# Patient Record
Sex: MALE | Race: WHITE | NOT HISPANIC OR LATINO | Employment: OTHER | URBAN - NONMETROPOLITAN AREA
[De-identification: names, ages, dates, MRNs, and addresses within clinical notes are randomized per-mention and may not be internally consistent; named-entity substitution may affect disease eponyms.]

---

## 2018-07-02 ENCOUNTER — APPOINTMENT (OUTPATIENT)
Dept: CT IMAGING | Facility: HOSPITAL | Age: 63
End: 2018-07-02

## 2018-07-02 ENCOUNTER — APPOINTMENT (OUTPATIENT)
Dept: GENERAL RADIOLOGY | Facility: HOSPITAL | Age: 63
End: 2018-07-02

## 2018-07-02 ENCOUNTER — HOSPITAL ENCOUNTER (INPATIENT)
Facility: HOSPITAL | Age: 63
LOS: 12 days | Discharge: HOME HEALTH | End: 2018-07-14
Attending: FAMILY MEDICINE | Admitting: INTERNAL MEDICINE

## 2018-07-02 DIAGNOSIS — J69.0 ASPIRATION PNEUMONIA OF LEFT LUNG, UNSPECIFIED ASPIRATION PNEUMONIA TYPE, UNSPECIFIED PART OF LUNG (HCC): Primary | ICD-10-CM

## 2018-07-02 DIAGNOSIS — R65.10 SIRS (SYSTEMIC INFLAMMATORY RESPONSE SYNDROME) (HCC): ICD-10-CM

## 2018-07-02 DIAGNOSIS — R13.12 OROPHARYNGEAL DYSPHAGIA: ICD-10-CM

## 2018-07-02 DIAGNOSIS — Z74.09 IMPAIRED FUNCTIONAL MOBILITY, BALANCE, GAIT, AND ENDURANCE: ICD-10-CM

## 2018-07-02 LAB
ALBUMIN SERPL-MCNC: 3.4 G/DL (ref 3.5–5)
ALBUMIN/GLOB SERPL: 0.9 G/DL (ref 1.1–2.5)
ALP SERPL-CCNC: 228 U/L (ref 24–120)
ALT SERPL W P-5'-P-CCNC: 69 U/L (ref 0–54)
ANION GAP SERPL CALCULATED.3IONS-SCNC: 10 MMOL/L (ref 4–13)
ANISOCYTOSIS BLD QL: ABNORMAL
APTT PPP: 41.6 SECONDS (ref 24.1–34.8)
ARTERIAL PATENCY WRIST A: POSITIVE
AST SERPL-CCNC: 60 U/L (ref 7–45)
ATMOSPHERIC PRESS: 751 MMHG
BASE EXCESS BLDA CALC-SCNC: 11 MMOL/L (ref 0–2)
BDY SITE: ABNORMAL
BILIRUB SERPL-MCNC: 0.5 MG/DL (ref 0.1–1)
BODY TEMPERATURE: 37 C
BUN BLD-MCNC: 21 MG/DL (ref 5–21)
BUN/CREAT SERPL: 47.7 (ref 7–25)
CALCIUM SPEC-SCNC: 9.5 MG/DL (ref 8.4–10.4)
CHLORIDE SERPL-SCNC: 90 MMOL/L (ref 98–110)
CO2 SERPL-SCNC: 37 MMOL/L (ref 24–31)
CREAT BLD-MCNC: 0.44 MG/DL (ref 0.5–1.4)
D DIMER PPP FEU-MCNC: 2.35 MG/L (FEU) (ref 0–0.5)
D-LACTATE SERPL-SCNC: 1.6 MMOL/L (ref 0.5–2)
DEPRECATED RDW RBC AUTO: 41.3 FL (ref 40–54)
ERYTHROCYTE [DISTWIDTH] IN BLOOD BY AUTOMATED COUNT: 16.4 % (ref 12–15)
GAS FLOW AIRWAY: 2 LPM
GFR SERPL CREATININE-BSD FRML MDRD: >150 ML/MIN/1.73
GLOBULIN UR ELPH-MCNC: 3.9 GM/DL
GLUCOSE BLD-MCNC: 130 MG/DL (ref 70–100)
HCO3 BLDA-SCNC: 37.5 MMOL/L (ref 20–26)
HCT VFR BLD AUTO: 35.1 % (ref 40–52)
HGB BLD-MCNC: 10.3 G/DL (ref 14–18)
HYPOCHROMIA BLD QL: ABNORMAL
INR PPP: 1.19 (ref 0.91–1.09)
LYMPHOCYTES # BLD MANUAL: 0.3 10*3/MM3 (ref 0.72–4.86)
LYMPHOCYTES NFR BLD MANUAL: 1 % (ref 15–45)
LYMPHOCYTES NFR BLD MANUAL: 2 % (ref 4–12)
Lab: ABNORMAL
MCH RBC QN AUTO: 20.8 PG (ref 28–32)
MCHC RBC AUTO-ENTMCNC: 29.3 G/DL (ref 33–36)
MCV RBC AUTO: 70.9 FL (ref 82–95)
MICROCYTES BLD QL: ABNORMAL
MODALITY: ABNORMAL
MONOCYTES # BLD AUTO: 0.61 10*3/MM3 (ref 0.19–1.3)
NEUTROPHILS # BLD AUTO: 29.57 10*3/MM3 (ref 1.87–8.4)
NEUTROPHILS NFR BLD MANUAL: 97 % (ref 39–78)
NT-PROBNP SERPL-MCNC: 570 PG/ML (ref 0–900)
PCO2 BLDA: 59.2 MM HG (ref 35–45)
PH BLDA: 7.41 PH UNITS (ref 7.35–7.45)
PLATELET # BLD AUTO: 493 10*3/MM3 (ref 130–400)
PMV BLD AUTO: 9.1 FL (ref 6–12)
PO2 BLDA: 61.9 MM HG (ref 83–108)
POIKILOCYTOSIS BLD QL SMEAR: ABNORMAL
POTASSIUM BLD-SCNC: 4.6 MMOL/L (ref 3.5–5.3)
PROCALCITONIN SERPL-MCNC: 0.35 NG/ML
PROT SERPL-MCNC: 7.3 G/DL (ref 6.3–8.7)
PROTHROMBIN TIME: 15.5 SECONDS (ref 11.9–14.6)
RBC # BLD AUTO: 4.95 10*6/MM3 (ref 4.8–5.9)
SAO2 % BLDCOA: 92.4 % (ref 94–99)
SCHISTOCYTES BLD QL SMEAR: ABNORMAL
SMALL PLATELETS BLD QL SMEAR: ABNORMAL
SODIUM BLD-SCNC: 137 MMOL/L (ref 135–145)
TROPONIN I SERPL-MCNC: 0.19 NG/ML (ref 0–0.03)
TROPONIN I SERPL-MCNC: 0.23 NG/ML (ref 0–0.03)
TROPONIN I SERPL-MCNC: 0.28 NG/ML (ref 0–0.03)
VENTILATOR MODE: ABNORMAL
WBC MORPH BLD: NORMAL
WBC NRBC COR # BLD: 30.48 10*3/MM3 (ref 4.8–10.8)

## 2018-07-02 PROCEDURE — 94799 UNLISTED PULMONARY SVC/PX: CPT

## 2018-07-02 PROCEDURE — 83605 ASSAY OF LACTIC ACID: CPT | Performed by: PHYSICIAN ASSISTANT

## 2018-07-02 PROCEDURE — 93010 ELECTROCARDIOGRAM REPORT: CPT | Performed by: INTERNAL MEDICINE

## 2018-07-02 PROCEDURE — 84145 PROCALCITONIN (PCT): CPT | Performed by: PHYSICIAN ASSISTANT

## 2018-07-02 PROCEDURE — 25010000002 LEVOFLOXACIN PER 250 MG: Performed by: INTERNAL MEDICINE

## 2018-07-02 PROCEDURE — 85007 BL SMEAR W/DIFF WBC COUNT: CPT | Performed by: PHYSICIAN ASSISTANT

## 2018-07-02 PROCEDURE — 85025 COMPLETE CBC W/AUTO DIFF WBC: CPT | Performed by: PHYSICIAN ASSISTANT

## 2018-07-02 PROCEDURE — 84484 ASSAY OF TROPONIN QUANT: CPT | Performed by: INTERNAL MEDICINE

## 2018-07-02 PROCEDURE — 94640 AIRWAY INHALATION TREATMENT: CPT

## 2018-07-02 PROCEDURE — 71275 CT ANGIOGRAPHY CHEST: CPT

## 2018-07-02 PROCEDURE — 93005 ELECTROCARDIOGRAM TRACING: CPT | Performed by: PHYSICIAN ASSISTANT

## 2018-07-02 PROCEDURE — 99284 EMERGENCY DEPT VISIT MOD MDM: CPT

## 2018-07-02 PROCEDURE — 71045 X-RAY EXAM CHEST 1 VIEW: CPT

## 2018-07-02 PROCEDURE — 92610 EVALUATE SWALLOWING FUNCTION: CPT

## 2018-07-02 PROCEDURE — 25010000002 PIPERACILLIN SOD-TAZOBACTAM PER 1 G: Performed by: PHYSICIAN ASSISTANT

## 2018-07-02 PROCEDURE — 85610 PROTHROMBIN TIME: CPT | Performed by: PHYSICIAN ASSISTANT

## 2018-07-02 PROCEDURE — 85379 FIBRIN DEGRADATION QUANT: CPT | Performed by: PHYSICIAN ASSISTANT

## 2018-07-02 PROCEDURE — 84484 ASSAY OF TROPONIN QUANT: CPT | Performed by: PHYSICIAN ASSISTANT

## 2018-07-02 PROCEDURE — 73502 X-RAY EXAM HIP UNI 2-3 VIEWS: CPT

## 2018-07-02 PROCEDURE — 25010000002 ENOXAPARIN PER 10 MG: Performed by: INTERNAL MEDICINE

## 2018-07-02 PROCEDURE — 87040 BLOOD CULTURE FOR BACTERIA: CPT | Performed by: PHYSICIAN ASSISTANT

## 2018-07-02 PROCEDURE — 25010000002 PIPERACILLIN SOD-TAZOBACTAM PER 1 G: Performed by: INTERNAL MEDICINE

## 2018-07-02 PROCEDURE — 80053 COMPREHEN METABOLIC PANEL: CPT | Performed by: PHYSICIAN ASSISTANT

## 2018-07-02 PROCEDURE — 83880 ASSAY OF NATRIURETIC PEPTIDE: CPT | Performed by: PHYSICIAN ASSISTANT

## 2018-07-02 PROCEDURE — 36600 WITHDRAWAL OF ARTERIAL BLOOD: CPT

## 2018-07-02 PROCEDURE — 25010000002 VANCOMYCIN PER 500 MG: Performed by: PHYSICIAN ASSISTANT

## 2018-07-02 PROCEDURE — 85730 THROMBOPLASTIN TIME PARTIAL: CPT | Performed by: PHYSICIAN ASSISTANT

## 2018-07-02 PROCEDURE — G8997 SWALLOW GOAL STATUS: HCPCS

## 2018-07-02 PROCEDURE — 0 IOPAMIDOL PER 1 ML: Performed by: PHYSICIAN ASSISTANT

## 2018-07-02 PROCEDURE — G8996 SWALLOW CURRENT STATUS: HCPCS

## 2018-07-02 PROCEDURE — 25010000002 METHYLPREDNISOLONE PER 40 MG: Performed by: INTERNAL MEDICINE

## 2018-07-02 PROCEDURE — 82803 BLOOD GASES ANY COMBINATION: CPT

## 2018-07-02 RX ORDER — SODIUM CHLORIDE 9 MG/ML
100 INJECTION, SOLUTION INTRAVENOUS CONTINUOUS
Status: DISCONTINUED | OUTPATIENT
Start: 2018-07-02 | End: 2018-07-03

## 2018-07-02 RX ORDER — CITALOPRAM 20 MG/1
20 TABLET ORAL DAILY
Status: DISCONTINUED | OUTPATIENT
Start: 2018-07-02 | End: 2018-07-02

## 2018-07-02 RX ORDER — METOPROLOL TARTRATE 50 MG/1
50 TABLET, FILM COATED ORAL EVERY 12 HOURS SCHEDULED
Status: DISCONTINUED | OUTPATIENT
Start: 2018-07-02 | End: 2018-07-03

## 2018-07-02 RX ORDER — ASPIRIN 81 MG/1
81 TABLET ORAL DAILY
COMMUNITY

## 2018-07-02 RX ORDER — PANTOPRAZOLE SODIUM 40 MG/1
40 TABLET, DELAYED RELEASE ORAL
Status: DISCONTINUED | OUTPATIENT
Start: 2018-07-02 | End: 2018-07-03

## 2018-07-02 RX ORDER — MIRTAZAPINE 15 MG/1
15 TABLET, FILM COATED ORAL DAILY
COMMUNITY
End: 2018-07-14 | Stop reason: HOSPADM

## 2018-07-02 RX ORDER — SIMVASTATIN 20 MG
20 TABLET ORAL NIGHTLY
COMMUNITY

## 2018-07-02 RX ORDER — ONDANSETRON 2 MG/ML
4 INJECTION INTRAMUSCULAR; INTRAVENOUS EVERY 6 HOURS PRN
Status: DISCONTINUED | OUTPATIENT
Start: 2018-07-02 | End: 2018-07-14 | Stop reason: HOSPADM

## 2018-07-02 RX ORDER — SODIUM CHLORIDE 0.9 % (FLUSH) 0.9 %
1-10 SYRINGE (ML) INJECTION AS NEEDED
Status: DISCONTINUED | OUTPATIENT
Start: 2018-07-02 | End: 2018-07-14 | Stop reason: HOSPADM

## 2018-07-02 RX ORDER — ROPINIROLE 2 MG/1
2 TABLET, FILM COATED ORAL NIGHTLY
COMMUNITY
End: 2018-07-14 | Stop reason: HOSPADM

## 2018-07-02 RX ORDER — IPRATROPIUM BROMIDE AND ALBUTEROL SULFATE 2.5; .5 MG/3ML; MG/3ML
3 SOLUTION RESPIRATORY (INHALATION)
Status: DISCONTINUED | OUTPATIENT
Start: 2018-07-02 | End: 2018-07-14 | Stop reason: HOSPADM

## 2018-07-02 RX ORDER — GUAIFENESIN 600 MG/1
1200 TABLET, EXTENDED RELEASE ORAL EVERY 12 HOURS SCHEDULED
Status: DISCONTINUED | OUTPATIENT
Start: 2018-07-02 | End: 2018-07-14 | Stop reason: HOSPADM

## 2018-07-02 RX ORDER — LEVOFLOXACIN 5 MG/ML
750 INJECTION, SOLUTION INTRAVENOUS EVERY 24 HOURS
Status: COMPLETED | OUTPATIENT
Start: 2018-07-02 | End: 2018-07-04

## 2018-07-02 RX ORDER — ACETAMINOPHEN 325 MG/1
650 TABLET ORAL EVERY 4 HOURS PRN
Status: DISCONTINUED | OUTPATIENT
Start: 2018-07-02 | End: 2018-07-14 | Stop reason: HOSPADM

## 2018-07-02 RX ORDER — AMITRIPTYLINE HYDROCHLORIDE 25 MG/1
50 TABLET, FILM COATED ORAL NIGHTLY
Status: DISCONTINUED | OUTPATIENT
Start: 2018-07-02 | End: 2018-07-11

## 2018-07-02 RX ORDER — METHYLPREDNISOLONE SODIUM SUCCINATE 40 MG/ML
40 INJECTION, POWDER, LYOPHILIZED, FOR SOLUTION INTRAMUSCULAR; INTRAVENOUS EVERY 6 HOURS
Status: DISCONTINUED | OUTPATIENT
Start: 2018-07-02 | End: 2018-07-04

## 2018-07-02 RX ADMIN — IPRATROPIUM BROMIDE AND ALBUTEROL SULFATE 3 ML: 2.5; .5 SOLUTION RESPIRATORY (INHALATION) at 16:24

## 2018-07-02 RX ADMIN — ENOXAPARIN SODIUM 40 MG: 100 INJECTION SUBCUTANEOUS at 16:53

## 2018-07-02 RX ADMIN — IPRATROPIUM BROMIDE AND ALBUTEROL SULFATE 3 ML: 2.5; .5 SOLUTION RESPIRATORY (INHALATION) at 19:03

## 2018-07-02 RX ADMIN — IOPAMIDOL 100 ML: 755 INJECTION, SOLUTION INTRAVENOUS at 13:13

## 2018-07-02 RX ADMIN — METHYLPREDNISOLONE SODIUM SUCCINATE 40 MG: 40 INJECTION, POWDER, FOR SOLUTION INTRAMUSCULAR; INTRAVENOUS at 21:17

## 2018-07-02 RX ADMIN — SODIUM CHLORIDE 1000 ML: 9 INJECTION, SOLUTION INTRAVENOUS at 14:21

## 2018-07-02 RX ADMIN — TAZOBACTAM SODIUM AND PIPERACILLIN SODIUM 3.38 G: 375; 3 INJECTION, SOLUTION INTRAVENOUS at 21:17

## 2018-07-02 RX ADMIN — IPRATROPIUM BROMIDE AND ALBUTEROL SULFATE 3 ML: 2.5; .5 SOLUTION RESPIRATORY (INHALATION) at 23:02

## 2018-07-02 RX ADMIN — SODIUM CHLORIDE 1000 ML: 9 INJECTION, SOLUTION INTRAVENOUS at 12:49

## 2018-07-02 RX ADMIN — METHYLPREDNISOLONE SODIUM SUCCINATE 40 MG: 40 INJECTION, POWDER, FOR SOLUTION INTRAMUSCULAR; INTRAVENOUS at 16:53

## 2018-07-02 RX ADMIN — TAZOBACTAM SODIUM AND PIPERACILLIN SODIUM 4.5 G: 500; 4 INJECTION, SOLUTION INTRAVENOUS at 13:55

## 2018-07-02 RX ADMIN — LEVOFLOXACIN 750 MG: 5 INJECTION, SOLUTION INTRAVENOUS at 16:53

## 2018-07-02 RX ADMIN — SODIUM CHLORIDE 100 ML/HR: 9 INJECTION, SOLUTION INTRAVENOUS at 15:58

## 2018-07-02 RX ADMIN — VANCOMYCIN HYDROCHLORIDE 1000 MG: 1 INJECTION, POWDER, LYOPHILIZED, FOR SOLUTION INTRAVENOUS at 14:22

## 2018-07-02 NOTE — PLAN OF CARE
Problem: Fall Risk (Adult)  Goal: Identify Related Risk Factors and Signs and Symptoms  Outcome: Ongoing (interventions implemented as appropriate)      Problem: Patient Care Overview  Goal: Plan of Care Review  Outcome: Ongoing (interventions implemented as appropriate)   07/02/18 5761   Coping/Psychosocial   Plan of Care Reviewed With patient   Plan of Care Review   Progress no change   OTHER   Outcome Summary Pt on 3 liter O2 at this time. He is NPO until he has a dysphagia evaluation tomorrow. He remains alert, oriented and pleasant. No c/o pain , heart rhythm remains tachycardic.       Problem: Sepsis/Septic Shock (Adult)  Goal: Signs and Symptoms of Listed Potential Problems Will be Absent, Minimized or Managed (Sepsis/Septic Shock)  Outcome: Ongoing (interventions implemented as appropriate)

## 2018-07-02 NOTE — THERAPY EVALUATION
Acute Care - Speech Language Pathology   Swallow Initial Evaluation [facility]     Patient Name: Kellie Arzate  : 1955  MRN: [id number]  Today's Date: 2018               Admit Date: 2018       SPEECH-LANGUAGE PATHOLOGY EVALUATION - SWALLOW  Subjective: The patient was seen on this date for a Clinical Swallow evaluation.  Patient was alert and cooperative.  Significant history: Prior PEG, prior trach, hx of dysphagia and aspiration   Objective: Textures given included puree consistency.  Assessment: Upon ST arrival, pt presented with weak, significantly congested cough with increased respirations following coughing instances. Wet sounds noted in cough at times. Pt was presented pureed trials only. Pt was noted to have up to three multiple swallows, with delayed coughs noted following each trial. Feel pt is at a high risk for aspiration given decreased respiratory support, also supported by acute CXR showing new L consolidation. Cannot ensure safety with PO intake at this time.   SLP Findings:  Patient presents with suspected pharyngeal dysphagia, without esophageal component.   Recommendations: Diet Textures: NPO, except ok for ice chips following aspiration precautions.  Medications should be taken with puree. May have ice between meals after oral care, under staff or family supervision and with the recommended strategies for safe swallowing.   Recommended Strategies: Upright for PO and small bites and sips. Oral care BID and prior to ice chips.  Other Recommended Evaluations: VFSS to be completed on 2018  Dysphagia therapy is recommended. Rationale: See above. VFSS to be completed in Radiology tomorrow. ST to follow up with further nutritional recommendations following VFSS. Thanks!   Dinorah Thomas, CCC-SLP 2018 4:31 PM    Visit Dx:     ICD-10-CM ICD-9-CM   1. Aspiration pneumonia of left lung, unspecified aspiration pneumonia type, unspecified part of lung J69.0 507.0   2. SIRS  (systemic inflammatory response syndrome) (CMS/[facility]) R65.10 995.90   3. Oropharyngeal dysphagia R13.12 787.22     Patient Active Problem List   Diagnosis   • Aspiration pneumonia of left lung     Past Medical History:   Diagnosis Date   • Anxiety    • Arthritis    • COPD (chronic obstructive pulmonary disease) (CMS/[facility])    • Depression    • Hypertension    • Nephrolithiasis      Past Surgical History:   Procedure Laterality Date   • COLONOSCOPY  04/20/2007    Within Normal Limits.   • ENDOSCOPY  04/20/2007    urea neg, sbbx benign, mild gastritis, small hiatal hernia   • ENDOSCOPY N/A 10/5/2016    Procedure: ESOPHAGOGASTRODUODENOSCOPY WITH ANESTHESIA;  Surgeon: Kevyn Damon MD;  Location: [facility] ENDOSCOPY;  Service:    • HEMORRHOIDECTOMY     • PEG TUBE INSERTION  07/22/2016    Dr Mccarty   • TRACHEOSTOMY            SWALLOW EVALUATION (last 72 hours)      SLP Adult Swallow Evaluation     Row Name 07/02/18 1530                   Rehab Evaluation    Document Type evaluation  -TM        Subjective Information no complaints  -TM        Patient Observations alert;cooperative  -TM        Patient/Family Observations No family present at time of eval; however, Keyla SNOW present at end of eval.   -TM        Patient Effort adequate  -TM           General Information    Patient Profile Reviewed yes  -TM        Pertinent History Of Current Problem CXR 07/02/2018 showed L consolidation, new   -TM        Current Method of Nutrition NPO  -TM        Precautions/Limitations, Vision WFL  -TM        Precautions/Limitations, Hearing WFL  -TM        Prior Level of Function-Communication WFL  -TM        Prior Level of Function-Swallowing other (see comments)   Hx of dysphagia, hx of trach, hx of PEG   -TM        Plans/Goals Discussed with patient;other (see comments)   Keyla SNOW.  -TM        Barriers to Rehab previous functional deficit  -TM        Patient's Goals for Discharge patient did not state  -TM        Family Goals for Discharge  other (see comments)   No family present  -TM           Pain Assessment    Additional Documentation Pain Scale: Numbers Pre/Post-Treatment (Group)  -TM           Pain Scale: Numbers Pre/Post-Treatment    Pain Scale: Numbers, Pretreatment 0/10 - no pain  -TM        Pain Scale: Numbers, Post-Treatment 0/10 - no pain  -TM           Oral Motor and Function    Dentition Assessment edentulous, dentures not available;other (see comments)   Stated he has upper dentures, wears inconsistently  -TM        Secretion Management dried secretions in oral cavity;other (see comments)   Lingually and labially  -TM        Mucosal Quality dry  -TM        Volitional Swallow WFL  -TM        Volitional Cough weak;reduced respiratory support  -TM           Oral Musculature and Cranial Nerve Assessment    Oral Motor General Assessment WFL  -TM           General Eating/Swallowing Observations    Respiratory Support Currently in Use nasal cannula  -TM        Eating/Swallowing Skills fed by SLP  -TM        Positioning During Eating upright 90 degree;upright in bed  -TM        Utensils Used spoon  -TM        Consistencies Trialed pudding thick  -TM           Clinical Swallow Eval    Oral Prep Phase WFL  -TM        Oral Transit WFL  -TM        Oral Residue WFL  -TM        Pharyngeal Phase suspected pharyngeal impairment  -TM        Esophageal Phase unremarkable  -TM        Clinical Swallow Evaluation Summary Bedside swallow eval completed. Upon ST arrival, pt presented with weak, significantly congested cough with increased respirations following coughing instances. Wet sounds noted in cough at times. Pt was presented pureed trials only. Pt was noted to have up to three multiple swallows, with delayed coughs noted following each trial. Feel pt is at a high risk for aspiration given decreased respiratory support, also supported by acute CXR showing new L consolidation. Cannot ensure safety with PO intake at this time.   -TM           Pharyngeal  Phase Concerns    Pharyngeal Phase Concerns multiple swallows;cough  -TM        Multiple Swallows pudding  -TM           Clinical Impression    SLP Swallowing Diagnosis functional oral phase;suspected pharyngeal dysfunction  -TM        Functional Impact risk of aspiration/pneumonia;risk of malnutrition;risk of dehydration  -TM        Rehab Potential/Prognosis, Swallowing adequate, monitor progress closely  -TM        Criteria for Skilled Therapeutic Interventions Met demonstrates skilled criteria  -TM           Recommendations    Therapy Frequency (Swallow) daily  -TM        Predicted Duration Therapy Intervention (Days) until discharge  -TM        SLP Diet Recommendation NPO;ice chips between meals after oral care, with supervision  -TM        Recommended Diagnostics VFSS (MBS);other (see comments)   07/03/2018  -TM        Recommended Precautions and Strategies upright posture during/after eating;other (see comments)   For ice chips, oral care prior to ice chips  -TM        SLP Rec. for Method of Medication Administration meds via alternate route  -TM        Monitor for Signs of Aspiration yes;cough;gurgly voice;throat clearing;pneumonia;right lower lobe infiltrates  -TM        Anticipated Dischage Disposition unknown  -TM           Swallow Goals (SLP)    Oral Nutrition/Hydration Goal Selection (SLP) oral nutrition/hydration, SLP goal 1  -TM           Oral Nutrition/Hydration Goal 1 (SLP)    Oral Nutrition/Hydration Goal 1, SLP Pt will tolerate LRD without overt s/s of aspiration.   -TM        Time Frame (Oral Nutrition/Hydration Goal 1, SLP) short term goal (STG);by discharge  -TM        Barriers (Oral Nutrition/Hydration Goal 1, SLP) N/A  -TM        Progress/Outcomes (Oral Nutrition/Hydration Goal 1, SLP) goal ongoing  -TM          User Key  (r) = Recorded By, (t) = Taken By, (c) = Cosigned By    Initials Name Effective Dates    TM Dinorah Thomas CCC-SLP 08/02/16 -         EDUCATION  The patient has been  educated in the following areas:   Dysphagia (Swallowing Impairment).    SLP Recommendation and Plan  SLP Swallowing Diagnosis: functional oral phase, suspected pharyngeal dysfunction  SLP Diet Recommendation: NPO, ice chips between meals after oral care, with supervision  Recommended Precautions and Strategies: upright posture during/after eating, other (see comments) (For ice chips, oral care prior to ice chips)     Monitor for Signs of Aspiration: yes, cough, gurgly voice, throat clearing, pneumonia, right lower lobe infiltrates  Recommended Diagnostics: VFSS (MBS), other (see comments) (07/03/2018)  Criteria for Skilled Therapeutic Interventions Met: demonstrates skilled criteria  Anticipated Dischage Disposition: unknown  Rehab Potential/Prognosis, Swallowing: adequate, monitor progress closely  Therapy Frequency (Swallow): daily  Predicted Duration Therapy Intervention (Days): until discharge       Plan of Care Reviewed With: patient, other (see comments) (RN)  Plan of Care Review  Plan of Care Reviewed With: patient, other (see comments) (RN)  Progress:  (Eval)  Outcome Summary: Bedside swallow eval completed. Upon ST arrival, pt presented with weak, significantly congested cough with increased respirations following coughing instances. Wet sounds noted in cough at times. Pt was presented pureed trials only. Pt was noted to have up to three multiple swallows, with delayed coughs noted following each trial. Feel pt is at a high risk for aspiration given decreased respiratory support, also supported by acute CXR showing new L consolidation. Cannot ensure safety with PO intake at this time. RECOMMENDATIONS: NPO except ok for ice chips s/p oral care, following aspiration precautions; no PO meds; RN to monitor for increased congestion, if concerns noted, hold ice chips until VFSS, scheduled for 07/03/2018. Thanks!           SLP GOALS     Row Name 07/02/18 8649             Oral Nutrition/Hydration Goal 1 (SLP)     Oral Nutrition/Hydration Goal 1, SLP Pt will tolerate LRD without overt s/s of aspiration.   -TM      Time Frame (Oral Nutrition/Hydration Goal 1, SLP) short term goal (STG);by discharge  -TM      Barriers (Oral Nutrition/Hydration Goal 1, SLP) N/A  -TM      Progress/Outcomes (Oral Nutrition/Hydration Goal 1, SLP) goal ongoing  -TM        User Key  (r) = Recorded By, (t) = Taken By, (c) = Cosigned By    Initials Name Provider Type    TM Dinorah Thomas CCC-SLP Speech and Language Pathologist             SLP Outcome Measures (last 72 hours)      SLP Outcome Measures     Row Name 07/02/18 1626             SLP Outcome Measures    Outcome Measure Used? Adult NOMS  -TM         FCM Scores    FCM Chosen Swallowing  -TM      Swallowing FCM Score 2  -TM        User Key  (r) = Recorded By, (t) = Taken By, (c) = Cosigned By    Initials Name Effective Dates    TM DAKOTAH España 08/02/16 -            Time Calculation:         Time Calculation- SLP     Row Name 07/02/18 1627             Time Calculation- SLP    SLP Start Time 1530  -TM      SLP Stop Time 1630  -TM      SLP Time Calculation (min) 60 min  -TM      SLP Received On 07/02/18  -TM      SLP Goal Re-Cert Due Date 07/12/18  -TM        User Key  (r) = Recorded By, (t) = Taken By, (c) = Cosigned By    Initials Name Provider Type     DAKOTAH España Speech and Language Pathologist          Therapy Charges for Today     Code Description Service Date Service Provider Modifiers Qty    22049859876 HC ST SWALLOWING CURRENT STATUS 7/2/2018 DAKOTAH España GN, CM 1    69667663186 HC ST SWALLOWING PROJECTED 7/2/2018 DAKOTAH España GN, CL 1    [id number] HC ST EVAL ORAL PHARYNG SWALLOW 4 7/2/2018 DAKOTAH España GN 1          SLP G-Codes  SLP NOMS Used?: Yes  Functional Limitations: Swallowing  Swallow Current Status (): At least 80 percent but less than 100 percent impaired, limited or restricted  Swallow Goal Status (): At  least 60 percent but less than 80 percent impaired, limited or restricted    Dinorah Thomas, LOWELL-SLP  7/2/2018

## 2018-07-02 NOTE — PLAN OF CARE
Problem: Patient Care Overview  Goal: Plan of Care Review  Outcome: Ongoing (interventions implemented as appropriate)   07/02/18 4885   Coping/Psychosocial   Plan of Care Reviewed With patient;other (see comments)  (RN)   Plan of Care Review   Progress (Eval)   OTHER   Outcome Summary Bedside swallow eval completed. Upon ST arrival, pt presented with weak, significantly congested cough with increased respirations following coughing instances. Wet sounds noted in cough at times. Pt was presented pureed trials only. Pt was noted to have up to three multiple swallows, with delayed coughs noted following each trial. Feel pt is at a high risk for aspiration given decreased respiratory support, also supported by acute CXR showing new L consolidation. Cannot ensure safety with PO intake at this time. RECOMMENDATIONS: NPO except ok for ice chips s/p oral care, following aspiration precautions; no PO meds; RN to monitor for increased congestion, if concerns noted, hold ice chips until VFSS, scheduled for 07/03/2018. Thanks!

## 2018-07-02 NOTE — ED PROVIDER NOTES
Subjective   History of Present Illness  63-year-old male presents with a chief complaint of five-day history of weakness, frequent falls, cough productive of green sputum.  Patient also notes he has some chest pain described as a dull ache.  He says this is mild.  Patient has a history of pulmonary embolus and used to be on Coumadin but no longer takes this.  The patient reports she has some tenderness from a fall on his right lower back.  He reports after taking a few steps his legs can no longer take it to give out.  He says this is a combination of pain and weakness.  No fevers vomiting or diarrhea.  The patient reports his mouth does feel very dry.  Review of Systems   All other systems reviewed and are negative.      Past Medical History:   Diagnosis Date   • Anxiety    • Arthritis    • COPD (chronic obstructive pulmonary disease) (CMS/HCC)    • Depression    • Hypertension    • Nephrolithiasis        No Known Allergies    Past Surgical History:   Procedure Laterality Date   • COLONOSCOPY  04/20/2007    Within Normal Limits.   • ENDOSCOPY  04/20/2007    urea neg, sbbx benign, mild gastritis, small hiatal hernia   • ENDOSCOPY N/A 10/5/2016    Procedure: ESOPHAGOGASTRODUODENOSCOPY WITH ANESTHESIA;  Surgeon: Kevyn Damon MD;  Location: [facility] ENDOSCOPY;  Service:    • HEMORRHOIDECTOMY     • PEG TUBE INSERTION  07/22/2016    Dr Mccarty   • TRACHEOSTOMY         History reviewed. No pertinent family history.    Social History     Social History   • Marital status:      Social History Main Topics   • Smoking status: Current Every Day Smoker     Packs/day: 0.50     Types: Cigarettes   • Smokeless tobacco: Never Used   • Alcohol use No   • Drug use: Unknown     Other Topics Concern   • Not on file           Objective   Physical Exam   Constitutional: He is oriented to person, place, and time.   Cachectic, appears chronically ill   HENT:   Dry oral mucous membranes   Eyes: EOM are normal. Pupils are equal, round,  and reactive to light. No scleral icterus.   Neck: Normal range of motion. Neck supple.   Cardiovascular: Tachycardia present.    Pulmonary/Chest: Effort normal. He has wheezes. He has rales.   Abdominal: Soft. Bowel sounds are normal.   Musculoskeletal: Normal range of motion.   No pain with internal and external rotation of hips bilaterally    Lymphadenopathy:     He has no cervical adenopathy.   Neurological: He is alert and oriented to person, place, and time. No cranial nerve deficit. Coordination normal.   Skin: Skin is warm and dry.   Psychiatric: He has a normal mood and affect. His behavior is normal.   Nursing note and vitals reviewed.      Procedures           ED Course                  MDM  Number of Diagnoses or Management Options  Diagnosis management comments: Will admit to hospitalist for aspiration pneumonia, IV abx       Amount and/or Complexity of Data Reviewed  Clinical lab tests: reviewed and ordered  Tests in the radiology section of CPT®: ordered and reviewed  Tests in the medicine section of CPT®: ordered and reviewed  Decide to obtain previous medical records or to obtain history from someone other than the patient: yes    Risk of Complications, Morbidity, and/or Mortality  Presenting problems: moderate  Diagnostic procedures: moderate  Management options: moderate    Patient Progress  Patient progress: stable        Final diagnoses:   Aspiration pneumonia of left lung, unspecified aspiration pneumonia type, unspecified part of lung (CMS/[facility])   SIRS (systemic inflammatory response syndrome) (CMS/[facility])            Eduar Mcfarland PA-C  07/02/18 7959

## 2018-07-03 ENCOUNTER — APPOINTMENT (OUTPATIENT)
Dept: GENERAL RADIOLOGY | Facility: HOSPITAL | Age: 63
End: 2018-07-03

## 2018-07-03 ENCOUNTER — APPOINTMENT (OUTPATIENT)
Dept: CARDIOLOGY | Facility: HOSPITAL | Age: 63
End: 2018-07-03
Attending: INTERNAL MEDICINE

## 2018-07-03 LAB
ALBUMIN SERPL-MCNC: 2.6 G/DL (ref 3.5–5)
ALBUMIN/GLOB SERPL: 0.8 G/DL (ref 1.1–2.5)
ALP SERPL-CCNC: 169 U/L (ref 24–120)
ALT SERPL W P-5'-P-CCNC: 50 U/L (ref 0–54)
ANION GAP SERPL CALCULATED.3IONS-SCNC: 8 MMOL/L (ref 4–13)
AST SERPL-CCNC: 54 U/L (ref 7–45)
BILIRUB SERPL-MCNC: 0.4 MG/DL (ref 0.1–1)
BUN BLD-MCNC: 12 MG/DL (ref 5–21)
BUN/CREAT SERPL: 32.4 (ref 7–25)
CALCIUM SPEC-SCNC: 8.4 MG/DL (ref 8.4–10.4)
CHLORIDE SERPL-SCNC: 96 MMOL/L (ref 98–110)
CO2 SERPL-SCNC: 33 MMOL/L (ref 24–31)
CREAT BLD-MCNC: 0.37 MG/DL (ref 0.5–1.4)
DEPRECATED RDW RBC AUTO: 40.9 FL (ref 40–54)
ERYTHROCYTE [DISTWIDTH] IN BLOOD BY AUTOMATED COUNT: 16.4 % (ref 12–15)
GFR SERPL CREATININE-BSD FRML MDRD: >150 ML/MIN/1.73
GLOBULIN UR ELPH-MCNC: 3.1 GM/DL
GLUCOSE BLD-MCNC: 171 MG/DL (ref 70–100)
HCT VFR BLD AUTO: 29.4 % (ref 40–52)
HGB BLD-MCNC: 8.9 G/DL (ref 14–18)
IRON 24H UR-MRATE: 11 MCG/DL (ref 42–180)
IRON SATN MFR SERPL: 5 % (ref 20–45)
MCH RBC QN AUTO: 21 PG (ref 28–32)
MCHC RBC AUTO-ENTMCNC: 30.3 G/DL (ref 33–36)
MCV RBC AUTO: 69.3 FL (ref 82–95)
PLATELET # BLD AUTO: 390 10*3/MM3 (ref 130–400)
PMV BLD AUTO: 9.1 FL (ref 6–12)
POTASSIUM BLD-SCNC: 3.5 MMOL/L (ref 3.5–5.3)
PREALB SERPL-MCNC: <3 MG/DL (ref 18–36)
PROT SERPL-MCNC: 5.7 G/DL (ref 6.3–8.7)
RBC # BLD AUTO: 4.24 10*6/MM3 (ref 4.8–5.9)
SODIUM BLD-SCNC: 137 MMOL/L (ref 135–145)
TIBC SERPL-MCNC: 228 MCG/DL (ref 225–420)
TROPONIN I SERPL-MCNC: 0.26 NG/ML (ref 0–0.03)
WBC NRBC COR # BLD: 23.12 10*3/MM3 (ref 4.8–10.8)

## 2018-07-03 PROCEDURE — 84134 ASSAY OF PREALBUMIN: CPT | Performed by: INTERNAL MEDICINE

## 2018-07-03 PROCEDURE — 25010000002 METHYLPREDNISOLONE PER 40 MG: Performed by: INTERNAL MEDICINE

## 2018-07-03 PROCEDURE — 92611 MOTION FLUOROSCOPY/SWALLOW: CPT

## 2018-07-03 PROCEDURE — 25010000002 PIPERACILLIN SOD-TAZOBACTAM PER 1 G: Performed by: INTERNAL MEDICINE

## 2018-07-03 PROCEDURE — 87205 SMEAR GRAM STAIN: CPT | Performed by: INTERNAL MEDICINE

## 2018-07-03 PROCEDURE — 94799 UNLISTED PULMONARY SVC/PX: CPT

## 2018-07-03 PROCEDURE — 87070 CULTURE OTHR SPECIMN AEROBIC: CPT | Performed by: INTERNAL MEDICINE

## 2018-07-03 PROCEDURE — 85027 COMPLETE CBC AUTOMATED: CPT | Performed by: INTERNAL MEDICINE

## 2018-07-03 PROCEDURE — 25810000003 SODIUM CHLORIDE 0.9 % WITH KCL 20 MEQ 20-0.9 MEQ/L-% SOLUTION: Performed by: INTERNAL MEDICINE

## 2018-07-03 PROCEDURE — 93325 DOPPLER ECHO COLOR FLOW MAPG: CPT

## 2018-07-03 PROCEDURE — 74230 X-RAY XM SWLNG FUNCJ C+: CPT

## 2018-07-03 PROCEDURE — 80053 COMPREHEN METABOLIC PANEL: CPT | Performed by: INTERNAL MEDICINE

## 2018-07-03 PROCEDURE — 83550 IRON BINDING TEST: CPT | Performed by: INTERNAL MEDICINE

## 2018-07-03 PROCEDURE — 93308 TTE F-UP OR LMTD: CPT | Performed by: INTERNAL MEDICINE

## 2018-07-03 PROCEDURE — 25010000002 LEVOFLOXACIN PER 250 MG: Performed by: INTERNAL MEDICINE

## 2018-07-03 PROCEDURE — 93321 DOPPLER ECHO F-UP/LMTD STD: CPT

## 2018-07-03 PROCEDURE — 25010000002 IRON SUCROSE PER 1 MG: Performed by: INTERNAL MEDICINE

## 2018-07-03 PROCEDURE — 25010000002 ENOXAPARIN PER 10 MG: Performed by: INTERNAL MEDICINE

## 2018-07-03 PROCEDURE — 84484 ASSAY OF TROPONIN QUANT: CPT | Performed by: INTERNAL MEDICINE

## 2018-07-03 PROCEDURE — 93308 TTE F-UP OR LMTD: CPT

## 2018-07-03 PROCEDURE — 93325 DOPPLER ECHO COLOR FLOW MAPG: CPT | Performed by: INTERNAL MEDICINE

## 2018-07-03 PROCEDURE — 83540 ASSAY OF IRON: CPT | Performed by: INTERNAL MEDICINE

## 2018-07-03 PROCEDURE — 93321 DOPPLER ECHO F-UP/LMTD STD: CPT | Performed by: INTERNAL MEDICINE

## 2018-07-03 RX ORDER — METOPROLOL TARTRATE 5 MG/5ML
5 INJECTION INTRAVENOUS EVERY 8 HOURS
Status: DISCONTINUED | OUTPATIENT
Start: 2018-07-03 | End: 2018-07-03

## 2018-07-03 RX ORDER — ASPIRIN 81 MG/1
81 TABLET ORAL DAILY
Status: DISCONTINUED | OUTPATIENT
Start: 2018-07-03 | End: 2018-07-14 | Stop reason: HOSPADM

## 2018-07-03 RX ORDER — SODIUM CHLORIDE AND POTASSIUM CHLORIDE 150; 900 MG/100ML; MG/100ML
20 INJECTION, SOLUTION INTRAVENOUS CONTINUOUS
Status: DISCONTINUED | OUTPATIENT
Start: 2018-07-03 | End: 2018-07-10

## 2018-07-03 RX ORDER — METOPROLOL TARTRATE 50 MG/1
50 TABLET, FILM COATED ORAL EVERY 12 HOURS SCHEDULED
Status: DISCONTINUED | OUTPATIENT
Start: 2018-07-03 | End: 2018-07-14 | Stop reason: HOSPADM

## 2018-07-03 RX ORDER — FAMOTIDINE 10 MG/ML
20 INJECTION, SOLUTION INTRAVENOUS DAILY
Status: DISCONTINUED | OUTPATIENT
Start: 2018-07-03 | End: 2018-07-04

## 2018-07-03 RX ORDER — POTASSIUM CHLORIDE 14.9 MG/ML
10 INJECTION INTRAVENOUS
Status: COMPLETED | OUTPATIENT
Start: 2018-07-03 | End: 2018-07-03

## 2018-07-03 RX ADMIN — POTASSIUM CHLORIDE 10 MEQ: 200 INJECTION, SOLUTION INTRAVENOUS at 10:04

## 2018-07-03 RX ADMIN — GUAIFENESIN 1200 MG: 600 TABLET, EXTENDED RELEASE ORAL at 21:08

## 2018-07-03 RX ADMIN — LEVOFLOXACIN 750 MG: 5 INJECTION, SOLUTION INTRAVENOUS at 16:44

## 2018-07-03 RX ADMIN — BARIUM SULFATE 20 ML: 400 PASTE ORAL at 13:40

## 2018-07-03 RX ADMIN — IPRATROPIUM BROMIDE AND ALBUTEROL SULFATE 3 ML: 2.5; .5 SOLUTION RESPIRATORY (INHALATION) at 15:39

## 2018-07-03 RX ADMIN — AMITRIPTYLINE HYDROCHLORIDE 50 MG: 25 TABLET, FILM COATED ORAL at 21:09

## 2018-07-03 RX ADMIN — METHYLPREDNISOLONE SODIUM SUCCINATE 40 MG: 40 INJECTION, POWDER, FOR SOLUTION INTRAMUSCULAR; INTRAVENOUS at 04:30

## 2018-07-03 RX ADMIN — TAZOBACTAM SODIUM AND PIPERACILLIN SODIUM 3.38 G: 375; 3 INJECTION, SOLUTION INTRAVENOUS at 05:54

## 2018-07-03 RX ADMIN — METHYLPREDNISOLONE SODIUM SUCCINATE 40 MG: 40 INJECTION, POWDER, FOR SOLUTION INTRAMUSCULAR; INTRAVENOUS at 11:42

## 2018-07-03 RX ADMIN — METHYLPREDNISOLONE SODIUM SUCCINATE 40 MG: 40 INJECTION, POWDER, FOR SOLUTION INTRAMUSCULAR; INTRAVENOUS at 22:12

## 2018-07-03 RX ADMIN — TAZOBACTAM SODIUM AND PIPERACILLIN SODIUM 3.38 G: 375; 3 INJECTION, SOLUTION INTRAVENOUS at 22:11

## 2018-07-03 RX ADMIN — POTASSIUM CHLORIDE 10 MEQ: 200 INJECTION, SOLUTION INTRAVENOUS at 10:48

## 2018-07-03 RX ADMIN — BARIUM SULFATE 20 ML: 0.81 POWDER, FOR SUSPENSION ORAL at 13:40

## 2018-07-03 RX ADMIN — IPRATROPIUM BROMIDE AND ALBUTEROL SULFATE 3 ML: 2.5; .5 SOLUTION RESPIRATORY (INHALATION) at 18:55

## 2018-07-03 RX ADMIN — METOROPROLOL TARTRATE 5 MG: 5 INJECTION, SOLUTION INTRAVENOUS at 09:50

## 2018-07-03 RX ADMIN — FAMOTIDINE 20 MG: 10 INJECTION, SOLUTION INTRAVENOUS at 09:50

## 2018-07-03 RX ADMIN — IPRATROPIUM BROMIDE AND ALBUTEROL SULFATE 3 ML: 2.5; .5 SOLUTION RESPIRATORY (INHALATION) at 02:53

## 2018-07-03 RX ADMIN — IRON SUCROSE 200 MG: 20 INJECTION, SOLUTION INTRAVENOUS at 09:59

## 2018-07-03 RX ADMIN — IPRATROPIUM BROMIDE AND ALBUTEROL SULFATE 3 ML: 2.5; .5 SOLUTION RESPIRATORY (INHALATION) at 12:04

## 2018-07-03 RX ADMIN — BARIUM SULFATE 20 ML: 400 SUSPENSION ORAL at 13:40

## 2018-07-03 RX ADMIN — POTASSIUM CHLORIDE AND SODIUM CHLORIDE 100 ML/HR: 900; 150 INJECTION, SOLUTION INTRAVENOUS at 10:04

## 2018-07-03 RX ADMIN — TAZOBACTAM SODIUM AND PIPERACILLIN SODIUM 3.38 G: 375; 3 INJECTION, SOLUTION INTRAVENOUS at 14:42

## 2018-07-03 RX ADMIN — ENOXAPARIN SODIUM 40 MG: 100 INJECTION SUBCUTANEOUS at 16:43

## 2018-07-03 RX ADMIN — METOPROLOL TARTRATE 50 MG: 50 TABLET ORAL at 21:08

## 2018-07-03 RX ADMIN — POTASSIUM CHLORIDE 10 MEQ: 200 INJECTION, SOLUTION INTRAVENOUS at 11:43

## 2018-07-03 RX ADMIN — IPRATROPIUM BROMIDE AND ALBUTEROL SULFATE 3 ML: 2.5; .5 SOLUTION RESPIRATORY (INHALATION) at 06:25

## 2018-07-03 RX ADMIN — METHYLPREDNISOLONE SODIUM SUCCINATE 40 MG: 40 INJECTION, POWDER, FOR SOLUTION INTRAMUSCULAR; INTRAVENOUS at 16:43

## 2018-07-03 NOTE — PROGRESS NOTES
Discharge Planning Assessment  [facility]     Patient Name: Kellie Arzate  MRN: [id number]  Today's Date: 7/3/2018    Admit Date: 7/2/2018          Discharge Needs Assessment     Row Name 07/03/18 1044       Living Environment    Lives With alone    Current Living Arrangements home/apartment/condo    Potentially Unsafe Housing Conditions other (see comments)    Primary Care Provided by self    Provides Primary Care For pet(s)    Family Caregiver if Needed other (see comments)    Quality of Family Relationships supportive;helpful;involved    Able to Return to Prior Arrangements other (see comments)    Living Arrangement Comments Family plans to remove the 32 cats.       Resource/Environmental Concerns    Resource/Environmental Concerns other (see comments)   32 cats       Transition Planning    Patient/Family Anticipates Transition to home;long term care facility    Patient/Family Anticipated Services at Transition ;skilled nursing    Transportation Anticipated family or friend will provide       Discharge Needs Assessment    Readmission Within the Last 30 Days no previous admission in last 30 days    Concerns to be Addressed care coordination/care conferences;discharge planning;adjustment to diagnosis/illness;home safety    Equipment Currently Used at Home oxygen;nebulizer;bipap/cpap;walker, rolling;rollator;commode    Anticipated Changes Related to Illness inability to care for self    Outpatient/Agency/Support Group Needs skilled nursing facility    Discharge Facility/Level of Care Needs nursing facility, skilled    Current Discharge Risk chronically ill;lives alone;non-alliance;other (see comments)    Discharge Coordination/Progress Patient admitted from home where he resides alone.  Patient reports he has 32 cats and three dogs.  (Yes, that is correct 32 cats.)  Patient's niece, Cory Zendejas,confirmed patient indeed has 32 cats.  Patient advises his family will have the cats removed while he is in  the hospital.  Patient's niece stated they have removed several cats from patient's home before but left a few and they mated and they are back at [facility] one.  Patient states his brother, Ronak Arzate, is his POA but he wants to have him removed as POA due to being an alcoholic and having stolen from him.  Patient niece confirmed patient's brother, Ronak, is an alcoholic and has been residing out of state and they are not even sure where he is.  Patient also states he has an advance directive but SW was unable to locate this information.  Patient has been on [facility] in the past, 2016, and had a tracheostomy and feeding tube.  Patient states he is mostly independent at home but has experienced falling over his animals lately.  Patient states he is able to get back up when he falls.  SW did discuss short term nursing home placement with patient and he stated he would think about it.  Patient made it very clear he does not want anyone living with him on a long term basis and patient's niece, Cory, states the same.  SW did make report to [facility]PS, spoke with Rose, ID# [id number].    Row Name 07/03/18 1001       Living Environment    Lives With alone    Current Living Arrangements home/apartment/condo    Potentially Unsafe Housing Conditions other (see comments)   Patient and family report patient has approximately 32 cats and three dogs.     Primary Care Provided by self;other (see comments)   patient has a niece and nephew that are supportive            Discharge Plan    No documentation.       Destination     No service coordination in this encounter.      Durable Medical Equipment     No service coordination in this encounter.      Dialysis/Infusion     No service coordination in this encounter.      Home Medical Care     No service coordination in this encounter.      Social Care     No service coordination in this encounter.                Demographic Summary    No documentation.           Functional Status     No documentation.           Psychosocial    No documentation.           Abuse/Neglect    No documentation.           Legal    No documentation.           Substance Abuse    No documentation.           Patient Forms    No documentation.         ANDERS EscobarW

## 2018-07-03 NOTE — PLAN OF CARE
Problem: Patient Care Overview  Goal: Plan of Care Review  Outcome: Ongoing (interventions implemented as appropriate)   07/03/18 4652   Coping/Psychosocial   Plan of Care Reviewed With patient   Plan of Care Review   Progress no change   OTHER   Outcome Summary VFSS complete. Pt presented with a full range of PO consistencies except for regular solids due to poor oral preperation of mechanical soft consistency and essentially swallowing bolus whole. Pt experienced premature loss of bolus into the pharynx with nectar thick and thin liquids. Noteably to the point of the pyriform sinuses with thin liquids. This premature loss was secondary to decreased back fo tongue strength and delayed swallow initiation. Poor hyolaryngeal excursion was noted throughout evaluation and inconsistent epiglottic inversion. Epiglottic inversion was noted to be decreased throughout assessment, however with trial of honey thick and x1 trial of nectar thick liquids essentially no epiglottic inversion was observed. Due to this the pt expereinced 2x trace penetration of nectar thick liquids with a delayed cough noted which cleared bolus from laryngeal vestibule. Moderate oropharyngeal residue was noted with honey thick and thin consistencies and a mild amount of residue was noted with all other consistencies. It is also important to note poor pharyngeal contraction with trials completed. No definite aspiration was observed. SLP recommends a pureed diet with honey thick liquids. Ok for meds to be administered whole in pudding/applesauce or with thickened liquids. Ok for water between meals 30 minutes following PO intake. SLP will continue to follow and treat.

## 2018-07-03 NOTE — PROGRESS NOTES
Malnutrition Severity Assessment    Patient Name:  Kellie Arzate  YOB: 1955  MRN: [id number]  Admit Date:  7/2/2018    Patient meets criteria for : Severe malnutrition    Comments:  If in agreement please attest.    Malnutrition Type: Chronic Illness Malnutrition     Malnutrition Type (last 8 hours)      Malnutrition Severity Assessment     Row Name 07/03/18 1310       Malnutrition Severity Assessment    Malnutrition Type Chronic Illness Malnutrition    Row Name 07/03/18 1310       Physical Signs of Malnutrition (Chronic)    Muscle Wasting Severe   temple region severe;clavicle region severe; shoulder severe; scapula region severe    Fat Loss Severe   Orbital fat pad severe;ribs severe    Row Name 07/03/18 1310       Weight Status (Chronic)    BMI Mod (<17)    %IBW Mod <80%    %UBW Mod (75-85%)   per pt UBW 117lbs unspecified time frame    Row Name 07/03/18 1315       Criteria Met (Must meet criteria for severity in at least 2 of these categories: M Wasting, Fat Loss, Fluid, Secondary Signs, Wt. Status, Intake)    Patient meets criteria for  Severe malnutrition          Electronically signed by:  Chrissie Ariza MS,RDN,LD  07/03/18 1:17 PM

## 2018-07-03 NOTE — PLAN OF CARE
Problem: Patient Care Overview  Goal: Plan of Care Review  Outcome: Ongoing (interventions implemented as appropriate)   07/03/18 1320   Coping/Psychosocial   Plan of Care Reviewed With patient   Plan of Care Review   Progress no change   OTHER   Outcome Summary NPO. Pt reports good appetite. Encouraged oral intake and pt willing to try oral supplements pending diet advancement. con to follow.       Problem: Nutrition, Imbalanced: Inadequate Oral Intake (Adult)  Goal: Identify Related Risk Factors and Signs and Symptoms  Outcome: Ongoing (interventions implemented as appropriate)    Goal: Improved Oral Intake  Outcome: Ongoing (interventions implemented as appropriate)    Goal: Prevent Further Weight Loss  Outcome: Ongoing (interventions implemented as appropriate)

## 2018-07-03 NOTE — PROGRESS NOTES
"    [facility]  INPATIENT PROGRESS NOTE    Patient Name: Kellie Arzate  Date of Admission: 7/2/2018  Today's Date: 07/03/18  Length of Stay: 1  Primary Care Physician: Jose Moon MD    Subjective   Chief Complaint: follow-up aspiration PNA  HPI   Patient reports that he feels pretty good today.  He states that he is \"about to starve\".  Remains NPO.  Denies any pain, including no chest pain or chest pressure.  Feels like he is breathing a little bit better today than he was yesterday.  He voices no new complaints this morning.    Review of Systems     All pertinent negatives and positives are as above. All other systems have been reviewed and are negative unless otherwise stated.     Objective    Temp:  [98 °F (36.7 °C)-99.2 °F (37.3 °C)] 98.1 °F (36.7 °C)  Heart Rate:  [113-131] 113  Resp:  [14-24] 20  BP: (103-146)/() 114/68  Physical Exam   Constitutional: He is oriented to person, place, and time. No distress.   Thin and frail appearing   HENT:   Head: Normocephalic.   Mouth/Throat: No oropharyngeal exudate.   Eyes: No scleral icterus.   Neck: No tracheal deviation present.   Cardiovascular: Normal heart sounds.    Rate approx 110-120; regular   Pulmonary/Chest: No respiratory distress. He has wheezes (faint - expiratory).   On 3L nasal cannula; some coarse BSs noted on the left   Abdominal: Soft.   Musculoskeletal: He exhibits no edema.   Neurological: He is alert and oriented to person, place, and time.   Skin: Skin is warm and dry. He is not diaphoretic.   Psychiatric: He has a normal mood and affect. His behavior is normal.   Vitals reviewed.    Results Review:  I have reviewed the labs, radiology results, and diagnostic studies.    Laboratory Data:     Results from last 7 days  Lab Units 07/03/18  0439 07/02/18  1246   WBC 10*3/mm3 23.12* 30.48*   HEMOGLOBIN g/dL 8.9* 10.3*   HEMATOCRIT % 29.4* 35.1*   PLATELETS 10*3/mm3 390 493* "          Results from last 7 days  Lab Units 07/03/18  0439 07/02/18  1246   SODIUM mmol/L 137 137   POTASSIUM mmol/L 3.5 4.6   CHLORIDE mmol/L 96* 90*   CO2 mmol/L 33.0* 37.0*   BUN mg/dL 12 21   CREATININE mg/dL 0.37* 0.44*   CALCIUM mg/dL 8.4 9.5   BILIRUBIN mg/dL 0.4 0.5   ALK PHOS U/L 169* 228*   ALT (SGPT) U/L 50 69*   AST (SGOT) U/L 54* 60*   GLUCOSE mg/dL 171* 130*       Culture Data:   Blood Culture   Date Value Ref Range Status   07/02/2018 No growth at less than 24 hours  Preliminary   07/02/2018 No growth at less than 24 hours  Preliminary       Radiology Data:   Imaging Results (last 24 hours)     Procedure Component Value Units Date/Time    CT Angiogram Chest With Contrast [[id number]] Collected:  07/02/18 1334     Updated:  07/02/18 1345    Narrative:       History:  63-year-old with shortness of breath and tachycardia. History of  pulmonary embolus.      Reference:  CTA chest August 2016.     Technique:  Following the administration of intravenous contrast, helical  acquisition was obtained from the thoracic inlet through the diaphragm  during the arterial phase. Multiplanar reconstructed images including 3D  MIP were obtained.     For this CT exam, one or more of the following dose reduction techniques  was employed:  -automated exposure control  -mA and/or kVp adjustment for patient size  -iterative reconstruction      mGy-cm     Findings:     Vascular findings:  No pulmonary embolus is identified. No thoracic aortic aneurysm or  dissection. Atherosclerosis of the thoracic aorta. The heart is not  enlarged. There is a small pericardial effusion.     Nonvascular findings:  No axillary adenopathy. There is mediastinal lymphadenopathy. For  reference and aorticopulmonary window node measures 2 x 1 cm. Is  abnormal bilateral hilar jannie tissue as well. Granulomas no  calcification subcarinal region.     There is significant presumed aspirate in the trachea and mainstem  bronchi.     There is  extensive consolidation throughout the left lung. Chronic  consolidation/scarring involves the right upper lobe apex with cystic  change. Severe pulmonary emphysema and architectural distortion is  noted. No pleural effusion or pneumothorax.     Imaging through the upper abdomen reveals no acute process.     No acute osseous abnormalities.          Impression:       1. No pulmonary embolus.  2. Extensive aspiration pneumonia throughout the left lung. Large amount  of aspirated material in the trachea and mainstem bronchi.  3. Follow-up CT chest in 1-2 months is needed to ensure resolution and  exclude an underlying left lung neoplasm.  4. Mediastinal and hilar adenopathy is probably reactive but attention  at follow-up is needed.           This report was finalized on 07/02/2018 13:41 by Dr Beto Mendenhall, .    XR Hip With or Without Pelvis 2 - 3 View Right [[id number]] Collected:  07/02/18 1332     Updated:  07/02/18 1337    Narrative:       History:  63-year-old with right-sided low back pain. Fall.     Reference:  KUB July 2016.     FINDINGS:  Frontal view the pelvis with 2 views of the right hip.     The pelvic ring is intact. No fracture or dislocation of the right hip.  Joint spaces maintained. Limited view of the left hip is unremarkable.          Impression:       No fractures identified.  This report was finalized on 07/02/2018 13:34 by Dr Beto Mendenhall, .    XR Chest 1 View [[id number]] Collected:  07/02/18 1329     Updated:  07/02/18 1334    Narrative:       XR CHEST 1 VW- 7/2/2018 12:37 PM CDT     HISTORY: Cough and shortness of air     COMPARISON: 8/29/2016.     FINDINGS:   Bilateral airspace opacities are noted. Volume loss is seen in the RIGHT  lung. Interstitial and airspace opacities in the LEFT lung have  significantly increased since the previous study. This is seen on a  background of moderate to marked emphysema. The cardiomediastinal  silhouette and pulmonary vascularity are unchanged.      The osseous  structures and surrounding soft tissues demonstrate no acute  abnormality.       Impression:       1. Diffuse opacities in the LEFT lung are new since the prior exam and  could be due to pneumonia superimposed upon chronic emphysema. Worsening  pulmonary fibrosis can cause a similar appearance.  2. Progressive scarring in the RIGHT upper lobe is noted. There is  compensatory hyperexpansion of the RIGHT lower lobe.  This report was finalized on 07/02/2018 13:31 by Dr. Kel Purcell MD.          I have reviewed the patient's current medications.     Assessment/Plan     Hospital Problem List     Aspiration pneumonia of left lung (CMS/HCC)        Assessment:  1.  Acute on chronic hypoxemic respiratory failure  2.  Sepsis  3.  Pneumonia - aspiration  4.  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  Weight Loss  6.  Elevated troponin - no cheset pain  7.  Abnormal LFTs  8.  History of PE in the past - now off of anticoagulation  9.  Concern for severe protein-calorie malnutrition  10.  Tobacco dependence    11.  MICHELLE     Plan:   1.  IV Zosyn and Levaquin - day 2  2.  IV Solumedrol  3.  Scheduled nebs  4.  NPO pending ST VFSS planned for today  5.  Supp 02  6.  Respiratory culture and blood cultures pending  7.  IVFs  8.  Nutrition consult  9.   Repeat troponin this AM  10.  IV Lopressor (transition to PO when okay with ST; ASA as well)  11.  IV Pepcid  12.  Lovenox at PPX dose for now  13.  Echo today  14.  Continue outpatient CPAP per home settings  15.  IV Venofer  16.  Anticipate transfer to floor soon, even later today, pending the above      Adrien Merrill MD   07/03/18   7:03 AM

## 2018-07-03 NOTE — MBS/VFSS/FEES
Acute Care - Speech Language Pathology   Swallow Re-Evaluation [facility]     Patient Name: Kellie Arzate  : 1955  MRN: [id number]  Today's Date: 7/3/2018               Admit Date: 2018  VFSS complete. Pt presented with a full range of PO consistencies except for regular solids due to poor oral preperation of mechanical soft consistency and essentially swallowing bolus whole. Pt experienced premature loss of bolus into the pharynx with nectar thick and thin liquids. Noteably to the point of the pyriform sinuses with thin liquids. This premature loss was secondary to decreased back fo tongue strength and delayed swallow initiation. Poor hyolaryngeal excursion was noted throughout evaluation and inconsistent epiglottic inversion. Epiglottic inversion was noted to be decreased throughout assessment, however with trial of honey thick and x1 trial of nectar thick liquids essentially no epiglottic inversion was observed. Due to this the pt expereinced 2x trace penetration of nectar thick liquids with a delayed cough noted which cleared bolus from laryngeal vestibule. Moderate oropharyngeal residue was noted with honey thick and thin consistencies and a mild amount of residue was noted with all other consistencies. It is also important to note poor pharyngeal contraction with trials completed. No definite aspiration was observed.   Recommendations:  1.) Pureed diet with honey thick liquids.   2.) Ok for meds to be administered whole in pudding/applesauce or with thickened liquids.   3.). Ok for water between meals 30 minutes following PO intake.   4.) SLP will continue to follow and treat.  Jack Muro MS CCC-SLP 7/3/2018 4:12 PM    Visit Dx:     ICD-10-CM ICD-9-CM   1. Aspiration pneumonia of left lung, unspecified aspiration pneumonia type, unspecified part of lung (CMS/Former[facility]) J69.0 507.0   2. SIRS (systemic inflammatory response syndrome) (CMS/[facility]) R65.10 995.90   3. Oropharyngeal dysphagia R13.12 787.22      Patient Active Problem List   Diagnosis   • Aspiration pneumonia of left lung (CMS/HCC)     Past Medical History:   Diagnosis Date   • Anxiety    • Arthritis    • COPD (chronic obstructive pulmonary disease) (CMS/HCC)    • Depression    • Hypertension    • Nephrolithiasis      Past Surgical History:   Procedure Laterality Date   • COLONOSCOPY  04/20/2007    Within Normal Limits.   • ENDOSCOPY  04/20/2007    urea neg, sbbx benign, mild gastritis, small hiatal hernia   • ENDOSCOPY N/A 10/5/2016    Procedure: ESOPHAGOGASTRODUODENOSCOPY WITH ANESTHESIA;  Surgeon: Kevyn Damon MD;  Location: [facility] ENDOSCOPY;  Service:    • HEMORRHOIDECTOMY     • PEG TUBE INSERTION  07/22/2016    Dr Mccarty   • TRACHEOSTOMY            SWALLOW EVALUATION (last 72 hours)      SLP Adult Swallow Evaluation     Row Name 07/03/18 1300 07/02/18 1530                Rehab Evaluation    Document Type evaluation  -CS evaluation  -TM       Subjective Information no complaints  -CS no complaints  -TM       Patient Observations alert;cooperative  -CS alert;cooperative  -TM       Patient/Family Observations No family present  -CS No family present at time of eval; however, Keyla SNOW present at end of eval.   -TM       Patient Effort adequate  -CS adequate  -TM          General Information    Patient Profile Reviewed yes  -CS yes  -TM       Pertinent History Of Current Problem CXR 7/02/18- Left consolidation.  -CS CXR 07/02/2018 showed L consolidation, new   -TM       Current Method of Nutrition NPO  -CS NPO  -TM       Precautions/Limitations, Vision WFL  -CS WFL  -TM       Precautions/Limitations, Hearing WFL  -CS WFL  -TM       Prior Level of Function-Communication WFL  -CS WFL  -TM       Prior Level of Function-Swallowing other (see comments)  -CS other (see comments)   Hx of dysphagia, hx of trach, hx of PEG   -TM       Plans/Goals Discussed with patient;other (see comments)  -CS patient;other (see comments)   Keyla SNOW.  -TM       Barriers to  Rehab previous functional deficit  -CS previous functional deficit  -TM       Patient's Goals for Discharge patient did not state  -CS patient did not state  -TM       Family Goals for Discharge  -- other (see comments)   No family present  -TM          Pain Assessment    Additional Documentation Pain Scale: FACES Pre/Post-Treatment (Group)  -CS Pain Scale: Numbers Pre/Post-Treatment (Group)  -TM          Pain Scale: Numbers Pre/Post-Treatment    Pain Scale: Numbers, Pretreatment  -- 0/10 - no pain  -TM       Pain Scale: Numbers, Post-Treatment  -- 0/10 - no pain  -TM          Pain Scale: FACES Pre/Post-Treatment    Pain: FACES Scale, Pretreatment 0-->no hurt  -CS  --       Pain: FACES Scale, Post-Treatment 0-->no hurt  -CS  --          Oral Motor and Function    Dentition Assessment edentulous, dentures not available;other (see comments)  -CS edentulous, dentures not available;other (see comments)   Stated he has upper dentures, wears inconsistently  -TM       Secretion Management WNL/WFL  -CS dried secretions in oral cavity;other (see comments)   Lingually and labially  -TM       Mucosal Quality dry  -CS dry  -TM       Volitional Swallow WFL  -CS WFL  -TM       Volitional Cough non-productive  -CS weak;reduced respiratory support  -TM          Oral Musculature and Cranial Nerve Assessment    Oral Motor General Assessment WFL  -CS WFL  -TM          General Eating/Swallowing Observations    Respiratory Support Currently in Use  -- nasal cannula  -TM       Eating/Swallowing Skills  -- fed by SLP  -TM       Positioning During Eating  -- upright 90 degree;upright in bed  -TM       Utensils Used  -- spoon  -TM       Consistencies Trialed  -- pudding thick  -TM          Clinical Swallow Eval    Oral Prep Phase  -- WFL  -TM       Oral Transit  -- WFL  -TM       Oral Residue  -- WFL  -TM       Pharyngeal Phase  -- suspected pharyngeal impairment  -TM       Esophageal Phase  -- unremarkable  -TM       Clinical Swallow  Evaluation Summary  -- Bedside swallow eval completed. Upon ST arrival, pt presented with weak, significantly congested cough with increased respirations following coughing instances. Wet sounds noted in cough at times. Pt was presented pureed trials only. Pt was noted to have up to three multiple swallows, with delayed coughs noted following each trial. Feel pt is at a high risk for aspiration given decreased respiratory support, also supported by acute CXR showing new L consolidation. Cannot ensure safety with PO intake at this time.   -TM          Pharyngeal Phase Concerns    Pharyngeal Phase Concerns  -- multiple swallows;cough  -TM       Multiple Swallows  -- pudding  -TM          MBS/VFSS    Utensils Used spoon;straw  -CS  --       Consistencies Trialed soft textures;pudding thick;honey-thick liquids;nectar/syrup-thick liquids;thin liquids  -CS  --          MBS/VFSS Interpretation    Oral Prep Phase impaired oral phase of swallowing  -CS  --       Oral Transit Phase impaired  -CS  --       Oral Residue impaired  -CS  --       VFSS Summary VFSS complete. Pt presented with a full range of PO consistencies except for regular solids due to poor oral preperation of mechanical soft consistency and essentially swallowing bolus whole. Pt experienced premature loss of bolus into the pharynx with nectar thick and thin liquids. Noteably to the point of the pyriform sinuses with thin liquids. This premature loss was secondary to decreased back fo tongue strength and delayed swallow initiation. Poor hyolaryngeal excursion was noted throughout evaluation and inconsistent epiglottic inversion. Epiglottic inversion was noted to be decreased throughout assessment, however with trial of honey thick and x1 trial of nectar thick liquids essentially no epiglottic inversion was observed. Due to this the pt expereinced 2x trace penetration of nectar thick liquids with a delayed cough noted which cleared bolus from laryngeal  vestibule. Moderate oropharyngeal residue was noted with honey thick and thin consistencies and a mild amount of residue was noted with all other consistencies. It is also important to note poor pharyngeal contraction with trials completed. No definite aspiration was observed. SLP recommends a pureed diet with honey thick liquids. Ok for meds to be administered whole in pudding/applesauce or with thickened liquids. Ok for water between meals 30 minutes following PO intake. SLP will continue to follow and treat.  -CS  --          Oral Preparatory Phase    Oral Preparatory Phase inadequate manipulation  -CS  --       Inadequate Manipulation mechanical soft  -CS  --          Initiation of Pharyngeal Swallow    Initiation of Pharyngeal Swallow bolus in pyriform sinuses  -CS  --       Pharyngeal Phase impaired pharyngeal phase of swallowing  -CS  --       Penetration During the Swallow nectar-thick liquids;secondary to reduced laryngeal elevation  -CS  --       Pharyngeal Residue mechanical soft;pudding/puree;honey-thick liquids;nectar-thick liquids;thin liquids;secondary to reduced base of tongue retraction;secondary to reduced posterior pharyngeal wall stripping;secondary to reduced hyolaryngeal excursion  -CS  --       Response to Residue cleared residue with spontaneous subsequent swallow  -CS  --          Clinical Impression    SLP Swallowing Diagnosis moderate;oral dysfunction;pharyngeal dysfunction  -CS functional oral phase;suspected pharyngeal dysfunction  -TM       Functional Impact risk of aspiration/pneumonia  -CS risk of aspiration/pneumonia;risk of malnutrition;risk of dehydration  -TM       Rehab Potential/Prognosis, Swallowing good, to achieve stated therapy goals  -CS adequate, monitor progress closely  -TM       Criteria for Skilled Therapeutic Interventions Met demonstrates skilled criteria  -CS demonstrates skilled criteria  -TM          Recommendations    Therapy Frequency (Swallow) at least;3 days  per week  -CS daily  -TM       Predicted Duration Therapy Intervention (Days) until discharge  -CS until discharge  -TM       SLP Diet Recommendation puree;honey thick liquids;water between meals after oral care, with supervision;ice chips between meals after oral care, with supervision  -CS NPO;ice chips between meals after oral care, with supervision  -TM       Recommended Diagnostics  -- VFSS ([facility]);other (see comments)   07/03/2018  -TM       Recommended Precautions and Strategies upright posture during/after eating;other (see comments)  -CS upright posture during/after eating;other (see comments)   For ice chips, oral care prior to ice chips  -TM       SLP Rec. for Method of Medication Administration meds whole;with thick liquids;with pudding or applesauce  -CS meds via alternate route  -TM       Monitor for Signs of Aspiration yes;cough;gurgly voice;throat clearing;pneumonia;right lower lobe infiltrates  -CS yes;cough;gurgly voice;throat clearing;pneumonia;right lower lobe infiltrates  -TM       Anticipated Dischage Disposition unknown  -CS unknown  -TM          Swallow Goals (SLP)    Oral Nutrition/Hydration Goal Selection (SLP) oral nutrition/hydration, SLP goal 1  -CS oral nutrition/hydration, SLP goal 1  -TM       Pharyngeal Strengthening Exercise Goal Selection (SLP) pharyngeal strengthening exercise, SLP goal 1;pharyngeal strengthening exercise, SLP goal (free text)  -CS  --       Additional Documentation pharyngeal strengthening exercise goal selection (SLP)  -CS  --          Oral Nutrition/Hydration Goal 1 (SLP)    Oral Nutrition/Hydration Goal 1, SLP LTG: Pt will tolerate LRD without overt s/s of aspiration.   -CS Pt will tolerate LRD without overt s/s of aspiration.   -TM       Time Frame (Oral Nutrition/Hydration Goal 1, SLP) by discharge  -CS short term goal (STG);by discharge  -TM       Barriers (Oral Nutrition/Hydration Goal 1, SLP) N/A  -CS N/A  -TM       Progress/Outcomes (Oral  Nutrition/Hydration Goal 1, SLP) goal ongoing  -CS goal ongoing  -TM          Pharyngeal Strengthening Exercise Goal 1 (SLP)    Activity (Pharyngeal Strengthening Goal 1, SLP) increase squeeze/positive pressure generation  -CS  --       Increase Squeeze/Positive Pressure Generation hard effortful swallow;cassidy  -CS  --       [place]/Accuracy (Pharyngeal Strengthening Goal 1, SLP) with minimal cues (75-90% accuracy)  -CS  --       Time Frame (Pharyngeal Strengthening Goal 1, SLP) by discharge  -CS  --       Barriers (Pharyngeal Strengthening Goal 1, SLP) n/a  -CS  --       Progress/Outcomes (Pharyngeal Strengthening Goal 1, SLP) good progress toward goal  -CS  --          Pharyngeal Strengthening Exercise Goal (SLP)    Pharyngeal Strengthening Goal, (SLP) Pt will complete 30 minutes of NMES applied to the suprahyoids in conjunction with swallow exercises w/o any overt s/s of distress in order to improve swallow function.  -CS  --       Time Frame (Pharyngeal Strengthening Goal, SLP) by discharge  -CS  --       Barriers (Pharyngeal Strengthening Goal, SLP) n/a  -CS  --       Progress/Outcomes (Pharyngeal Strengthening Goal, SLP) goal ongoing  -CS  --         User Key  (r) = Recorded By, (t) = Taken By, (c) = Cosigned By    Initials Name Effective Dates    TM Dinorah Thomas, CCC-SLP 08/02/16 -     CS Jack Muro MS CCC-SLP 04/03/18 -         EDUCATION  The patient has been educated in the following areas:   Dysphagia (Swallowing Impairment).    SLP Recommendation and Plan  SLP Swallowing Diagnosis: moderate, oral dysfunction, pharyngeal dysfunction  SLP Diet Recommendation: puree, honey thick liquids, water between meals after oral care, with supervision, ice chips between meals after oral care, with supervision  Recommended Precautions and Strategies: upright posture during/after eating, other (see comments)     Monitor for Signs of Aspiration: yes, cough, gurgly voice, throat clearing, pneumonia, right lower  lobe infiltrates     Criteria for Skilled Therapeutic Interventions Met: demonstrates skilled criteria  Anticipated Dischage Disposition: unknown  Rehab Potential/Prognosis, Swallowing: good, to achieve stated therapy goals  Therapy Frequency (Swallow): at least, 3 days per week  Predicted Duration Therapy Intervention (Days): until discharge       Plan of Care Reviewed With: patient  Plan of Care Review  Plan of Care Reviewed With: patient  Progress: no change  Outcome Summary: VFSS complete. Pt presented with a full range of PO consistencies except for regular solids due to poor oral preperation of mechanical soft consistency and essentially swallowing bolus whole. Pt experienced premature loss of bolus into the pharynx with nectar thick and thin liquids. Noteably to the point of the pyriform sinuses with thin liquids. This premature loss was secondary to decreased back fo tongue strength and delayed swallow initiation. Poor hyolaryngeal excursion was noted throughout evaluation and inconsistent epiglottic inversion. Epiglottic inversion was noted to be decreased throughout assessment, however with trial of honey thick and x1 trial of nectar thick liquids essentially no epiglottic inversion was observed. Due to this the pt expereinced 2x trace penetration of nectar thick liquids with a delayed cough noted which cleared bolus from laryngeal vestibule. Moderate oropharyngeal residue was noted with honey thick and thin consistencies and a mild amount of residue was noted with all other consistencies. It is also important to note poor pharyngeal contraction with trials completed. No definite aspiration was observed. SLP recommends a pureed diet with honey thick liquids. Ok for meds to be administered whole in pudding/applesauce or with thickened liquids. Ok for water between meals 30 minutes following PO intake. SLP will continue to follow and treat.          SLP GOALS     Row Name 07/03/18 1300 07/02/18 6788           Oral Nutrition/Hydration Goal 1 (SLP)    Oral Nutrition/Hydration Goal 1, SLP LTG: Pt will tolerate LRD without overt s/s of aspiration.   -CS Pt will tolerate LRD without overt s/s of aspiration.   -TM     Time Frame (Oral Nutrition/Hydration Goal 1, SLP) by discharge  -CS short term goal (STG);by discharge  -TM     Barriers (Oral Nutrition/Hydration Goal 1, SLP) N/A  -CS N/A  -TM     Progress/Outcomes (Oral Nutrition/Hydration Goal 1, SLP) goal ongoing  -CS goal ongoing  -TM        Pharyngeal Strengthening Exercise Goal 1 (SLP)    Activity (Pharyngeal Strengthening Goal 1, SLP) increase squeeze/positive pressure generation  -CS  --     Increase Squeeze/Positive Pressure Generation hard effortful swallow;cassidy  -CS  --     [place]/Accuracy (Pharyngeal Strengthening Goal 1, SLP) with minimal cues (75-90% accuracy)  -CS  --     Time Frame (Pharyngeal Strengthening Goal 1, SLP) by discharge  -CS  --     Barriers (Pharyngeal Strengthening Goal 1, SLP) n/a  -CS  --     Progress/Outcomes (Pharyngeal Strengthening Goal 1, SLP) good progress toward goal  -CS  --        Pharyngeal Strengthening Exercise Goal (SLP)    Pharyngeal Strengthening Goal, (SLP) Pt will complete 30 minutes of NMES applied to the suprahyoids in conjunction with swallow exercises w/o any overt s/s of distress in order to improve swallow function.  -CS  --     Time Frame (Pharyngeal Strengthening Goal, SLP) by discharge  -CS  --     Barriers (Pharyngeal Strengthening Goal, SLP) n/a  -CS  --     Progress/Outcomes (Pharyngeal Strengthening Goal, SLP) goal ongoing  -CS  --       User Key  (r) = Recorded By, (t) = Taken By, (c) = Cosigned By    Initials Name Provider Type    TM Dinorah Thomas CCC-SLP Speech and Language Pathologist    CS Jack Muro MS CCC-SLP Speech and Language Pathologist             SLP Outcome Measures (last 72 hours)      SLP Outcome Measures     Row Name 07/03/18 1600 07/02/18 8026          SLP Outcome Measures    Outcome  Measure Used? Adult NOMS  -CS Adult NOMS  -TM        FCM Scores    FCM Chosen Swallowing  -CS Swallowing  -TM     Swallowing FCM Score 3  -CS 2  -TM       User Key  (r) = Recorded By, (t) = Taken By, (c) = Cosigned By    Initials Name Effective Dates    TM Dinorah Thomas CCC-SLP 08/02/16 -     CS Jack Muro MS CCC-SLP 04/03/18 -            Time Calculation:         Time Calculation- SLP     Row Name 07/03/18 1609             Time Calculation- SLP    SLP Start Time 1300  -CS      SLP Stop Time 1430  -      SLP Time Calculation (min) 90 min  -CS      SLP Received On 07/03/18  -      SLP Goal Re-Cert Due Date 07/13/18  -CS        User Key  (r) = Recorded By, (t) = Taken By, (c) = Cosigned By    Initials Name Provider Type    CS Jack Muro MS CCC-SLP Speech and Language Pathologist          Therapy Charges for Today     Code Description Service Date Service Provider Modifiers Qty    [id number] HC ST MOTION FLUORO EVAL SWALLOW 6 7/3/2018 Jack Muro MS CCC-SLP GN 1          SLP G-Codes  SLP NOMS Used?: Yes  Functional Limitations: Swallowing  Swallow Current Status (): At least 80 percent but less than 100 percent impaired, limited or restricted  Swallow Goal Status (): At least 60 percent but less than 80 percent impaired, limited or restricted    Jack Muro MS CCC-SLP  7/3/2018

## 2018-07-04 LAB
ALBUMIN SERPL-MCNC: 2.7 G/DL (ref 3.5–5)
ALBUMIN/GLOB SERPL: 0.8 G/DL (ref 1.1–2.5)
ALP SERPL-CCNC: 196 U/L (ref 24–120)
ALT SERPL W P-5'-P-CCNC: 72 U/L (ref 0–54)
ANION GAP SERPL CALCULATED.3IONS-SCNC: 6 MMOL/L (ref 4–13)
AST SERPL-CCNC: 85 U/L (ref 7–45)
BH CV ECHO MEAS - AO MAX PG (FULL): 1.2 MMHG
BH CV ECHO MEAS - AO MAX PG: 2.8 MMHG
BH CV ECHO MEAS - AO MEAN PG (FULL): 1 MMHG
BH CV ECHO MEAS - AO MEAN PG: 2 MMHG
BH CV ECHO MEAS - AO ROOT AREA (BSA CORRECTED): 2.3
BH CV ECHO MEAS - AO ROOT AREA: 8.6 CM^2
BH CV ECHO MEAS - AO ROOT DIAM: 3.3 CM
BH CV ECHO MEAS - AO V2 MAX: 84.4 CM/SEC
BH CV ECHO MEAS - AO V2 MEAN: 61 CM/SEC
BH CV ECHO MEAS - AO V2 VTI: 16.1 CM
BH CV ECHO MEAS - AVA(I,A): 3.1 CM^2
BH CV ECHO MEAS - AVA(I,D): 3.1 CM^2
BH CV ECHO MEAS - AVA(V,A): 3.2 CM^2
BH CV ECHO MEAS - AVA(V,D): 3.2 CM^2
BH CV ECHO MEAS - BSA(HAYCOCK): 1.4 M^2
BH CV ECHO MEAS - BSA: 1.5 M^2
BH CV ECHO MEAS - BZI_BMI: 16.1 KILOGRAMS/M^2
BH CV ECHO MEAS - BZI_METRIC_HEIGHT: 165.1 CM
BH CV ECHO MEAS - BZI_METRIC_WEIGHT: 44 KG
BH CV ECHO MEAS - CONTRAST EF 4CH: 50 ML/M^2
BH CV ECHO MEAS - EDV(CUBED): 129.6 ML
BH CV ECHO MEAS - EDV(MOD-SP4): 29 ML
BH CV ECHO MEAS - EDV(TEICH): 121.6 ML
BH CV ECHO MEAS - EF(CUBED): 73.5 %
BH CV ECHO MEAS - EF(MOD-SP4): 50 %
BH CV ECHO MEAS - EF(TEICH): 65 %
BH CV ECHO MEAS - ESV(CUBED): 34.3 ML
BH CV ECHO MEAS - ESV(MOD-SP4): 14.5 ML
BH CV ECHO MEAS - ESV(TEICH): 42.5 ML
BH CV ECHO MEAS - FS: 35.8 %
BH CV ECHO MEAS - IVS/LVPW: 1.5
BH CV ECHO MEAS - IVSD: 0.63 CM
BH CV ECHO MEAS - LA DIMENSION: 2.4 CM
BH CV ECHO MEAS - LA/AO: 0.73
BH CV ECHO MEAS - LAT PEAK E' VEL: 10.1 CM/SEC
BH CV ECHO MEAS - LV DIASTOLIC VOL/BSA (35-75): 19.9 ML/M^2
BH CV ECHO MEAS - LV MASS(C)D: 81.3 GRAMS
BH CV ECHO MEAS - LV MASS(C)DI: 55.9 GRAMS/M^2
BH CV ECHO MEAS - LV MAX PG: 1.6 MMHG
BH CV ECHO MEAS - LV MEAN PG: 1 MMHG
BH CV ECHO MEAS - LV SYSTOLIC VOL/BSA (12-30): 10 ML/M^2
BH CV ECHO MEAS - LV V1 MAX: 64 CM/SEC
BH CV ECHO MEAS - LV V1 MEAN: 34.3 CM/SEC
BH CV ECHO MEAS - LV V1 VTI: 11.9 CM
BH CV ECHO MEAS - LVIDD: 5.1 CM
BH CV ECHO MEAS - LVIDS: 3.3 CM
BH CV ECHO MEAS - LVLD AP4: 5.2 CM
BH CV ECHO MEAS - LVLS AP4: 4.6 CM
BH CV ECHO MEAS - LVOT AREA (M): 4.2 CM^2
BH CV ECHO MEAS - LVOT AREA: 4.2 CM^2
BH CV ECHO MEAS - LVOT DIAM: 2.3 CM
BH CV ECHO MEAS - LVPWD: 0.41 CM
BH CV ECHO MEAS - MED PEAK E' VEL: 11.7 CM/SEC
BH CV ECHO MEAS - RAP SYSTOLE: 5 MMHG
BH CV ECHO MEAS - RVSP: 39.1 MMHG
BH CV ECHO MEAS - SI(AO): 94.7 ML/M^2
BH CV ECHO MEAS - SI(CUBED): 65.5 ML/M^2
BH CV ECHO MEAS - SI(LVOT): 34 ML/M^2
BH CV ECHO MEAS - SI(MOD-SP4): 10 ML/M^2
BH CV ECHO MEAS - SI(TEICH): 54.3 ML/M^2
BH CV ECHO MEAS - SV(AO): 137.7 ML
BH CV ECHO MEAS - SV(CUBED): 95.2 ML
BH CV ECHO MEAS - SV(LVOT): 49.4 ML
BH CV ECHO MEAS - SV(MOD-SP4): 14.5 ML
BH CV ECHO MEAS - SV(TEICH): 79 ML
BH CV ECHO MEAS - TR MAX VEL: 292 CM/SEC
BILIRUB SERPL-MCNC: 0.3 MG/DL (ref 0.1–1)
BUN BLD-MCNC: 14 MG/DL (ref 5–21)
BUN/CREAT SERPL: 32.6 (ref 7–25)
CALCIUM SPEC-SCNC: 9.1 MG/DL (ref 8.4–10.4)
CHLORIDE SERPL-SCNC: 100 MMOL/L (ref 98–110)
CO2 SERPL-SCNC: 34 MMOL/L (ref 24–31)
CREAT BLD-MCNC: 0.43 MG/DL (ref 0.5–1.4)
DEPRECATED RDW RBC AUTO: 42.3 FL (ref 40–54)
ERYTHROCYTE [DISTWIDTH] IN BLOOD BY AUTOMATED COUNT: 16.9 % (ref 12–15)
GFR SERPL CREATININE-BSD FRML MDRD: >150 ML/MIN/1.73
GLOBULIN UR ELPH-MCNC: 3.3 GM/DL
GLUCOSE BLD-MCNC: 140 MG/DL (ref 70–100)
HCT VFR BLD AUTO: 32.9 % (ref 40–52)
HGB BLD-MCNC: 9.8 G/DL (ref 14–18)
LEFT ATRIUM VOLUME INDEX: 15.9 ML/M2
LEFT ATRIUM VOLUME: 23 CM3
LV EF 2D ECHO EST: 60 %
MAXIMAL PREDICTED HEART RATE: 155 BPM
MCH RBC QN AUTO: 20.9 PG (ref 28–32)
MCHC RBC AUTO-ENTMCNC: 29.8 G/DL (ref 33–36)
MCV RBC AUTO: 70 FL (ref 82–95)
PLATELET # BLD AUTO: 399 10*3/MM3 (ref 130–400)
PMV BLD AUTO: 9.3 FL (ref 6–12)
POTASSIUM BLD-SCNC: 4.5 MMOL/L (ref 3.5–5.3)
PROT SERPL-MCNC: 6 G/DL (ref 6.3–8.7)
RBC # BLD AUTO: 4.7 10*6/MM3 (ref 4.8–5.9)
SODIUM BLD-SCNC: 140 MMOL/L (ref 135–145)
STRESS TARGET HR: 132 BPM
WBC NRBC COR # BLD: 23.67 10*3/MM3 (ref 4.8–10.8)

## 2018-07-04 PROCEDURE — 94799 UNLISTED PULMONARY SVC/PX: CPT

## 2018-07-04 PROCEDURE — 94760 N-INVAS EAR/PLS OXIMETRY 1: CPT

## 2018-07-04 PROCEDURE — 25010000002 PIPERACILLIN SOD-TAZOBACTAM PER 1 G: Performed by: INTERNAL MEDICINE

## 2018-07-04 PROCEDURE — 25810000003 SODIUM CHLORIDE 0.9 % WITH KCL 20 MEQ 20-0.9 MEQ/L-% SOLUTION: Performed by: INTERNAL MEDICINE

## 2018-07-04 PROCEDURE — 25010000002 IRON SUCROSE PER 1 MG: Performed by: INTERNAL MEDICINE

## 2018-07-04 PROCEDURE — 25010000002 METHYLPREDNISOLONE PER 40 MG: Performed by: INTERNAL MEDICINE

## 2018-07-04 PROCEDURE — 25010000002 ENOXAPARIN PER 10 MG: Performed by: INTERNAL MEDICINE

## 2018-07-04 PROCEDURE — 25010000002 LEVOFLOXACIN PER 250 MG: Performed by: INTERNAL MEDICINE

## 2018-07-04 PROCEDURE — 80053 COMPREHEN METABOLIC PANEL: CPT | Performed by: INTERNAL MEDICINE

## 2018-07-04 PROCEDURE — 85027 COMPLETE CBC AUTOMATED: CPT | Performed by: INTERNAL MEDICINE

## 2018-07-04 RX ORDER — MEGESTROL ACETATE 40 MG/ML
800 SUSPENSION ORAL DAILY
Status: DISCONTINUED | OUTPATIENT
Start: 2018-07-04 | End: 2018-07-14 | Stop reason: HOSPADM

## 2018-07-04 RX ORDER — METHYLPREDNISOLONE SODIUM SUCCINATE 40 MG/ML
40 INJECTION, POWDER, LYOPHILIZED, FOR SOLUTION INTRAMUSCULAR; INTRAVENOUS EVERY 8 HOURS
Status: DISCONTINUED | OUTPATIENT
Start: 2018-07-04 | End: 2018-07-05

## 2018-07-04 RX ADMIN — TAZOBACTAM SODIUM AND PIPERACILLIN SODIUM 3.38 G: 375; 3 INJECTION, SOLUTION INTRAVENOUS at 06:03

## 2018-07-04 RX ADMIN — IPRATROPIUM BROMIDE AND ALBUTEROL SULFATE 3 ML: 2.5; .5 SOLUTION RESPIRATORY (INHALATION) at 15:51

## 2018-07-04 RX ADMIN — IPRATROPIUM BROMIDE AND ALBUTEROL SULFATE 3 ML: 2.5; .5 SOLUTION RESPIRATORY (INHALATION) at 00:02

## 2018-07-04 RX ADMIN — METHYLPREDNISOLONE SODIUM SUCCINATE 40 MG: 40 INJECTION, POWDER, FOR SOLUTION INTRAMUSCULAR; INTRAVENOUS at 10:10

## 2018-07-04 RX ADMIN — IPRATROPIUM BROMIDE AND ALBUTEROL SULFATE 3 ML: 2.5; .5 SOLUTION RESPIRATORY (INHALATION) at 11:46

## 2018-07-04 RX ADMIN — GUAIFENESIN 1200 MG: 600 TABLET, EXTENDED RELEASE ORAL at 20:55

## 2018-07-04 RX ADMIN — TAZOBACTAM SODIUM AND PIPERACILLIN SODIUM 3.38 G: 375; 3 INJECTION, SOLUTION INTRAVENOUS at 14:50

## 2018-07-04 RX ADMIN — IPRATROPIUM BROMIDE AND ALBUTEROL SULFATE 3 ML: 2.5; .5 SOLUTION RESPIRATORY (INHALATION) at 23:27

## 2018-07-04 RX ADMIN — IPRATROPIUM BROMIDE AND ALBUTEROL SULFATE 3 ML: 2.5; .5 SOLUTION RESPIRATORY (INHALATION) at 02:52

## 2018-07-04 RX ADMIN — TAZOBACTAM SODIUM AND PIPERACILLIN SODIUM 3.38 G: 375; 3 INJECTION, SOLUTION INTRAVENOUS at 21:13

## 2018-07-04 RX ADMIN — METHYLPREDNISOLONE SODIUM SUCCINATE 40 MG: 40 INJECTION, POWDER, FOR SOLUTION INTRAMUSCULAR; INTRAVENOUS at 18:14

## 2018-07-04 RX ADMIN — POTASSIUM CHLORIDE AND SODIUM CHLORIDE 100 ML/HR: 900; 150 INJECTION, SOLUTION INTRAVENOUS at 03:02

## 2018-07-04 RX ADMIN — GUAIFENESIN 1200 MG: 600 TABLET, EXTENDED RELEASE ORAL at 09:01

## 2018-07-04 RX ADMIN — ENOXAPARIN SODIUM 40 MG: 100 INJECTION SUBCUTANEOUS at 16:10

## 2018-07-04 RX ADMIN — FAMOTIDINE 20 MG: 10 INJECTION, SOLUTION INTRAVENOUS at 09:01

## 2018-07-04 RX ADMIN — LEVOFLOXACIN 750 MG: 5 INJECTION, SOLUTION INTRAVENOUS at 18:14

## 2018-07-04 RX ADMIN — METOPROLOL TARTRATE 50 MG: 50 TABLET ORAL at 20:55

## 2018-07-04 RX ADMIN — METHYLPREDNISOLONE SODIUM SUCCINATE 40 MG: 40 INJECTION, POWDER, FOR SOLUTION INTRAMUSCULAR; INTRAVENOUS at 04:03

## 2018-07-04 RX ADMIN — ASPIRIN 81 MG: 81 TABLET ORAL at 09:01

## 2018-07-04 RX ADMIN — MEGESTROL ACETATE 800 MG: 40 SUSPENSION ORAL at 16:10

## 2018-07-04 RX ADMIN — METOPROLOL TARTRATE 50 MG: 50 TABLET ORAL at 09:01

## 2018-07-04 RX ADMIN — IRON SUCROSE 200 MG: 20 INJECTION, SOLUTION INTRAVENOUS at 09:00

## 2018-07-04 RX ADMIN — AMITRIPTYLINE HYDROCHLORIDE 50 MG: 25 TABLET, FILM COATED ORAL at 20:55

## 2018-07-04 RX ADMIN — IPRATROPIUM BROMIDE AND ALBUTEROL SULFATE 3 ML: 2.5; .5 SOLUTION RESPIRATORY (INHALATION) at 07:42

## 2018-07-04 NOTE — PROGRESS NOTES
[facility] Medicine Services  INPATIENT PROGRESS NOTE    Patient Name: Kellie Arzate  Date of Admission: 7/2/2018  Today's Date: 07/04/18  Length of Stay: 2  Primary Care Physician: Jose Moon MD    Subjective   Chief Complaint: follow-up aspiration PNA  HPI   Patient reports that he feels pretty good today. Patient was started on a pureed diet, and states that it is nasty. States that he will drink Boost. No BM. States that he is getting a sore on his bottom. Thin and frail.     Review of Systems     All pertinent negatives and positives are as above. All other systems have been reviewed and are negative unless otherwise stated.     Objective    Temp:  [96.2 °F (35.7 °C)-98.5 °F (36.9 °C)] 96.2 °F (35.7 °C)  Heart Rate:  [] 85  Resp:  [14-26] 16  BP: (105-142)/(62-79) 112/79  Physical Exam   Constitutional: He is oriented to person, place, and time. No distress.   Thin and frail appearing   HENT:   Head: Normocephalic.   Mouth/Throat: No oropharyngeal exudate.   Eyes: No scleral icterus.   Neck: No tracheal deviation present.   Cardiovascular: Normal heart sounds.    Rate approx 110-120; regular   Pulmonary/Chest: No respiratory distress. He has wheezes (faint - expiratory).   On 3L nasal cannula; some coarse BSs noted on the left   Abdominal: Soft.   Musculoskeletal: He exhibits no edema.   Neurological: He is alert and oriented to person, place, and time.   Skin: Skin is warm and dry. He is not diaphoretic.   Psychiatric: He has a normal mood and affect. His behavior is normal.   Vitals reviewed.    Results Review:  I have reviewed the labs, radiology results, and diagnostic studies.    Laboratory Data:     Results from last 7 days  Lab Units 07/04/18  0549 07/03/18  0439 07/02/18  1246   WBC 10*3/mm3 23.67* 23.12* 30.48*   HEMOGLOBIN g/dL 9.8* 8.9* 10.3*   HEMATOCRIT % 32.9* 29.4* 35.1*   PLATELETS 10*3/mm3 399 390 493*          Results from last 7  days  Lab Units 07/04/18  0549 07/03/18  0439 07/02/18  1246   SODIUM mmol/L 140 137 137   POTASSIUM mmol/L 4.5 3.5 4.6   CHLORIDE mmol/L 100 96* 90*   CO2 mmol/L 34.0* 33.0* 37.0*   BUN mg/dL 14 12 21   CREATININE mg/dL 0.43* 0.37* 0.44*   CALCIUM mg/dL 9.1 8.4 9.5   BILIRUBIN mg/dL 0.3 0.4 0.5   ALK PHOS U/L 196* 169* 228*   ALT (SGPT) U/L 72* 50 69*   AST (SGOT) U/L 85* 54* 60*   GLUCOSE mg/dL 140* 171* 130*       Culture Data:   Blood Culture   Date Value Ref Range Status   07/02/2018 No growth at less than 24 hours  Preliminary   07/02/2018 No growth at less than 24 hours  Preliminary       Radiology Data:   Imaging Results (last 24 hours)     ** No results found for the last 24 hours. **          I have reviewed the patient's current medications.     Assessment/Plan     Hospital Problem List     Aspiration pneumonia of left lung (CMS/HCC)        Assessment:  1.  Acute on chronic hypoxemic respiratory failure  2.  Sepsis  3.  Pneumonia - aspiration  4.  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  Weight Loss  6.  Elevated troponin - no cheset pain  7.  Abnormal LFTs  8.  History of PE in the past - now off of anticoagulation  9.  Concern for severe protein-calorie malnutrition  10.  Tobacco dependence    11.  MICHELLE  12. Developing sacral wound.     Plan:   1.  IV Zosyn and Levaquin - day 3  2.  IV Solumedrol, wean  3.  Scheduled nebs  4.   Diet advanced, but patient not eating  5.  Supp 02  6.   No growth thus far.   7.  IVFs  8.  Nutrition consult  9.   Repeat troponin this AM  10.  IV Lopressor to PO  11.  IV Pepcid to PO  12.  Lovenox at PPX dose for now  13.  Echo:  · Left ventricular systolic function is normal. Estimated EF = 60%.  · Right ventricular cavity is mildly dilated.  · Normal RV function  · Mild pulmonary hypertension is present.  14.  Continue outpatient CPAP per home settings  15.  IV Perez Oliver DO   07/04/18   3:02  PM

## 2018-07-04 NOTE — PROGRESS NOTES
Continued Stay Note   Pal     Patient Name: Kellie Arzate  MRN: [id number]  Today's Date: 7/4/2018    Admit Date: 7/2/2018          Discharge Plan     Row Name 07/04/18 1439       Plan    Plan Open to HH if ordered    Patient/Family in Agreement with Plan yes    Plan Comments Spoke with pt to see if he feels he will need to go to SNF upon discharge. He has thought over and wishes to return home, says he has family to assist if needed. He says he had [facility] in past and would be agreeable for them to come back.  Will inform [facility] when he is ready to go home. Will follow.               Discharge Codes    No documentation.           MATTHIEU [place]

## 2018-07-04 NOTE — PLAN OF CARE
Problem: Fall Risk (Adult)  Goal: Identify Related Risk Factors and Signs and Symptoms  Outcome: Outcome(s) achieved Date Met: 07/04/18    Goal: Absence of Fall  Outcome: Ongoing (interventions implemented as appropriate)      Problem: Patient Care Overview  Goal: Plan of Care Review  Outcome: Ongoing (interventions implemented as appropriate)   07/04/18 0413   Coping/Psychosocial   Plan of Care Reviewed With patient   Plan of Care Review   Progress no change   OTHER   Outcome Summary No changes. Denies pain or SOA. VSS and monitoring for s/s aspiration. Will continue to monitor.       Problem: Sepsis/Septic Shock (Adult)  Goal: Signs and Symptoms of Listed Potential Problems Will be Absent, Minimized or Managed (Sepsis/Septic Shock)  Outcome: Ongoing (interventions implemented as appropriate)      Problem: Skin Injury Risk (Adult)  Goal: Identify Related Risk Factors and Signs and Symptoms  Outcome: Outcome(s) achieved Date Met: 07/04/18    Goal: Skin Health and Integrity  Outcome: Ongoing (interventions implemented as appropriate)      Problem: Nutrition, Imbalanced: Inadequate Oral Intake (Adult)  Goal: Improved Oral Intake  Outcome: Ongoing (interventions implemented as appropriate)    Goal: Prevent Further Weight Loss  Outcome: Ongoing (interventions implemented as appropriate)

## 2018-07-04 NOTE — PLAN OF CARE
Problem: Patient Care Overview  Goal: Plan of Care Review  Outcome: Ongoing (interventions implemented as appropriate)   07/04/18 1512   Coping/Psychosocial   Plan of Care Reviewed With patient   Plan of Care Review   Progress no change   OTHER   Outcome Summary Pt reports shortness of air at times. V/S/S. Pt doesn't like prescribed pureed diet. MD to order ensure. Lungs coarse, productive cough with yellow/green sputum. Oxygen at 3l/nc sats wnl.        Problem: Sepsis/Septic Shock (Adult)  Goal: Signs and Symptoms of Listed Potential Problems Will be Absent, Minimized or Managed (Sepsis/Septic Shock)  Outcome: Ongoing (interventions implemented as appropriate)      Problem: Skin Injury Risk (Adult)  Goal: Skin Health and Integrity  Outcome: Ongoing (interventions implemented as appropriate)      Problem: Nutrition, Imbalanced: Inadequate Oral Intake (Adult)  Goal: Identify Related Risk Factors and Signs and Symptoms  Outcome: Ongoing (interventions implemented as appropriate)    Goal: Improved Oral Intake  Outcome: Ongoing (interventions implemented as appropriate)

## 2018-07-05 LAB
ALBUMIN SERPL-MCNC: 2.7 G/DL (ref 3.5–5)
ALBUMIN/GLOB SERPL: 0.9 G/DL (ref 1.1–2.5)
ALP SERPL-CCNC: 181 U/L (ref 24–120)
ALT SERPL W P-5'-P-CCNC: 72 U/L (ref 0–54)
ANION GAP SERPL CALCULATED.3IONS-SCNC: 8 MMOL/L (ref 4–13)
AST SERPL-CCNC: 102 U/L (ref 7–45)
BACTERIA SPEC RESP CULT: ABNORMAL
BACTERIA SPEC RESP CULT: ABNORMAL
BASOPHILS # BLD AUTO: 0.02 10*3/MM3 (ref 0–0.2)
BASOPHILS NFR BLD AUTO: 0.1 % (ref 0–2)
BILIRUB SERPL-MCNC: 0.4 MG/DL (ref 0.1–1)
BUN BLD-MCNC: 17 MG/DL (ref 5–21)
BUN/CREAT SERPL: 31.5 (ref 7–25)
CALCIUM SPEC-SCNC: 9 MG/DL (ref 8.4–10.4)
CHLORIDE SERPL-SCNC: 100 MMOL/L (ref 98–110)
CO2 SERPL-SCNC: 35 MMOL/L (ref 24–31)
CREAT BLD-MCNC: 0.54 MG/DL (ref 0.5–1.4)
DEPRECATED RDW RBC AUTO: 42.8 FL (ref 40–54)
EOSINOPHIL # BLD AUTO: 0 10*3/MM3 (ref 0–0.7)
EOSINOPHIL NFR BLD AUTO: 0 % (ref 0–4)
ERYTHROCYTE [DISTWIDTH] IN BLOOD BY AUTOMATED COUNT: 17.1 % (ref 12–15)
GFR SERPL CREATININE-BSD FRML MDRD: >150 ML/MIN/1.73
GLOBULIN UR ELPH-MCNC: 3.1 GM/DL
GLUCOSE BLD-MCNC: 144 MG/DL (ref 70–100)
GRAM STN SPEC: ABNORMAL
HCT VFR BLD AUTO: 31.7 % (ref 40–52)
HGB BLD-MCNC: 9.5 G/DL (ref 14–18)
LYMPHOCYTES # BLD AUTO: 0.86 10*3/MM3 (ref 0.72–4.86)
LYMPHOCYTES NFR BLD AUTO: 3.8 % (ref 15–45)
MCH RBC QN AUTO: 21.1 PG (ref 28–32)
MCHC RBC AUTO-ENTMCNC: 30 G/DL (ref 33–36)
MCV RBC AUTO: 70.4 FL (ref 82–95)
MONOCYTES # BLD AUTO: 0.61 10*3/MM3 (ref 0.19–1.3)
MONOCYTES NFR BLD AUTO: 2.7 % (ref 4–12)
NEUTROPHILS # BLD AUTO: 20.77 10*3/MM3 (ref 1.87–8.4)
NEUTROPHILS NFR BLD AUTO: 90.7 % (ref 39–78)
PLATELET # BLD AUTO: 427 10*3/MM3 (ref 130–400)
PMV BLD AUTO: 9.4 FL (ref 6–12)
POTASSIUM BLD-SCNC: 4.4 MMOL/L (ref 3.5–5.3)
PROT SERPL-MCNC: 5.8 G/DL (ref 6.3–8.7)
RBC # BLD AUTO: 4.5 10*6/MM3 (ref 4.8–5.9)
SODIUM BLD-SCNC: 143 MMOL/L (ref 135–145)
WBC NRBC COR # BLD: 22.87 10*3/MM3 (ref 4.8–10.8)

## 2018-07-05 PROCEDURE — 85025 COMPLETE CBC W/AUTO DIFF WBC: CPT | Performed by: INTERNAL MEDICINE

## 2018-07-05 PROCEDURE — 94799 UNLISTED PULMONARY SVC/PX: CPT

## 2018-07-05 PROCEDURE — 25010000002 IRON SUCROSE PER 1 MG: Performed by: INTERNAL MEDICINE

## 2018-07-05 PROCEDURE — 25010000002 ENOXAPARIN PER 10 MG: Performed by: INTERNAL MEDICINE

## 2018-07-05 PROCEDURE — 25010000002 PIPERACILLIN SOD-TAZOBACTAM PER 1 G: Performed by: INTERNAL MEDICINE

## 2018-07-05 PROCEDURE — 25810000003 SODIUM CHLORIDE 0.9 % WITH KCL 20 MEQ 20-0.9 MEQ/L-% SOLUTION: Performed by: INTERNAL MEDICINE

## 2018-07-05 PROCEDURE — 94760 N-INVAS EAR/PLS OXIMETRY 1: CPT

## 2018-07-05 PROCEDURE — 80053 COMPREHEN METABOLIC PANEL: CPT | Performed by: INTERNAL MEDICINE

## 2018-07-05 PROCEDURE — 92526 ORAL FUNCTION THERAPY: CPT

## 2018-07-05 PROCEDURE — 25010000002 LEVOFLOXACIN PER 250 MG: Performed by: INTERNAL MEDICINE

## 2018-07-05 PROCEDURE — 25010000002 METHYLPREDNISOLONE PER 40 MG: Performed by: INTERNAL MEDICINE

## 2018-07-05 RX ORDER — DOCUSATE SODIUM 100 MG/1
100 CAPSULE, LIQUID FILLED ORAL 2 TIMES DAILY
Status: DISCONTINUED | OUTPATIENT
Start: 2018-07-05 | End: 2018-07-14 | Stop reason: HOSPADM

## 2018-07-05 RX ORDER — LEVOFLOXACIN 5 MG/ML
750 INJECTION, SOLUTION INTRAVENOUS EVERY 24 HOURS
Status: DISCONTINUED | OUTPATIENT
Start: 2018-07-05 | End: 2018-07-06

## 2018-07-05 RX ORDER — LEVOFLOXACIN 5 MG/ML
750 INJECTION, SOLUTION INTRAVENOUS EVERY 24 HOURS
Status: DISCONTINUED | OUTPATIENT
Start: 2018-07-05 | End: 2018-07-05

## 2018-07-05 RX ORDER — METHYLPREDNISOLONE SODIUM SUCCINATE 40 MG/ML
40 INJECTION, POWDER, LYOPHILIZED, FOR SOLUTION INTRAMUSCULAR; INTRAVENOUS EVERY 12 HOURS
Status: DISCONTINUED | OUTPATIENT
Start: 2018-07-06 | End: 2018-07-06

## 2018-07-05 RX ADMIN — IRON SUCROSE 200 MG: 20 INJECTION, SOLUTION INTRAVENOUS at 09:22

## 2018-07-05 RX ADMIN — ACETAMINOPHEN 650 MG: 325 TABLET, FILM COATED ORAL at 00:08

## 2018-07-05 RX ADMIN — TAZOBACTAM SODIUM AND PIPERACILLIN SODIUM 3.38 G: 375; 3 INJECTION, SOLUTION INTRAVENOUS at 05:32

## 2018-07-05 RX ADMIN — POTASSIUM CHLORIDE AND SODIUM CHLORIDE 75 ML/HR: 900; 150 INJECTION, SOLUTION INTRAVENOUS at 05:32

## 2018-07-05 RX ADMIN — IPRATROPIUM BROMIDE AND ALBUTEROL SULFATE 3 ML: 2.5; .5 SOLUTION RESPIRATORY (INHALATION) at 03:25

## 2018-07-05 RX ADMIN — ACETAMINOPHEN 650 MG: 325 TABLET, FILM COATED ORAL at 23:57

## 2018-07-05 RX ADMIN — METHYLPREDNISOLONE SODIUM SUCCINATE 40 MG: 40 INJECTION, POWDER, FOR SOLUTION INTRAMUSCULAR; INTRAVENOUS at 02:01

## 2018-07-05 RX ADMIN — TAZOBACTAM SODIUM AND PIPERACILLIN SODIUM 3.38 G: 375; 3 INJECTION, SOLUTION INTRAVENOUS at 15:05

## 2018-07-05 RX ADMIN — POTASSIUM CHLORIDE AND SODIUM CHLORIDE 75 ML/HR: 900; 150 INJECTION, SOLUTION INTRAVENOUS at 22:10

## 2018-07-05 RX ADMIN — IPRATROPIUM BROMIDE AND ALBUTEROL SULFATE 3 ML: 2.5; .5 SOLUTION RESPIRATORY (INHALATION) at 06:57

## 2018-07-05 RX ADMIN — IPRATROPIUM BROMIDE AND ALBUTEROL SULFATE 3 ML: 2.5; .5 SOLUTION RESPIRATORY (INHALATION) at 19:28

## 2018-07-05 RX ADMIN — GUAIFENESIN 1200 MG: 600 TABLET, EXTENDED RELEASE ORAL at 09:26

## 2018-07-05 RX ADMIN — ASPIRIN 81 MG: 81 TABLET ORAL at 15:04

## 2018-07-05 RX ADMIN — IPRATROPIUM BROMIDE AND ALBUTEROL SULFATE 3 ML: 2.5; .5 SOLUTION RESPIRATORY (INHALATION) at 14:46

## 2018-07-05 RX ADMIN — IPRATROPIUM BROMIDE AND ALBUTEROL SULFATE 3 ML: 2.5; .5 SOLUTION RESPIRATORY (INHALATION) at 10:38

## 2018-07-05 RX ADMIN — ENOXAPARIN SODIUM 40 MG: 100 INJECTION SUBCUTANEOUS at 18:12

## 2018-07-05 RX ADMIN — AMITRIPTYLINE HYDROCHLORIDE 50 MG: 25 TABLET, FILM COATED ORAL at 21:25

## 2018-07-05 RX ADMIN — LEVOFLOXACIN 750 MG: 750 INJECTION, SOLUTION INTRAVENOUS at 20:32

## 2018-07-05 RX ADMIN — METHYLPREDNISOLONE SODIUM SUCCINATE 40 MG: 40 INJECTION, POWDER, FOR SOLUTION INTRAMUSCULAR; INTRAVENOUS at 15:05

## 2018-07-05 RX ADMIN — GUAIFENESIN 1200 MG: 600 TABLET, EXTENDED RELEASE ORAL at 21:25

## 2018-07-05 RX ADMIN — METOPROLOL TARTRATE 50 MG: 50 TABLET ORAL at 09:26

## 2018-07-05 RX ADMIN — MEGESTROL ACETATE 800 MG: 40 SUSPENSION ORAL at 09:26

## 2018-07-05 RX ADMIN — TAZOBACTAM SODIUM AND PIPERACILLIN SODIUM 3.38 G: 375; 3 INJECTION, SOLUTION INTRAVENOUS at 22:10

## 2018-07-05 RX ADMIN — DOCUSATE SODIUM 100 MG: 100 CAPSULE ORAL at 21:25

## 2018-07-05 RX ADMIN — METOPROLOL TARTRATE 50 MG: 50 TABLET ORAL at 21:25

## 2018-07-05 NOTE — PLAN OF CARE
Problem: Patient Care Overview  Goal: Plan of Care Review  Outcome: Ongoing (interventions implemented as appropriate)   07/05/18 1214   Coping/Psychosocial   Plan of Care Reviewed With patient   Plan of Care Review   Progress improving   OTHER   Outcome Summary ST swallowing tx completed. Pt was alert and cooperative. Trialed ice chips, per pt's request, and pt demonstrated with 1x immediate cough and 1x delayed cough. Pt did not show any overt s/s on trials of honey-thick liquids. Pt notes that he has not had dificulty during his meals. During swallowing exercises, pt performed 6x effortful swallow and 4x cassidy. Pt benefits from ice cube to initiate swallow. Educated RN and pt that he is able to have water or ice chips in between meals. RN notes that pt's lungs sounded clear this AM. Thanks!

## 2018-07-05 NOTE — PLAN OF CARE
Problem: Fall Risk (Adult)  Goal: Absence of Fall  Outcome: Ongoing (interventions implemented as appropriate)      Problem: Patient Care Overview  Goal: Plan of Care Review  Outcome: Ongoing (interventions implemented as appropriate)   07/05/18 0165   Coping/Psychosocial   Plan of Care Reviewed With patient   Plan of Care Review   Progress no change   OTHER   Outcome Summary Pt up to BR and shower with PCA this shitft. SOA with exertion. O2 increased to 4lnc to keep o2 sats greater than 90%. No c/o pain. Encouraged to shift weight. Turns as tolerated. Po intake encouraged. Continues with IV fluids and IV antibiotics.       Problem: Sepsis/Septic Shock (Adult)  Goal: Signs and Symptoms of Listed Potential Problems Will be Absent, Minimized or Managed (Sepsis/Septic Shock)  Outcome: Ongoing (interventions implemented as appropriate)      Problem: Skin Injury Risk (Adult)  Goal: Skin Health and Integrity  Outcome: Ongoing (interventions implemented as appropriate)      Problem: Nutrition, Imbalanced: Inadequate Oral Intake (Adult)  Goal: Identify Related Risk Factors and Signs and Symptoms  Outcome: Outcome(s) achieved Date Met: 07/05/18    Goal: Improved Oral Intake  Outcome: Ongoing (interventions implemented as appropriate)    Goal: Prevent Further Weight Loss  Outcome: Ongoing (interventions implemented as appropriate)

## 2018-07-05 NOTE — PROGRESS NOTES
[facility] Medicine Services  INPATIENT PROGRESS NOTE    Patient Name: Kellie Arzate  Date of Admission: 7/2/2018  Today's Date: 07/05/18  Length of Stay: 3  Primary Care Physician: Jose Moon MD    Subjective   Chief Complaint: follow-up aspiration PNA  Weakness - Generalized        Patient reports that he feels better today. Patient was started on a pureed diet, and states that it is nasty. Has been drinking supplements and eating applesauce. No BM. Thin and frail.     Review of Systems     All pertinent negatives and positives are as above. All other systems have been reviewed and are negative unless otherwise stated.     Objective    Temp:  [97 °F (36.1 °C)-98 °F (36.7 °C)] 97.4 °F (36.3 °C)  Heart Rate:  [] 75  Resp:  [16-20] 18  BP: (118-133)/(61-83) 133/83  Physical Exam   Constitutional: He is oriented to person, place, and time. No distress.   Thin and frail appearing   HENT:   Head: Normocephalic.   Mouth/Throat: No oropharyngeal exudate.   Eyes: No scleral icterus.   Neck: No tracheal deviation present.   Cardiovascular: Normal heart sounds.    Rate approx 110-120; regular   Pulmonary/Chest: No respiratory distress. He has wheezes (faint - expiratory).   On 3L nasal cannula; some coarse BSs noted on the left   Abdominal: Soft.   Musculoskeletal: He exhibits no edema.   Neurological: He is alert and oriented to person, place, and time.   Skin: Skin is warm and dry. He is not diaphoretic.   Psychiatric: He has a normal mood and affect. His behavior is normal.   Vitals reviewed.    Results Review:  I have reviewed the labs, radiology results, and diagnostic studies.    Laboratory Data:     Results from last 7 days  Lab Units 07/05/18  0411 07/04/18  0549 07/03/18  0439   WBC 10*3/mm3 22.87* 23.67* 23.12*   HEMOGLOBIN g/dL 9.5* 9.8* 8.9*   HEMATOCRIT % 31.7* 32.9* 29.4*   PLATELETS 10*3/mm3 427* 399 390          Results from last 7 days  Lab Units  07/05/18  0411 07/04/18  0549 07/03/18  0439   SODIUM mmol/L 143 140 137   POTASSIUM mmol/L 4.4 4.5 3.5   CHLORIDE mmol/L 100 100 96*   CO2 mmol/L 35.0* 34.0* 33.0*   BUN mg/dL 17 14 12   CREATININE mg/dL 0.54 0.43* 0.37*   CALCIUM mg/dL 9.0 9.1 8.4   BILIRUBIN mg/dL 0.4 0.3 0.4   ALK PHOS U/L 181* 196* 169*   ALT (SGPT) U/L 72* 72* 50   AST (SGOT) U/L 102* 85* 54*   GLUCOSE mg/dL 144* 140* 171*       Culture Data:   Blood Culture   Date Value Ref Range Status   07/02/2018 No growth at less than 24 hours  Preliminary   07/02/2018 No growth at less than 24 hours  Preliminary       Radiology Data:   Imaging Results (last 24 hours)     ** No results found for the last 24 hours. **          I have reviewed the patient's current medications.     Assessment/Plan     Hospital Problem List     Aspiration pneumonia of left lung (CMS/HCC)        Assessment:  1.  Acute on chronic hypoxemic respiratory failure  2.  Sepsis  3.  Pneumonia - aspiration  4.  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  Weight Loss  6.  Elevated troponin - no cheset pain  7.  Abnormal LFTs  8.  History of PE in the past - now off of anticoagulation  9.  Concern for severe protein-calorie malnutrition  10.  Tobacco dependence    11.  MICHELLE  12. Developing sacral wound.     Plan:   1.  IV Zosyn and Levaquin - day 4  2.  IV Solumedrol, wean  3.  Scheduled nebs  4.   Patient starting to eat more  5.  Supp 02  6.   No growth thus far.   7.  DC IVF  8.  Nutrition consult  9.  Lovenox at PPX dose for now  10.  Echo:  · Left ventricular systolic function is normal. Estimated EF = 60%.  · Right ventricular cavity is mildly dilated.  · Normal RV function  · Mild pulmonary hypertension is present.  11.  Continue outpatient CPAP per home settings  12.  IV Perez Oliver,    07/05/18   3:12 PM

## 2018-07-05 NOTE — THERAPY TREATMENT NOTE
Acute Care - Speech Language Pathology   Swallow Treatment Note [facility]     Patient Name: Kellie Arzate  : 1955  MRN: [id number]  Today's Date: 2018               Admit Date: 2018  ST swallowing tx completed. Pt was alert and cooperative. Trialed ice chips, per pt's request, and pt demonstrated with 1x immediate cough and 1x delayed cough. Pt did not show any overt s/s on trials of honey-thick liquids. Pt notes that he has not had dificulty during his meals. During swallowing exercises, pt performed 6x effortful swallow and 4x cassidy. Pt benefits from ice cube to initiate swallow. Educated RN and pt that he is able to have water or ice chips in between meals. RN notes that pt's lungs sounded clear this AM. Thanks! Keyla Alvarez Speech Therapy Student 2018 12:22 PM  Visit Dx:      ICD-10-CM ICD-9-CM   1. Aspiration pneumonia of left lung, unspecified aspiration pneumonia type, unspecified part of lung (CMS/[facility]) J69.0 507.0   2. SIRS (systemic inflammatory response syndrome) (CMS/[facility]) R65.10 995.90   3. Oropharyngeal dysphagia R13.12 787.22     Patient Active Problem List   Diagnosis   • Aspiration pneumonia of left lung (CMS/[facility])       Therapy Treatment    Therapy Treatment / Health Promotion    Treatment Time/Intention  Discipline: (P) speech language pathologist (18 : Marc Hussein Therapy Student)  Document Type: (P) therapy note (daily note) (18 : Marc Hussein Therapy Student)  Subjective Information: (P) no complaints (18 : Marc Hussein Therapy Student)  Mode of Treatment: (P) individual therapy, speech-language pathology (18 : Marc Hussein Therapy Student)  Patient/Family Observations: (P) No family present (18 : Marc Hussein Therapy Student)  Care Plan Review: (P) evaluation/treatment results reviewed, care plan/treatment goals reviewed, risks/benefits reviewed (18 : Keyla Alvarez Speech  Therapy Student)  Patient Effort: (P) good (07/05/18 0955 : Keyla Alvarez Speech Therapy Student)  Plan of Care Review  Plan of Care Reviewed With: (P) patient (07/05/18 1214 : Keyla Alvarez Speech Therapy Student)    Vitals/Pain/Safety  Pain Assessment  Additional Documentation: (P) Pain Scale: Numbers Pre/Post-Treatment (Group) (07/05/18 0955 : Keyla Alvarez Speech Therapy Student)  Pain Scale: Numbers Pre/Post-Treatment  Pain Scale: Numbers, Pretreatment: (P) 0/10 - no pain (07/05/18 0955 : Keyla Alvarez Speech Therapy Student)  Pain Scale: Numbers, Post-Treatment: (P) 0/10 - no pain (07/05/18 0955 : Keyla Alvarez Speech Therapy Student)    Cognition, Communication, Swallow  Recommendations  Anticipated Dischage Disposition: (P) unknown (07/05/18 0955 : Keyla Alvarez Speech Therapy Student)    Outcome Summary  Outcome Summary/Treatment Plan (SLP)  Daily Summary of Progress (SLP): (P) progress toward functional goals is good (07/05/18 0955 : Keyla Alvarez Speech Therapy Student)  Barriers to Overall Progress (SLP): (P) n/a (07/05/18 0955 : Keyla Alvarez Speech Therapy Student)  Plan for Continued Treatment (SLP): (P) ST to continue to follow and tx (07/05/18 0955 : Keyla Alvarez Speech Therapy Student)  Anticipated Dischage Disposition: (P) unknown (07/05/18 0955 : eKyla Alvarez Speech Therapy Student)            SLP GOALS     Row Name 07/05/18 0955 07/03/18 1300 07/02/18 1530       Oral Nutrition/Hydration Goal 1 (SLP)    Oral Nutrition/Hydration Goal 1, SLP (P)  LTG: Pt will tolerate LRD without overt s/s of aspiration.   -TF LTG: Pt will tolerate LRD without overt s/s of aspiration.   -CS Pt will tolerate LRD without overt s/s of aspiration.   -TM    Time Frame (Oral Nutrition/Hydration Goal 1, SLP) (P)  by discharge  -TF by discharge  -CS short term goal (STG);by discharge  -TM    Barriers (Oral Nutrition/Hydration Goal 1, SLP) (P)  N/A  -TF N/A  -CS N/A  -TM    Progress/Outcomes (Oral Nutrition/Hydration Goal 1, SLP)  (P)  continuing progress toward goal  -TF goal ongoing  -CS goal ongoing  -TM       Pharyngeal Strengthening Exercise Goal 1 (SLP)    Activity (Pharyngeal Strengthening Goal 1, SLP) (P)  increase squeeze/positive pressure generation  -TF increase squeeze/positive pressure generation  -CS  --    Increase Squeeze/Positive Pressure Generation (P)  hard effortful swallow;cassidy  -TF hard effortful swallow;cassidy  -CS  --    Brule/Accuracy (Pharyngeal Strengthening Goal 1, SLP) (P)  with minimal cues (75-90% accuracy)  -TF with minimal cues (75-90% accuracy)  -CS  --    Time Frame (Pharyngeal Strengthening Goal 1, SLP) (P)  by discharge  -TF by discharge  -CS  --    Barriers (Pharyngeal Strengthening Goal 1, SLP) (P)  n/a  -TF n/a  -CS  --    Progress/Outcomes (Pharyngeal Strengthening Goal 1, SLP) (P)  good progress toward goal  -TF good progress toward goal  -CS  --       Pharyngeal Strengthening Exercise Goal (SLP)    Pharyngeal Strengthening Goal, (SLP) (P)  Pt will complete 30 minutes of NMES applied to the suprahyoids in conjunction with swallow exercises w/o any overt s/s of distress in order to improve swallow function.  -TF Pt will complete 30 minutes of NMES applied to the suprahyoids in conjunction with swallow exercises w/o any overt s/s of distress in order to improve swallow function.  -CS  --    Time Frame (Pharyngeal Strengthening Goal, SLP) (P)  by discharge  -TF by discharge  -CS  --    Barriers (Pharyngeal Strengthening Goal, SLP) (P)  n/a  -TF n/a  -CS  --    Progress/Outcomes (Pharyngeal Strengthening Goal, SLP) (P)  goal ongoing  -TF goal ongoing  -CS  --      User Key  (r) = Recorded By, (t) = Taken By, (c) = Cosigned By    Initials Name Provider Type    TM Dinorah Thomas, CCC-SLP Speech and Language Pathologist    ANA Muro MS CCC-SLP Speech and Language Pathologist    MARTINA Alvarez, Speech Therapy Student Speech Therapy Student          EDUCATION  The patient has been educated in  the following areas:   Dysphagia (Swallowing Impairment).    SLP Recommendation and Plan                       Anticipated Dischage Disposition: (P) unknown                Plan of Care Reviewed With: (P) patient  Plan of Care Review  Plan of Care Reviewed With: (P) patient  Daily Summary of Progress (SLP): (P) progress toward functional goals is good  Plan for Continued Treatment (SLP): (P) ST to continue to follow and tx  Progress: (P) improving  Outcome Summary: (P) ST swallowing tx completed. Pt was alert and cooperative. Trialed ice chips, per pt's request, and pt demonstrated with 1x immediate cough and 1x delayed cough. Pt did not show any overt s/s on trials of honey-thick liquids. Pt notes that he has not had dificulty during his meals. During swallowing exercises, pt performed 6x effortful swallow and 4x cassidy. Pt benefits from ice cube to initiate swallow. Educated RN and pt that he is able to have water or ice chips in between meals. RN notes that pt's lungs sounded clear this AM. Thanks!        SLP Outcome Measures (last 72 hours)      SLP Outcome Measures     Row Name 07/03/18 1600 07/02/18 1626          SLP Outcome Measures    Outcome Measure Used? Adult NOMS  -CS Adult NOMS  -TM        FCM Scores    FCM Chosen Swallowing  -CS Swallowing  -TM     Swallowing FCM Score 3  -CS 2  -TM       User Key  (r) = Recorded By, (t) = Taken By, (c) = Cosigned By    Initials Name Effective Dates    TM Dinorah Thomas CCC-SLP 08/02/16 -     CS Jack Muro, MS CCC-SLP 04/03/18 -              Time Calculation:         Time Calculation- SLP     Row Name 07/05/18 1221             Time Calculation- SLP    SLP Start Time (P)  0955  -TF      SLP Stop Time (P)  1023  -TF      SLP Time Calculation (min) (P)  28 min  -TF      SLP Received On (P)  07/05/18  -TF        User Key  (r) = Recorded By, (t) = Taken By, (c) = Cosigned By    Initials Name Provider Type    MARTINA Alvarez Speech Therapy Student Speech Therapy Student           Therapy Charges for Today     Code Description Service Date Service Provider Modifiers Qty    [id number] HC ST TREATMENT SWALLOW 2 7/5/2018 Keyla Alvarez, Speech Therapy Student GN 1          SLP G-Codes  SLP NOMS Used?: Yes  Functional Limitations: Swallowing  Swallow Current Status (): At least 80 percent but less than 100 percent impaired, limited or restricted  Swallow Goal Status (): At least 60 percent but less than 80 percent impaired, limited or restricted      Keyla Alvarez, Speech Therapy Student  7/5/2018

## 2018-07-05 NOTE — PLAN OF CARE
Problem: Fall Risk (Adult)  Goal: Absence of Fall  Outcome: Ongoing (interventions implemented as appropriate)      Problem: Patient Care Overview  Goal: Plan of Care Review  Outcome: Ongoing (interventions implemented as appropriate)   07/05/18 0223   Coping/Psychosocial   Plan of Care Reviewed With patient   Plan of Care Review   Progress no change   OTHER   Outcome Summary pt encouraged to do frequent wt shifts. complied once so far this shift. mepilex in place over coccyx which is red and blachable. one on right hip which is not red but pt states is sore. cont to monitor     Goal: Individualization and Mutuality  Outcome: Ongoing (interventions implemented as appropriate)      Problem: Sepsis/Septic Shock (Adult)  Goal: Signs and Symptoms of Listed Potential Problems Will be Absent, Minimized or Managed (Sepsis/Septic Shock)  Outcome: Ongoing (interventions implemented as appropriate)      Problem: Skin Injury Risk (Adult)  Goal: Skin Health and Integrity  Outcome: Ongoing (interventions implemented as appropriate)      Problem: Nutrition, Imbalanced: Inadequate Oral Intake (Adult)  Goal: Identify Related Risk Factors and Signs and Symptoms  Outcome: Ongoing (interventions implemented as appropriate)    Goal: Improved Oral Intake  Outcome: Ongoing (interventions implemented as appropriate)    Goal: Prevent Further Weight Loss  Outcome: Ongoing (interventions implemented as appropriate)

## 2018-07-06 ENCOUNTER — APPOINTMENT (OUTPATIENT)
Dept: GENERAL RADIOLOGY | Facility: HOSPITAL | Age: 63
End: 2018-07-06

## 2018-07-06 LAB
BILIRUB UR QL STRIP: NEGATIVE
CLARITY UR: ABNORMAL
COLOR UR: YELLOW
GLUCOSE UR STRIP-MCNC: NEGATIVE MG/DL
HGB UR QL STRIP.AUTO: NEGATIVE
KETONES UR QL STRIP: ABNORMAL
LEUKOCYTE ESTERASE UR QL STRIP.AUTO: NEGATIVE
NITRITE UR QL STRIP: NEGATIVE
PH UR STRIP.AUTO: 7.5 [PH] (ref 5–8)
PROT UR QL STRIP: NEGATIVE
SP GR UR STRIP: 1.02 (ref 1–1.03)
UROBILINOGEN UR QL STRIP: ABNORMAL

## 2018-07-06 PROCEDURE — 25010000002 METHYLPREDNISOLONE PER 40 MG: Performed by: INTERNAL MEDICINE

## 2018-07-06 PROCEDURE — 71046 X-RAY EXAM CHEST 2 VIEWS: CPT

## 2018-07-06 PROCEDURE — 94799 UNLISTED PULMONARY SVC/PX: CPT

## 2018-07-06 PROCEDURE — 25010000002 PIPERACILLIN SOD-TAZOBACTAM PER 1 G: Performed by: INTERNAL MEDICINE

## 2018-07-06 PROCEDURE — 94760 N-INVAS EAR/PLS OXIMETRY 1: CPT

## 2018-07-06 PROCEDURE — 94640 AIRWAY INHALATION TREATMENT: CPT

## 2018-07-06 PROCEDURE — 92526 ORAL FUNCTION THERAPY: CPT | Performed by: SPEECH-LANGUAGE PATHOLOGIST

## 2018-07-06 PROCEDURE — 81003 URINALYSIS AUTO W/O SCOPE: CPT | Performed by: INTERNAL MEDICINE

## 2018-07-06 PROCEDURE — 25010000002 ENOXAPARIN PER 10 MG: Performed by: INTERNAL MEDICINE

## 2018-07-06 RX ORDER — SACCHAROMYCES BOULARDII 250 MG
250 CAPSULE ORAL 2 TIMES DAILY
Status: DISCONTINUED | OUTPATIENT
Start: 2018-07-06 | End: 2018-07-14 | Stop reason: HOSPADM

## 2018-07-06 RX ORDER — FAMOTIDINE 20 MG/1
20 TABLET, FILM COATED ORAL DAILY
Status: DISCONTINUED | OUTPATIENT
Start: 2018-07-06 | End: 2018-07-14 | Stop reason: HOSPADM

## 2018-07-06 RX ORDER — BUDESONIDE AND FORMOTEROL FUMARATE DIHYDRATE 160; 4.5 UG/1; UG/1
2 AEROSOL RESPIRATORY (INHALATION)
Status: DISCONTINUED | OUTPATIENT
Start: 2018-07-06 | End: 2018-07-14 | Stop reason: HOSPADM

## 2018-07-06 RX ORDER — METHYLPREDNISOLONE SODIUM SUCCINATE 40 MG/ML
40 INJECTION, POWDER, LYOPHILIZED, FOR SOLUTION INTRAMUSCULAR; INTRAVENOUS EVERY 8 HOURS SCHEDULED
Status: DISCONTINUED | OUTPATIENT
Start: 2018-07-06 | End: 2018-07-08

## 2018-07-06 RX ADMIN — TAZOBACTAM SODIUM AND PIPERACILLIN SODIUM 3.38 G: 375; 3 INJECTION, SOLUTION INTRAVENOUS at 14:33

## 2018-07-06 RX ADMIN — IPRATROPIUM BROMIDE AND ALBUTEROL SULFATE 3 ML: 2.5; .5 SOLUTION RESPIRATORY (INHALATION) at 23:14

## 2018-07-06 RX ADMIN — METHYLPREDNISOLONE SODIUM SUCCINATE 40 MG: 40 INJECTION, POWDER, FOR SOLUTION INTRAMUSCULAR; INTRAVENOUS at 14:33

## 2018-07-06 RX ADMIN — IPRATROPIUM BROMIDE AND ALBUTEROL SULFATE 3 ML: 2.5; .5 SOLUTION RESPIRATORY (INHALATION) at 14:40

## 2018-07-06 RX ADMIN — METHYLPREDNISOLONE SODIUM SUCCINATE 40 MG: 40 INJECTION, POWDER, FOR SOLUTION INTRAMUSCULAR; INTRAVENOUS at 03:33

## 2018-07-06 RX ADMIN — GUAIFENESIN 1200 MG: 600 TABLET, EXTENDED RELEASE ORAL at 21:12

## 2018-07-06 RX ADMIN — ASPIRIN 81 MG: 81 TABLET ORAL at 08:03

## 2018-07-06 RX ADMIN — IPRATROPIUM BROMIDE AND ALBUTEROL SULFATE 3 ML: 2.5; .5 SOLUTION RESPIRATORY (INHALATION) at 10:38

## 2018-07-06 RX ADMIN — TAZOBACTAM SODIUM AND PIPERACILLIN SODIUM 3.38 G: 375; 3 INJECTION, SOLUTION INTRAVENOUS at 06:35

## 2018-07-06 RX ADMIN — DOCUSATE SODIUM 100 MG: 100 CAPSULE ORAL at 08:03

## 2018-07-06 RX ADMIN — FAMOTIDINE 20 MG: 20 TABLET, FILM COATED ORAL at 10:53

## 2018-07-06 RX ADMIN — IPRATROPIUM BROMIDE AND ALBUTEROL SULFATE 3 ML: 2.5; .5 SOLUTION RESPIRATORY (INHALATION) at 03:25

## 2018-07-06 RX ADMIN — METHYLPREDNISOLONE SODIUM SUCCINATE 40 MG: 40 INJECTION, POWDER, FOR SOLUTION INTRAMUSCULAR; INTRAVENOUS at 21:12

## 2018-07-06 RX ADMIN — DOCUSATE SODIUM 100 MG: 100 CAPSULE ORAL at 21:13

## 2018-07-06 RX ADMIN — METOPROLOL TARTRATE 50 MG: 50 TABLET ORAL at 08:03

## 2018-07-06 RX ADMIN — GUAIFENESIN 1200 MG: 600 TABLET, EXTENDED RELEASE ORAL at 08:03

## 2018-07-06 RX ADMIN — Medication 250 MG: at 10:52

## 2018-07-06 RX ADMIN — IPRATROPIUM BROMIDE AND ALBUTEROL SULFATE 3 ML: 2.5; .5 SOLUTION RESPIRATORY (INHALATION) at 06:15

## 2018-07-06 RX ADMIN — BUDESONIDE AND FORMOTEROL FUMARATE DIHYDRATE 2 PUFF: 160; 4.5 AEROSOL RESPIRATORY (INHALATION) at 19:26

## 2018-07-06 RX ADMIN — IPRATROPIUM BROMIDE AND ALBUTEROL SULFATE 3 ML: 2.5; .5 SOLUTION RESPIRATORY (INHALATION) at 19:26

## 2018-07-06 RX ADMIN — MEGESTROL ACETATE 800 MG: 40 SUSPENSION ORAL at 08:03

## 2018-07-06 RX ADMIN — TAZOBACTAM SODIUM AND PIPERACILLIN SODIUM 3.38 G: 375; 3 INJECTION, SOLUTION INTRAVENOUS at 21:12

## 2018-07-06 RX ADMIN — METOPROLOL TARTRATE 50 MG: 50 TABLET ORAL at 21:12

## 2018-07-06 RX ADMIN — AMITRIPTYLINE HYDROCHLORIDE 50 MG: 25 TABLET, FILM COATED ORAL at 21:12

## 2018-07-06 RX ADMIN — IPRATROPIUM BROMIDE AND ALBUTEROL SULFATE 3 ML: 2.5; .5 SOLUTION RESPIRATORY (INHALATION) at 00:14

## 2018-07-06 RX ADMIN — Medication 250 MG: at 21:12

## 2018-07-06 RX ADMIN — BUDESONIDE AND FORMOTEROL FUMARATE DIHYDRATE 2 PUFF: 160; 4.5 AEROSOL RESPIRATORY (INHALATION) at 10:38

## 2018-07-06 RX ADMIN — ENOXAPARIN SODIUM 40 MG: 100 INJECTION SUBCUTANEOUS at 18:19

## 2018-07-06 NOTE — THERAPY TREATMENT NOTE
Acute Care - Speech Language Pathology   Swallow Treatment Note [facility]     Patient Name: Kellie Arzate  : 1955  MRN: [id number]  Today's Date: 2018               Admit Date: 2018  Swallow treatment completed. Pt was upright in bed and alert. Pt completed trial of nectar thick liquids with 1X immediate cough and 1X delayed throat clear. He also completed a trial of mech soft fruit. Inadequate oral prep noted at this time. Pt states he usually wears upper dentures but does not have them with him. ST asked to see if family could bring in to attempt mech soft trial again. ST recommends continue puree/honey thick liquids. ST will continue to follow.   Lindsay Webb MS, CFY-SLP 2018 4:08 PM    Visit Dx:      ICD-10-CM ICD-9-CM   1. Aspiration pneumonia of left lung, unspecified aspiration pneumonia type, unspecified part of lung (CMS/[facility]) J69.0 507.0   2. SIRS (systemic inflammatory response syndrome) (CMS/[facility]) R65.10 995.90   3. Oropharyngeal dysphagia R13.12 787.22     Patient Active Problem List   Diagnosis   • Aspiration pneumonia of left lung (CMS/[facility])       Therapy Treatment    Therapy Treatment / Health Promotion    Treatment Time/Intention  Discipline: speech language pathologist (18 1525 : Lindsay Webb MS, CFY-SLP)  Document Type: therapy note (daily note) (18 1525 : Lindsay Webb MS, CFY-SLP)  Subjective Information: no complaints (18 1525 : Lindsay Webb MS, CFY-SLP)  Mode of Treatment: individual therapy, speech-language pathology (18 1525 : Lindsay Webb MS, CFY-SLP)  Patient/Family Observations: No family present. (18 1525 : Lindsay Webb MS, CFY-SLP)  Care Plan Review: care plan/treatment goals reviewed, risks/benefits reviewed, patient/other agree to care plan (18 1525 : Lindsay Webb MS, CFY-SLP)  Therapy Frequency (Swallow): at least, 3 days per week (18 1525 : Lindsay Webb MS,  CFY-SLP)  Patient Effort: good (07/06/18 1525 : Lindsay SHIN Jon MS, CFY-SLP)  Plan of Care Review  Plan of Care Reviewed With: patient (07/06/18 1602 : Lindsay SHIN Jon MS, CFY-SLP)    Vitals/Pain/Safety  Pain Assessment  Additional Documentation: Pain Scale: FACES Pre/Post-Treatment (Group) (07/06/18 1525 : Lindsay Webb MS, CFY-SLP)  Pain Scale: FACES Pre/Post-Treatment  Pain: FACES Scale, Pretreatment: 0-->no hurt (07/06/18 1525 : Lindsay Webb MS, CFY-SLP)  Pain: FACES Scale, Post-Treatment: 0-->no hurt (07/06/18 1525 : Lindsay Webb MS, CFY-SLP)    Cognition, Communication, Swallow  Recommendations  Therapy Frequency (Swallow): at least, 3 days per week (07/06/18 1525 : Lindsay Webb MS, CFY-SLP)  Anticipated Dischage Disposition: unknown (07/06/18 1525 : Lindsaylea Webb MS, CFY-SLP)    Outcome Summary  Outcome Summary/Treatment Plan (SLP)  Daily Summary of Progress (SLP): progress toward functional goals is good (07/06/18 1525 : Lindsay Webb MS, CFY-SLP)  Barriers to Overall Progress (SLP): n/a  (07/06/18 1525 : Lindsay Webb MS, CFY-SLP)  Plan for Continued Treatment (SLP): ST to continue to follow. ST asked pt to attempt to get family to bring upper dentures. (07/06/18 1525 : Lindsay Webb MS, CFY-SLP)  Anticipated Dischage Disposition: unknown (07/06/18 1525 : Lindsay Webb MS, CFY-SLP)            SLP GOALS     Row Name 07/06/18 1525 07/05/18 0955          Oral Nutrition/Hydration Goal 1 (SLP)    Oral Nutrition/Hydration Goal 1, SLP LTG: Pt will tolerate LRD without overt s/s of aspiration.   -MM LTG: Pt will tolerate LRD without overt s/s of aspiration.   -CS (r) TF (t) CS (c)     Time Frame (Oral Nutrition/Hydration Goal 1, SLP) by discharge  -MM by discharge  -CS (r) TF (t) CS (c)     Barriers (Oral Nutrition/Hydration Goal 1, SLP) N/A  -MM N/A  -CS (r) TF (t) CS (c)     Progress/Outcomes (Oral Nutrition/Hydration Goal 1, SLP) continuing  progress toward goal  -MM continuing progress toward goal  -CS (r) TF (t) CS (c)        Pharyngeal Strengthening Exercise Goal 1 (SLP)    Activity (Pharyngeal Strengthening Goal 1, SLP) increase squeeze/positive pressure generation  -MM increase squeeze/positive pressure generation  -CS (r) TF (t) CS (c)     Increase Squeeze/Positive Pressure Generation hard effortful swallow;cassidy  -MM hard effortful swallow;cassidy  -CS (r) TF (t) CS (c)     [place]/Accuracy (Pharyngeal Strengthening Goal 1, SLP) with minimal cues (75-90% accuracy)  -MM with minimal cues (75-90% accuracy)  -CS (r) TF (t) CS (c)     Time Frame (Pharyngeal Strengthening Goal 1, SLP) by discharge  -MM by discharge  -CS (r) TF (t) CS (c)     Barriers (Pharyngeal Strengthening Goal 1, SLP) n/a  -MM n/a  -CS (r) TF (t) CS (c)     Progress/Outcomes (Pharyngeal Strengthening Goal 1, SLP) goal ongoing  -MM good progress toward goal  -CS (r) TF (t) CS (c)        Pharyngeal Strengthening Exercise Goal (SLP)    Pharyngeal Strengthening Goal, (SLP) Pt will complete 30 minutes of NMES applied to the suprahyoids in conjunction with swallow exercises w/o any overt s/s of distress in order to improve swallow function.  -MM Pt will complete 30 minutes of NMES applied to the suprahyoids in conjunction with swallow exercises w/o any overt s/s of distress in order to improve swallow function.  -CS (r) TF (t) CS (c)     Time Frame (Pharyngeal Strengthening Goal, SLP) by discharge  -MM by discharge  -CS (r) TF (t) CS (c)     Barriers (Pharyngeal Strengthening Goal, SLP) n/a  -MM n/a  -CS (r) TF (t) CS (c)     Progress/Outcomes (Pharyngeal Strengthening Goal, SLP) goal ongoing  -MM goal ongoing  -CS (r) TF (t) CS (c)       User Key  (r) = Recorded By, (t) = Taken By, (c) = Cosigned By    Initials Name Provider Type    CS Jack Muro MS CCC-SLP Speech and Language Pathologist    MM Lindsay Webb MS, CFY-SLP Speech and Language Pathologist    TF Keyla Alvarez,  Speech Therapy Student Speech Therapy Student          EDUCATION  The patient has been educated in the following areas:   Dysphagia (Swallowing Impairment).    SLP Recommendation and Plan                       Anticipated Dischage Disposition: unknown     Therapy Frequency (Swallow): at least, 3 days per week          Plan of Care Reviewed With: patient  Plan of Care Review  Plan of Care Reviewed With: patient  Daily Summary of Progress (SLP): progress toward functional goals is good  Plan for Continued Treatment (SLP): ST to continue to follow. ST asked pt to attempt to get family to bring upper dentures.  Progress: improving  Outcome Summary: Swallow treatment completed. Pt was upright in bed and alert. Pt completed trial of nectar thick liquids with 1X immediate cough and 1X delayed throat clear. He also completed a trial of mech soft fruit. Inadequate oral prep noted at this time. Pt states he usually wears upper dentures but does not have them with him. ST asked to see if family could bring in to attempt mech soft trial again. ST recommends continue puree/honey thick liquids. ST will continue to follow.            Time Calculation:         Time Calculation- SLP     Row Name 07/06/18 1606             Time Calculation- SLP    SLP Start Time 1525  -MM      SLP Stop Time 1552  -MM      SLP Time Calculation (min) 27 min  -MM      SLP Received On 07/06/18  -MM        User Key  (r) = Recorded By, (t) = Taken By, (c) = Cosigned By    Initials Name Provider Type    MM Lindsay Webb MS, CFY-SLP Speech and Language Pathologist          Therapy Charges for Today     Code Description Service Date Service Provider Modifiers Qty    [id number] [facility] TREATMENT SWALLOW 2 7/6/2018 Lindsay Webb MS, CFY-SLP GN 1          SLP G-Codes  SLP NOMS Used?: Yes  Functional Limitations: Swallowing  Swallow Current Status (): At least 80 percent but less than 100 percent impaired, limited or restricted  Swallow Goal Status  (): At least 60 percent but less than 80 percent impaired, limited or restricted      Lindsay Webb MS, CFY-SLP  7/6/2018

## 2018-07-06 NOTE — PROGRESS NOTES
Continued Stay Note   Pal     Patient Name: Kellie Arzate  MRN: [id number]  Today's Date: 7/6/2018    Admit Date: 7/2/2018          Discharge Plan     Row Name 07/06/18 0841       Plan    Plan Open to HH if ordered    Patient/Family in Agreement with Plan yes    Plan Comments Pt wishes to return home, says he has family to assist if needed. He was not interested in SNF with last visit.  He says he had [facility] in past and would be agreeable for them to come back.  Will inform [facility] when he is ready to go home. Will follow.               Discharge Codes    No documentation.           MATTHIEU [place]

## 2018-07-06 NOTE — PLAN OF CARE
Problem: Fall Risk (Adult)  Goal: Absence of Fall  Outcome: Ongoing (interventions implemented as appropriate)      Problem: Patient Care Overview  Goal: Plan of Care Review  Outcome: Ongoing (interventions implemented as appropriate)   07/06/18 0352   Coping/Psychosocial   Plan of Care Reviewed With patient   Plan of Care Review   Progress no change   OTHER   Outcome Summary C/o of BLE pain x1, PRN given with relief. SOA with exertion. O2 @ 3.5L nc. Requested pt to have family/friends to bring Home trilogy in. VSS.      Goal: Individualization and Mutuality  Outcome: Ongoing (interventions implemented as appropriate)    Goal: Discharge Needs Assessment  Outcome: Ongoing (interventions implemented as appropriate)      Problem: Sepsis/Septic Shock (Adult)  Goal: Signs and Symptoms of Listed Potential Problems Will be Absent, Minimized or Managed (Sepsis/Septic Shock)  Outcome: Ongoing (interventions implemented as appropriate)      Problem: Skin Injury Risk (Adult)  Goal: Skin Health and Integrity  Outcome: Ongoing (interventions implemented as appropriate)      Problem: Nutrition, Imbalanced: Inadequate Oral Intake (Adult)  Goal: Improved Oral Intake  Outcome: Ongoing (interventions implemented as appropriate)    Goal: Prevent Further Weight Loss  Outcome: Ongoing (interventions implemented as appropriate)

## 2018-07-06 NOTE — PROGRESS NOTES
[facility] Medicine Services  INPATIENT PROGRESS NOTE    Patient Name: Kellie Arzate  Date of Admission: 7/2/2018  Today's Date: 07/06/18  Length of Stay: 4  Primary Care Physician: Jose Moon MD    Subjective   Chief Complaint: follow-up aspiration PNA  HPI   Patient feels like his breathing is getting worse.  More dyspnea.  Denies new wheezing.  Denies chest pain.  Patient reports he had difficulty sleeping last night.  Nursing reports that he's not wanting to eat much of anything.    Review of Systems     All pertinent negatives and positives are as above. All other systems have been reviewed and are negative unless otherwise stated.     Objective    Temp:  [96.9 °F (36.1 °C)-98.1 °F (36.7 °C)] 96.9 °F (36.1 °C)  Heart Rate:  [] 108  Resp:  [16-24] 18  BP: (119-137)/(67-83) 123/77  Physical Exam   Constitutional: He is oriented to person, place, and time. No distress.   Thin and frail appearing; on 3.5-4.0LNC   HENT:   Head: Normocephalic.   Mouth/Throat: No oropharyngeal exudate.   Eyes: No scleral icterus.   Neck: No tracheal deviation present.   Cardiovascular: Normal heart sounds.    Rate approx 100; regular   Pulmonary/Chest: No respiratory distress. He has no wheezes.   Diminished BSs bilaterally; after walking from bathroom to bedroom had dyspnea with pursed lip breathing with some tachypnea   Abdominal: Soft.   Musculoskeletal: He exhibits no edema.   Neurological: He is alert and oriented to person, place, and time.   Skin: Skin is warm and dry. He is not diaphoretic.   Psychiatric: He has a normal mood and affect. His behavior is normal.   Vitals reviewed.    Results Review:  I have reviewed the labs, radiology results, and diagnostic studies.    Laboratory Data:     Results from last 7 days  Lab Units 07/05/18  0411 07/04/18  0549 07/03/18  0439   WBC 10*3/mm3 22.87* 23.67* 23.12*   HEMOGLOBIN g/dL 9.5* 9.8* 8.9*   HEMATOCRIT % 31.7* 32.9*  29.4*   PLATELETS 10*3/mm3 427* 399 390          Results from last 7 days  Lab Units 07/05/18  0411 07/04/18  0549 07/03/18  0439   SODIUM mmol/L 143 140 137   POTASSIUM mmol/L 4.4 4.5 3.5   CHLORIDE mmol/L 100 100 96*   CO2 mmol/L 35.0* 34.0* 33.0*   BUN mg/dL 17 14 12   CREATININE mg/dL 0.54 0.43* 0.37*   CALCIUM mg/dL 9.0 9.1 8.4   BILIRUBIN mg/dL 0.4 0.3 0.4   ALK PHOS U/L 181* 196* 169*   ALT (SGPT) U/L 72* 72* 50   AST (SGOT) U/L 102* 85* 54*   GLUCOSE mg/dL 144* 140* 171*       Culture Data:   Blood Culture   Date Value Ref Range Status   07/02/2018 No growth at less than 24 hours  Preliminary   07/02/2018 No growth at less than 24 hours  Preliminary       Radiology Data:   Imaging Results (last 24 hours)     ** No results found for the last 24 hours. **          I have reviewed the patient's current medications.     Assessment/Plan     Hospital Problem List     Aspiration pneumonia of left lung (CMS/HCC)        Assessment:  1.  Acute on chronic hypoxemic respiratory failure  2.  Sepsis  3.  Pneumonia - aspiration  4.  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  Weight Loss  6.  Elevated troponin - no chest pain  7.  Abnormal LFTs  8.  History of PE in the past - now off of anticoagulation  9.  Severe protein-calorie malnutrition  10.  Tobacco dependence    11.  MICHELLE     Plan:   1.  IV Zosyn - day 5  2.  D/C Levaquin  3.  Will increase IV steroids  4.  PA/LAT CXR today  5.  Scheduled nebs  6.  Add Symbicort  7.  Respiratory culture and blood cultures with no acute findings  8.  IVFs to KVO  9.  CPAP per home settings  10.  S/p 3 days of IV Venofer  11.  PT and OT eval pending  12.  ST also following  13.  PO Megace  14.  PO Probiotic  15.  Dispo planning: patient wants to go home with HH; does not want SNF placement.  Will see how he does with PT and OT.  I watched him ambulate today from bathroom to bedside chair; would likely benefit from  SNF.    Adrien Merrill MD   07/06/18   9:21 AM

## 2018-07-06 NOTE — PLAN OF CARE
Problem: Patient Care Overview  Goal: Plan of Care Review  Outcome: Ongoing (interventions implemented as appropriate)   07/06/18 1602   Coping/Psychosocial   Plan of Care Reviewed With patient   Plan of Care Review   Progress improving   OTHER   Outcome Summary Swallow treatment completed. Pt was upright in bed and alert. Pt completed trial of nectar thick liquids with 1X immediate cough and 1X delayed throat clear. He also completed a trial of mech soft fruit. Inadequate oral prep noted at this time. Pt states he usually wears upper dentures but does not have them with him. ST asked to see if family could bring in to attempt mech soft trial again. ST recommends continue puree/honey thick liquids. ST will continue to follow.

## 2018-07-06 NOTE — PLAN OF CARE
Problem: Patient Care Overview  Goal: Plan of Care Review  Outcome: Ongoing (interventions implemented as appropriate)   07/06/18 1129   Coping/Psychosocial   Plan of Care Reviewed With patient   Plan of Care Review   Progress no change   OTHER   Outcome Summary RD follow up completed. Pt states that he never has had a good appetite. Will offer Magic Cup for pt to try and monitor acceptance. PO intake remains poor at this time with 25% of meals charted. Will continue to follow for d/c needs. Pt likely to benefit from nutritional supplementation after d/c       Problem: Nutrition, Imbalanced: Inadequate Oral Intake (Adult)  Goal: Improved Oral Intake  Outcome: Ongoing (interventions implemented as appropriate)   07/06/18 1129   Nutrition, Imbalanced: Inadequate Oral Intake (Adult)   Improved Oral Intake other (see comments)     Goal: Prevent Further Weight Loss  Outcome: Ongoing (interventions implemented as appropriate)   07/06/18 1129   Nutrition, Imbalanced: Inadequate Oral Intake (Adult)   Prevent Further Weight Loss making progress toward outcome

## 2018-07-06 NOTE — PLAN OF CARE
Problem: Fall Risk (Adult)  Goal: Absence of Fall  Outcome: Ongoing (interventions implemented as appropriate)      Problem: Patient Care Overview  Goal: Plan of Care Review  Outcome: Ongoing (interventions implemented as appropriate)   07/06/18 9473   Coping/Psychosocial   Plan of Care Reviewed With patient   Plan of Care Review   Progress no change   OTHER   Outcome Summary Pt continues on 3.5LNC with c/o SOa with any exertion. Up to chair for about an hour today. Much more pleasant and smiling today. Awake and alert this shift. Encouraged PO intake. Encouraging weight shifting to prevent pressure injury. Continues on IV anyibiotics and solu medrol.       Problem: Sepsis/Septic Shock (Adult)  Goal: Signs and Symptoms of Listed Potential Problems Will be Absent, Minimized or Managed (Sepsis/Septic Shock)  Outcome: Ongoing (interventions implemented as appropriate)      Problem: Skin Injury Risk (Adult)  Goal: Skin Health and Integrity  Outcome: Ongoing (interventions implemented as appropriate)      Problem: Nutrition, Imbalanced: Inadequate Oral Intake (Adult)  Goal: Improved Oral Intake  Outcome: Ongoing (interventions implemented as appropriate)    Goal: Prevent Further Weight Loss  Outcome: Ongoing (interventions implemented as appropriate)

## 2018-07-07 LAB
BACTERIA SPEC AEROBE CULT: NORMAL
BACTERIA SPEC AEROBE CULT: NORMAL
PROCALCITONIN SERPL-MCNC: 0.31 NG/ML

## 2018-07-07 PROCEDURE — G8979 MOBILITY GOAL STATUS: HCPCS | Performed by: PHYSICAL THERAPIST

## 2018-07-07 PROCEDURE — 25010000002 PIPERACILLIN SOD-TAZOBACTAM PER 1 G: Performed by: INTERNAL MEDICINE

## 2018-07-07 PROCEDURE — 94760 N-INVAS EAR/PLS OXIMETRY 1: CPT

## 2018-07-07 PROCEDURE — 94799 UNLISTED PULMONARY SVC/PX: CPT

## 2018-07-07 PROCEDURE — 84145 PROCALCITONIN (PCT): CPT | Performed by: INTERNAL MEDICINE

## 2018-07-07 PROCEDURE — 25810000003 SODIUM CHLORIDE 0.9 % WITH KCL 20 MEQ 20-0.9 MEQ/L-% SOLUTION: Performed by: INTERNAL MEDICINE

## 2018-07-07 PROCEDURE — 25010000002 METHYLPREDNISOLONE PER 40 MG: Performed by: INTERNAL MEDICINE

## 2018-07-07 PROCEDURE — 97162 PT EVAL MOD COMPLEX 30 MIN: CPT | Performed by: PHYSICAL THERAPIST

## 2018-07-07 PROCEDURE — 25010000002 ENOXAPARIN PER 10 MG: Performed by: INTERNAL MEDICINE

## 2018-07-07 PROCEDURE — 94667 MNPJ CHEST WALL 1ST: CPT

## 2018-07-07 PROCEDURE — G8978 MOBILITY CURRENT STATUS: HCPCS | Performed by: PHYSICAL THERAPIST

## 2018-07-07 RX ADMIN — AMITRIPTYLINE HYDROCHLORIDE 50 MG: 25 TABLET, FILM COATED ORAL at 21:56

## 2018-07-07 RX ADMIN — IPRATROPIUM BROMIDE AND ALBUTEROL SULFATE 3 ML: 2.5; .5 SOLUTION RESPIRATORY (INHALATION) at 11:08

## 2018-07-07 RX ADMIN — FAMOTIDINE 20 MG: 20 TABLET, FILM COATED ORAL at 09:01

## 2018-07-07 RX ADMIN — TAZOBACTAM SODIUM AND PIPERACILLIN SODIUM 3.38 G: 375; 3 INJECTION, SOLUTION INTRAVENOUS at 21:56

## 2018-07-07 RX ADMIN — METOPROLOL TARTRATE 50 MG: 50 TABLET ORAL at 21:56

## 2018-07-07 RX ADMIN — IPRATROPIUM BROMIDE AND ALBUTEROL SULFATE 3 ML: 2.5; .5 SOLUTION RESPIRATORY (INHALATION) at 19:48

## 2018-07-07 RX ADMIN — POTASSIUM CHLORIDE AND SODIUM CHLORIDE 20 ML/HR: 900; 150 INJECTION, SOLUTION INTRAVENOUS at 18:13

## 2018-07-07 RX ADMIN — Medication 250 MG: at 09:01

## 2018-07-07 RX ADMIN — IPRATROPIUM BROMIDE AND ALBUTEROL SULFATE 3 ML: 2.5; .5 SOLUTION RESPIRATORY (INHALATION) at 22:52

## 2018-07-07 RX ADMIN — DOCUSATE SODIUM 100 MG: 100 CAPSULE ORAL at 21:56

## 2018-07-07 RX ADMIN — METHYLPREDNISOLONE SODIUM SUCCINATE 40 MG: 40 INJECTION, POWDER, FOR SOLUTION INTRAMUSCULAR; INTRAVENOUS at 05:58

## 2018-07-07 RX ADMIN — GUAIFENESIN 1200 MG: 600 TABLET, EXTENDED RELEASE ORAL at 09:01

## 2018-07-07 RX ADMIN — Medication 250 MG: at 21:56

## 2018-07-07 RX ADMIN — GUAIFENESIN 1200 MG: 600 TABLET, EXTENDED RELEASE ORAL at 21:56

## 2018-07-07 RX ADMIN — BUDESONIDE AND FORMOTEROL FUMARATE DIHYDRATE 2 PUFF: 160; 4.5 AEROSOL RESPIRATORY (INHALATION) at 07:48

## 2018-07-07 RX ADMIN — METOPROLOL TARTRATE 50 MG: 50 TABLET ORAL at 09:01

## 2018-07-07 RX ADMIN — IPRATROPIUM BROMIDE AND ALBUTEROL SULFATE 3 ML: 2.5; .5 SOLUTION RESPIRATORY (INHALATION) at 07:47

## 2018-07-07 RX ADMIN — ENOXAPARIN SODIUM 40 MG: 100 INJECTION SUBCUTANEOUS at 15:08

## 2018-07-07 RX ADMIN — IPRATROPIUM BROMIDE AND ALBUTEROL SULFATE 3 ML: 2.5; .5 SOLUTION RESPIRATORY (INHALATION) at 15:19

## 2018-07-07 RX ADMIN — BUDESONIDE AND FORMOTEROL FUMARATE DIHYDRATE 2 PUFF: 160; 4.5 AEROSOL RESPIRATORY (INHALATION) at 19:48

## 2018-07-07 RX ADMIN — ASPIRIN 81 MG: 81 TABLET ORAL at 09:01

## 2018-07-07 RX ADMIN — DOCUSATE SODIUM 100 MG: 100 CAPSULE ORAL at 09:01

## 2018-07-07 RX ADMIN — METHYLPREDNISOLONE SODIUM SUCCINATE 40 MG: 40 INJECTION, POWDER, FOR SOLUTION INTRAMUSCULAR; INTRAVENOUS at 21:56

## 2018-07-07 RX ADMIN — TAZOBACTAM SODIUM AND PIPERACILLIN SODIUM 3.38 G: 375; 3 INJECTION, SOLUTION INTRAVENOUS at 05:57

## 2018-07-07 RX ADMIN — MEGESTROL ACETATE 800 MG: 40 SUSPENSION ORAL at 09:01

## 2018-07-07 RX ADMIN — METHYLPREDNISOLONE SODIUM SUCCINATE 40 MG: 40 INJECTION, POWDER, FOR SOLUTION INTRAMUSCULAR; INTRAVENOUS at 14:08

## 2018-07-07 RX ADMIN — TAZOBACTAM SODIUM AND PIPERACILLIN SODIUM 3.38 G: 375; 3 INJECTION, SOLUTION INTRAVENOUS at 14:08

## 2018-07-07 RX ADMIN — IPRATROPIUM BROMIDE AND ALBUTEROL SULFATE 3 ML: 2.5; .5 SOLUTION RESPIRATORY (INHALATION) at 03:43

## 2018-07-07 NOTE — PLAN OF CARE
Problem: Patient Care Overview  Goal: Plan of Care Review  Outcome: Ongoing (interventions implemented as appropriate)   07/07/18 1799   Coping/Psychosocial   Plan of Care Reviewed With patient   Plan of Care Review   Progress no change   OTHER   Outcome Summary Pt states he doesnt like this food or the consistancy of it, PO intake havily encouraged, vss, no c/o pain, urinal in use at bedside, will continue to monitor       Problem: Sepsis/Septic Shock (Adult)  Goal: Signs and Symptoms of Listed Potential Problems Will be Absent, Minimized or Managed (Sepsis/Septic Shock)  Outcome: Ongoing (interventions implemented as appropriate)      Problem: Skin Injury Risk (Adult)  Goal: Skin Health and Integrity  Outcome: Ongoing (interventions implemented as appropriate)      Problem: Nutrition, Imbalanced: Inadequate Oral Intake (Adult)  Goal: Improved Oral Intake  Outcome: Ongoing (interventions implemented as appropriate)    Goal: Prevent Further Weight Loss  Outcome: Ongoing (interventions implemented as appropriate)

## 2018-07-07 NOTE — PLAN OF CARE
Problem: Patient Care Overview  Goal: Plan of Care Review  Outcome: Ongoing (interventions implemented as appropriate)   07/06/18 1843 07/07/18 0438   Coping/Psychosocial   Plan of Care Reviewed With --  patient   Plan of Care Review   Progress --  no change   OTHER   Outcome Summary Pt continues on 3.5LNC with c/o SOa with any exertion. Up to chair for about an hour today. Much more pleasant and smiling today. Awake and alert this shift. Encouraged PO intake. Encouraging weight shifting to prevent pressure injury. Continues on IV anyibiotics and solu medrol. --

## 2018-07-07 NOTE — THERAPY EVALUATION
Acute Care - Physical Therapy Initial Evaluation  [facility]     Patient Name: Kellie Arzate  : 1955  MRN: [id number]  Today's Date: 2018   Onset of Illness/Injury or Date of Surgery: 18  Date of Referral to PT: 18  Referring Physician: Dr. Merrill      Admit Date: 2018    Visit Dx:     ICD-10-CM ICD-9-CM   1. Aspiration pneumonia of left lung, unspecified aspiration pneumonia type, unspecified part of lung (CMS/Formerly [facility]) J69.0 507.0   2. SIRS (systemic inflammatory response syndrome) (CMS/Formerly [facility]) R65.10 995.90   3. Oropharyngeal dysphagia R13.12 787.22   4. Impaired functional mobility, balance, gait, and endurance Z74.09 V49.89     Patient Active Problem List   Diagnosis   • Aspiration pneumonia of left lung (CMS/Formerly [facility])     Past Medical History:   Diagnosis Date   • Anxiety    • Arthritis    • COPD (chronic obstructive pulmonary disease) (CMS/Formerly [facility])    • Depression    • Hypertension    • Nephrolithiasis      Past Surgical History:   Procedure Laterality Date   • COLONOSCOPY  2007    Within Normal Limits.   • ENDOSCOPY  2007    urea neg, sbbx benign, mild gastritis, small hiatal hernia   • ENDOSCOPY N/A 10/5/2016    Procedure: ESOPHAGOGASTRODUODENOSCOPY WITH ANESTHESIA;  Surgeon: Kevyn Damon MD;  Location: [facility];  Service:    • HEMORRHOIDECTOMY     • PEG TUBE INSERTION  2016    Dr Mccarty   • TRACHEOSTOMY          PT ASSESSMENT (last 12 hours)      Physical Therapy Evaluation     Row Name 18 1300          PT Evaluation Time/Intention    Subjective Information complains of;weakness  -HR     Document Type evaluation  -HR     Mode of Treatment physical therapy  -HR     Patient Effort good  -HR     Symptoms Noted During/After Treatment shortness of breath  -HR     Row Name 18 1300          General Information    Patient Profile Reviewed? yes  -HR     Onset of Illness/Injury or Date of Surgery 18  -HR     Referring Physician Dr. Merrill  -HR     Patient  Observations alert;cooperative  -HR     Patient/Family Observations no family present  -HR     General Observations of Patient very thin male, O2 per nc. Castanon's in bed.   -HR     Prior Level of Function independent:;all household mobility;home management;ADL's   per patient  -HR     Equipment Currently Used at Home bipap/ cpap;oxygen  -HR     Pertinent History of Current Functional Problem Came to ER with recent increased weakness, falls and feeling ill.  -HR     Existing Precautions/Restrictions fall  -HR     Limitations/Impairments swallowing;safety/cognitive  -HR     Risks Reviewed patient:;LOB;dizziness;nausea/vomiting;change in vital signs  -HR     Benefits Reviewed patient:;improve function;increase independence;increase strength;increase balance  -HR     Barriers to Rehab medically complex;previous functional deficit;physical barrier  -HR     Row Name 07/07/18 1300          Relationship/Environment    Primary Source of Support/Comfort extended family  -HR     Lives With alone  -HR     Row Name 07/07/18 1300          Resource/Environmental Concerns    Current Living Arrangements home/apartment/condo   mobile home  -HR     Row Name 07/07/18 1300          Cognitive Assessment/Intervention- PT/OT    Orientation Status (Cognition) oriented x 3  -HR     Cognitive Assessment/Intervention Comment Some decreased cognition possible. Difficult to pinpoint. Odd answers to questions. Example- stated he was falling at home because it was so hot.  -HR     Row Name 07/07/18 1300          Safety Issues, Functional Mobility    Safety Issues Affecting Function (Mobility) insight into deficits/self awareness;judgment;safety precautions follow-through/compliance  -HR     Impairments Affecting Function (Mobility) balance;strength  -HR     Row Name 07/07/18 1300          Bed Mobility Assessment/Treatment    Bed Mobility Assessment/Treatment supine-sit  -HR     Supine-Sit [place] (Bed Mobility) set up;supervision  -HR      Assistive Device (Bed Mobility) bed rails;head of bed elevated  -HR     Comment (Bed Mobility) Had to sit EOB due to dyspnea from getting from lying to sitting  -HR     Row Name 07/07/18 1300          Transfer Assessment/Treatment    Transfer Assessment/Treatment bed-chair transfer  -HR     Comment (Transfers) SOB with any activity. Unsteady on feet.  -HR     Bed-Chair [place] (Transfers) contact guard;set up;verbal cues  -HR     Row Name 07/07/18 1300          Gait/Stairs Assessment/Training    Gait/Stairs Assessment/Training gait/ambulation independence;gait/ambulation assistive device;distance ambulated  -HR     [place] Level (Gait) contact guard  -HR     Distance in Feet (Gait) 4  -HR     Deviations/Abnormal Patterns (Gait) base of support, wide  -HR     Row Name 07/07/18 1300          General ROM    GENERAL ROM COMMENTS BLE ROM WFL  -HR     Row Name 07/07/18 1300          General Assessment (Manual Muscle Testing)    Comment, General Manual Muscle Testing (MMT) Assessment BLE strength 3+/5- 4-/5 with very little muscle mass. Very thin with muscle wasting.  -HR     Row Name 07/07/18 1300          Pain Scale: Numbers Pre/Post-Treatment    Pain Scale: Numbers, Pretreatment 0/10 - no pain  -HR     Row Name 07/07/18 1300          Plan of Care Review    Plan of Care Reviewed With patient  -HR     Row Name 07/07/18 1300          Physical Therapy Clinical Impression    Date of Referral to PT 07/04/18  -HR     PT Diagnosis (PT Clinical Impression) impaired functional mobility, gait and balance   -HR     Functional Level at Time of Evaluation (PT Clinical Impression) Mr. Arzate is severely deconditioned and experiences dyspnea with any exertion. He needs support in standing to transfer and walk very short distances. He would benefit from a rolling walker to decrease exertion during ambulation but distance should not be the primary goal. His O2 monitor was not consistently giving a reliable reading.   -HR      Patient/Family Goals Statement (PT Clinical Impression) Patient believes he would be fine to go back home when discharged.   -HR     Criteria for Skilled Interventions Met (PT Clinical Impression) yes;treatment indicated  -HR     Pathology/Pathophysiology Noted (Describe Specifically for Each System) pulmonary;musculoskeletal  -HR     Impairments Found (describe specific impairments) gait, locomotion, and balance;aerobic capacity/endurance;arousal, attention, and cognition;muscle performance  -HR     Functional Limitations in Following Categories (Describe Specific Limitations) self-care;home management  -HR     Rehab Potential (PT Clinical Summary) fair, will monitor progress closely  -HR     Predicted Duration of Therapy (PT) until discharge  -HR     Care Plan Review (PT) evaluation/treatment results reviewed  -HR     Patient/Family Concerns, Anticipated Discharge Disposition (PT) patient plans to go home  -HR     Row Name 07/07/18 1300          Vital Signs    Pre SpO2 (%) 91  -HR     Intra SpO2 (%) 83  -HR     Post SpO2 (%) --   would not read  -HR     O2 Delivery Post Treatment supplemental O2  -HR     Pre Patient Position Supine  -HR     Intra Patient Position Sitting  -HR     Post Patient Position Sitting  -HR     Row Name 07/07/18 1300          Physical Therapy Goals    Bed Mobility Goal Selection (PT) bed mobility, PT goal 1  -HR     Transfer Goal Selection (PT) transfer, PT goal 1  -HR     Gait Training Goal Selection (PT) gait training, PT goal 1  -HR     Row Name 07/07/18 1300          Bed Mobility Goal 1 (PT)    Activity/Assistive Device (Bed Mobility Goal 1, PT) bed mobility activities, all  -HR     [place] Level/Cues Needed (Bed Mobility Goal 1, PT) conditional independence  -HR     Time Frame (Bed Mobility Goal 1, PT) by discharge  -HR     Row Name 07/07/18 1300          Transfer Goal 1 (PT)    Activity/Assistive Device (Transfer Goal 1, PT) sit-to-stand/stand-to-sit;bed-to-chair/chair-to-bed   -HR     [place] Level/Cues Needed (Transfer Goal 1, PT) conditional independence  -HR     Time Frame (Transfer Goal 1, PT) by discharge  -HR     Barriers (Transfers Goal 1, PT) VIVAS  -HR     Row Name 07/07/18 1300          Gait Training Goal 1 (PT)    Activity/Assistive Device (Gait Training Goal 1, PT) gait (walking locomotion);assistive device use;walker, rolling  -HR     [place] Level (Gait Training Goal 1, PT) contact guard assist  -HR     Distance (Gait Goal 1, PT) 50  -HR     Time Frame (Gait Training Goal 1, PT) by discharge  -HR     Barriers (Gait Training Goal 1, PT) VIVAS  -HR     Row Name 07/07/18 1300          Positioning and Restraints    Pre-Treatment Position in bed  -HR     Post Treatment Position chair  -HR     In Chair notified nsg;call light within reach  -HR       User Key  (r) = Recorded By, (t) = Taken By, (c) = Cosigned By    Initials Name Provider Type    HR Tete Barboza, PT, DPT, CLT-AGUSTÍN Physical Therapist          Physical Therapy Education     Title: PT OT SLP Therapies (Active)     Topic: Physical Therapy (Active)     Point: Mobility training (Active)    Learning Progress Summary     Learner Status Readiness Method Response Comment Documented by    Patient Active Acceptance E NR Educated patient about need for assist with mobility and progression of activity with PT. HR 07/07/18 1353          Point: Home exercise program (Active)    Learning Progress Summary     Learner Status Readiness Method Response Comment Documented by    Patient Active Acceptance E NR Educated patient about need for assist with mobility and progression of activity with PT. HR 07/07/18 1353          Point: Body mechanics (Active)    Learning Progress Summary     Learner Status Readiness Method Response Comment Documented by    Patient Active Acceptance E NR Educated patient about need for assist with mobility and progression of activity with PT. HR 07/07/18 1353          Point: Precautions (Active)     Learning Progress Summary     Learner Status Readiness Method Response Comment Documented by    Patient Active Acceptance E NR Educated patient about need for assist with mobility and progression of activity with PT. HR 07/07/18 5948                      User Key     Initials Effective Dates Name Provider Type Discipline    HR 06/04/18 -  Tete Barboza, PT, DPT, CLT-AGUSTÍN Physical Therapist PT                PT Recommendation and Plan  Anticipated Discharge Disposition (PT): skilled nursing facility  Planned Therapy Interventions (PT Eval): bed mobility training, strengthening, transfer training, gait training, balance training  Therapy Frequency (PT Clinical Impression): daily (1-2X/day)  Outcome Summary/Treatment Plan (PT)  Anticipated Equipment Needs at Discharge (PT): front wheeled walker, four wheeled walker, rollator (may do well with rollator to have seat to rest when SOB)  Anticipated Discharge Disposition (PT): skilled nursing facility  Patient/Family Concerns, Anticipated Discharge Disposition (PT): patient plans to go home  Plan of Care Reviewed With: patient  Outcome Summary: Mr. Arzate was evaluated by PT today. He is very limited in any physical activity as even getting up to sit EOB made him SOB and he had to rest. He is unsteady on his feet and will need to use a rolling walker for any ambulation. He needs to be progressed with short bouts of activity with plenty of time to recover.  I don't anticipate that he will be safe to return home alone at discharge but this is his plan.           Outcome Measures     Row Name 07/07/18 1300             How much help from another person do you currently need...    Turning from your back to your side while in flat bed without using bedrails? 3  -HR      Moving from lying on back to sitting on the side of a flat bed without bedrails? 3  -HR      Moving to and from a bed to a chair (including a wheelchair)? 3  -HR      Standing up from a chair using your  arms (e.g., wheelchair, bedside chair)? 4  -HR      Climbing 3-5 steps with a railing? 2  -HR      To walk in hospital room? 3  -HR      AM-PAC 6 Clicks Score 18  -HR         Functional Assessment    Outcome Measure Options AM-PAC 6 Clicks Basic Mobility (PT)  -HR        User Key  (r) = Recorded By, (t) = Taken By, (c) = Cosigned By    Initials Name Provider Type    HR Tete Barboza, PT, DPT, CLT-AGUSTÍN Physical Therapist           Time Calculation:         PT Charges     Row Name 07/07/18 1358             Time Calculation    PT Received On 07/07/18  -HR      PT Goal Re-Cert Due Date 07/17/18  -HR        User Key  (r) = Recorded By, (t) = Taken By, (c) = Cosigned By    Initials Name Provider Type    HR Tete Barboza, PT, DPT, CLT-AGUSTÍN Physical Therapist        Therapy Suggested Charges     Code   Minutes Charges    None           Therapy Charges for Today     Code Description Service Date Service Provider Modifiers Qty    05307020484 HC PT MOBILITY CURRENT 7/7/2018 Tete Barboza PT, DPT, CLT-AGUSTÍN GP, CK 1    03205522359 HC PT MOBILITY PROJECTED 7/7/2018 Tete Barboza PT, DPT, CLT-AGUSTÍN GP, CJ 1    89732527283 HC PT EVAL MOD COMPLEXITY 4 7/7/2018 Tete Barboza PT, DPT, CLT-AGUSTÍN GP 1          PT G-Codes  Outcome Measure Options: AM-PAC 6 Clicks Basic Mobility (PT)  Score: 18  Functional Limitation: Mobility: Walking and moving around  Mobility: Walking and Moving Around Current Status (): At least 40 percent but less than 60 percent impaired, limited or restricted  Mobility: Walking and Moving Around Goal Status (): At least 20 percent but less than 40 percent impaired, limited or restricted      Tete Barboza PT, DPT, CLT-AGUSTÍN  7/7/2018

## 2018-07-07 NOTE — PROGRESS NOTES
[facility] Medicine Services  INPATIENT PROGRESS NOTE    Patient Name: Kellie Arzate  Date of Admission: 7/2/2018  Today's Date: 07/07/18  Length of Stay: 5  Primary Care Physician: Jose Moon MD    Subjective   Chief Complaint: follow-up aspiration PNA  Weakness - Generalized        Patient continues to state that he just does not feel very good.  Feels like that his breathing is not making meaningful improvement.  He is frustrated by the puréed diet, and feels like he is never going to be able to eat enough to be able to gain weight while on this diet.  Denies any pain at this time.  Feels weak overall, but does feel like that he is starting to get stronger.    Review of Systems     All pertinent negatives and positives are as above. All other systems have been reviewed and are negative unless otherwise stated.     Objective    Temp:  [97.9 °F (36.6 °C)-98.9 °F (37.2 °C)] 97.9 °F (36.6 °C)  Heart Rate:  [] 110  Resp:  [16-20] 20  BP: (118-140)/(69-74) 124/72  Physical Exam   Constitutional: He is oriented to person, place, and time. No distress.   Thin and frail appearing; resting comfortably in bed; nontoxic appearing   HENT:   Head: Normocephalic.   Mouth/Throat: No oropharyngeal exudate.   Eyes: No scleral icterus.   Neck: No tracheal deviation present.   Cardiovascular: Normal heart sounds.    Rate approx 100; regular   Pulmonary/Chest: No respiratory distress. He has no wheezes.   Some scattered coarse BSs on the left; no significant wheezes   Abdominal: Soft.   Musculoskeletal: He exhibits no edema.   Neurological: He is alert and oriented to person, place, and time.   Skin: Skin is warm and dry. He is not diaphoretic.   Psychiatric: He has a normal mood and affect. His behavior is normal.   Vitals reviewed.    Results Review:  I have reviewed the labs, radiology results, and diagnostic studies.    Laboratory Data:     Results from last 7  days  Lab Units 07/05/18  0411 07/04/18  0549 07/03/18  0439   WBC 10*3/mm3 22.87* 23.67* 23.12*   HEMOGLOBIN g/dL 9.5* 9.8* 8.9*   HEMATOCRIT % 31.7* 32.9* 29.4*   PLATELETS 10*3/mm3 427* 399 390          Results from last 7 days  Lab Units 07/05/18  0411 07/04/18  0549 07/03/18  0439   SODIUM mmol/L 143 140 137   POTASSIUM mmol/L 4.4 4.5 3.5   CHLORIDE mmol/L 100 100 96*   CO2 mmol/L 35.0* 34.0* 33.0*   BUN mg/dL 17 14 12   CREATININE mg/dL 0.54 0.43* 0.37*   CALCIUM mg/dL 9.0 9.1 8.4   BILIRUBIN mg/dL 0.4 0.3 0.4   ALK PHOS U/L 181* 196* 169*   ALT (SGPT) U/L 72* 72* 50   AST (SGOT) U/L 102* 85* 54*   GLUCOSE mg/dL 144* 140* 171*       Culture Data:   Blood Culture   Date Value Ref Range Status   07/02/2018 No growth at less than 24 hours  Preliminary   07/02/2018 No growth at less than 24 hours  Preliminary       Radiology Data:   Imaging Results (last 24 hours)     Procedure Component Value Units Date/Time    XR Chest PA & Lateral [[id number]] Collected:  07/06/18 1014     Updated:  07/06/18 1019    Narrative:       HISTORY: Shortness of breath     CXR: 2 views the chest are obtained.     COMPARISON: 7/2/2018     FINDINGS: Lungs remain hyperinflated with advanced emphysematous changes  of the right lung. Diffuse predominantly interstitial pulmonary  opacities throughout the left lung persist with slightly increasing  alveolar changes within the left lung base. There is chronic scarring  and volume loss within the right lung apex. No pneumothorax is  identified. Mediastinal contours are similar. Subcarinal calcified lymph  nodes are noted. No acute bony pathology identified.       Impression:       1. Slightly increasing left lung opacities compared to 7/2/2018.  Pneumonia superimposed on chronic emphysema favored. Continued follow-up  recommended  2. Right apical scarring with associated volume loss. Advanced  emphysematous changes of the right lung.  This report was finalized on 07/06/2018 10:16 by Dr. Mckeon  "MD Rashawn.          I have reviewed the patient's current medications.     Assessment/Plan     Hospital Problem List     Aspiration pneumonia of left lung (CMS/HCC)        Assessment:  1.  Acute on chronic hypoxemic respiratory failure  2.  Sepsis  3.  Pneumonia - aspiration  4.  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  Weight Loss  6.  Elevated troponin - no chest pain  7.  Abnormal LFTs  8.  History of PE in the past - now off of anticoagulation  9.  Severe protein-calorie malnutrition  10.  Tobacco dependence    11.  MICHELLE     Plan:   1.  IV Zosyn - day 6  2.  Now off of Levaquin  3.  IV steroids  4.  Given lack of improvement, and repeat CXR findings, will ask for Pulmonary consultation today  5.  Scheduled nebs; Symbicort  6.  Respiratory culture and blood cultures with no acute findings  7.  IVFs to KVO  8.  CPAP per home settings  9.  S/p 3 days of IV Venofer  10.  PT eval still pending  11.  ST also following  12.  PO Megace; encourage PO (patient reports he's \"never going to eat enough\" with the Pureed diet - he does not like this diet)  13.  PO Probiotic  14.  Dispo planning: patient wants to go home with HH; does not want SNF placement.  Will see how he does with PT and OT.  I watched him ambulate today from bathroom to bedside chair; would likely benefit from SNF.    Adrien Merrill MD   07/07/18   9:50 AM  "

## 2018-07-07 NOTE — CONSULTS
PULMONARY & CRITICAL CARE CONSULT - [facility]    18, 4:44 PM  Patient Care Team:  Jose Moon MD as PCP - General  Jose Moon MD as PCP - Family Medicine  Name: Kellie Arzate  : 1955  MRN: [id number]  Contact Serial Number [id number]    Chief complaint: pneumonia  HPI:  We have been consulted by Adrien Merrill MD to see this 63 y.o. male born on 1955.  Patient complains of dyspnea, cough and wheezing in the chest for 6 days prior to admission on .. Severity: severe and worsening.  Aggravating factors: walking.   Alleviating factors: medication(s) (nothing) Associated symptoms: fatigue, wheezing and falls. Sputum is discolored.  Patient currently is on oxygen at 2 L/min per nasal cannula.. Respiratory history: COPD, emphysema, pneumonia and history of severe acute respiratory failure requiring LTAC therapy 2 years ago.  Pt had tracheostomy then.  Pt has had problems with dysphagia and has an altered consistency diet with uncertain compliance.  He has been admitted with pneumonia and is failing to improve significantly.  Outside records are reviewed including [facility] records which in summary indicate prior treatment at LTAC for respiratory failure requiring ventilation and tracheostomy.  Outside records at pulmonary office show no physical visits but problems with some hemoptysis after he had gone home and 4 separate phone calls dealing with various issues but he never went physically to the office and was lost to follow up.  He had been sent home with anticoagulation then.  Past Medical History:  Past Medical History:   Diagnosis Date   • Anxiety    • Arthritis    • COPD (chronic obstructive pulmonary disease) (CMS/HCC)    • Depression    • Hypertension    • Nephrolithiasis      Past Surgical History:   Procedure Laterality Date   • COLONOSCOPY  2007    Within Normal Limits.   • ENDOSCOPY  2007    urea neg,  sbbx benign, mild gastritis, small hiatal hernia   • ENDOSCOPY N/A 10/5/2016    Procedure: ESOPHAGOGASTRODUODENOSCOPY WITH ANESTHESIA;  Surgeon: Kevyn Damon MD;  Location: [facility] ENDOSCOPY;  Service:    • HEMORRHOIDECTOMY     • PEG TUBE INSERTION  07/22/2016    Dr Mccarty   • TRACHEOSTOMY       No Known Allergies  Medications:    amitriptyline 50 mg Oral Nightly   aspirin 81 mg Oral Daily   budesonide-formoterol 2 puff Inhalation BID - RT   docusate sodium 100 mg Oral BID   enoxaparin 40 mg Subcutaneous Q24H   famotidine 20 mg Oral Daily   guaiFENesin 1,200 mg Oral Q12H   ipratropium-albuterol 3 mL Nebulization Q4H - RT   megestrol 800 mg Oral Daily   methylPREDNISolone sodium succinate 40 mg Intravenous Q8H   metoprolol tartrate 50 mg Oral Q12H   piperacillin-tazobactam 3.375 g Intravenous Q8H   saccharomyces boulardii 250 mg Oral BID       sodium chloride 0.9 % with KCl 20 mEq 20 mL/hr Last Rate: 20 mL/hr (07/06/18 0943)     Family History:  History reviewed. No pertinent family history.  Social History:   reports that he has been smoking Cigarettes.  He has been smoking about 0.50 packs per day. He has never used smokeless tobacco. He reports that he does not drink alcohol.  Review of Systems:  Review of Systems   Constitutional: Negative for activity change, appetite change, chills and fever.   HENT: Positive for trouble swallowing. Negative for drooling and rhinorrhea.    Eyes: Negative for photophobia and visual disturbance.   Respiratory: Positive for cough, choking, shortness of breath and wheezing.    Cardiovascular: Negative for chest pain and leg swelling.   Gastrointestinal: Negative for abdominal distention and abdominal pain.   Endocrine: Negative for cold intolerance.   Genitourinary: Positive for flank pain. Negative for difficulty urinating.   Musculoskeletal: Negative for arthralgias, back pain and joint swelling.   Neurological: Positive for weakness. Negative for dizziness and numbness.    Psychiatric/Behavioral: Negative for dysphoric mood, hallucinations and sleep disturbance.      Physical Exam:  Temp:  [97.8 °F (36.6 °C)-98.2 °F (36.8 °C)] 97.8 °F (36.6 °C)  Heart Rate:  [] 122  Resp:  [16-22] 22  BP: (119-138)/(60-80) 132/80  Intake/Output Summary (Last 24 hours) at 07/07/18 1644  Last data filed at 07/07/18 1200   Gross per 24 hour   Intake              690 ml   Output              650 ml   Net               40 ml     1    07/04/18 2019 07/05/18 2025 07/06/18  1924   Weight: 50.8 kg (112 lb) 53.4 kg (117 lb 12.8 oz) 53 kg (116 lb 14.4 oz)     SpO2 Percentage    07/07/18 1151 07/07/18 1519 07/07/18 1603   SpO2: 94% 97% 90%     Physical Exam   Constitutional: He appears well-developed. No distress.   HENT:   Head: Normocephalic and atraumatic.   Nose: Nose normal.   Mouth/Throat: Oropharynx is clear and moist.   Eyes: EOM are normal. No scleral icterus.   Neck: No JVD present. No tracheal deviation present.   Well healed tracheostomy scar.   Cardiovascular: Normal rate and regular rhythm.    Pulmonary/Chest: No respiratory distress.   Abdominal: Soft. There is no tenderness. There is no guarding.   scaphoid   Musculoskeletal: He exhibits no edema.   Skin: Skin is warm and dry. He is not diaphoretic.   Psychiatric: He has a normal mood and affect. His behavior is normal.       Results from last 7 days  Lab Units 07/05/18  0411 07/04/18  0549 07/03/18  0439   WBC 10*3/mm3 22.87* 23.67* 23.12*   HEMOGLOBIN g/dL 9.5* 9.8* 8.9*   PLATELETS 10*3/mm3 427* 399 390       Results from last 7 days  Lab Units 07/05/18  0411 07/04/18  0549 07/03/18  0439   SODIUM mmol/L 143 140 137   POTASSIUM mmol/L 4.4 4.5 3.5   CO2 mmol/L 35.0* 34.0* 33.0*   BUN mg/dL 17 14 12   CREATININE mg/dL 0.54 0.43* 0.37*   GLUCOSE mg/dL 144* 140* 171*       Results from last 7 days  Lab Units 07/02/18  1230   PH, ARTERIAL pH units 7.410   PCO2, ARTERIAL mm Hg 59.2*   PO2 ART mm Hg 61.9*     Lab Results   Component Value  Date    PROBNP 570.0 07/02/2018     Blood Culture   Date Value Ref Range Status   07/02/2018 No growth at 5 days  Final   07/02/2018 No growth at 5 days  Final     Respiratory Culture   Date Value Ref Range Status   07/03/2018 Scant growth (1+) Normal Respiratory Tonya  Final   07/03/2018 Scant growth (1+) Candida albicans (A)  Final     Recent radiology:   Imaging Results (last 72 hours)     Procedure Component Value Units Date/Time    XR Chest PA & Lateral [[id number]] Collected:  07/06/18 1014     Updated:  07/06/18 1019    Narrative:       HISTORY: Shortness of breath     CXR: 2 views the chest are obtained.     COMPARISON: 7/2/2018     FINDINGS: Lungs remain hyperinflated with advanced emphysematous changes  of the right lung. Diffuse predominantly interstitial pulmonary  opacities throughout the left lung persist with slightly increasing  alveolar changes within the left lung base. There is chronic scarring  and volume loss within the right lung apex. No pneumothorax is  identified. Mediastinal contours are similar. Subcarinal calcified lymph  nodes are noted. No acute bony pathology identified.       Impression:       1. Slightly increasing left lung opacities compared to 7/2/2018.  Pneumonia superimposed on chronic emphysema favored. Continued follow-up  recommended  2. Right apical scarring with associated volume loss. Advanced  emphysematous changes of the right lung.  This report was finalized on 07/06/2018 10:16 by Dr. Linda Morocho MD.        My radiograph interpretation/independent review of imaging: severe emphysema, fibrosis and consolidation in XI on ct chest, probable reactive adenopathy  · Other test results (not lab or imaging): Left ventricular systolic function is normal. Estimated EF = 60%.  · Right ventricular cavity is mildly dilated.  · Normal RV function  · Mild pulmonary hypertension is present.  There is a trivial pericardial effusion. There is no evidence of cardiac  tamponade.  Independent review of ekg: SR, poor rwp, RAA  Patient Active Problem List   Diagnosis   • Aspiration pneumonia of left lung (CMS/HCC)     Pulmonary Assessment:  New problem (to me), with additional workup planned: Pneumonia, slow to respond due to underlying severe lung disease, likely due to repeated aspiration  New problem (to me), no additional workup planned: hypertension, deferred to others  Other problems either stable, failing to improve or worsenin. Nicotine dependency, needs to discontinue  2. Chronic resp failure with hypoxia, compensated with oxygen  3. Cough  4. Pulmonary hypertension, probably group 3  5. Personal history of tracheostomy  6. Leukocytosis due to pneumonia  7. Mediastinal adenopathy  8. Emphysema, probably severe/COPD probably severe  Recommend/plan:   · Smoking cessation  · Continue current abx  · Vest therapy  · Dysphagia diet  · Continue oxygen  · Continue current RT  · Not ready to taper steroids yet  · Will need to follow up ct in outpatient setting    Electronically signed by Matti Young MD, 18, 5:36 PM

## 2018-07-07 NOTE — PLAN OF CARE
Problem: Patient Care Overview  Goal: Plan of Care Review   07/07/18 0104   Coping/Psychosocial   Plan of Care Reviewed With patient   OTHER   Outcome Summary Mr. Arzate was evaluated by PT today. He is very limited in any physical activity as even getting up to sit EOB made him SOB and he had to rest. He is unsteady on his feet and will need to use a rolling walker for any ambulation. He needs to be progressed with short bouts of activity with plenty of time to recover. I don't anticipate that he will be safe to return home alone at discharge but this is his plan.

## 2018-07-08 PROCEDURE — 97530 THERAPEUTIC ACTIVITIES: CPT

## 2018-07-08 PROCEDURE — 25010000002 PIPERACILLIN SOD-TAZOBACTAM PER 1 G: Performed by: INTERNAL MEDICINE

## 2018-07-08 PROCEDURE — 94799 UNLISTED PULMONARY SVC/PX: CPT

## 2018-07-08 PROCEDURE — 25010000002 METHYLPREDNISOLONE PER 40 MG: Performed by: INTERNAL MEDICINE

## 2018-07-08 PROCEDURE — 25010000002 ENOXAPARIN PER 10 MG: Performed by: INTERNAL MEDICINE

## 2018-07-08 PROCEDURE — 94760 N-INVAS EAR/PLS OXIMETRY 1: CPT

## 2018-07-08 PROCEDURE — 97116 GAIT TRAINING THERAPY: CPT

## 2018-07-08 PROCEDURE — 25010000002 METHYLPREDNISOLONE PER 40 MG: Performed by: NURSE PRACTITIONER

## 2018-07-08 PROCEDURE — 97110 THERAPEUTIC EXERCISES: CPT

## 2018-07-08 RX ORDER — METHYLPREDNISOLONE SODIUM SUCCINATE 40 MG/ML
40 INJECTION, POWDER, LYOPHILIZED, FOR SOLUTION INTRAMUSCULAR; INTRAVENOUS EVERY 12 HOURS
Status: DISCONTINUED | OUTPATIENT
Start: 2018-07-08 | End: 2018-07-09

## 2018-07-08 RX ADMIN — FAMOTIDINE 20 MG: 20 TABLET, FILM COATED ORAL at 08:38

## 2018-07-08 RX ADMIN — IPRATROPIUM BROMIDE AND ALBUTEROL SULFATE 3 ML: 2.5; .5 SOLUTION RESPIRATORY (INHALATION) at 14:52

## 2018-07-08 RX ADMIN — Medication 250 MG: at 08:38

## 2018-07-08 RX ADMIN — IPRATROPIUM BROMIDE AND ALBUTEROL SULFATE 3 ML: 2.5; .5 SOLUTION RESPIRATORY (INHALATION) at 02:44

## 2018-07-08 RX ADMIN — BUDESONIDE AND FORMOTEROL FUMARATE DIHYDRATE 2 PUFF: 160; 4.5 AEROSOL RESPIRATORY (INHALATION) at 19:04

## 2018-07-08 RX ADMIN — Medication 250 MG: at 21:42

## 2018-07-08 RX ADMIN — TAZOBACTAM SODIUM AND PIPERACILLIN SODIUM 3.38 G: 375; 3 INJECTION, SOLUTION INTRAVENOUS at 15:48

## 2018-07-08 RX ADMIN — IPRATROPIUM BROMIDE AND ALBUTEROL SULFATE 3 ML: 2.5; .5 SOLUTION RESPIRATORY (INHALATION) at 19:04

## 2018-07-08 RX ADMIN — MEGESTROL ACETATE 800 MG: 40 SUSPENSION ORAL at 08:38

## 2018-07-08 RX ADMIN — IPRATROPIUM BROMIDE AND ALBUTEROL SULFATE 3 ML: 2.5; .5 SOLUTION RESPIRATORY (INHALATION) at 22:51

## 2018-07-08 RX ADMIN — GUAIFENESIN 1200 MG: 600 TABLET, EXTENDED RELEASE ORAL at 08:38

## 2018-07-08 RX ADMIN — IPRATROPIUM BROMIDE AND ALBUTEROL SULFATE 3 ML: 2.5; .5 SOLUTION RESPIRATORY (INHALATION) at 11:03

## 2018-07-08 RX ADMIN — ASPIRIN 81 MG: 81 TABLET ORAL at 08:38

## 2018-07-08 RX ADMIN — METOPROLOL TARTRATE 50 MG: 50 TABLET ORAL at 08:38

## 2018-07-08 RX ADMIN — GUAIFENESIN 1200 MG: 600 TABLET, EXTENDED RELEASE ORAL at 21:42

## 2018-07-08 RX ADMIN — DOCUSATE SODIUM 100 MG: 100 CAPSULE ORAL at 21:42

## 2018-07-08 RX ADMIN — TAZOBACTAM SODIUM AND PIPERACILLIN SODIUM 3.38 G: 375; 3 INJECTION, SOLUTION INTRAVENOUS at 21:42

## 2018-07-08 RX ADMIN — METHYLPREDNISOLONE SODIUM SUCCINATE 40 MG: 40 INJECTION, POWDER, FOR SOLUTION INTRAMUSCULAR; INTRAVENOUS at 18:24

## 2018-07-08 RX ADMIN — BUDESONIDE AND FORMOTEROL FUMARATE DIHYDRATE 2 PUFF: 160; 4.5 AEROSOL RESPIRATORY (INHALATION) at 07:38

## 2018-07-08 RX ADMIN — AMITRIPTYLINE HYDROCHLORIDE 50 MG: 25 TABLET, FILM COATED ORAL at 21:42

## 2018-07-08 RX ADMIN — TAZOBACTAM SODIUM AND PIPERACILLIN SODIUM 3.38 G: 375; 3 INJECTION, SOLUTION INTRAVENOUS at 05:43

## 2018-07-08 RX ADMIN — METHYLPREDNISOLONE SODIUM SUCCINATE 40 MG: 40 INJECTION, POWDER, FOR SOLUTION INTRAMUSCULAR; INTRAVENOUS at 05:43

## 2018-07-08 RX ADMIN — DOCUSATE SODIUM 100 MG: 100 CAPSULE ORAL at 08:38

## 2018-07-08 RX ADMIN — METOPROLOL TARTRATE 50 MG: 50 TABLET ORAL at 21:42

## 2018-07-08 RX ADMIN — IPRATROPIUM BROMIDE AND ALBUTEROL SULFATE 3 ML: 2.5; .5 SOLUTION RESPIRATORY (INHALATION) at 07:37

## 2018-07-08 RX ADMIN — ENOXAPARIN SODIUM 40 MG: 100 INJECTION SUBCUTANEOUS at 15:49

## 2018-07-08 NOTE — NURSING NOTE
Pt has become increasingly confused this shift. States he is not from this planet, he needs to see people to be aligned, he must climb the mountain, where is the portal to this place. Family was called and they remain at bedside. VSS. Will continue to monitor.

## 2018-07-08 NOTE — PLAN OF CARE
Problem: Fall Risk (Adult)  Goal: Absence of Fall  Outcome: Ongoing (interventions implemented as appropriate)      Problem: Patient Care Overview  Goal: Plan of Care Review  Outcome: Ongoing (interventions implemented as appropriate)   07/08/18 9195   Coping/Psychosocial   Plan of Care Reviewed With patient   Plan of Care Review   Progress improving   OTHER   Outcome Summary Pt has less confusion this afternoon, still talking about being from another world but answers orientation questions appropriatly. Multiple thor from family members this shift claiming POA, the patient stated his only POA was Ronak Arzate, and had his nephew Sharan go to his(Kellie Arzate) house with Laura to retrieve his dentures and POA papers. I also spoke with speech this shift regarding foods that the family could bring him to eat, as he refuses to eat what is brought to him on his trays with meals. Speech recomended pudding, yogurt, dressing, dumplings that are fork smashed, smasshed sweet potatoe, smasshed carrots, etc. The patient was more inclined to eat from this list of items rather than the options from the hospital. Patient was walked from bathroom back to his bed with assist times one. Small bowel movement noted. VSS, ABX continue, PT still insisting that he is not gavin to an SNF but according to notes per SW, he has given some thought about it, and was provided with [facility] close to his place of residence. Will continue to monitor.       Problem: Sepsis/Septic Shock (Adult)  Goal: Signs and Symptoms of Listed Potential Problems Will be Absent, Minimized or Managed (Sepsis/Septic Shock)  Outcome: Ongoing (interventions implemented as appropriate)      Problem: Skin Injury Risk (Adult)  Goal: Skin Health and Integrity  Outcome: Ongoing (interventions implemented as appropriate)      Problem: Nutrition, Imbalanced: Inadequate Oral Intake (Adult)  Goal: Improved Oral Intake  Outcome: Ongoing (interventions implemented as  appropriate)    Goal: Prevent Further Weight Loss  Outcome: Ongoing (interventions implemented as appropriate)

## 2018-07-08 NOTE — PROGRESS NOTES
"Continued Stay Note  [facility]     Patient Name: Kellie Arzate  MRN: [id number]  Today's Date: 7/8/2018    Admit Date: 7/2/2018          Discharge Plan     Row Name 07/08/18 1031       Plan    Plan SW spoke to pt about placement.  Pt stated he wasn't sure what facilities were close to his home.  SW spoke to him about NHP that are close to the [facility].  Pt would not let SW make referrals.  He stated he is \"having a test done tomorrow\" and to check back with him after that.  ELVIRA Whittington.    Patient/Family in Agreement with Plan yes              Discharge Codes    No documentation.           ELVIRA Go    "

## 2018-07-08 NOTE — THERAPY TREATMENT NOTE
Acute Care - Physical Therapy Treatment Note  [facility]     Patient Name: Kellie Arzate  : 1955  MRN: [id number]  Today's Date: 2018  Onset of Illness/Injury or Date of Surgery: 18  Date of Referral to PT: 18  Referring Physician: Dr. Merrill    Admit Date: 2018    Visit Dx:    ICD-10-CM ICD-9-CM   1. Aspiration pneumonia of left lung, unspecified aspiration pneumonia type, unspecified part of lung (CMS/[facility]) J69.0 507.0   2. SIRS (systemic inflammatory response syndrome) (CMS/[facility]) R65.10 995.90   3. Oropharyngeal dysphagia R13.12 787.22   4. Impaired functional mobility, balance, gait, and endurance Z74.09 V49.89     Patient Active Problem List   Diagnosis   • Aspiration pneumonia of left lung (CMS/[facility])       Therapy Treatment          Rehabilitation Treatment Summary     Row Name 18 1121             Treatment Time/Intention    Discipline physical therapy assistant  -MF      Document Type therapy note (daily note)  -MF2      Subjective Information complains of;weakness  -MF2      Mode of Treatment physical therapy  -MF2      Existing Precautions/Restrictions fall;oxygen therapy device and L/min  -MF3      Treatment Considerations/Comments oriented, but odd responses to questions and statements  -MF2      Patient Response to Treatment Pt very talkative and difficult to exit room at end of tx.   -MF4      Recorded by [MF] Kari Harrison, [facility] 18 1122  [MF2] Kari Harrison, [facility] 18 1134  [MF3] Kari Harrison, [facility] 18 1136  [MF4] Kari Harrison, [facility] 18 1159      Row Name 18 1121             Vital Signs    Pre SpO2 (%) 95  -MF      O2 Delivery Pre Treatment supplemental O2   4L  -MF      Post SpO2 (%) --   unable to get O2 sat reading.   -MF2      Recorded by [MF] Kari Harrison, [facility] 18 1134  [MF2] Kari Harrison, [facility] 18 1159      Row Name 18 1121             Bed Mobility Assessment/Treatment    Supine-Sit  [place] (Bed Mobility) supervision  -MF      Sit-Supine [place] (Bed Mobility) supervision  -MF2      Assistive Device (Bed Mobility) head of bed elevated  -MF      Recorded by [MF] Kari Harrison, [facility] 07/08/18 1134  [MF2] Kari Harrison, [facility] 07/08/18 1159      Row Name 07/08/18 1121             Sit-Stand Transfer    Sit-Stand [place] (Transfers) contact guard  -      Assistive Device (Sit-Stand Transfers) walker, front-wheeled  -MF      Recorded by [MF] Kari Harrison, PTA 07/08/18 1134      Row Name 07/08/18 1121             Stand-Sit Transfer    Stand-Sit [place] (Transfers) contact guard  -      Recorded by [MF] Kari Harrison, [facility] 07/08/18 1134      Row Name 07/08/18 1121             Toilet Transfer    Type (Toilet Transfer) sit-stand;stand-sit  -MF      [place] Level (Toilet Transfer) verbal cues;contact guard  -      Assistive Device (Toilet Transfer) walker, front-wheeled  -MF      Recorded by [MF] Kari Harrison, PTA 07/08/18 1134      Row Name 07/08/18 1121             Gait/Stairs Assessment/Training    [place] Level (Gait) verbal cues;contact guard  -MF      Assistive Device (Gait) walker, front-wheeled  -      Distance in Feet (Gait) 15   in room  -MF2      Deviations/Abnormal Patterns (Gait) stride length decreased;josé miguel decreased  -MF      Recorded by [MF] Kari Harrison, PTA 07/08/18 1134  [MF2] Kari Harrison, [facility] 07/08/18 1159      Row Name 07/08/18 1121             Therapeutic Exercise    Lower Extremity (Therapeutic Exercise) LAQ (long arc quad), bilateral  -MF      Lower Extremity Range of Motion (Therapeutic Exercise) hip flexion/extension, bilateral  -MF      Exercise Type (Therapeutic Exercise) AROM (active range of motion)  -MF      Position (Therapeutic Exercise) seated  -MF      Sets/Reps (Therapeutic Exercise) 20  -MF      Recorded by [MF] Kari Harrison, PTA 07/08/18 1159      Row Name 07/08/18 1121              Positioning and Restraints    Pre-Treatment Position in bed  -      Post Treatment Position bed  -MF      In Bed fowlers;call light within reach;side rails up x2  -      Recorded by [] Kari Harrison, [facility] 07/08/18 1159      Row Name 07/08/18 1121             Pain Scale: Numbers Pre/Post-Treatment    Pain Scale: Numbers, Pretreatment 0/10 - no pain  -      Pain Scale: Numbers, Post-Treatment 0/10 - no pain  -2      Recorded by [MF] Kari Harrison, [facility] 07/08/18 1134  [MF2] Kari Harrison, [facility] 07/08/18 1159        User Key  (r) = Recorded By, (t) = Taken By, (c) = Cosigned By    Initials Name Effective Dates Discipline     Kari LUIGI Harrison, [facility] 08/02/16 -  PT                     Physical Therapy Education     Title: PT OT SLP Therapies (Active)     Topic: Physical Therapy (Active)     Point: Mobility training (Active)    Learning Progress Summary     Learner Status Readiness Method Response Comment Documented by    Patient Active Acceptance E,D NR benefits of activity  07/08/18 1135     Active Acceptance E NR Educated patient about need for assist with mobility and progression of activity with PT. HR 07/07/18 1353          Point: Home exercise program (Active)    Learning Progress Summary     Learner Status Readiness Method Response Comment Documented by    Patient Active Acceptance E NR Educated patient about need for assist with mobility and progression of activity with PT. HR 07/07/18 1353          Point: Body mechanics (Active)    Learning Progress Summary     Learner Status Readiness Method Response Comment Documented by    Patient Active Acceptance E NR Educated patient about need for assist with mobility and progression of activity with PT. HR 07/07/18 1353          Point: Precautions (Active)    Learning Progress Summary     Learner Status Readiness Method Response Comment Documented by    Patient Active Acceptance E NR Educated patient about need for assist with mobility and  progression of activity with PT. HR 07/07/18 1353                      User Key     Initials Effective Dates Name Provider Type Discipline     06/04/18 -  Tete Barboza, PT, DPT, [place] Physical Therapist PT     08/02/16 -  Kari Harrison PTA Physical Therapy Assistant PT                    PT Recommendation and Plan     Plan of Care Reviewed With: patient  Progress: improving  Outcome Summary: Pt. agreeable to therapy. Pt. continues to be somewhat confused and had many questions for staff. He makes odd statements. Pt. was supervision for bed mobility. Pt. was CGA for transfers and gait. Pt. walked 15' with rwx. WIll continue to work with pt. on progressive ambulation and endurance.           Outcome Measures     Row Name 07/08/18 1121 07/07/18 1300          How much help from another person do you currently need...    Turning from your back to your side while in flat bed without using bedrails? 3  -MF 3  -HR     Moving from lying on back to sitting on the side of a flat bed without bedrails? 3  -MF 3  -HR     Moving to and from a bed to a chair (including a wheelchair)? 3  -MF 3  -HR     Standing up from a chair using your arms (e.g., wheelchair, bedside chair)? 3  -MF 4  -HR     Climbing 3-5 steps with a railing? 2  -MF 2  -HR     To walk in hospital room? 3  -MF 3  -HR     AM-PAC 6 Clicks Score 17  -MF 18  -HR        Functional Assessment    Outcome Measure Options AM-PAC 6 Clicks Basic Mobility (PT)  -MF AM-PAC 6 Clicks Basic Mobility (PT)  -HR       User Key  (r) = Recorded By, (t) = Taken By, (c) = Cosigned By    Initials Name Provider Type    HR Tete Barboza, PT, DPT, [place] Physical Therapist     Kari Harrison PTA Physical Therapy Assistant           Time Calculation:         PT Charges     Row Name 07/08/18 1202             Time Calculation    Start Time 1121  -      Stop Time 1200  -MF      Time Calculation (min) 39 min  -      PT Received On 07/08/18  -      PT  Goal Re-Cert Due Date 07/17/18  -         Time Calculation- PT    Total Timed Code Minutes- PT 39 minute(s)  -MF         Timed Charges    10204 - PT Therapeutic Exercise Minutes 9  -MF      53435 - Gait Training Minutes  15  -MF      38399 - PT Therapeutic Activity Minutes 15  -MF        User Key  (r) = Recorded By, (t) = Taken By, (c) = Cosigned By    Initials Name Provider Type     Kari Harrison PTA Physical Therapy Assistant        Therapy Suggested Charges     Code   Minutes Charges    64229 (CPT®) Hc Pt Neuromusc Re Education Ea 15 Min      04433 (CPT®) Hc Pt Ther Proc Ea 15 Min 9 1    02230 (CPT®) Hc Gait Training Ea 15 Min 15 1    10159 (CPT®) Hc Pt Therapeutic Act Ea 15 Min 15 1    66724 (CPT®) Hc Pt Manual Therapy Ea 15 Min      16682 (CPT®) Hc Pt Iontophoresis Ea 15 Min      98121 (CPT®) Hc Pt Elec Stim Ea-Per 15 Min      36706 (CPT®) Hc Pt Ultrasound Ea 15 Min      20713 (CPT®) Hc Pt Self Care/Mgmt/Train Ea 15 Min      Total  39 3        Therapy Charges for Today     Code Description Service Date Service Provider Modifiers Qty    06715836941 HC PT THER PROC EA 15 MIN 7/8/2018 Kari Harrison, PTA GP 1    64963407585 HC GAIT TRAINING EA 15 MIN 7/8/2018 Kari Harrison, PTA GP 1    08258717819 HC PT THERAPEUTIC ACT EA 15 MIN 7/8/2018 Kari Harrison, PTA GP 1          PT G-Codes  Outcome Measure Options: AM-PAC 6 Clicks Basic Mobility (PT)  Score: 18  Functional Limitation: Mobility: Walking and moving around  Mobility: Walking and Moving Around Current Status (): At least 40 percent but less than 60 percent impaired, limited or restricted  Mobility: Walking and Moving Around Goal Status (): At least 20 percent but less than 40 percent impaired, limited or restricted    Kari Harrison PTA  7/8/2018

## 2018-07-08 NOTE — SIGNIFICANT NOTE
The relative Kervin Zendejas, called to inform me that his (Kervin's) half brothers girlfriend (Laura), is ok to be in the room and ok to know what it going on, however, she is not to be involved in any Care related decisions. Kervin then stated that his wife will be coming with POA papers.

## 2018-07-08 NOTE — PROGRESS NOTES
PULMONARY AND CRITICAL CARE PROGRESS NOTE - [facility]    Patient: Kellie Arzate  1955   MR# [id number]   Acct# [id number]  07/08/18   9:36 AM  Referring Provider: Adrien Merrill MD    Chief Complaint: Dyspnea     Interval history: Patient sitting up in the bed on 4L. He appears to be breathing comfortably. Family at the bedside. Reports overnight indicate that he was hallucinating and somewhat agitated. Conversation is still a bit off this morning but improved and he is pleasant. No other aggravating, alleviating factors or associated symptoms.    Meds:    amitriptyline 50 mg Oral Nightly   aspirin 81 mg Oral Daily   budesonide-formoterol 2 puff Inhalation BID - RT   docusate sodium 100 mg Oral BID   enoxaparin 40 mg Subcutaneous Q24H   famotidine 20 mg Oral Daily   guaiFENesin 1,200 mg Oral Q12H   ipratropium-albuterol 3 mL Nebulization Q4H - RT   megestrol 800 mg Oral Daily   methylPREDNISolone sodium succinate 40 mg Intravenous Q8H   metoprolol tartrate 50 mg Oral Q12H   piperacillin-tazobactam 3.375 g Intravenous Q8H   saccharomyces boulardii 250 mg Oral BID       sodium chloride 0.9 % with KCl 20 mEq 20 mL/hr Last Rate: 20 mL/hr (07/07/18 1813)     Review of Systems:   Review of Systems     Constitutional: Negative for activity change, appetite change, chills and fever.   HENT: Positive for trouble swallowing. Negative for drooling and rhinorrhea.    Eyes: Negative for photophobia and visual disturbance.   Respiratory: Positive for cough, choking, shortness of breath and wheezing.    Cardiovascular: Negative for chest pain and leg swelling.   Gastrointestinal: Negative for abdominal distention and abdominal pain.   Endocrine: Negative for cold intolerance.   Genitourinary: Positive for flank pain. Negative for difficulty urinating.   Musculoskeletal: Negative for arthralgias, back pain and joint swelling.   Neurological: Positive for weakness. Negative for dizziness and numbness.    Psychiatric/Behavioral: Positive for hallucinations and sleep disturbance.     Physical Exam:  SpO2 Percentage    07/08/18 0430 07/08/18 0717 07/08/18 0737   SpO2: 90% 90% (!) 78%     Temp:  [97.2 °F (36.2 °C)-98.5 °F (36.9 °C)] 98.5 °F (36.9 °C)  Heart Rate:  [] 99  Resp:  [14-24] 20  BP: (111-132)/(60-80) 125/76  Intake/Output Summary (Last 24 hours) at 07/08/18 0936  Last data filed at 07/08/18 0545   Gross per 24 hour   Intake              480 ml   Output              200 ml   Net              280 ml     Physical Exam:    Constitutional: He appears well-developed. No distress.   HENT:   Head: Normocephalic and atraumatic. NC in place  Nose: Nose normal.   Mouth/Throat: Oropharynx is clear and moist.   Eyes: EOM are normal. No scleral icterus.   Neck: No JVD present. No tracheal deviation present.   Well healed tracheostomy scar.   Cardiovascular: Normal rate and regular rhythm.    Pulmonary/Chest: No respiratory distress.   Abdominal: Soft. There is no tenderness. There is no guarding.   scaphoid   Musculoskeletal: He exhibits no edema.   Skin: Skin is warm and dry. He is not diaphoretic.   Psychiatric:  Calm, pleasant, speech is tangential and he is delusional    Laboratory Data:    Results from last 7 days  Lab Units 07/05/18  0411 07/04/18  0549 07/03/18  0439   WBC 10*3/mm3 22.87* 23.67* 23.12*   HEMOGLOBIN g/dL 9.5* 9.8* 8.9*   PLATELETS 10*3/mm3 427* 399 390       Results from last 7 days  Lab Units 07/05/18  0411 07/04/18  0549 07/03/18  0439 07/02/18  1246   SODIUM mmol/L 143 140 137 137   POTASSIUM mmol/L 4.4 4.5 3.5 4.6   BUN mg/dL 17 14 12 21   CREATININE mg/dL 0.54 0.43* 0.37* 0.44*   INR   --   --   --  1.19*       Results from last 7 days  Lab Units 07/02/18  1230   PH, ARTERIAL pH units 7.410   PCO2, ARTERIAL mm Hg 59.2*   PO2 ART mm Hg 61.9*     Blood Culture   Date Value Ref Range Status   07/02/2018 No growth at 5 days  Final   07/02/2018 No growth at 5 days  Final     Respiratory  Culture   Date Value Ref Range Status   07/03/2018 Scant growth (1+) Normal Respiratory Tonya  Final   07/03/2018 Scant growth (1+) Candida albicans (A)  Final     Recent films:  Xr Chest Pa & Lateral    Result Date: 7/6/2018  1. Slightly increasing left lung opacities compared to 7/2/2018. Pneumonia superimposed on chronic emphysema favored. Continued follow-up recommended 2. Right apical scarring with associated volume loss. Advanced emphysematous changes of the right lung. This report was finalized on 07/06/2018 10:16 by Dr. Linda Morocho MD.    Films reviewed personally by me.  My interpretation: none today     Pulmonary Assessment:    1. Pneumonia  2. Aspiration  3. Emphysema  4. Hypertension  5. Hallucinations  6. Nicotine dependency, needs to discontinue  7. Chronic resp failure with hypoxia, compensated with oxygen  8. Cough  9. Pulmonary hypertension, probably group 3  10. Personal history of tracheostomy  11. Leukocytosis due to pneumonia  12. Mediastinal adenopathy  13. Emphysema, probably severe/COPD probably severe    Recommend:     · Smoking cessation  · Continue zosyn but off of levaquin  · Continue bronchodilators  · Supplemental oxygen   · Trilogy with sleep and PRN  · PT/OT rehab placement  · Will decrease IV steroid dose to see if this help with his confusions, restlessness and hallucinations.     Electronically signed by CRISPIN Villegas, 07/08/18, 9:36 AM     He is very tangential in his conversation.  We have been treating him with vest therapy and continued with antibiotic therapy and altered diet.  He knows he is supposed to use a special diet but at the same time he has seems to be delusional and hallucinating today.  Recommend tapering steroids and avoidance of quinolones at this point.  Continue other antibiotics.  Continue vest therapy speech therapy and oxygen.  Sounds diminished.  Patient awake and alert    I have seen and examined patient personally, performing a face-to-face  diagnostic evaluation with plan of care reviewed and developed with APRN and nursing staff. I have addended and/or modified the above history of present illness, physical examination, and assessment and plan to reflect my findings and impressions. Essential elements of the care plan were discussed with APRN above.  Agree with findings and assessment/plan as documented above.    Electronically signed by Matti Young MD, on 7/8/2018, 12:35 PM    EMR Dragon/Transcription disclaimer: Much of this encounter note is an electronic transcription/translation of spoken language to printed text. The electronic translation of spoken language may permit erroneous, or at times, nonsensical words or phrases to be inadvertently transcribed; although I have reviewed the note for such errors, some may still exist.

## 2018-07-08 NOTE — PROGRESS NOTES
[facility] Medicine Services  INPATIENT PROGRESS NOTE    Patient Name: Kellie Arzate  Date of Admission: 7/2/2018  Today's Date: 07/08/18  Length of Stay: 6  Primary Care Physician: Jose Moon MD    Subjective   Chief Complaint: follow-up aspiration PNA  Weakness - Generalized        Patient feeling a little bit better today.  Denies any pain.  Feels like his breathing is slowly starting to improve.  He seems receptive to considering SNF/rehab placement prior to going home based upon his PT eval.    Review of Systems     All pertinent negatives and positives are as above. All other systems have been reviewed and are negative unless otherwise stated.     Objective    Temp:  [97.2 °F (36.2 °C)-98.5 °F (36.9 °C)] 98.5 °F (36.9 °C)  Heart Rate:  [] 99  Resp:  [14-24] 20  BP: (111-132)/(60-80) 125/76  Physical Exam   Constitutional: He is oriented to person, place, and time. No distress.   HENT:   Head: Normocephalic.   Mouth/Throat: No oropharyngeal exudate.   Eyes: No scleral icterus.   Neck: No tracheal deviation present.   Cardiovascular: Normal heart sounds.    Rate approx 90; regular   Pulmonary/Chest: No respiratory distress. He has no wheezes.   Some scattered coarse BSs on the left; no significant wheezes; nonlabored breathing; breath sounds appear to be slowly improving   Abdominal: Soft.   Musculoskeletal: He exhibits no edema.   Neurological: He is alert and oriented to person, place, and time.   Skin: Skin is warm and dry. He is not diaphoretic.   Psychiatric: He has a normal mood and affect. His behavior is normal.   Vitals reviewed.    Results Review:  I have reviewed the labs, radiology results, and diagnostic studies.    Laboratory Data:     Results from last 7 days  Lab Units 07/05/18  0411 07/04/18  0549 07/03/18  0439   WBC 10*3/mm3 22.87* 23.67* 23.12*   HEMOGLOBIN g/dL 9.5* 9.8* 8.9*   HEMATOCRIT % 31.7* 32.9* 29.4*   PLATELETS 10*3/mm3  "427* 399 390          Results from last 7 days  Lab Units 07/05/18  0411 07/04/18  0549 07/03/18  0439   SODIUM mmol/L 143 140 137   POTASSIUM mmol/L 4.4 4.5 3.5   CHLORIDE mmol/L 100 100 96*   CO2 mmol/L 35.0* 34.0* 33.0*   BUN mg/dL 17 14 12   CREATININE mg/dL 0.54 0.43* 0.37*   CALCIUM mg/dL 9.0 9.1 8.4   BILIRUBIN mg/dL 0.4 0.3 0.4   ALK PHOS U/L 181* 196* 169*   ALT (SGPT) U/L 72* 72* 50   AST (SGOT) U/L 102* 85* 54*   GLUCOSE mg/dL 144* 140* 171*       Culture Data:   Blood Culture   Date Value Ref Range Status   07/02/2018 No growth at less than 24 hours  Preliminary   07/02/2018 No growth at less than 24 hours  Preliminary       Radiology Data:   Imaging Results (last 24 hours)     ** No results found for the last 24 hours. **          I have reviewed the patient's current medications.     Assessment/Plan     Hospital Problem List     Aspiration pneumonia of left lung (CMS/HCC)        Assessment:  1.  Acute on chronic hypoxemic respiratory failure  2.  Sepsis  3.  Pneumonia - aspiration  4.  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  Weight Loss  6.  Elevated troponin - no chest pain  7.  Abnormal LFTs  8.  History of PE in the past - now off of anticoagulation  9.  Severe protein-calorie malnutrition  10.  Tobacco dependence    11.  MICHELLE     Plan:   1.  IV Zosyn - day 7  2.  Now off of Levaquin  3.  Continue current dose of IV steroids  4.  Appreciate Pulmonary following  5.  Scheduled nebs; Symbicort  6.  Respiratory culture and blood cultures with no acute findings  7.  IVFs to KVO  8.  Trilogy per home settings  9.  S/p 3 days of IV Venofer  10.  PT note reviewed; will need to look into placement  11.  ST also following; patient really wants his diet advanced  12.  PO Megace; encourage PO (patient reports he's \"never going to eat enough\" with the Pureed diet - he does not like this diet)  13.  PO Probiotic  14.  Dispo planning: patient wants to go " home with ; Physical therapy note reviewed.  Will have case mgmt start looking into placement; patient lives in [facility] and would benefit from looking into placement closer to his home.  15.  Will d/c telemetry at patient request    Adrien Merrill MD   07/08/18   9:16 AM

## 2018-07-08 NOTE — PLAN OF CARE
Problem: Fall Risk (Adult)  Goal: Absence of Fall  Outcome: Ongoing (interventions implemented as appropriate)      Problem: Patient Care Overview  Goal: Plan of Care Review  Outcome: Ongoing (interventions implemented as appropriate)   07/08/18 0512   Coping/Psychosocial   Plan of Care Reviewed With patient;family   OTHER   Outcome Summary Pt confused and anxious. Continues with IV solumedrol and abx. Pt states he does not like the food or the consistency. Family is at bedside. VSS.      Goal: Discharge Needs Assessment  Outcome: Ongoing (interventions implemented as appropriate)      Problem: Sepsis/Septic Shock (Adult)  Goal: Signs and Symptoms of Listed Potential Problems Will be Absent, Minimized or Managed (Sepsis/Septic Shock)  Outcome: Ongoing (interventions implemented as appropriate)      Problem: Skin Injury Risk (Adult)  Goal: Skin Health and Integrity  Outcome: Ongoing (interventions implemented as appropriate)

## 2018-07-08 NOTE — PLAN OF CARE
Problem: Patient Care Overview  Goal: Plan of Care Review  Outcome: Ongoing (interventions implemented as appropriate)   07/08/18 1121   Coping/Psychosocial   Plan of Care Reviewed With patient   Plan of Care Review   Progress improving   OTHER   Outcome Summary Pt. agreeable to therapy. Pt. continues to be somewhat confused and had many questions for staff. He makes odd statements. Pt. was supervision for bed mobility. Pt. was CGA for transfers and gait. Pt. walked 15' with rwx. WIll continue to work with pt. on progressive ambulation and endurance.

## 2018-07-09 LAB
ALBUMIN SERPL-MCNC: 3.2 G/DL (ref 3.5–5)
ALBUMIN/GLOB SERPL: 0.9 G/DL (ref 1.1–2.5)
ALP SERPL-CCNC: 141 U/L (ref 24–120)
ALT SERPL W P-5'-P-CCNC: 394 U/L (ref 0–54)
ANION GAP SERPL CALCULATED.3IONS-SCNC: 10 MMOL/L (ref 4–13)
AST SERPL-CCNC: 216 U/L (ref 7–45)
BILIRUB SERPL-MCNC: 0.6 MG/DL (ref 0.1–1)
BUN BLD-MCNC: 17 MG/DL (ref 5–21)
BUN/CREAT SERPL: 27.4 (ref 7–25)
CALCIUM SPEC-SCNC: 8.9 MG/DL (ref 8.4–10.4)
CHLORIDE SERPL-SCNC: 95 MMOL/L (ref 98–110)
CO2 SERPL-SCNC: 34 MMOL/L (ref 24–31)
CREAT BLD-MCNC: 0.62 MG/DL (ref 0.5–1.4)
DEPRECATED RDW RBC AUTO: 42.5 FL (ref 40–54)
ERYTHROCYTE [DISTWIDTH] IN BLOOD BY AUTOMATED COUNT: 20.3 % (ref 12–15)
GFR SERPL CREATININE-BSD FRML MDRD: 130 ML/MIN/1.73
GLOBULIN UR ELPH-MCNC: 3.4 GM/DL
GLUCOSE BLD-MCNC: 91 MG/DL (ref 70–100)
HCT VFR BLD AUTO: 41.7 % (ref 40–52)
HGB BLD-MCNC: 12.4 G/DL (ref 14–18)
MCH RBC QN AUTO: 21.1 PG (ref 28–32)
MCHC RBC AUTO-ENTMCNC: 29.7 G/DL (ref 33–36)
MCV RBC AUTO: 70.8 FL (ref 82–95)
PLATELET # BLD AUTO: 430 10*3/MM3 (ref 130–400)
PMV BLD AUTO: 9 FL (ref 6–12)
POTASSIUM BLD-SCNC: 3.9 MMOL/L (ref 3.5–5.3)
PROT SERPL-MCNC: 6.6 G/DL (ref 6.3–8.7)
RBC # BLD AUTO: 5.89 10*6/MM3 (ref 4.8–5.9)
SODIUM BLD-SCNC: 139 MMOL/L (ref 135–145)
WBC NRBC COR # BLD: 19.57 10*3/MM3 (ref 4.8–10.8)

## 2018-07-09 PROCEDURE — 80053 COMPREHEN METABOLIC PANEL: CPT | Performed by: INTERNAL MEDICINE

## 2018-07-09 PROCEDURE — 25010000002 METHYLPREDNISOLONE PER 40 MG: Performed by: NURSE PRACTITIONER

## 2018-07-09 PROCEDURE — 97116 GAIT TRAINING THERAPY: CPT

## 2018-07-09 PROCEDURE — 25010000002 ENOXAPARIN PER 10 MG: Performed by: INTERNAL MEDICINE

## 2018-07-09 PROCEDURE — 25010000002 METHYLPREDNISOLONE PER 40 MG: Performed by: INTERNAL MEDICINE

## 2018-07-09 PROCEDURE — 94760 N-INVAS EAR/PLS OXIMETRY 1: CPT

## 2018-07-09 PROCEDURE — 92526 ORAL FUNCTION THERAPY: CPT | Performed by: SPEECH-LANGUAGE PATHOLOGIST

## 2018-07-09 PROCEDURE — 94799 UNLISTED PULMONARY SVC/PX: CPT

## 2018-07-09 PROCEDURE — 97530 THERAPEUTIC ACTIVITIES: CPT

## 2018-07-09 PROCEDURE — 94669 MECHANICAL CHEST WALL OSCILL: CPT

## 2018-07-09 PROCEDURE — 85027 COMPLETE CBC AUTOMATED: CPT | Performed by: INTERNAL MEDICINE

## 2018-07-09 PROCEDURE — 25010000002 PIPERACILLIN SOD-TAZOBACTAM PER 1 G: Performed by: INTERNAL MEDICINE

## 2018-07-09 RX ORDER — CITALOPRAM 20 MG/1
20 TABLET ORAL DAILY
Status: DISCONTINUED | OUTPATIENT
Start: 2018-07-09 | End: 2018-07-14 | Stop reason: HOSPADM

## 2018-07-09 RX ORDER — ALPRAZOLAM 0.25 MG/1
0.25 TABLET ORAL 3 TIMES DAILY PRN
Status: DISCONTINUED | OUTPATIENT
Start: 2018-07-09 | End: 2018-07-14 | Stop reason: HOSPADM

## 2018-07-09 RX ORDER — METHYLPREDNISOLONE SODIUM SUCCINATE 40 MG/ML
10 INJECTION, POWDER, LYOPHILIZED, FOR SOLUTION INTRAMUSCULAR; INTRAVENOUS EVERY 12 HOURS
Status: DISCONTINUED | OUTPATIENT
Start: 2018-07-09 | End: 2018-07-10

## 2018-07-09 RX ADMIN — IPRATROPIUM BROMIDE AND ALBUTEROL SULFATE 3 ML: 2.5; .5 SOLUTION RESPIRATORY (INHALATION) at 22:40

## 2018-07-09 RX ADMIN — CITALOPRAM 20 MG: 20 TABLET, FILM COATED ORAL at 10:26

## 2018-07-09 RX ADMIN — Medication 250 MG: at 20:40

## 2018-07-09 RX ADMIN — TAZOBACTAM SODIUM AND PIPERACILLIN SODIUM 3.38 G: 375; 3 INJECTION, SOLUTION INTRAVENOUS at 06:30

## 2018-07-09 RX ADMIN — TAZOBACTAM SODIUM AND PIPERACILLIN SODIUM 3.38 G: 375; 3 INJECTION, SOLUTION INTRAVENOUS at 15:55

## 2018-07-09 RX ADMIN — METHYLPREDNISOLONE SODIUM SUCCINATE 10 MG: 40 INJECTION, POWDER, FOR SOLUTION INTRAMUSCULAR; INTRAVENOUS at 18:20

## 2018-07-09 RX ADMIN — ENOXAPARIN SODIUM 40 MG: 100 INJECTION SUBCUTANEOUS at 18:20

## 2018-07-09 RX ADMIN — FAMOTIDINE 20 MG: 20 TABLET, FILM COATED ORAL at 09:29

## 2018-07-09 RX ADMIN — METOPROLOL TARTRATE 50 MG: 50 TABLET ORAL at 09:29

## 2018-07-09 RX ADMIN — DOCUSATE SODIUM 100 MG: 100 CAPSULE ORAL at 09:29

## 2018-07-09 RX ADMIN — METOPROLOL TARTRATE 50 MG: 50 TABLET ORAL at 20:40

## 2018-07-09 RX ADMIN — IPRATROPIUM BROMIDE AND ALBUTEROL SULFATE 3 ML: 2.5; .5 SOLUTION RESPIRATORY (INHALATION) at 06:56

## 2018-07-09 RX ADMIN — ASPIRIN 81 MG: 81 TABLET ORAL at 09:29

## 2018-07-09 RX ADMIN — DOCUSATE SODIUM 100 MG: 100 CAPSULE ORAL at 20:40

## 2018-07-09 RX ADMIN — BUDESONIDE AND FORMOTEROL FUMARATE DIHYDRATE 2 PUFF: 160; 4.5 AEROSOL RESPIRATORY (INHALATION) at 19:12

## 2018-07-09 RX ADMIN — GUAIFENESIN 1200 MG: 600 TABLET, EXTENDED RELEASE ORAL at 20:40

## 2018-07-09 RX ADMIN — TAZOBACTAM SODIUM AND PIPERACILLIN SODIUM 3.38 G: 375; 3 INJECTION, SOLUTION INTRAVENOUS at 20:39

## 2018-07-09 RX ADMIN — BUDESONIDE AND FORMOTEROL FUMARATE DIHYDRATE 2 PUFF: 160; 4.5 AEROSOL RESPIRATORY (INHALATION) at 06:56

## 2018-07-09 RX ADMIN — IPRATROPIUM BROMIDE AND ALBUTEROL SULFATE 3 ML: 2.5; .5 SOLUTION RESPIRATORY (INHALATION) at 15:03

## 2018-07-09 RX ADMIN — IPRATROPIUM BROMIDE AND ALBUTEROL SULFATE 3 ML: 2.5; .5 SOLUTION RESPIRATORY (INHALATION) at 19:12

## 2018-07-09 RX ADMIN — GUAIFENESIN 1200 MG: 600 TABLET, EXTENDED RELEASE ORAL at 09:29

## 2018-07-09 RX ADMIN — IPRATROPIUM BROMIDE AND ALBUTEROL SULFATE 3 ML: 2.5; .5 SOLUTION RESPIRATORY (INHALATION) at 11:23

## 2018-07-09 RX ADMIN — Medication 250 MG: at 09:29

## 2018-07-09 RX ADMIN — AMITRIPTYLINE HYDROCHLORIDE 50 MG: 25 TABLET, FILM COATED ORAL at 20:40

## 2018-07-09 RX ADMIN — MEGESTROL ACETATE 800 MG: 40 SUSPENSION ORAL at 09:29

## 2018-07-09 RX ADMIN — METHYLPREDNISOLONE SODIUM SUCCINATE 40 MG: 40 INJECTION, POWDER, FOR SOLUTION INTRAMUSCULAR; INTRAVENOUS at 06:30

## 2018-07-09 NOTE — PROGRESS NOTES
PULMONARY AND CRITICAL CARE PROGRESS NOTE - [facility]    Patient: Kellie Arzate  1955   MR# [id number]   Acct# [id number]  07/09/18   8:16 AM  Referring Provider: Adrien Merrill MD    Chief Complaint: Dyspnea     Interval history: He is sleeping. Family is in room.  They have been taking care of him for the past couple of years.  They report he is not usually delusional or hallucinating or disoriented.  Has been very sleepy the past few weeks.  They are working on some POA issues to be able to help him better.  He is sleeping but arousible this am.  No additional complaints.  He has been refusing use of trilogy/bipap due to confusion.  Cannot get additional history    Meds:    amitriptyline 50 mg Oral Nightly   aspirin 81 mg Oral Daily   budesonide-formoterol 2 puff Inhalation BID - RT   docusate sodium 100 mg Oral BID   enoxaparin 40 mg Subcutaneous Q24H   famotidine 20 mg Oral Daily   guaiFENesin 1,200 mg Oral Q12H   ipratropium-albuterol 3 mL Nebulization Q4H - RT   megestrol 800 mg Oral Daily   methylPREDNISolone sodium succinate 40 mg Intravenous Q12H   metoprolol tartrate 50 mg Oral Q12H   piperacillin-tazobactam 3.375 g Intravenous Q8H   saccharomyces boulardii 250 mg Oral BID       sodium chloride 0.9 % with KCl 20 mEq 20 mL/hr Last Rate: 20 mL/hr (07/07/18 1813)     Review of Systems:   Review of Systems   Unable to perform ROS: Mental status change      Physical Exam:  SpO2 Percentage    07/09/18 0656 07/09/18 0700 07/09/18 0723   SpO2: 96% 98% 90%     Temp:  [97.2 °F (36.2 °C)-98.2 °F (36.8 °C)] 97.4 °F (36.3 °C)  Heart Rate:  [] 68  Resp:  [20] 20  BP: (104-133)/(62-81) 133/62    Intake/Output Summary (Last 24 hours) at 07/09/18 0816  Last data filed at 07/09/18 0630   Gross per 24 hour   Intake              780 ml   Output                0 ml   Net              780 ml     Physical Exam   Constitutional: He appears lethargic. He appears cachectic. He is sleeping.   Non-toxic appearance. He has a sickly appearance. He appears ill. No distress. Nasal cannula in place.   Eyes: EOM are normal. No scleral icterus.   Neck: No JVD present. No tracheal deviation present.   Cardiovascular: Normal rate and regular rhythm.    Pulmonary/Chest: No respiratory distress.   Abdominal: Soft. There is no tenderness. There is no guarding.   Musculoskeletal: He exhibits no edema.   Neurological: He appears lethargic.   Skin: Skin is warm and dry. He is not diaphoretic.   Psychiatric: He has a normal mood and affect. His behavior is normal.   :    Laboratory Data:    Results from last 7 days  Lab Units 07/05/18  0411 07/04/18  0549 07/03/18  0439   WBC 10*3/mm3 22.87* 23.67* 23.12*   HEMOGLOBIN g/dL 9.5* 9.8* 8.9*   PLATELETS 10*3/mm3 427* 399 390       Results from last 7 days  Lab Units 07/05/18  0411 07/04/18  0549 07/03/18  0439 07/02/18  1246   SODIUM mmol/L 143 140 137 137   POTASSIUM mmol/L 4.4 4.5 3.5 4.6   BUN mg/dL 17 14 12 21   CREATININE mg/dL 0.54 0.43* 0.37* 0.44*   INR   --   --   --  1.19*       Results from last 7 days  Lab Units 07/02/18  1230   PH, ARTERIAL pH units 7.410   PCO2, ARTERIAL mm Hg 59.2*   PO2 ART mm Hg 61.9*     Blood Culture   Date Value Ref Range Status   07/02/2018 No growth at 5 days  Final   07/02/2018 No growth at 5 days  Final     Respiratory Culture   Date Value Ref Range Status   07/03/2018 Scant growth (1+) Normal Respiratory Tonya  Final   07/03/2018 Scant growth (1+) Candida albicans (A)  Final     Recent films:  No radiology results for the last day  Films reviewed personally by me.  My interpretation: none today     Pulmonary Assessment:    1. Pneumonia improving  2. Aspiration on altered diet, questionable compliance  3. Emphysema severe  4. Hypertension  5. Hallucinations hope to improve with less offending meds  6. Nicotine dependency, needs to discontinue  7. Chronic resp failure with hypoxia, compensated with oxygen  8. Cough  9. Pulmonary  hypertension, probably group 3  10. Personal history of tracheostomy resolved  11. Leukocytosis due to pneumonia  12. Mediastinal adenopathy  13. Emphysema, probably severe/COPD probably severe  14. Anemia, moderate  Recommend:     · Dramatically reduce steroids  · F/u cbc and cxr.  · Smoking cessation going forward  · Continue zosyn but avoid quinolones  · Continue bronchodilators  · continueSupplemental oxygen   · Trilogy with sleep and PRN once he is willing to comply with that  · PT/OT rehab placement  f    Electronically signed by Matti Young MD, 07/09/18, 8:16 AM

## 2018-07-09 NOTE — PLAN OF CARE
Problem: Patient Care Overview  Goal: Plan of Care Review   07/09/18 3644   Coping/Psychosocial   Plan of Care Reviewed With patient;family   Plan of Care Review   Progress improving   OTHER   Outcome Summary Pt rested well this shift, no c/o of apin or discomfort voiced. Pt still delusional stating he is not of this world, he is living in a box in the air conditioner looking thru the cracks for alignment. He also has stated he can not wear his trilogy mask due to fact it is a portal to another universe. Family has remained at bedside. VSS.

## 2018-07-09 NOTE — PROGRESS NOTES
Continued Stay Note   [place]     Patient Name: Kellie Arzate  MRN: [id number]  Today's Date: 7/9/2018    Admit Date: 7/2/2018          Discharge Plan     Row Name 07/09/18 0917       Plan    Plan Home Health    Patient/Family in Agreement with Plan yes    Plan Comments Spoke with pt/nephew/nephew's wife in room to reassess home needs.  Pt lives on nephews property and nephew saying they have a room for pt to stay in at discharge while he recuperates.  He will need  care to follow in home, all agreeable.  Pt saying he is trying to figure out things with family and deciding on who to make POA.  His current POA Ronak, is not reachable. [facility] contact numbers [phone]. Will follow.               Discharge Codes    No documentation.           MATTHIEU [place]

## 2018-07-09 NOTE — PLAN OF CARE
Problem: Patient Care Overview  Goal: Plan of Care Review  Outcome: Ongoing (interventions implemented as appropriate)   07/09/18 1010   Coping/Psychosocial   Plan of Care Reviewed With patient   Plan of Care Review   Progress improving   OTHER   Outcome Summary  Pt. continues to be somewhat confused and makes odd statements. Pt. was cga for bed mobility.Pt sat EOB for a long period before agreeing to walk. Pt. was CGA for transfers and gait. Pt. walked 50 with RW.WIll continue to work with pt. on progressive ambulation and endurance.

## 2018-07-09 NOTE — PROGRESS NOTES
"    [facility] Medicine Services  INPATIENT PROGRESS NOTE    Patient Name: Kellie Arzate  Date of Admission: 7/2/2018  Today's Date: 07/09/18  Length of Stay: 7  Primary Care Physician: Jose Moon MD    Subjective   Chief Complaint: follow-up aspiration PNA  Weakness - Generalized        Patient reports that he feels good this morning.  He rested well overnight.  2 members of the family are currently at bedside.  He reports no current pain.  He still reports that he is not all that hungry, and does not like puréed diet.  Feels like that he is getting a little bit stronger, but admits that this is occurring slowly.  Voices no new complaints this morning.    Review of Systems     All pertinent negatives and positives are as above. All other systems have been reviewed and are negative unless otherwise stated.     Objective    Temp:  [97.2 °F (36.2 °C)-98.2 °F (36.8 °C)] 97.4 °F (36.3 °C)  Heart Rate:  [] 68  Resp:  [20] 20  BP: (104-133)/(62-81) 133/62  Physical Exam   Constitutional: He is oriented to person, place, and time. No distress.   HENT:   Head: Normocephalic.   Mouth/Throat: No oropharyngeal exudate.   Eyes: No scleral icterus.   Neck: No tracheal deviation present.   Cardiovascular: Normal heart sounds.    Pulmonary/Chest: No respiratory distress. He has no wheezes.   Improvement in breath sounds noted this morning; no prominent wheezing or rhonchi this morning   Abdominal: Soft.   Musculoskeletal: He exhibits no edema.   Neurological: He is alert and oriented to person, place, and time.   Knows he is at \"[facility]\", year 2018.  Initially said month was February but easily cued to recall month was July.  Knew the president was Trsher; no focal deficits appreciated this morning   Skin: Skin is warm and dry. He is not diaphoretic.   Psychiatric: He has a normal mood and affect. His behavior is normal.   Vitals reviewed.    Results " Review:  I have reviewed the labs, radiology results, and diagnostic studies.    Laboratory Data:     Results from last 7 days  Lab Units 07/05/18  0411 07/04/18  0549 07/03/18  0439   WBC 10*3/mm3 22.87* 23.67* 23.12*   HEMOGLOBIN g/dL 9.5* 9.8* 8.9*   HEMATOCRIT % 31.7* 32.9* 29.4*   PLATELETS 10*3/mm3 427* 399 390          Results from last 7 days  Lab Units 07/05/18 0411 07/04/18  0549 07/03/18  0439   SODIUM mmol/L 143 140 137   POTASSIUM mmol/L 4.4 4.5 3.5   CHLORIDE mmol/L 100 100 96*   CO2 mmol/L 35.0* 34.0* 33.0*   BUN mg/dL 17 14 12   CREATININE mg/dL 0.54 0.43* 0.37*   CALCIUM mg/dL 9.0 9.1 8.4   BILIRUBIN mg/dL 0.4 0.3 0.4   ALK PHOS U/L 181* 196* 169*   ALT (SGPT) U/L 72* 72* 50   AST (SGOT) U/L 102* 85* 54*   GLUCOSE mg/dL 144* 140* 171*       Culture Data:   Blood Culture   Date Value Ref Range Status   07/02/2018 No growth at less than 24 hours  Preliminary   07/02/2018 No growth at less than 24 hours  Preliminary       Radiology Data:   Imaging Results (last 24 hours)     ** No results found for the last 24 hours. **          I have reviewed the patient's current medications.     Assessment/Plan     Hospital Problem List     Aspiration pneumonia of left lung (CMS/HCC)        Assessment:  1.  Acute on chronic hypoxemic respiratory failure  2.  Sepsis  3.  Pneumonia - aspiration  4.  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  Weight Loss  6.  Elevated troponin - no chest pain  7.  Abnormal LFTs  8.  History of PE in the past - now off of anticoagulation  9.  Severe protein-calorie malnutrition  10.  Tobacco dependence    11.  MICHELLE  12.  Confusion     Plan:   1.  IV Zosyn - day 8  2.  Off of Levaquin  3.  Agree with weaning steroids  4.  Appreciate Pulmonary input  5.  Scheduled nebs; Symbicort  6.  Respiratory culture and blood cultures with no acute findings  7.  IVFs to KVO  8.  Trilogy per home settings  9.  S/p 3 days of IV Venofer  10.   Restart Celexa this AM  11.  Xanax PRN (low dose at 0.25mg TID PRN)  11.  PT note reviewed; will need to look into placement  12.  ST also following; patient really wants his diet advanced  13.  PO Probiotic  14.  Dispo planning ongoing. Two members of family at bedside this morning and they are very helpful.  The patient currently is living in a trailer on the nephew's property and the nephew is willing to have Mr. Arzate move in with them to assist in his care at the time of discharge as he knows he doesn't want to go to a SNF; also agreeable to home health including PT and OT.  15.  CBC and CMP in AM    Adrien Merrill MD   07/09/18   8:55 AM

## 2018-07-09 NOTE — PLAN OF CARE
Problem: Patient Care Overview  Goal: Plan of Care Review  Outcome: Ongoing (interventions implemented as appropriate)   07/09/18 1422   Coping/Psychosocial   Plan of Care Reviewed With patient;family   Plan of Care Review   Progress improving   OTHER   Outcome Summary Pt diet texture upgraded to Soft/Chopped today per SLP. Pt reports fairly good appetite. Does feel he will do better with non pureed food. PO intake avg has been 53.5%/7 meals. RDN encouraged intake, advised of alt selection on soft texture diet, and added magic cup BID. will continue to follow.       Problem: Nutrition, Imbalanced: Inadequate Oral Intake (Adult)  Goal: Improved Oral Intake  Outcome: Ongoing (interventions implemented as appropriate)   07/09/18 1422   Nutrition, Imbalanced: Inadequate Oral Intake (Adult)   Improved Oral Intake making progress toward outcome     Goal: Prevent Further Weight Loss  Outcome: Ongoing (interventions implemented as appropriate)   07/09/18 1422   Nutrition, Imbalanced: Inadequate Oral Intake (Adult)   Prevent Further Weight Loss making progress toward outcome

## 2018-07-09 NOTE — THERAPY TREATMENT NOTE
Acute Care - Physical Therapy Treatment Note  [facility]     Patient Name: Kellie Arzate  : 1955  MRN: [id number]  Today's Date: 2018  Onset of Illness/Injury or Date of Surgery: 18  Date of Referral to PT: 18  Referring Physician: Dr. Merrill    Admit Date: 2018    Visit Dx:    ICD-10-CM ICD-9-CM   1. Aspiration pneumonia of left lung, unspecified aspiration pneumonia type, unspecified part of lung (CMS/Formerly [facility]) J69.0 507.0   2. SIRS (systemic inflammatory response syndrome) (CMS/[facility]) R65.10 995.90   3. Oropharyngeal dysphagia R13.12 787.22   4. Impaired functional mobility, balance, gait, and endurance Z74.09 V49.89     Patient Active Problem List   Diagnosis   • Aspiration pneumonia of left lung (CMS/[facility])       Therapy Treatment          Rehabilitation Treatment Summary     Row Name 18             Treatment Time/Intention    Discipline physical therapy assistant  -AE      Document Type therapy note (daily note)  -AE      Subjective Information complains of;fatigue  -AE      Existing Precautions/Restrictions fall;oxygen therapy device and L/min  -AE      Treatment Considerations/Comments confused   at times  -AE      Recorded by [AE] Sil Ruiz PTA 18 1010      Row Name 18             Vital Signs    Pre SpO2 (%) 90  -AE      O2 Delivery Pre Treatment supplemental O2   3L  -AE      Intra SpO2 (%) 80  -AE      O2 Delivery Intra Treatment supplemental O2   3L  -AE      Post SpO2 (%) 95   Took  5 min for 02 to come back up  -AE      O2 Delivery Post Treatment supplemental O2   3L  -AE      Recorded by [AE] Sil Ruiz PTA 18 1010      Row Name 18             Bed Mobility Assessment/Treatment    Sit-Supine [place] (Bed Mobility) contact guard;verbal cues  -AE      Recorded by [AE] Sil Ruiz PTA 18 1010      Row Name 18             Bed-Chair Transfer    Bed-Chair [place] (Transfers) contact guard;verbal cues   -AE      Recorded by [AE] Sil Ruiz, PTA 07/09/18 1010      Row Name 07/09/18 0925             Sit-Stand Transfer    Sit-Stand [place] (Transfers) contact guard;verbal cues  -AE      Recorded by [AE] Sil Ruiz, PTA 07/09/18 1010      Row Name 07/09/18 0925             Gait/Stairs Assessment/Training    25137 - Gait Training Minutes  30  -AE      Gait/Stairs Assessment/Training gait/ambulation independence  -AE      [place] Level (Gait) contact guard  -AE      Assistive Device (Gait) walker, front-wheeled  -AE      Distance in Feet (Gait) 50  -AE      Deviations/Abnormal Patterns (Gait) stride length decreased;josé miguel decreased  -AE      Recorded by [AE] Sil Ruiz, PTA 07/09/18 1010      Row Name 07/09/18 0925             Therapeutic Exercise    24262 - PT Therapeutic Activity Minutes 8  -AE      Recorded by [AE] Sil Ruiz, PTA 07/09/18 1010      Row Name 07/09/18 0925             Therapeutic Exercise    Lower Extremity Range of Motion (Therapeutic Exercise) ankle dorsiflexion/plantar flexion, bilateral  -AE      Sets/Reps (Therapeutic Exercise) 15  -AE      Recorded by [AE] Sil Ruiz, PTA 07/09/18 1010      Row Name 07/09/18 0925             Positioning and Restraints    Pre-Treatment Position in bed  -AE      Post Treatment Position chair  -AE      In Chair sitting;call light within reach;with family/caregiver  -AE      Recorded by [AE] Sil Ruiz, PTA 07/09/18 1010      Row Name 07/09/18 0925             Pain Scale: Numbers Pre/Post-Treatment    Pain Scale: Numbers, Pretreatment 0/10 - no pain  -AE      Pain Scale: Numbers, Post-Treatment 0/10 - no pain  -AE      Recorded by [AE] Sil Ruiz, PTA 07/09/18 1010        User Key  (r) = Recorded By, (t) = Taken By, (c) = Cosigned By    Initials Name Effective Dates Discipline    AE Sil Ruiz, PTA 06/22/15 -  PT                     Physical Therapy Education     Title: PT OT SLP Therapies (Active)     Topic: Physical  Therapy (Active)     Point: Mobility training (Active)    Learning Progress Summary     Learner Status Readiness Method Response Comment Documented by    Patient Active Acceptance E,D NR benefits of activity  07/08/18 1135     Active Acceptance E NR Educated patient about need for assist with mobility and progression of activity with PT. HR 07/07/18 1353    Family Done Eager E VU POC benefits of sitting up AE 07/09/18 1014          Point: Home exercise program (Active)    Learning Progress Summary     Learner Status Readiness Method Response Comment Documented by    Patient Active Acceptance E NR Educated patient about need for assist with mobility and progression of activity with PT. HR 07/07/18 1353          Point: Body mechanics (Active)    Learning Progress Summary     Learner Status Readiness Method Response Comment Documented by    Patient Active Acceptance E NR Educated patient about need for assist with mobility and progression of activity with PT. HR 07/07/18 1353          Point: Precautions (Active)    Learning Progress Summary     Learner Status Readiness Method Response Comment Documented by    Patient Active Acceptance E NR Educated patient about need for assist with mobility and progression of activity with PT. HR 07/07/18 1353                      User Key     Initials Effective Dates Name Provider Type Discipline    AE 06/22/15 -  Sil Ruiz PTA Physical Therapy Assistant PT    HR 06/04/18 -  Tete Barboza, PT, DPT, CLT-AGUSTÍN Physical Therapist PT     08/02/16 -  Kari Harrison PTA Physical Therapy Assistant PT                    PT Recommendation and Plan     Plan of Care Reviewed With: patient  Progress: improving  Outcome Summary: Pt. agreeable to therapy. Pt. continues to be somewhat confused and had many questions for staff. He makes odd statements. Pt. was supervision for bed mobility. Pt. was CGA for transfers and gait. Pt. walked 15' with rwx. WIll continue to work with  pt. on progressive ambulation and endurance.           Outcome Measures     Row Name 07/09/18 0925 07/08/18 1121 07/07/18 1300       How much help from another person do you currently need...    Turning from your back to your side while in flat bed without using bedrails? 3  -AE 3  -MF 3  -HR    Moving from lying on back to sitting on the side of a flat bed without bedrails? 3  -AE 3  -MF 3  -HR    Moving to and from a bed to a chair (including a wheelchair)? 3  -AE 3  -MF 3  -HR    Standing up from a chair using your arms (e.g., wheelchair, bedside chair)? 3  -AE 3  -MF 4  -HR    Climbing 3-5 steps with a railing? 2  -AE 2  -MF 2  -HR    To walk in hospital room? 3  -AE 3  -MF 3  -HR    AM-PAC 6 Clicks Score 17  -AE 17  -MF 18  -HR       Functional Assessment    Outcome Measure Options AM-PAC 6 Clicks Basic Mobility (PT)  -AE AM-PAC 6 Clicks Basic Mobility (PT)  -MF AM-PAC 6 Clicks Basic Mobility (PT)  -HR      User Key  (r) = Recorded By, (t) = Taken By, (c) = Cosigned By    Initials Name Provider Type    AE Sil Ruiz, JNOY Physical Therapy Assistant    HR Tete Barboza, PT, DPT, CLT-AGUSTÍN Physical Therapist     Kari Harrison PTA Physical Therapy Assistant           Time Calculation:         PT Charges     Row Name 07/09/18 1015 07/09/18 0925          Time Calculation    Start Time 0925  -AE  --     Stop Time 1003  -AE  --     Time Calculation (min) 38 min  -AE  --     PT Received On 07/09/18  -AE  --     PT Goal Re-Cert Due Date 07/17/18  -AE  --        Time Calculation- PT    Total Timed Code Minutes- PT 38 minute(s)  -AE  --        Timed Charges    83461 - Gait Training Minutes   -- 30  -AE     64218 - PT Therapeutic Activity Minutes  -- 8  -AE       User Key  (r) = Recorded By, (t) = Taken By, (c) = Cosigned By    Initials Name Provider Type    AE Sil Ruiz PTA Physical Therapy Assistant        Therapy Suggested Charges     Code   Minutes Charges    42848 (CPT®) Hc Pt Neuromusc Re  Education Ea 15 Min      90001 (CPT®) Hc Pt Ther Proc Ea 15 Min      17927 (CPT®) Hc Gait Training Ea 15 Min 30 2    59606 (CPT®) Hc Pt Therapeutic Act Ea 15 Min 8 1    71653 (CPT®) Hc Pt Manual Therapy Ea 15 Min      71461 (CPT®) Hc Pt Iontophoresis Ea 15 Min      73077 (CPT®) Hc Pt Elec Stim Ea-Per 15 Min      63070 (CPT®) Hc Pt Ultrasound Ea 15 Min      89555 (CPT®) Hc Pt Self Care/Mgmt/Train Ea 15 Min      Total  38 3        Therapy Charges for Today     Code Description Service Date Service Provider Modifiers Qty    72272812371 HC GAIT TRAINING EA 15 MIN 7/9/2018 Sil Ruiz, PTA GP 2    68250360695 HC PT THERAPEUTIC ACT EA 15 MIN 7/9/2018 Sil Ruiz, PTA GP 1          PT G-Codes  Outcome Measure Options: AM-PAC 6 Clicks Basic Mobility (PT)  Score: 18  Functional Limitation: Mobility: Walking and moving around  Mobility: Walking and Moving Around Current Status (): At least 40 percent but less than 60 percent impaired, limited or restricted  Mobility: Walking and Moving Around Goal Status (): At least 20 percent but less than 40 percent impaired, limited or restricted    Sil Ruiz, PTA  7/9/2018

## 2018-07-09 NOTE — PLAN OF CARE
Problem: Patient Care Overview  Goal: Plan of Care Review  Outcome: Ongoing (interventions implemented as appropriate)   07/09/18 8514   Coping/Psychosocial   Plan of Care Reviewed With patient;family   Plan of Care Review   Progress improving   OTHER   Outcome Summary Swallow therapy completed. Upon arrival, pt was sitting upright in chair asleep with family at bedside. Family member stated that other family members have been giving pt thin coffee and other foods not on current modified diet .ST educated RE: importance of current modifications and risk of aspiration/pneumonia/death. Pt completed trial of mech soft with adequate oral prep and oral transit noted. No residue in oral cavity and no other s/s of aspiration with mech soft trial. Pt completed trial of thin with 2X delayed cough and completed trial of nectar with 1X delayed throat clear. ST to upgrade to mech soft at this time. Continue honey thick liquids and educate family RE: compliance with diet modifications. ST will continue to follow and treat.

## 2018-07-09 NOTE — PLAN OF CARE
Problem: Patient Care Overview  Goal: Plan of Care Review   07/09/18 1525   Coping/Psychosocial   Plan of Care Reviewed With patient   Plan of Care Review   Progress no change   OTHER   Outcome Summary diet upgraded to soft texture, pt is talking about falling asleepin in the past and waking up in 2018 and being re-booted. 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 VSS, family at bedside, will continue to monitor       Problem: Sepsis/Septic Shock (Adult)  Goal: Signs and Symptoms of Listed Potential Problems Will be Absent, Minimized or Managed (Sepsis/Septic Shock)  Outcome: Ongoing (interventions implemented as appropriate)      Problem: Skin Injury Risk (Adult)  Goal: Skin Health and Integrity  Outcome: Ongoing (interventions implemented as appropriate)      Problem: Nutrition, Imbalanced: Inadequate Oral Intake (Adult)  Goal: Improved Oral Intake  Outcome: Ongoing (interventions implemented as appropriate)    Goal: Prevent Further Weight Loss  Outcome: Ongoing (interventions implemented as appropriate)

## 2018-07-09 NOTE — THERAPY TREATMENT NOTE
Acute Care - Speech Language Pathology   Swallow Treatment Note [facility]     Patient Name: Kellie Arzate  : 1955  MRN: [id number]  Today's Date: 2018  Onset of Illness/Injury or Date of Surgery: 18     Referring Physician: Dr. Merrill      Admit Date: 2018  Swallow therapy completed. Upon arrival, pt was sitting upright in chair asleep with family at bedside. Family member stated that other family members have been giving pt thin coffee and other foods not on current modified diet .ST educated RE: importance of current modifications and risk of aspiration/pneumonia/death. Pt completed trial of mech soft with adequate oral prep and oral transit noted. No residue in oral cavity and no other s/s of aspiration with mech soft trial. Pt completed trial of thin with 2X delayed cough and completed trial of nectar with 1X delayed throat clear. ST to upgrade to mech soft at this time. Continue honey thick liquids and educate family RE: compliance with diet modifications. ST will continue to follow and treat.   Lindsay Webb MS, CFY-SLP 2018 1:51 PM    Visit Dx:      ICD-10-CM ICD-9-CM   1. Aspiration pneumonia of left lung, unspecified aspiration pneumonia type, unspecified part of lung (CMS/HCC) J69.0 507.0   2. SIRS (systemic inflammatory response syndrome) (CMS/HCC) R65.10 995.90   3. Oropharyngeal dysphagia R13.12 787.22   4. Impaired functional mobility, balance, gait, and endurance Z74.09 V49.89     Patient Active Problem List   Diagnosis   • Aspiration pneumonia of left lung (CMS/HCC)       Therapy Treatment    Therapy Treatment / Health Promotion    Treatment Time/Intention  Discipline: speech language pathologist (18 1037 : Lindsay Webb MS, CFY-SLP)  Document Type: therapy note (daily note) (18 1037 : Lindsay Webb MS, CFY-SLP)  Subjective Information: no complaints (18 1037 : Lindsay Webb MS, CFY-SLP)  Mode of Treatment: individual therapy,  speech-language pathology (07/09/18 1037 : Lindsay Webb MS, CFY-SLP)  Patient/Family Observations: Family present at bedside. (07/09/18 1037 : Lindsay Webb MS, CFY-SLP)  Care Plan Review: care plan/treatment goals reviewed, risks/benefits reviewed, patient/other agree to care plan (07/09/18 1037 : Lindsay Webb MS, CFY-SLP)  Care Plan Review, Other Participant(s): family (07/09/18 1037 : Lindsay Webb MS, CFY-SLP)  Therapy Frequency (Swallow): at least, 3 days per week (07/09/18 1037 : Lindsay Webb MS, CFY-SLP)  Patient Effort: adequate (07/09/18 1037 : Lindsay Webb MS, CFY-SLP)  Plan of Care Review  Plan of Care Reviewed With: patient, family (07/09/18 1344 : Lindsay Webb MS, CFY-SLP)    Vitals/Pain/Safety  Pain Assessment  Additional Documentation: Pain Scale: FACES Pre/Post-Treatment (Group) (07/09/18 1037 : Lindsay Webb MS, CFY-SLP)  Pain Scale: FACES Pre/Post-Treatment  Pain: FACES Scale, Pretreatment: 2-->hurts little bit (07/09/18 1037 : Lindsay Webb MS, CFY-SLP)  Pain: FACES Scale, Post-Treatment: 2-->hurts little bit (07/09/18 1037 : Lindsay Webb MS, CFY-SLP)    Cognition, Communication, Swallow  Recommendations  Therapy Frequency (Swallow): at least, 3 days per week (07/09/18 1037 : Lindsay Webb MS, CFY-SLP)    Outcome Summary  Outcome Summary/Treatment Plan (SLP)  Daily Summary of Progress (SLP): progress toward functional goals as expected (07/09/18 1037 : Lindsay Webb MS, CFY-SLP)  Barriers to Overall Progress (SLP): n/a (07/09/18 1037 : Lindsay Webb MS, CFY-SLP)  Plan for Continued Treatment (SLP): ST to upgrade to [facility] soft. Continue tx.  (07/09/18 1037 : Lindsay Webb, MS, CFY-SLP)            SLP GOALS     Row Name 07/09/18 1037 07/06/18 1525          Oral Nutrition/Hydration Goal 1 (SLP)    Oral Nutrition/Hydration Goal 1, SLP LTG: Pt will tolerate LRD without overt s/s of aspiration.   -MM LTG: Pt will  tolerate LRD without overt s/s of aspiration.   -MM     Time Frame (Oral Nutrition/Hydration Goal 1, SLP) by discharge  -MM by discharge  -MM     Barriers (Oral Nutrition/Hydration Goal 1, SLP) N/A  -MM N/A  -MM     Progress/Outcomes (Oral Nutrition/Hydration Goal 1, SLP) continuing progress toward goal  -MM continuing progress toward goal  -MM        Pharyngeal Strengthening Exercise Goal 1 (SLP)    Activity (Pharyngeal Strengthening Goal 1, SLP) increase squeeze/positive pressure generation  -MM increase squeeze/positive pressure generation  -MM     Increase Squeeze/Positive Pressure Generation hard effortful swallow;cassidy  -MM hard effortful swallow;cassidy  -MM     [place]/Accuracy (Pharyngeal Strengthening Goal 1, SLP) with minimal cues (75-90% accuracy)  -MM with minimal cues (75-90% accuracy)  -MM     Time Frame (Pharyngeal Strengthening Goal 1, SLP) by discharge  -MM by discharge  -MM     Barriers (Pharyngeal Strengthening Goal 1, SLP) n/a  -MM n/a  -MM     Progress/Outcomes (Pharyngeal Strengthening Goal 1, SLP) goal ongoing  -MM goal ongoing  -MM        Pharyngeal Strengthening Exercise Goal (SLP)    Pharyngeal Strengthening Goal, (SLP) Pt will complete 30 minutes of NMES applied to the suprahyoids in conjunction with swallow exercises w/o any overt s/s of distress in order to improve swallow function.  -MM Pt will complete 30 minutes of NMES applied to the suprahyoids in conjunction with swallow exercises w/o any overt s/s of distress in order to improve swallow function.  -MM     Time Frame (Pharyngeal Strengthening Goal, SLP) by discharge  -MM by discharge  -MM     Barriers (Pharyngeal Strengthening Goal, SLP) n/a  -MM n/a  -MM     Progress/Outcomes (Pharyngeal Strengthening Goal, SLP) goal ongoing  -MM goal ongoing  -MM       User Key  (r) = Recorded By, (t) = Taken By, (c) = Cosigned By    Initials Name Provider Type    MM Lindsay Webb MS, CFY-SLP Speech and Language Pathologist           EDUCATION  The patient has been educated in the following areas:   Dysphagia (Swallowing Impairment).    SLP Recommendation and Plan                             Therapy Frequency (Swallow): at least, 3 days per week          Plan of Care Reviewed With: patient, family  Plan of Care Review  Plan of Care Reviewed With: patient, family  Daily Summary of Progress (SLP): progress toward functional goals as expected  Plan for Continued Treatment (SLP): ST to upgrade to mech soft. Continue tx.   Progress: improving  Outcome Summary: Swallow therapy completed. Upon arrival, pt was sitting upright in chair asleep with family at bedside. Family member stated that other family members have been giving pt thin coffee and other foods not on current modified diet .ST educated RE: importance of current modifications and risk of aspiration/pneumonia/death. Pt completed trial of mech soft with adequate oral prep and oral transit noted. No residue in oral cavity and no other s/s of aspiration with mech soft trial. Pt completed trial of thin with 2X delayed cough and completed trial of nectar with 1X delayed throat clear. ST to upgrade to mech soft at this time. Continue honey thick liquids and educate family RE: compliance with diet modifications. ST will continue to follow and treat.            Time Calculation:         Time Calculation- SLP     Row Name 07/09/18 1349             Time Calculation- SLP    SLP Start Time 1037  -MM      SLP Stop Time 1057  -MM      SLP Time Calculation (min) 20 min  -MM      SLP Received On 07/09/18  -MM        User Key  (r) = Recorded By, (t) = Taken By, (c) = Cosigned By    Initials Name Provider Type     Lindsay Webb MS, CFY-SLP Speech and Language Pathologist          Therapy Charges for Today     Code Description Service Date Service Provider Modifiers Qty    83653204811 [facility] TREATMENT SWALLOW 1 7/9/2018 Lindsay Webb MS, CFY-SLP GN 1          SLP G-Codes  SLP NOMS Used?:  Yes  Functional Limitations: Swallowing  Swallow Current Status (): At least 80 percent but less than 100 percent impaired, limited or restricted  Swallow Goal Status (): At least 60 percent but less than 80 percent impaired, limited or restricted      Lindsay Webb MS, CFY-SLP  7/9/2018

## 2018-07-10 ENCOUNTER — APPOINTMENT (OUTPATIENT)
Dept: GENERAL RADIOLOGY | Facility: HOSPITAL | Age: 63
End: 2018-07-10

## 2018-07-10 LAB
ALBUMIN SERPL-MCNC: 3.1 G/DL (ref 3.5–5)
ALBUMIN/GLOB SERPL: 1 G/DL (ref 1.1–2.5)
ALP SERPL-CCNC: 118 U/L (ref 24–120)
ALT SERPL W P-5'-P-CCNC: 395 U/L (ref 0–54)
ANION GAP SERPL CALCULATED.3IONS-SCNC: 10 MMOL/L (ref 4–13)
AST SERPL-CCNC: 226 U/L (ref 7–45)
BASOPHILS # BLD AUTO: 0.05 10*3/MM3 (ref 0–0.2)
BASOPHILS NFR BLD AUTO: 0.3 % (ref 0–2)
BILIRUB SERPL-MCNC: 0.7 MG/DL (ref 0.1–1)
BUN BLD-MCNC: 17 MG/DL (ref 5–21)
BUN/CREAT SERPL: 29.3 (ref 7–25)
CALCIUM SPEC-SCNC: 8.6 MG/DL (ref 8.4–10.4)
CHLORIDE SERPL-SCNC: 97 MMOL/L (ref 98–110)
CO2 SERPL-SCNC: 31 MMOL/L (ref 24–31)
CREAT BLD-MCNC: 0.58 MG/DL (ref 0.5–1.4)
DEPRECATED RDW RBC AUTO: 42.6 FL (ref 40–54)
EOSINOPHIL # BLD AUTO: 0.1 10*3/MM3 (ref 0–0.7)
EOSINOPHIL NFR BLD AUTO: 0.6 % (ref 0–4)
ERYTHROCYTE [DISTWIDTH] IN BLOOD BY AUTOMATED COUNT: 20.7 % (ref 12–15)
GFR SERPL CREATININE-BSD FRML MDRD: 141 ML/MIN/1.73
GLOBULIN UR ELPH-MCNC: 3 GM/DL
GLUCOSE BLD-MCNC: 95 MG/DL (ref 70–100)
HCT VFR BLD AUTO: 37.8 % (ref 40–52)
HGB BLD-MCNC: 11.2 G/DL (ref 14–18)
IMM GRANULOCYTES # BLD: 0.53 10*3/MM3 (ref 0–0.03)
IMM GRANULOCYTES NFR BLD: 3.3 % (ref 0–5)
LYMPHOCYTES # BLD AUTO: 1.21 10*3/MM3 (ref 0.72–4.86)
LYMPHOCYTES NFR BLD AUTO: 7.5 % (ref 15–45)
MCH RBC QN AUTO: 21.1 PG (ref 28–32)
MCHC RBC AUTO-ENTMCNC: 29.6 G/DL (ref 33–36)
MCV RBC AUTO: 71.1 FL (ref 82–95)
MONOCYTES # BLD AUTO: 0.99 10*3/MM3 (ref 0.19–1.3)
MONOCYTES NFR BLD AUTO: 6.2 % (ref 4–12)
NEUTROPHILS # BLD AUTO: 13.16 10*3/MM3 (ref 1.87–8.4)
NEUTROPHILS NFR BLD AUTO: 82.1 % (ref 39–78)
NRBC BLD MANUAL-RTO: 0 /100 WBC (ref 0–0)
PLATELET # BLD AUTO: 346 10*3/MM3 (ref 130–400)
PMV BLD AUTO: 9 FL (ref 6–12)
POTASSIUM BLD-SCNC: 4.4 MMOL/L (ref 3.5–5.3)
PROT SERPL-MCNC: 6.1 G/DL (ref 6.3–8.7)
RBC # BLD AUTO: 5.32 10*6/MM3 (ref 4.8–5.9)
SODIUM BLD-SCNC: 138 MMOL/L (ref 135–145)
WBC NRBC COR # BLD: 16.04 10*3/MM3 (ref 4.8–10.8)

## 2018-07-10 PROCEDURE — 97110 THERAPEUTIC EXERCISES: CPT

## 2018-07-10 PROCEDURE — 85025 COMPLETE CBC W/AUTO DIFF WBC: CPT | Performed by: INTERNAL MEDICINE

## 2018-07-10 PROCEDURE — 25810000003 SODIUM CHLORIDE 0.9 % WITH KCL 20 MEQ 20-0.9 MEQ/L-% SOLUTION: Performed by: INTERNAL MEDICINE

## 2018-07-10 PROCEDURE — 80053 COMPREHEN METABOLIC PANEL: CPT | Performed by: INTERNAL MEDICINE

## 2018-07-10 PROCEDURE — 97530 THERAPEUTIC ACTIVITIES: CPT

## 2018-07-10 PROCEDURE — 25010000002 METHYLPREDNISOLONE PER 40 MG: Performed by: INTERNAL MEDICINE

## 2018-07-10 PROCEDURE — 94799 UNLISTED PULMONARY SVC/PX: CPT

## 2018-07-10 PROCEDURE — 92526 ORAL FUNCTION THERAPY: CPT

## 2018-07-10 PROCEDURE — 97116 GAIT TRAINING THERAPY: CPT

## 2018-07-10 PROCEDURE — 25010000002 PIPERACILLIN SOD-TAZOBACTAM PER 1 G: Performed by: INTERNAL MEDICINE

## 2018-07-10 PROCEDURE — 25010000002 ENOXAPARIN PER 10 MG: Performed by: INTERNAL MEDICINE

## 2018-07-10 PROCEDURE — 94760 N-INVAS EAR/PLS OXIMETRY 1: CPT

## 2018-07-10 PROCEDURE — 71046 X-RAY EXAM CHEST 2 VIEWS: CPT

## 2018-07-10 RX ORDER — PREDNISONE 10 MG/1
10 TABLET ORAL
Status: DISCONTINUED | OUTPATIENT
Start: 2018-07-11 | End: 2018-07-12

## 2018-07-10 RX ADMIN — Medication 250 MG: at 20:34

## 2018-07-10 RX ADMIN — TAZOBACTAM SODIUM AND PIPERACILLIN SODIUM 3.38 G: 375; 3 INJECTION, SOLUTION INTRAVENOUS at 14:09

## 2018-07-10 RX ADMIN — IPRATROPIUM BROMIDE AND ALBUTEROL SULFATE 3 ML: 2.5; .5 SOLUTION RESPIRATORY (INHALATION) at 06:15

## 2018-07-10 RX ADMIN — Medication 250 MG: at 08:56

## 2018-07-10 RX ADMIN — GUAIFENESIN 1200 MG: 600 TABLET, EXTENDED RELEASE ORAL at 08:56

## 2018-07-10 RX ADMIN — IPRATROPIUM BROMIDE AND ALBUTEROL SULFATE 3 ML: 2.5; .5 SOLUTION RESPIRATORY (INHALATION) at 10:47

## 2018-07-10 RX ADMIN — IPRATROPIUM BROMIDE AND ALBUTEROL SULFATE 3 ML: 2.5; .5 SOLUTION RESPIRATORY (INHALATION) at 14:59

## 2018-07-10 RX ADMIN — POTASSIUM CHLORIDE AND SODIUM CHLORIDE 20 ML/HR: 900; 150 INJECTION, SOLUTION INTRAVENOUS at 11:48

## 2018-07-10 RX ADMIN — METOPROLOL TARTRATE 50 MG: 50 TABLET ORAL at 08:56

## 2018-07-10 RX ADMIN — METOPROLOL TARTRATE 50 MG: 50 TABLET ORAL at 20:34

## 2018-07-10 RX ADMIN — IPRATROPIUM BROMIDE AND ALBUTEROL SULFATE 3 ML: 2.5; .5 SOLUTION RESPIRATORY (INHALATION) at 20:04

## 2018-07-10 RX ADMIN — TAZOBACTAM SODIUM AND PIPERACILLIN SODIUM 3.38 G: 375; 3 INJECTION, SOLUTION INTRAVENOUS at 06:11

## 2018-07-10 RX ADMIN — MEGESTROL ACETATE 800 MG: 40 SUSPENSION ORAL at 08:56

## 2018-07-10 RX ADMIN — DOCUSATE SODIUM 100 MG: 100 CAPSULE ORAL at 20:34

## 2018-07-10 RX ADMIN — GUAIFENESIN 1200 MG: 600 TABLET, EXTENDED RELEASE ORAL at 20:34

## 2018-07-10 RX ADMIN — FAMOTIDINE 20 MG: 20 TABLET, FILM COATED ORAL at 08:56

## 2018-07-10 RX ADMIN — AMITRIPTYLINE HYDROCHLORIDE 50 MG: 25 TABLET, FILM COATED ORAL at 20:34

## 2018-07-10 RX ADMIN — CITALOPRAM 20 MG: 20 TABLET, FILM COATED ORAL at 08:56

## 2018-07-10 RX ADMIN — ENOXAPARIN SODIUM 40 MG: 100 INJECTION SUBCUTANEOUS at 16:44

## 2018-07-10 RX ADMIN — ASPIRIN 81 MG: 81 TABLET ORAL at 08:56

## 2018-07-10 RX ADMIN — METHYLPREDNISOLONE SODIUM SUCCINATE 10 MG: 40 INJECTION, POWDER, FOR SOLUTION INTRAMUSCULAR; INTRAVENOUS at 06:11

## 2018-07-10 RX ADMIN — DOCUSATE SODIUM 100 MG: 100 CAPSULE ORAL at 08:56

## 2018-07-10 NOTE — PROGRESS NOTES
PULMONARY AND CRITICAL CARE PROGRESS NOTE - [facility]    Patient: Kellie Arzate  1955   MR# [id number]   Acct# [id number]  07/10/18   8:34 AM  Referring Provider: Jameson Oliver DO    Chief Complaint: Dyspnea     Interval history: He is awake and oriented to place but otherwise is slow to respond. His nephew is at bedside and reports some confusion and delusional thoughts. The family feel that his confusion is medicine related.  The family is dealing with some POA issues and the person in charge of decision making is supposed to be here by weeks end.  No additional complaints. He appears comfortable on 2L NC O2. He's due for a f/u CXR today.     Meds:    amitriptyline 50 mg Oral Nightly   aspirin 81 mg Oral Daily   budesonide-formoterol 2 puff Inhalation BID - RT   citalopram 20 mg Oral Daily   docusate sodium 100 mg Oral BID   enoxaparin 40 mg Subcutaneous Q24H   famotidine 20 mg Oral Daily   guaiFENesin 1,200 mg Oral Q12H   ipratropium-albuterol 3 mL Nebulization Q4H - RT   megestrol 800 mg Oral Daily   methylPREDNISolone sodium succinate 10 mg Intravenous Q12H   metoprolol tartrate 50 mg Oral Q12H   piperacillin-tazobactam 3.375 g Intravenous Q8H   saccharomyces boulardii 250 mg Oral BID       sodium chloride 0.9 % with KCl 20 mEq 20 mL/hr Last Rate: 20 mL/hr (07/07/18 1813)     Review of Systems:   Review of Systems   Unable to perform ROS: Mental status change      Physical Exam:  SpO2 Percentage    07/09/18 2250 07/10/18 0615 07/10/18 0736   SpO2: 93% 94% 98%     Temp:  [97.8 °F (36.6 °C)-98.8 °F (37.1 °C)] 98.8 °F (37.1 °C)  Heart Rate:  [70-96] 91  Resp:  [12-20] 20  BP: ()/(56-67) 142/67    Intake/Output Summary (Last 24 hours) at 07/10/18 0834  Last data filed at 07/09/18 1700   Gross per 24 hour   Intake              600 ml   Output              200 ml   Net              400 ml     Physical Exam   Constitutional: He appears cachectic.  Non-toxic appearance. He has a  "sickly appearance. No distress. Nasal cannula in place.   Eyes: Pupils are equal, round, and reactive to light. No scleral icterus.   Neck: No JVD present. No tracheal deviation present.   Cardiovascular: Normal rate and regular rhythm.    Pulmonary/Chest: Effort normal. No respiratory distress. He has decreased breath sounds.   Abdominal: Soft. There is no tenderness. There is no guarding.   Musculoskeletal: He exhibits no edema.   Neurological: He is alert.   He is oriented to place, stating \"[facility]'m at [facility]\"   Skin: Skin is warm and dry. He is not diaphoretic.   Psychiatric: He has a normal mood and affect. His behavior is normal.   Nursing note and vitals reviewed.  :    Laboratory Data:    Results from last 7 days  Lab Units 07/10/18  0717 07/09/18  0818 07/05/18  0411   WBC 10*3/mm3 16.04* 19.57* 22.87*   HEMOGLOBIN g/dL 11.2* 12.4* 9.5*   PLATELETS 10*3/mm3 346 430* 427*       Results from last 7 days  Lab Units 07/10/18  0717 07/09/18  0818 07/05/18  0411   SODIUM mmol/L 138 139 143   POTASSIUM mmol/L 4.4 3.9 4.4   BUN mg/dL 17 17 17   CREATININE mg/dL 0.58 0.62 0.54         Blood Culture   Date Value Ref Range Status   07/02/2018 No growth at 5 days  Final   07/02/2018 No growth at 5 days  Final     Respiratory Culture   Date Value Ref Range Status   07/03/2018 Scant growth (1+) Normal Respiratory Tonya  Final   07/03/2018 Scant growth (1+) Candida albicans (A)  Final     Recent films:  No radiology results for the last day  Films reviewed personally by me.  My interpretation: pending for today    Pulmonary Assessment:    1. Pneumonia improving  2. Aspiration on altered diet, questionable compliance  3. Emphysema severe  4. Hypertension  5. Hallucinations, hope to improve with less offending meds  6. Nicotine dependency, needs to discontinue  7. Chronic resp failure with hypoxia, compensated with oxygen  8. Cough  9. Pulmonary hypertension, probably group 3  10. Personal history of tracheostomy " resolved  11. Leukocytosis due to pneumonia  12. Mediastinal adenopathy  13. Emphysema, probably severe/COPD probably severe  14. Anemia, moderate    Recommend:     · Steroids were dramatically reduced yesterday. He's more lucid today.   · F/u cxr is pending for today  · Smoking cessation going forward  · Continue zosyn (due to complete later today) but avoid quinolones  · Continue bronchodilators  · continueSupplemental oxygen   · Trilogy with sleep and PRN once he is willing to comply with that  · PT/OT rehab placement  · POA issues per nephew. Social workers are following.     Electronically signed by CRISPIN Almanza, 07/10/18, 8:34 AM     He has had much improvement in the hallucinations he has been sleeping this morning.  He has had no additional new respiratory complaints no fever chills or sweats.  Lungs have diminished breath sounds are much improved wheezing.  Plan continue the same tolerating steroid reduction from yesterday.  Continue as we're doing and follow-up chest x-ray.    I have seen and examined patient personally, performing a face-to-face diagnostic evaluation with plan of care reviewed and developed with APRN and nursing staff. I have addended and/or modified the above history of present illness, physical examination, and assessment and plan to reflect my findings and impressions. Essential elements of the care plan were discussed with APRN above.  Agree with findings and assessment/plan as documented above.    Electronically signed by Matti Young MD, on 7/10/2018, 12:00 PM    EMR Dragon/Transcription disclaimer: Much of this encounter note is an electronic transcription/translation of spoken language to printed text. The electronic translation of spoken language may permit erroneous, or at times, nonsensical words or phrases to be inadvertently transcribed; although I have reviewed the note for such errors, some may still exist.

## 2018-07-10 NOTE — THERAPY TREATMENT NOTE
Acute Care - Speech Language Pathology   Swallow Treatment Note [facility]     Patient Name: Kellie Arzate  : 1955  MRN: [id number]  Today's Date: 7/10/2018  Onset of Illness/Injury or Date of Surgery: 18     Referring Physician: Dr. Merrill      Admit Date: 2018     Swallowing tx completed. Pt was sleeping upon ST arrival, required mod cues to alert with fasisculations of eyelids noted prior to being able to open eyes efficiently. Nephew was initially present, yet left floor at time of initiation of ST session, with request to assist pt with breakfast. Pt presented with flat affect, delayed responses. Uncertain if this is pt's baseline. Functional manipulation of mech soft trials, mildly audible swallow with honey thick liquid trials. It must be noted that pt exhibited coughing prior to PO intake, yet showed no overt s/s of aspiration throughout session following initiation of PO intake with ST. Pt began to refuse further PO trials, following roughly only 15% intake of breakfast tray with ST. Transport arrived to take pt for CXR. ST to continue to follow and tx. Continue current diet of mech soft with honey thick liquids. Thanks!   Dinorah Thomas CCC-SLP 7/10/2018 10:16 AM    Visit Dx:      ICD-10-CM ICD-9-CM   1. Aspiration pneumonia of left lung, unspecified aspiration pneumonia type, unspecified part of lung (CMS/HCC) J69.0 507.0   2. SIRS (systemic inflammatory response syndrome) (CMS/HCC) R65.10 995.90   3. Oropharyngeal dysphagia R13.12 787.22   4. Impaired functional mobility, balance, gait, and endurance Z74.09 V49.89     Patient Active Problem List   Diagnosis   • Aspiration pneumonia of left lung (CMS/HCC)       Therapy Treatment    Therapy Treatment / Health Promotion    Treatment Time/Intention  Discipline: speech language pathologist (07/10/18 0932 : Dinorah Thomas CCC-SLP)  Document Type: therapy note (daily note) (07/10/18 0932 : DAKOTAH España)  Subjective  Information: weakness (Flat affect) (07/10/18 0932 : DAKOTAH España)  Mode of Treatment: individual therapy, speech-language pathology (07/10/18 0932 : DAKOTAH España)  Patient/Family Observations: Nephew present initially, yet left room to smoke when session was initiated. (07/10/18 0932 : DAKOTAH España)  Care Plan Review: care plan/treatment goals reviewed, patient/other agree to care plan (Pt and RNEbony ) (07/10/18 0932 : DAKOTAH España)  Patient Effort: fair (07/10/18 0932 : DAKOTAH España)  Plan of Care Review  Plan of Care Reviewed With: patient, other (see comments) (Max reinforcements needed) (07/10/18 1010 : DAKOTAH España)    Vitals/Pain/Safety       Cognition, Communication, Swallow  Recommendations  Anticipated Dischage Disposition: unknown (07/10/18 0932 : DAKOTAH España)    Outcome Summary  Outcome Summary/Treatment Plan (SLP)  Daily Summary of Progress (SLP): progress towards functional goals is fair (07/10/18 0932 : DAKOTAH España)  Barriers to Overall Progress (SLP): Weakness, cognition (07/10/18 0932 : DAKOTAH España)  Plan for Continued Treatment (SLP): Continue current diet of mech soft with honey thick liquids. ST to continue to follow and tx for dysphagia. (07/10/18 0932 : DAKOTAH España)  Anticipated Dischage Disposition: unknown (07/10/18 0932 : DAKOTAH España)            SLP GOALS     Row Name 07/10/18 0932 07/09/18 1037          Oral Nutrition/Hydration Goal 1 (SLP)    Oral Nutrition/Hydration Goal 1, SLP LTG: Pt will tolerate LRD without overt s/s of aspiration.   -TM LTG: Pt will tolerate LRD without overt s/s of aspiration.   -MM     Time Frame (Oral Nutrition/Hydration Goal 1, SLP) by discharge  -TM by discharge  -MM     Barriers (Oral Nutrition/Hydration Goal 1, SLP) Weakness, cognition   -TM N/A  -MM     Progress/Outcomes (Oral Nutrition/Hydration Goal 1, SLP) continuing progress  toward goal  -TM continuing progress toward goal  -MM        Pharyngeal Strengthening Exercise Goal 1 (SLP)    Activity (Pharyngeal Strengthening Goal 1, SLP) increase squeeze/positive pressure generation  -TM increase squeeze/positive pressure generation  -MM     Increase Squeeze/Positive Pressure Generation hard effortful swallow;cassidy  -TM hard effortful swallow;cassidy  -MM     Shirland/Accuracy (Pharyngeal Strengthening Goal 1, SLP) with minimal cues (75-90% accuracy)  -TM with minimal cues (75-90% accuracy)  -MM     Time Frame (Pharyngeal Strengthening Goal 1, SLP) by discharge  -TM by discharge  -MM     Barriers (Pharyngeal Strengthening Goal 1, SLP) Weakness, cognition  -TM n/a  -MM     Progress/Outcomes (Pharyngeal Strengthening Goal 1, SLP) goal ongoing  -TM goal ongoing  -MM        Pharyngeal Strengthening Exercise Goal (SLP)    Pharyngeal Strengthening Goal, (SLP) Pt will complete 30 minutes of NMES applied to the suprahyoids in conjunction with swallow exercises w/o any overt s/s of distress in order to improve swallow function.  -TM Pt will complete 30 minutes of NMES applied to the suprahyoids in conjunction with swallow exercises w/o any overt s/s of distress in order to improve swallow function.  -MM     Time Frame (Pharyngeal Strengthening Goal, SLP) by discharge  -TM by discharge  -MM     Barriers (Pharyngeal Strengthening Goal, SLP) n/a  -TM n/a  -MM     Progress/Outcomes (Pharyngeal Strengthening Goal, SLP) goal ongoing  -TM goal ongoing  -MM       User Key  (r) = Recorded By, (t) = Taken By, (c) = Cosigned By    Initials Name Provider Type    TM Dinorah Thomas CCC-SLP Speech and Language Pathologist    DANIEL Webb, MS, CFY-SLP Speech and Language Pathologist          EDUCATION  The patient has been educated in the following areas:   Dysphagia (Swallowing Impairment).    SLP Recommendation and Plan                       Anticipated Dischage Disposition: unknown                Plan  of Care Reviewed With: patient, other (see comments) (Max reinforcements needed)  Plan of Care Review  Plan of Care Reviewed With: patient, other (see comments) (Max reinforcements needed)  Daily Summary of Progress (SLP): progress towards functional goals is fair  Plan for Continued Treatment (SLP): Continue current diet of mech soft with honey thick liquids. ST to continue to follow and tx for dysphagia.  Progress: no change  Outcome Summary: Swallowing tx completed. Pt was sleeping upon ST arrival, required mod cues to alert with fasisculations of eyelids noted prior to being able to open eyes efficiently. Nephew was initially present, yet left floor at time of initiation of ST session, with request to assist pt with breakfast. Pt presented with flat affect, delayed responses. Uncertain if this is pt's baseline. Functional manipulation of mech soft trials, mildly audible swallow with honey thick liquid trials. It must be noted that pt exhibited coughing prior to PO intake, yet showed no overt s/s of aspiration throughout session following initiation of PO intake with ST. Pt began to refuse further PO trials, following roughly only 15% intake of breakfast tray with ST. Transport arrived to take pt for CXR. ST to continue to follow and tx. Continue current diet of mech soft with honey thick liquids. Thanks!            Time Calculation:         Time Calculation- SLP     Row Name 07/10/18 1015             Time Calculation- SLP    SLP Start Time 0932  -      SLP Stop Time 1001  -      SLP Time Calculation (min) 29 min  -      SLP Received On 07/10/18  -        User Key  (r) = Recorded By, (t) = Taken By, (c) = Cosigned By    Initials Name Provider Type     Dinorah Thomas CCC-SLP Speech and Language Pathologist          Therapy Charges for Today     Code Description Service Date Service Provider Modifiers Qty    [id number]  ST TREATMENT SWALLOW 2 7/10/2018 Dinorah Thomas CCC-SLP GN 1          SLP  G-Codes  SLP NOMS Used?: Yes  Functional Limitations: Swallowing  Swallow Current Status (): At least 80 percent but less than 100 percent impaired, limited or restricted  Swallow Goal Status (): At least 60 percent but less than 80 percent impaired, limited or restricted      Dinorah Thomas CCC-SLP  7/10/2018

## 2018-07-10 NOTE — PLAN OF CARE
Problem: Patient Care Overview  Goal: Plan of Care Review  Outcome: Ongoing (interventions implemented as appropriate)   07/10/18 1010   Coping/Psychosocial   Plan of Care Reviewed With patient;other (see comments)  (Max reinforcements needed)   Plan of Care Review   Progress no change   OTHER   Outcome Summary Swallowing tx completed. Pt was sleeping upon ST arrival, required mod cues to alert with fasisculations of eyelids noted prior to being able to open eyes efficiently. Nephew was initially present, yet left floor at time of initiation of ST session, with request to assist pt with breakfast. Pt presented with flat affect, delayed responses. Uncertain if this is pt's baseline. Functional manipulation of mech soft trials, mildly audible swallow with honey thick liquid trials. It must be noted that pt exhibited coughing prior to PO intake, yet showed no overt s/s of aspiration throughout session following initiation of PO intake with ST. Pt began to refuse further PO trials, following roughly only 15% intake of breakfast tray with ST. Transport arrived to take pt for CXR. ST to continue to follow and tx. Continue current diet of mech soft with honey thick liquids. Thanks!

## 2018-07-10 NOTE — PROGRESS NOTES
"    [facility] Medicine Services  INPATIENT PROGRESS NOTE    Patient Name: Kellie Arzate  Date of Admission: 7/2/2018  Today's Date: 07/10/18  Length of Stay: 8  Primary Care Physician: Jose Moon MD    Subjective   Chief Complaint: follow-up aspiration PNA   Weakness - Generalized        Patient reports that he feels good this morning.  Drowsy, but awakens easily from his nap. No family present. Expresses no needs. Awaiting dispo. WBC count continues to improve.     Review of Systems     All pertinent negatives and positives are as above. All other systems have been reviewed and are negative unless otherwise stated.     Objective    Temp:  [97.8 °F (36.6 °C)-98.8 °F (37.1 °C)] 98.2 °F (36.8 °C)  Heart Rate:  [76-93] 80  Resp:  [12-20] 16  BP: (113-146)/(56-72) 146/72  Physical Exam   Constitutional: He is oriented to person, place, and time. No distress.   HENT:   Head: Normocephalic.   Mouth/Throat: No oropharyngeal exudate.   Eyes: No scleral icterus.   Neck: No tracheal deviation present.   Cardiovascular: Normal heart sounds.    Pulmonary/Chest: No respiratory distress. He has no wheezes.   Improvement in breath sounds noted this morning; no prominent wheezing or rhonchi this morning   Abdominal: Soft.   Musculoskeletal: He exhibits no edema.   Neurological: He is alert and oriented to person, place, and time.   Knows he is at \"[facility]\", year 2018.  Initially said month was February but easily cued to recall month was July.  Knew the president was Trsher; no focal deficits appreciated this morning   Skin: Skin is warm and dry. He is not diaphoretic.   Psychiatric: He has a normal mood and affect. His behavior is normal.   Vitals reviewed.    Results Review:  I have reviewed the labs, radiology results, and diagnostic studies.    Laboratory Data:     Results from last 7 days  Lab Units 07/10/18  0717 07/09/18  0818 07/05/18  0411   WBC 10*3/mm3 " 16.04* 19.57* 22.87*   HEMOGLOBIN g/dL 11.2* 12.4* 9.5*   HEMATOCRIT % 37.8* 41.7 31.7*   PLATELETS 10*3/mm3 346 430* 427*          Results from last 7 days  Lab Units 07/10/18  0717 07/09/18  0818 07/05/18  0411   SODIUM mmol/L 138 139 143   POTASSIUM mmol/L 4.4 3.9 4.4   CHLORIDE mmol/L 97* 95* 100   CO2 mmol/L 31.0 34.0* 35.0*   BUN mg/dL 17 17 17   CREATININE mg/dL 0.58 0.62 0.54   CALCIUM mg/dL 8.6 8.9 9.0   BILIRUBIN mg/dL 0.7 0.6 0.4   ALK PHOS U/L 118 141* 181*   ALT (SGPT) U/L 395* 394* 72*   AST (SGOT) U/L 226* 216* 102*   GLUCOSE mg/dL 95 91 144*       Culture Data:   Blood Culture   Date Value Ref Range Status   07/02/2018 No growth at less than 24 hours  Preliminary   07/02/2018 No growth at less than 24 hours  Preliminary       Radiology Data:   Imaging Results (last 24 hours)     Procedure Component Value Units Date/Time    XR Chest 2 View [[id number]] Collected:  07/10/18 1021     Updated:  07/10/18 1026    Narrative:       EXAMINATION: XR CHEST 2 VW-  7/10/2018 10:21 AM CDT     TWO-VIEW CHEST: 3 images     HISTORY: Pneumonia      Frontal and lateral projection chest radiograph obtained.     COMPARISON:  7/6/2018, 7/2/2018 8/26/2016      FINDINGS:     There is been significant improvement in reticular nodular interstitial  and patchy airspace opacities in the left lung diffusely compared to the  previous examination with residual interstitial and airspace opacities  in the left upper lobe particularly compatible with partial resolution  of pneumonia.     Again noted are the chronic changes in the right upper lobe with linear  scarring and apical pleural thickening.                                                                                       Impression:       1. Partial resolution left sided pneumonia.        This report was finalized on 07/10/2018 10:23 by Dr. Ronak Souza MD.          I have reviewed the patient's current medications.     Assessment/Plan     Hospital Problem List      Aspiration pneumonia of left lung (CMS/HCC)        Assessment:  1.  Acute on chronic hypoxemic respiratory failure  2.  Sepsis  3.  Pneumonia - aspiration  4.  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  Weight Loss  6.  Elevated troponin - no chest pain  7.  Abnormal LFTs  8.  History of PE in the past - now off of anticoagulation  9.  Severe protein-calorie malnutrition  10.  Tobacco dependence    11.  MICHELLE  12.  Confusion     Plan:   1.  IV Zosyn - day 9  2.  Off of Levaquin  3.  Change to PO  Steroids   4.  Appreciate Pulmonary input  5.  Scheduled nebs; Symbicort  6.  Respiratory culture and blood cultures with no acute findings  7.  IVFs to KVO  8.  Trilogy per home settings  9.  S/p 3 days of IV Venofer  10.  Restart Celexa this AM  11.  Xanax PRN (low dose at 0.25mg TID PRN)  11.  PT note reviewed; will need to look into placement  12.  ST also following; patient really wants his diet advanced  13.  PO Probiotic  14.  Dispo planning ongoing. Two members of family at bedside this morning and they are very helpful.  The patient currently is living in a trailer on the nephew's property and the nephew is willing to have Mr. Arzate move in with them to assist in his care at the time of discharge as he knows he doesn't want to go to a SNF; also agreeable to home health including PT and OT.  15.  CBC and CMP in AM    Jameson Oliver,    07/10/18   3:09 PM

## 2018-07-10 NOTE — THERAPY TREATMENT NOTE
Acute Care - Physical Therapy Treatment Note  [facility]     Patient Name: Kellie Arzate  : 1955  MRN: [id number]  Today's Date: 7/10/2018  Onset of Illness/Injury or Date of Surgery: 18  Date of Referral to PT: 18  Referring Physician: Dr. Merrill    Admit Date: 2018    Visit Dx:    ICD-10-CM ICD-9-CM   1. Aspiration pneumonia of left lung, unspecified aspiration pneumonia type, unspecified part of lung (CMS/[facility]) J69.0 507.0   2. SIRS (systemic inflammatory response syndrome) (CMS/[facility]) R65.10 995.90   3. Oropharyngeal dysphagia R13.12 787.22   4. Impaired functional mobility, balance, gait, and endurance Z74.09 V49.89     Patient Active Problem List   Diagnosis   • Aspiration pneumonia of left lung (CMS/[facility])       Therapy Treatment          Rehabilitation Treatment Summary     Row Name 07/10/18 1133 07/10/18 0932          Treatment Time/Intention    Discipline physical therapy assistant  -LG speech language pathologist  -TM     Document Type therapy note (daily note)  -LG2 therapy note (daily note)  -TM     Subjective Information complains of;weakness  -LG2 weakness   Flat affect  -TM     Mode of Treatment physical therapy  -LG2 individual therapy;speech-language pathology  -TM     Patient/Family Observations  -- Nephew present initially, yet left room to smoke when session was initiated.  -TM     Care Plan Review  -- care plan/treatment goals reviewed;patient/other agree to care plan   Pt and RNReyna.   -TM     Patient Effort  -- fair  -TM     Comment pt just back from procedure, assisted RN to rolll, position pt after soiled bed.   -LG2  --     Existing Precautions/Restrictions fall;oxygen therapy device and L/min  -LG2  --     Patient Response to Treatment Pt very disoriented ,talktive but not able to hold meaninful conversation.   -LG2  --     Recorded by [LG] Morales Bennett, PTA 07/10/18 1134  [LG2] Morales Bennett, PTA 07/10/18 1152 [TM] Dinorah Thomas CCC-SLP 07/10/18 1008     Row Name  07/10/18 1133             Bed Mobility Assessment/Treatment    Rolling Left [place] (Bed Mobility) verbal cues;minimum assist (75% patient effort)  -LG      Rolling Right [place] (Bed Mobility) verbal cues;minimum assist (75% patient effort)  -LG      Scooting/Bridging [place] (Bed Mobility) verbal cues;maximum assist (25% patient effort);2 person assist  -LG      Assistive Device (Bed Mobility) draw sheet  -LG      Recorded by [LG] Morales Bennett, JONY 07/10/18 1152      Row Name 07/10/18 1133             Positioning and Restraints    Pre-Treatment Position in bed  -LG      Post Treatment Position bed  -LG      In Bed fowlers;call light within reach;encouraged to call for assist;with nsg;side rails up x2  -LG      Recorded by [LG] Morales Bennett, JONY 07/10/18 1152      Row Name 07/10/18 0932             Outcome Summary/Treatment Plan (SLP)    Daily Summary of Progress (SLP) progress towards functional goals is fair  -TM      Barriers to Overall Progress (SLP) Weakness, cognition  -TM      Plan for Continued Treatment (SLP) Continue current diet of [facility] soft with honey thick liquids. ST to continue to follow and tx for dysphagia.  -TM      Anticipated Dischage Disposition unknown  -TM      Recorded by [TM] Dinorah Thomas CCC-SLP 07/10/18 1008        User Key  (r) = Recorded By, (t) = Taken By, (c) = Cosigned By    Initials Name Effective Dates Discipline    LG Morales Bennett, JONY 08/02/16 -  PT    TM Dinorah Thomas CCC-SLP 08/02/16 -  SLP                     Physical Therapy Education     Title: PT OT SLP Therapies (Active)     Topic: Physical Therapy (Active)     Point: Mobility training (Active)    Learning Progress Summary     Learner Status Readiness Method Response Comment Documented by    Patient Active Acceptance E,D NR benefits of activity MF 07/08/18 1135     Active Acceptance E NR Educated patient about need for assist with mobility and progression of activity with PT. HR 07/07/18 1353    Family  Done Eager E VU POC benefits of sitting up AE 07/09/18 1014          Point: Home exercise program (Active)    Learning Progress Summary     Learner Status Readiness Method Response Comment Documented by    Patient Active Acceptance E NR Educated patient about need for assist with mobility and progression of activity with PT. HR 07/07/18 1353          Point: Body mechanics (Active)    Learning Progress Summary     Learner Status Readiness Method Response Comment Documented by    Patient Active Acceptance E NR Educated patient about need for assist with mobility and progression of activity with PT. HR 07/07/18 1353          Point: Precautions (Active)    Learning Progress Summary     Learner Status Readiness Method Response Comment Documented by    Patient Active Acceptance E NR Educated patient about need for assist with mobility and progression of activity with PT. HR 07/07/18 1353                      User Key     Initials Effective Dates Name Provider Type Discipline     06/22/15 -  Sil Ruiz, PTA Physical Therapy Assistant PT     06/04/18 -  Tete Barboza, PT, DPT, CLT-AGUSTÍN Physical Therapist PT     08/02/16 -  Kari Harrison PTA Physical Therapy Assistant PT                    PT Recommendation and Plan                Outcome Measures     Row Name 07/09/18 0925 07/08/18 1121 07/07/18 1300       How much help from another person do you currently need...    Turning from your back to your side while in flat bed without using bedrails? 3  -AE 3  -MF 3  -HR    Moving from lying on back to sitting on the side of a flat bed without bedrails? 3  -AE 3  -MF 3  -HR    Moving to and from a bed to a chair (including a wheelchair)? 3  -AE 3  -MF 3  -HR    Standing up from a chair using your arms (e.g., wheelchair, bedside chair)? 3  -AE 3  -MF 4  -HR    Climbing 3-5 steps with a railing? 2  -AE 2  -MF 2  -HR    To walk in hospital room? 3  -AE 3  -MF 3  -HR    AM-PAC 6 Clicks Score 17  -AE 17  -MF 18   -HR       Functional Assessment    Outcome Measure Options AM-PAC 6 Clicks Basic Mobility (PT)  -AE AM-PAC 6 Clicks Basic Mobility (PT)  -MF AM-PAC 6 Clicks Basic Mobility (PT)  -HR      User Key  (r) = Recorded By, (t) = Taken By, (c) = Cosigned By    Initials Name Provider Type    AE Sil Ruiz, PTA Physical Therapy Assistant    HR Tete Barboza, PT, DPT, CLT-AGUSTÍN Physical Therapist     Kari Harrison, [facility] Physical Therapy Assistant           Time Calculation:         PT Charges     Row Name 07/10/18 1133             Time Calculation    Start Time 1133  -LG      Stop Time 1152  -LG      Time Calculation (min) 19 min  -LG      PT Received On 07/10/18  -LG      PT Goal Re-Cert Due Date 07/17/18  -LG         Time Calculation- PT    Total Timed Code Minutes- PT 19 minute(s)  -LG         Timed Charges    05068 - PT Therapeutic Activity Minutes 19  -LG        User Key  (r) = Recorded By, (t) = Taken By, (c) = Cosigned By    Initials Name Provider Type    LG Morales Bennett PTA Physical Therapy Assistant        Therapy Suggested Charges     Code   Minutes Charges    36159 (CPT®) Hc Pt Neuromusc Re Education Ea 15 Min      81226 (CPT®) Hc Pt Ther Proc Ea 15 Min      64212 (CPT®) Hc Gait Training Ea 15 Min      38549 (CPT®) Hc Pt Therapeutic Act Ea 15 Min 19 1    02967 (CPT®) Hc Pt Manual Therapy Ea 15 Min      75293 (CPT®) Hc Pt Iontophoresis Ea 15 Min      00426 (CPT®) Hc Pt Elec Stim Ea-Per 15 Min      86473 (CPT®) Hc Pt Ultrasound Ea 15 Min      85863 (CPT®) Hc Pt Self Care/Mgmt/Train Ea 15 Min      Total  19 1        Therapy Charges for Today     Code Description Service Date Service Provider Modifiers Qty    84097171929 HC PT THERAPEUTIC ACT EA 15 MIN 7/10/2018 Morales Bennett PTA GP 1          PT G-Codes  Outcome Measure Options: AM-PAC 6 Clicks Basic Mobility (PT)  Score: 18  Functional Limitation: Mobility: Walking and moving around  Mobility: Walking and Moving Around Current Status (): At least  40 percent but less than 60 percent impaired, limited or restricted  Mobility: Walking and Moving Around Goal Status (): At least 20 percent but less than 40 percent impaired, limited or restricted    Morales Bennett, PTA  7/10/2018

## 2018-07-10 NOTE — PLAN OF CARE
Problem: Patient Care Overview  Goal: Plan of Care Review  Outcome: Ongoing (interventions implemented as appropriate)   07/10/18 0358   Coping/Psychosocial   Plan of Care Reviewed With patient   Plan of Care Review   Progress no change   OTHER   Outcome Summary VSS, pt remains confused. No tele, 3lnc. Lovenox vte. WIll continue to monitor

## 2018-07-10 NOTE — THERAPY TREATMENT NOTE
Acute Care - Physical Therapy Treatment Note  [facility]     Patient Name: Kellie Arzate  : 1955  MRN: [id number]  Today's Date: 7/10/2018  Onset of Illness/Injury or Date of Surgery: 18  Date of Referral to PT: 18  Referring Physician: Dr. Merrill    Admit Date: 2018    Visit Dx:    ICD-10-CM ICD-9-CM   1. Aspiration pneumonia of left lung, unspecified aspiration pneumonia type, unspecified part of lung (CMS/[facility]) J69.0 507.0   2. SIRS (systemic inflammatory response syndrome) (CMS/HCC) R65.10 995.90   3. Oropharyngeal dysphagia R13.12 787.22   4. Impaired functional mobility, balance, gait, and endurance Z74.09 V49.89     Patient Active Problem List   Diagnosis   • Aspiration pneumonia of left lung (CMS/[facility])       Therapy Treatment          Rehabilitation Treatment Summary     Row Name 07/10/18 1528 07/10/18 1501 07/10/18 1133       Treatment Time/Intention    Discipline --  -LG physical therapy assistant  -LG physical therapy assistant  -LG    Document Type  -- therapy note (daily note)  -LG therapy note (daily note)  -LG2    Subjective Information  -- no complaints  -LG complains of;weakness  -LG2    Mode of Treatment  -- physical therapy  - physical therapy  -LG2    Patient/Family Observations  -- no family in room  -LG  --    Comment  --  -- pt just back from procedure, assisted RN to rolll, position pt after soiled bed.   -LG2    Existing Precautions/Restrictions  -- fall;oxygen therapy device and L/min  -LG fall;oxygen therapy device and L/min  -LG2    Patient Response to Treatment  --  -- Pt very disoriented ,talktive but not able to hold meaninful conversation.   -LG2    Recorded by [LG] Morales Bennett, PTA 07/10/18 1550 [LG] Morales Bennett, PTA 07/10/18 1550 [LG] Morales Bennett, PTA 07/10/18 1134  [LG2] Morales Bennett, PTA 07/10/18 1152    Row Name 07/10/18 0932             Treatment Time/Intention    Discipline speech language pathologist  -TM      Document Type therapy note (daily  note)  -TM      Subjective Information weakness   Flat affect  -TM      Mode of Treatment individual therapy;speech-language pathology  -TM      Patient/Family Observations Nephew present initially, yet left room to smoke when session was initiated.  -TM      Care Plan Review care plan/treatment goals reviewed;patient/other agree to care plan   Pt and RNReyna.   -TM      Patient Effort fair  -TM      Recorded by [TM] Dinorah Thomas CCC-SLP 07/10/18 1008      Row Name 07/10/18 1501 07/10/18 1133          Bed Mobility Assessment/Treatment    Rolling Left [place] (Bed Mobility)  -- verbal cues;minimum assist (75% patient effort)  -LG     Rolling Right [place] (Bed Mobility)  -- verbal cues;minimum assist (75% patient effort)  -LG     Scooting/Bridging [place] (Bed Mobility)  -- verbal cues;maximum assist (25% patient effort);2 person assist  -LG     Supine-Sit [place] (Bed Mobility) verbal cues;minimum assist (75% patient effort)  -LG  --     Sit-Supine [place] (Bed Mobility) verbal cues;minimum assist (75% patient effort)  -LG  --     Assistive Device (Bed Mobility)  -- draw sheet  -LG     Recorded by [LG] Morales Bennett, [facility] 07/10/18 1550 [LG] Morales Bennett, [facility] 07/10/18 1152     Row Name 07/10/18 1501             Sit-Stand Transfer    Sit-Stand [place] (Transfers) verbal cues;contact guard  -LG      Assistive Device (Sit-Stand Transfers) walker, front-wheeled  -LG      Recorded by [LG] Morales Bennett, [facility] 07/10/18 1550      Row Name 07/10/18 1501             Stand-Sit Transfer    Stand-Sit [place] (Transfers) verbal cues;contact guard  -LG      Assistive Device (Stand-Sit Transfers) walker, front-wheeled  -LG      Recorded by [LG] Morales Bennett, [facility] 07/10/18 1550      Row Name 07/10/18 1501             Gait/Stairs Assessment/Training    Gait/Stairs Assessment/Training gait/ambulation independence  -      [place] Level (Gait) contact guard  -LG      Assistive Device (Gait) walker,  front-wheeled  -LG      Distance in Feet (Gait) 25' x 2 reps, pt did not want to walk further  -LG      Pattern (Gait) step-to  -LG      Deviations/Abnormal Patterns (Gait) stride length decreased;josé miguel decreased;base of support, narrow  -LG      Recorded by [LG] Morales Bennett, JONY 07/10/18 1550      Row Name 07/10/18 1501             Therapeutic Exercise    Comment (Therapeutic Exercise) B LE AROM x 20 reps sitting eob: AP's, LAQ', Hip Abd/Add, Seated Marching  -LG      Recorded by [LG] Morales Bennett, JONY 07/10/18 1550      Row Name 07/10/18 1501 07/10/18 1133          Positioning and Restraints    Pre-Treatment Position in bed  -LG in bed  -LG     Post Treatment Position bed  -LG bed  -LG     In Bed fowlers;call light within reach;encouraged to call for assist;exit alarm on;side rails up x2  -LG fowlers;call light within reach;encouraged to call for assist;with nsg;side rails up x2  -LG     Recorded by [LG] Morales Bennett, JONY 07/10/18 1550 [LG] Morales Bennett PTA 07/10/18 1152     Row Name                Wound 07/10/18 0800 midline coccyx pressure injury    Wound - Properties Group Date first assessed: 07/10/18 [AG] Time first assessed: 0800 [AG] Orientation: midline [AG] Location: coccyx [AG] Type: pressure injury [AG] Stage, Pressure Injury: Stage 1 [AG], blanchable  Recorded by:  [AG] Reyna Beatty RN 07/10/18 1529    Row Name 07/10/18 0932             Outcome Summary/Treatment Plan (SLP)    Daily Summary of Progress (SLP) progress towards functional goals is fair  -TM      Barriers to Overall Progress (SLP) Weakness, cognition  -TM      Plan for Continued Treatment (SLP) Continue current diet of [facility] soft with honey thick liquids. ST to continue to follow and tx for dysphagia.  -TM      Anticipated Dischage Disposition unknown  -TM      Recorded by [TM] Dinorah Thomas CCC-SLP 07/10/18 1008        User Key  (r) = Recorded By, (t) = Taken By, (c) = Cosigned By    Initials Name Effective Dates Discipline    [facility]  JEANMARIE Bennett, PTA 08/02/16 -  PT    TM Dinorah Thomas, CCC-SLP 08/02/16 -  SLP    AG Reyna Beatty, RN 08/02/16 -  Nurse                     Physical Therapy Education     Title: PT OT SLP Therapies (Active)     Topic: Physical Therapy (Active)     Point: Mobility training (Active)    Learning Progress Summary     Learner Status Readiness Method Response Comment Documented by    Patient Active Acceptance E,D NR benefits of activity  07/08/18 1135     Active Acceptance E NR Educated patient about need for assist with mobility and progression of activity with PT. HR 07/07/18 1353    Family Done Eager E VU POC benefits of sitting up AE 07/09/18 1014          Point: Home exercise program (Active)    Learning Progress Summary     Learner Status Readiness Method Response Comment Documented by    Patient Active Acceptance E NR Educated patient about need for assist with mobility and progression of activity with PT. HR 07/07/18 1353          Point: Body mechanics (Active)    Learning Progress Summary     Learner Status Readiness Method Response Comment Documented by    Patient Active Acceptance E NR Educated patient about need for assist with mobility and progression of activity with PT. HR 07/07/18 1353          Point: Precautions (Active)    Learning Progress Summary     Learner Status Readiness Method Response Comment Documented by    Patient Active Acceptance E NR Educated patient about need for assist with mobility and progression of activity with PT. HR 07/07/18 1353                      User Key     Initials Effective Dates Name Provider Type Discipline    AE 06/22/15 -  Sil Ruiz, PTA Physical Therapy Assistant PT    HR 06/04/18 -  Tete Barboza, PT, DPT, CLT-AGUSTÍN Physical Therapist PT     08/02/16 -  Kari Harrison PTA Physical Therapy Assistant PT                    PT Recommendation and Plan                Outcome Measures     Row Name 07/09/18 0925 07/08/18 1121          How much help from another  person do you currently need...    Turning from your back to your side while in flat bed without using bedrails? 3  -AE 3  -MF     Moving from lying on back to sitting on the side of a flat bed without bedrails? 3  -AE 3  -MF     Moving to and from a bed to a chair (including a wheelchair)? 3  -AE 3  -MF     Standing up from a chair using your arms (e.g., wheelchair, bedside chair)? 3  -AE 3  -MF     Climbing 3-5 steps with a railing? 2  -AE 2  -MF     To walk in hospital room? 3  -AE 3  -MF     AM-PAC 6 Clicks Score 17  -AE 17  -MF        Functional Assessment    Outcome Measure Options AM-PAC 6 Clicks Basic Mobility (PT)  -AE AM-PAC 6 Clicks Basic Mobility (PT)  -MF       User Key  (r) = Recorded By, (t) = Taken By, (c) = Cosigned By    Initials Name Provider Type    AE Sil Ruiz, JONY Physical Therapy Assistant     Kari Harrison PTA Physical Therapy Assistant           Time Calculation:         PT Charges     Row Name 07/10/18 1501 07/10/18 1133          Time Calculation    Start Time 1501  -LG 1133  -     Stop Time 1540  -LG 1152  -     Time Calculation (min) 39 min  -LG 19 min  -LG     PT Received On 07/10/18  -LG 07/10/18  -     PT Goal Re-Cert Due Date 07/17/18  -LG 07/17/18  -        Time Calculation- PT    Total Timed Code Minutes- PT 39 minute(s)  -LG 19 minute(s)  -LG        Timed Charges    15266 - PT Therapeutic Exercise Minutes 29  -LG  --     74072 - Gait Training Minutes  10  -LG  --     74762 - PT Therapeutic Activity Minutes  -- 19  -LG       User Key  (r) = Recorded By, (t) = Taken By, (c) = Cosigned By    Initials Name Provider Type    ELISABETH Bennett PTA Physical Therapy Assistant        Therapy Suggested Charges     Code   Minutes Charges    99060 (CPT®) Hc Pt Neuromusc Re Education Ea 15 Min      09401 (CPT®) Hc Pt Ther Proc Ea 15 Min 29 2    53660 (CPT®) Hc Gait Training Ea 15 Min 10 1    75471 (CPT®) Hc Pt Therapeutic Act Ea 15 Min      66697 (CPT®) Hc Pt Manual Therapy  Ea 15 Min      38265 (CPT®) Hc Pt Iontophoresis Ea 15 Min      90528 (CPT®) Hc Pt Elec Stim Ea-Per 15 Min      00234 (CPT®) Hc Pt Ultrasound Ea 15 Min      60913 (CPT®) Hc Pt Self Care/Mgmt/Train Ea 15 Min      Total  39 3        Therapy Charges for Today     Code Description Service Date Service Provider Modifiers Qty    13807316660 HC PT THERAPEUTIC ACT EA 15 MIN 7/10/2018 Morales Bennett PTA GP 1    88717251750 HC PT THER PROC EA 15 MIN 7/10/2018 Morales Bennett PTA GP 2    42686683819 HC GAIT TRAINING EA 15 MIN 7/10/2018 Morales Bennett PTA GP 1          PT G-Codes  Outcome Measure Options: AM-PAC 6 Clicks Basic Mobility (PT)  Score: 18  Functional Limitation: Mobility: Walking and moving around  Mobility: Walking and Moving Around Current Status (): At least 40 percent but less than 60 percent impaired, limited or restricted  Mobility: Walking and Moving Around Goal Status (): At least 20 percent but less than 40 percent impaired, limited or restricted    Morales Bennett PTA  7/10/2018

## 2018-07-10 NOTE — PLAN OF CARE
Problem: Fall Risk (Adult)  Goal: Absence of Fall  Outcome: Ongoing (interventions implemented as appropriate)      Problem: Patient Care Overview  Goal: Plan of Care Review  Outcome: Ongoing (interventions implemented as appropriate)   07/10/18 5228   Plan of Care Review   Progress no change   OTHER   Outcome Summary VSS, conts to be drowsy this am but more awake at suppertime, conts to be confused and says illogical things, appetitie poor, only ate very little of his meals and refused supper, family at bedside, chest xray today shows improvement, incontinence care provided, bed alarm in use       Problem: Sepsis/Septic Shock (Adult)  Goal: Signs and Symptoms of Listed Potential Problems Will be Absent, Minimized or Managed (Sepsis/Septic Shock)  Outcome: Ongoing (interventions implemented as appropriate)      Problem: Skin Injury Risk (Adult)  Goal: Skin Health and Integrity  Outcome: Ongoing (interventions implemented as appropriate)      Problem: Nutrition, Imbalanced: Inadequate Oral Intake (Adult)  Goal: Improved Oral Intake  Outcome: Ongoing (interventions implemented as appropriate)    Goal: Prevent Further Weight Loss  Outcome: Ongoing (interventions implemented as appropriate)

## 2018-07-11 ENCOUNTER — APPOINTMENT (OUTPATIENT)
Dept: ULTRASOUND IMAGING | Facility: HOSPITAL | Age: 63
End: 2018-07-11

## 2018-07-11 LAB
HAV IGM SERPL QL IA: NEGATIVE
HBV CORE IGM SERPL QL IA: NEGATIVE
HBV SURFACE AG SERPL QL IA: NEGATIVE
HCV AB SER DONR QL: NEGATIVE
HCV S/C RATIO: 0.02 (ref 0–0.99)

## 2018-07-11 PROCEDURE — 94799 UNLISTED PULMONARY SVC/PX: CPT

## 2018-07-11 PROCEDURE — 76705 ECHO EXAM OF ABDOMEN: CPT

## 2018-07-11 PROCEDURE — 97116 GAIT TRAINING THERAPY: CPT

## 2018-07-11 PROCEDURE — 80074 ACUTE HEPATITIS PANEL: CPT | Performed by: INTERNAL MEDICINE

## 2018-07-11 PROCEDURE — 94760 N-INVAS EAR/PLS OXIMETRY 1: CPT

## 2018-07-11 PROCEDURE — 25010000002 ENOXAPARIN PER 10 MG: Performed by: INTERNAL MEDICINE

## 2018-07-11 PROCEDURE — 63710000001 PREDNISONE PER 5 MG: Performed by: INTERNAL MEDICINE

## 2018-07-11 PROCEDURE — 97110 THERAPEUTIC EXERCISES: CPT

## 2018-07-11 PROCEDURE — 92526 ORAL FUNCTION THERAPY: CPT | Performed by: SPEECH-LANGUAGE PATHOLOGIST

## 2018-07-11 RX ORDER — AMITRIPTYLINE HYDROCHLORIDE 25 MG/1
25 TABLET, FILM COATED ORAL NIGHTLY
Status: DISCONTINUED | OUTPATIENT
Start: 2018-07-11 | End: 2018-07-14 | Stop reason: HOSPADM

## 2018-07-11 RX ADMIN — IPRATROPIUM BROMIDE AND ALBUTEROL SULFATE 3 ML: 2.5; .5 SOLUTION RESPIRATORY (INHALATION) at 23:06

## 2018-07-11 RX ADMIN — FAMOTIDINE 20 MG: 20 TABLET, FILM COATED ORAL at 08:11

## 2018-07-11 RX ADMIN — PREDNISONE 10 MG: 10 TABLET ORAL at 08:10

## 2018-07-11 RX ADMIN — METOPROLOL TARTRATE 50 MG: 50 TABLET ORAL at 08:13

## 2018-07-11 RX ADMIN — Medication 250 MG: at 08:11

## 2018-07-11 RX ADMIN — IPRATROPIUM BROMIDE AND ALBUTEROL SULFATE 3 ML: 2.5; .5 SOLUTION RESPIRATORY (INHALATION) at 15:20

## 2018-07-11 RX ADMIN — GUAIFENESIN 1200 MG: 600 TABLET, EXTENDED RELEASE ORAL at 08:10

## 2018-07-11 RX ADMIN — IPRATROPIUM BROMIDE AND ALBUTEROL SULFATE 3 ML: 2.5; .5 SOLUTION RESPIRATORY (INHALATION) at 10:39

## 2018-07-11 RX ADMIN — ASPIRIN 81 MG: 81 TABLET ORAL at 08:10

## 2018-07-11 RX ADMIN — CITALOPRAM 20 MG: 20 TABLET, FILM COATED ORAL at 08:11

## 2018-07-11 RX ADMIN — METOPROLOL TARTRATE 50 MG: 50 TABLET ORAL at 20:42

## 2018-07-11 RX ADMIN — Medication 250 MG: at 20:42

## 2018-07-11 RX ADMIN — ENOXAPARIN SODIUM 40 MG: 100 INJECTION SUBCUTANEOUS at 17:17

## 2018-07-11 RX ADMIN — GUAIFENESIN 1200 MG: 600 TABLET, EXTENDED RELEASE ORAL at 20:42

## 2018-07-11 RX ADMIN — DOCUSATE SODIUM 100 MG: 100 CAPSULE ORAL at 08:11

## 2018-07-11 RX ADMIN — AMITRIPTYLINE HYDROCHLORIDE 25 MG: 25 TABLET, FILM COATED ORAL at 20:42

## 2018-07-11 RX ADMIN — DOCUSATE SODIUM 100 MG: 100 CAPSULE ORAL at 20:42

## 2018-07-11 RX ADMIN — IPRATROPIUM BROMIDE AND ALBUTEROL SULFATE 3 ML: 2.5; .5 SOLUTION RESPIRATORY (INHALATION) at 07:19

## 2018-07-11 RX ADMIN — MEGESTROL ACETATE 800 MG: 40 SUSPENSION ORAL at 08:10

## 2018-07-11 RX ADMIN — IPRATROPIUM BROMIDE AND ALBUTEROL SULFATE 3 ML: 2.5; .5 SOLUTION RESPIRATORY (INHALATION) at 18:46

## 2018-07-11 NOTE — PLAN OF CARE
Problem: Fall Risk (Adult)  Goal: Absence of Fall  Outcome: Ongoing (interventions implemented as appropriate)      Problem: Patient Care Overview  Goal: Plan of Care Review  Outcome: Ongoing (interventions implemented as appropriate)   07/11/18 0501   Coping/Psychosocial   Plan of Care Reviewed With patient   Plan of Care Review   Progress no change   OTHER   Outcome Summary Patient has no c/o pain. Patient still confused at times. Family at bedside. VSS. Will continue to monitor.        Problem: Sepsis/Septic Shock (Adult)  Goal: Signs and Symptoms of Listed Potential Problems Will be Absent, Minimized or Managed (Sepsis/Septic Shock)  Outcome: Ongoing (interventions implemented as appropriate)      Problem: Skin Injury Risk (Adult)  Goal: Skin Health and Integrity  Outcome: Ongoing (interventions implemented as appropriate)      Problem: Nutrition, Imbalanced: Inadequate Oral Intake (Adult)  Goal: Improved Oral Intake  Outcome: Ongoing (interventions implemented as appropriate)    Goal: Prevent Further Weight Loss  Outcome: Ongoing (interventions implemented as appropriate)

## 2018-07-11 NOTE — PLAN OF CARE
Problem: Patient Care Overview  Goal: Plan of Care Review  Outcome: Ongoing (interventions implemented as appropriate)   07/11/18 1036   Coping/Psychosocial   Plan of Care Reviewed With patient   Plan of Care Review   Progress improving   OTHER   Outcome Summary Pt. was cga for bed mobility.Pt practiced sit to stand x 3 cga. Pt. walked 50 x 2 with 1 standing rest with RW.WIll continue to work with pt. on progressive ambulation and endurance.

## 2018-07-11 NOTE — PROGRESS NOTES
PULMONARY AND CRITICAL CARE PROGRESS NOTE - [facility]    Patient: Kellie Arzate  1955   MR# [id number]   Acct# [id number]  07/11/18   8:49 AM  Referring Provider: Adrien Merrill MD    Chief Complaint: Dyspnea     Interval history: He has no new complaints.  He is much more oriented and knows where he is today.  He says he feels much better.  He says he feels like putty and is weak like his muscles are just running down.  We discussed that that is a weakness related to underlying lung disease and probably aggravated by inactivity and possibly even the steroids could be affecting that.  He is otherwise much better and is not confused and disoriented today.  Shortness of breath and chest is markedly improved continuously aggravated by exertion and alleviated by rest and medications associated with minimal cough and wheezing.  He has been using the "[employer]" machine which she refers to as his paddle board and that has helped him with his breathing  Meds:    amitriptyline 50 mg Oral Nightly   aspirin 81 mg Oral Daily   budesonide-formoterol 2 puff Inhalation BID - RT   citalopram 20 mg Oral Daily   docusate sodium 100 mg Oral BID   enoxaparin 40 mg Subcutaneous Q24H   famotidine 20 mg Oral Daily   guaiFENesin 1,200 mg Oral Q12H   ipratropium-albuterol 3 mL Nebulization Q4H - RT   megestrol 800 mg Oral Daily   metoprolol tartrate 50 mg Oral Q12H   predniSONE 10 mg Oral Daily With Breakfast   saccharomyces boulardii 250 mg Oral BID        Review of Systems:   Review of Systems   Constitutional: Negative for chills and fever.   Cardiovascular: Negative for chest pain.   Gastrointestinal: Negative for abdominal distention, nausea and vomiting.   Psychiatric/Behavioral: Negative for agitation.      Physical Exam:  SpO2 Percentage    07/11/18 0719 07/11/18 0728 07/11/18 0812   SpO2: 98% 99%  Comment: post tx 98%     Temp:  [98.2 °F (36.8 °C)-98.9 °F (37.2 °C)] 98.3 °F (36.8 °C)  Heart Rate:   [] 104  Resp:  [16-20] 18  BP: (135-146)/(59-86) 141/86    Intake/Output Summary (Last 24 hours) at 07/11/18 0849  Last data filed at 07/10/18 1916   Gross per 24 hour   Intake              660 ml   Output              750 ml   Net              -90 ml     Physical Exam   Constitutional: He appears well-developed. No distress.   Eyes: EOM are normal. No scleral icterus.   Neck: No JVD present. No tracheal deviation present.   Cardiovascular: Normal rate and regular rhythm.    Pulmonary/Chest: No tachypnea. No respiratory distress. He has decreased breath sounds.   Abdominal: Soft. He exhibits no distension. There is no tenderness. There is no guarding.   Scaphoid   Musculoskeletal: He exhibits no edema.   Skin: Skin is warm and dry. He is not diaphoretic.   Psychiatric: He has a normal mood and affect. His behavior is normal.   :    Laboratory Data:    Results from last 7 days  Lab Units 07/10/18  0717 07/09/18  0818 07/05/18  0411   WBC 10*3/mm3 16.04* 19.57* 22.87*   HEMOGLOBIN g/dL 11.2* 12.4* 9.5*   PLATELETS 10*3/mm3 346 430* 427*       Results from last 7 days  Lab Units 07/10/18  0717 07/09/18  0818 07/05/18  0411   SODIUM mmol/L 138 139 143   POTASSIUM mmol/L 4.4 3.9 4.4   BUN mg/dL 17 17 17   CREATININE mg/dL 0.58 0.62 0.54         Blood Culture   Date Value Ref Range Status   07/02/2018 No growth at 5 days  Final   07/02/2018 No growth at 5 days  Final     Respiratory Culture   Date Value Ref Range Status   07/03/2018 Scant growth (1+) Normal Respiratory Tonya  Final   07/03/2018 Scant growth (1+) Candida albicans (A)  Final     Recent films:  Xr Chest 2 View    Result Date: 7/10/2018  1. Partial resolution left sided pneumonia.   This report was finalized on 07/10/2018 10:23 by Dr. Ronak Souza MD.    Films reviewed personally by me.  My interpretation: COPD and chronic changes improved acute infiltrate on the left    Pulmonary Assessment:    1. Pneumonia improving, warrants follow-up chest x-ray in  a few weeks  2. Aspiration on altered diet, questionable compliance, needs to continue working with speech therapy  3. Emphysema severe chronic and stable  4. Hypertension treated per others  5. Hallucinations, resolved  6. Nicotine dependency, needs to discontinue  7. Chronic resp failure with hypoxia, compensated with oxygen and trilogy machine  8. Cough due to above  9. Leukocytosis due to pneumonia  10. Mediastinal adenopathy  11. Probable severe COPD/emphysema  12. Anemia stable  13. Severe deconditioning and weakness and social issues may need nursing home placement rehabilitation etc.      Recommend:     · Continue steroid taper  · F/u cxr is pending for today  · Smoking cessation going forward  · D/c antibiotics, completed  course  · Continue bronchodilators  · Continue Supplemental oxygen   · Continue his trilogy machine  · PT/OT rehab placement  · I will sign off for now but we will follow-up in the office he he will come    Electronically signed by Matti Young MD, 07/11/18, 8:49 AM

## 2018-07-11 NOTE — THERAPY TREATMENT NOTE
"Acute Care - Speech Language Pathology   Swallow Treatment Note [facility]     Patient Name: Kellie Arzate  : 1955  MRN: [id number]  Today's Date: 2018  Onset of Illness/Injury or Date of Surgery: 18     Referring Physician: Dr. Merrill      Admit Date: 2018  ST tx completed. Pt tolerating current diet of [facility] soft and honey thick liquids. Prolonged mastication of [facility] soft items at times. Pt consumed approximately 50% of meal this date. Pt does state he gets \"worn out\" chewing food; however, does not wish to go to puree diet at this point. ST educated pt on expelling bolus if it becomes too tough to masticate. Delayed throat clear 1x  with honey thick liquids. Decreased laryngeal elevation with effortful swallows as well as with PO. Pt stated he was unable to complete cassidy exercise d/t sore tongue. Pt did state he is completing exercises throughout the day. Continue with current diet of [facility] soft and honey thick liquids. ST to continue to follow.   DAKOTAH Cerda 2018 1:37 PM        Visit Dx:      ICD-10-CM ICD-9-CM   1. Aspiration pneumonia of left lung, unspecified aspiration pneumonia type, unspecified part of lung (CMS/HCC) J69.0 507.0   2. SIRS (systemic inflammatory response syndrome) (CMS/HCC) R65.10 995.90   3. Oropharyngeal dysphagia R13.12 787.22   4. Impaired functional mobility, balance, gait, and endurance Z74.09 V49.89     Patient Active Problem List   Diagnosis   • Aspiration pneumonia of left lung (CMS/HCC)       Therapy Treatment    Therapy Treatment / Health Promotion    Treatment Time/Intention  Discipline: speech language pathologist (18 1314 : Laura Rios CCC-SLP)  Document Type: therapy note (daily note) (18 1314 : DAKOTAH Cerda)  Subjective Information: no complaints (18 1314 : DAKOTAH Cerda)  Mode of Treatment: individual therapy, speech-language pathology (18 1314 : Laura" Dmitriy Newhall, CCC-SLP)  Patient/Family Observations: family present (07/11/18 1314 : DAKOTAH Cerda)  Care Plan Review: care plan/treatment goals reviewed, patient/other agree to care plan (07/11/18 1314 : DAKOTAH Cerda)  Care Plan Review, Other Participant(s): family (07/11/18 1314 : DAKOTAH Cerda)  Patient Effort: adequate (07/11/18 1314 : DAKOTAH Cerda)  Reason Treatment Not Performed: unavailable for treatment (07/11/18 1250 : DAKOTAH Cerda)  Plan of Care Review  Plan of Care Reviewed With: patient (07/11/18 1321 : Laura Dmitriy Gabriel, CCC-SLP)    Vitals/Pain/Safety  Pain Scale: Numbers Pre/Post-Treatment  Pain Scale: Numbers, Pretreatment: 0/10 - no pain (07/11/18 1314 : DAKOTAH Cerda)  Pain Scale: Numbers, Post-Treatment: 0/10 - no pain (07/11/18 1314 : DAKOTAH Cerda)    Cognition, Communication, Swallow  Recommendations  Anticipated Dischage Disposition: unknown (07/11/18 1314 : DAKOTAH Cerda)    Outcome Summary  Outcome Summary/Treatment Plan (SLP)  Daily Summary of Progress (SLP): progress towards functional goals is fair (07/11/18 1314 : DAKOTAH Cerda)  Barriers to Overall Progress (SLP): weakness (07/11/18 1314 : DAKOTAH Cerda)  Plan for Continued Treatment (SLP): continue swallow  tx (07/11/18 1314 : DAKOTAH Cerda)  Anticipated Dischage Disposition: unknown (07/11/18 1314 : DAKOTAH Cerda)            SLP GOALS     Row Name 07/11/18 1314 07/10/18 0932 07/09/18 1037       Oral Nutrition/Hydration Goal 1 (SLP)    Oral Nutrition/Hydration Goal 1, SLP LTG: Pt will tolerate LRD without overt s/s of aspiration.   -BN LTG: Pt will tolerate LRD without overt s/s of aspiration.   -TM LTG: Pt will tolerate LRD without overt s/s of aspiration.   -MM    Time Frame (Oral Nutrition/Hydration Goal 1, SLP) by discharge   -BN by discharge  -TM by discharge  -MM    Barriers (Oral Nutrition/Hydration Goal 1, SLP) Weakness, cognition   -BN Weakness, cognition   -TM N/A  -MM    Progress/Outcomes (Oral Nutrition/Hydration Goal 1, SLP) continuing progress toward goal  -BN continuing progress toward goal  -TM continuing progress toward goal  -MM       Pharyngeal Strengthening Exercise Goal 1 (SLP)    Activity (Pharyngeal Strengthening Goal 1, SLP) increase squeeze/positive pressure generation  -BN increase squeeze/positive pressure generation  -TM increase squeeze/positive pressure generation  -MM    Increase Squeeze/Positive Pressure Generation hard effortful swallow;cassidy  -BN hard effortful swallow;cassidy  -TM hard effortful swallow;cassidy  -MM    [place]/Accuracy (Pharyngeal Strengthening Goal 1, SLP) with minimal cues (75-90% accuracy)  -BN with minimal cues (75-90% accuracy)  -TM with minimal cues (75-90% accuracy)  -MM    Time Frame (Pharyngeal Strengthening Goal 1, SLP) by discharge  -BN by discharge  -TM by discharge  -MM    Barriers (Pharyngeal Strengthening Goal 1, SLP) Weakness, cognition  -BN Weakness, cognition  -TM n/a  -MM    Progress/Outcomes (Pharyngeal Strengthening Goal 1, SLP) continuing progress toward goal  -BN goal ongoing  -TM goal ongoing  -MM       Pharyngeal Strengthening Exercise Goal (SLP)    Pharyngeal Strengthening Goal, (SLP) Pt will complete 30 minutes of NMES applied to the suprahyoids in conjunction with swallow exercises w/o any overt s/s of distress in order to improve swallow function.  -BN Pt will complete 30 minutes of NMES applied to the suprahyoids in conjunction with swallow exercises w/o any overt s/s of distress in order to improve swallow function.  -TM Pt will complete 30 minutes of NMES applied to the suprahyoids in conjunction with swallow exercises w/o any overt s/s of distress in order to improve swallow function.  -MM    Time Frame (Pharyngeal Strengthening Goal, SLP) by discharge   "-BN by discharge  -TM by discharge  -MM    Barriers (Pharyngeal Strengthening Goal, SLP) n/a  -BN n/a  -TM n/a  -MM    Progress/Outcomes (Pharyngeal Strengthening Goal, SLP) goal ongoing  -BN goal ongoing  -TM goal ongoing  -MM      User Key  (r) = Recorded By, (t) = Taken By, (c) = Cosigned By    Initials Name Provider Type    TM Dinorah Thomas, CCC-SLP Speech and Language Pathologist    BRYSON Rios, CCC-SLP Speech and Language Pathologist    MM Lindsay Webb, MS, CFY-SLP Speech and Language Pathologist          EDUCATION  The patient has been educated in the following areas:   Dysphagia (Swallowing Impairment).    SLP Recommendation and Plan                       Anticipated Dischage Disposition: unknown                Plan of Care Reviewed With: patient  Plan of Care Review  Plan of Care Reviewed With: patient  Daily Summary of Progress (SLP): progress towards functional goals is fair  Plan for Continued Treatment (SLP): continue swallow  tx  Progress: improving  Outcome Summary: ST tx completed. Pt tolerating current diet of mech soft and honey thick liquids. Prolonged mastication of mech soft items at times. Pt consumed approximately 50% of meal this date. Pt does stated he gets \"worn out\" chewing food; however, does not wish to go to puree diet at this point. ST educated pt on expelling bolus if it becomes too tough to masticate. Delayed throat clear 1x  with honey thick liquids. Decreased laryngeal elevation with effortful swallows as well as with PO. Pt stated he was unable to complete cassidy exercise d/t sore tongue. Pt did state he is completing exercises throughout the day. Continue with current diet of mech soft and honey thick liquids. ST to continue to follow.            Time Calculation:         Time Calculation- SLP     Row Name 07/11/18 6115             Time Calculation- SLP    SLP Start Time 1314  -BN      SLP Stop Time 1337  -BN      SLP Time Calculation (min) 23 min  -BN      " SLP Received On 07/11/18  -BRYSON        User Key  (r) = Recorded By, (t) = Taken By, (c) = Cosigned By    Initials Name Provider Type    DAKOTAH Regan Speech and Language Pathologist          Therapy Charges for Today     Code Description Service Date Service Provider Modifiers Qty    [id number] HC ST TREATMENT SWALLOW 2 7/11/2018 DAKOTAH Cerda  1          SLP G-Codes  SLP NOMS Used?: Yes  Functional Limitations: Swallowing  Swallow Current Status (): At least 80 percent but less than 100 percent impaired, limited or restricted  Swallow Goal Status (): At least 60 percent but less than 80 percent impaired, limited or restricted      DAKOTAH Cerda  7/11/2018

## 2018-07-11 NOTE — PROGRESS NOTES
Continued Stay Note  [facility]     Patient Name: Kellie Arzate  MRN: [id number]  Today's Date: 7/11/2018    Admit Date: 7/2/2018          Discharge Plan     Row Name 07/11/18 0933       Plan    Plan Home Health    Patient/Family in Agreement with Plan yes    Plan Comments Ronak (brother) here now at bedside.  With pt still alert and oriented, according to MD notes, pt able to make own decisions. He is still saying he will return home at d/c, saying he is open to HH/staying with nephew just until able to live alone again.  Brother seems to be on board with plan. POA in chart but told brother 1) pt can make own decisions at present 2) it is financial only and nothing to do with health care decisions. He says he has health care ability as well, told him we needed copy in case pt gets confused and unable to make own decisions. Will follow.               Discharge Codes    No documentation.           MATTHIEU [place]

## 2018-07-11 NOTE — SIGNIFICANT NOTE
Pt currently off floor and down for testing. ST to follow up.  Laura Rios, CCC-SLP 7/11/2018 12:50 PM       07/11/18 1250   Rehab Treatment   Discipline speech language pathologist   Reason Treatment Not Performed unavailable for treatment

## 2018-07-11 NOTE — THERAPY TREATMENT NOTE
Acute Care - Physical Therapy Treatment Note  [facility]     Patient Name: Kellie Arzate  : 1955  MRN: [id number]  Today's Date: 2018  Onset of Illness/Injury or Date of Surgery: 18  Date of Referral to PT: 18  Referring Physician: Dr. Merrill    Admit Date: 2018    Visit Dx:    ICD-10-CM ICD-9-CM   1. Aspiration pneumonia of left lung, unspecified aspiration pneumonia type, unspecified part of lung (CMS/[facility]) J69.0 507.0   2. SIRS (systemic inflammatory response syndrome) (CMS/[facility]) R65.10 995.90   3. Oropharyngeal dysphagia R13.12 787.22   4. Impaired functional mobility, balance, gait, and endurance Z74.09 V49.89     Patient Active Problem List   Diagnosis   • Aspiration pneumonia of left lung (CMS/[facility])       Therapy Treatment          Rehabilitation Treatment Summary     Row Name 18 1008             Treatment Time/Intention    Discipline physical therapy assistant  -AE      Document Type therapy note (daily note)  -AE      Subjective Information complains of;pain  -AE      Existing Precautions/Restrictions fall;oxygen therapy device and L/min  -AE      Recorded by [AE] Sil Ruiz, JONY 18 1036      Row Name 18 1008             Vital Signs    Pre SpO2 (%) 93  -AE      O2 Delivery Pre Treatment supplemental O2   2L  -AE      O2 Delivery Intra Treatment supplemental O2   4L  -AE      Post SpO2 (%) 90  -AE      O2 Delivery Post Treatment supplemental O2   2L  -AE      Recorded by [AE] Sil Ruzi, JONY 18 1036      Row Name 18 1008             Bed Mobility Assessment/Treatment    Supine-Sit [place] (Bed Mobility) contact guard;verbal cues  -AE      Assistive Device (Bed Mobility) head of bed elevated;bed rails  -AE      Recorded by [AE] Sil Ruiz, JONY 18 1036      Row Name 18 1008             Sit-Stand Transfer    Sit-Stand [place] (Transfers) contact guard;verbal cues  -AE      Recorded by [AE] Sil Ruiz, JONY 18  1036      Row Name 07/11/18 1008             Stand-Sit Transfer    Stand-Sit [place] (Transfers) contact guard;verbal cues  -AE      Recorded by [AE] iSl Ruiz, [facility] 07/11/18 1036      Row Name 07/11/18 1008             Gait/Stairs Assessment/Training    07797 - Gait Training Minutes  16  -AE      Gait/Stairs Assessment/Training gait/ambulation assistive device  -AE      [place] Level (Gait) contact guard  -AE      Assistive Device (Gait) walker, front-wheeled  -AE      Distance in Feet (Gait) 50   plus 50 x 1 standing rest  -AE      Pattern (Gait) step-to  -AE      Deviations/Abnormal Patterns (Gait) stride length decreased  -AE      Recorded by [AE] Sil Ruiz PTA 07/11/18 1036      Row Name 07/11/18 1008             Therapeutic Exercise    17551 - PT Therapeutic Exercise Minutes 8  -AE      Recorded by [AE] Sil Ruiz PTA 07/11/18 1040      Row Name 07/11/18 1008             Therapeutic Exercise    Lower Extremity (Therapeutic Exercise) LAQ (long arc quad), bilateral  -AE      Lower Extremity Range of Motion (Therapeutic Exercise) ankle dorsiflexion/plantar flexion, bilateral  -AE      Exercise Type (Therapeutic Exercise) AROM (active range of motion)  -AE      Position (Therapeutic Exercise) seated  -AE      Sets/Reps (Therapeutic Exercise) 15  -AE      Recorded by [AE] Sil Ruiz, JONY 07/11/18 1036      Row Name 07/11/18 1008             Positioning and Restraints    Pre-Treatment Position in bed  -AE      Post Treatment Position chair  -AE      In Chair sitting;call light within reach;with family/caregiver;exit alarm on  -AE      Recorded by [AE] Sil Ruiz, JONY 07/11/18 1036      Row Name 07/11/18 1008             Pain Scale: Numbers Pre/Post-Treatment    Pain Location - Side --   mouth  -AE      Pain Location mouth (non-dental)  -AE      Recorded by [AE] Sil Ruiz PTA 07/11/18 1036      Row Name 07/11/18 1008             Pain Scale: FACES Pre/Post-Treatment    Pain:  FACES Scale, Pretreatment 2-->hurts little bit  -AE      Pain: FACES Scale, Post-Treatment 2-->hurts little bit  -AE      Recorded by [AE] Sil Ruiz, PTA 07/11/18 1036      Row Name                Wound 07/10/18 0800 midline coccyx pressure injury    Wound - Properties Group Date first assessed: 07/10/18 [AG] Time first assessed: 0800 [AG] Orientation: midline [AG] Location: coccyx [AG] Type: pressure injury [AG] Stage, Pressure Injury: Stage 1 [AG], blanchable  Recorded by:  [AG] Reyna Beatty RN 07/10/18 0219      User Key  (r) = Recorded By, (t) = Taken By, (c) = Cosigned By    Initials Name Effective Dates Discipline    AE Sil Ruiz, PTA 06/22/15 -  PT    AG Reyna Beatty RN 08/02/16 -  Nurse          Wound 07/10/18 0800 midline coccyx pressure injury (Active)   Dressing Appearance intact 7/11/2018  8:00 AM             Physical Therapy Education     Title: PT OT SLP Therapies (Active)     Topic: Physical Therapy (Active)     Point: Mobility training (Active)    Learning Progress Summary     Learner Status Readiness Method Response Comment Documented by    Patient Active Acceptance E NR t/f's AE 07/11/18 1038     Active Acceptance E,D NR benefits of activity MF 07/08/18 1135     Active Acceptance E NR Educated patient about need for assist with mobility and progression of activity with PT. HR 07/07/18 1353    Family Done Eager E VU POC benefits of sitting up AE 07/09/18 1014          Point: Home exercise program (Active)    Learning Progress Summary     Learner Status Readiness Method Response Comment Documented by    Patient Active Acceptance E NR Educated patient about need for assist with mobility and progression of activity with PT. HR 07/07/18 1353          Point: Body mechanics (Active)    Learning Progress Summary     Learner Status Readiness Method Response Comment Documented by    Patient Active Acceptance E NR Educated patient about need for assist with mobility and progression of activity with  PT. HR 07/07/18 1353          Point: Precautions (Active)    Learning Progress Summary     Learner Status Readiness Method Response Comment Documented by    Patient Active Acceptance E NR Educated patient about need for assist with mobility and progression of activity with PT. HR 07/07/18 1353                      User Key     Initials Effective Dates Name Provider Type Discipline    AE 06/22/15 -  Sil Ruiz PTA Physical Therapy Assistant PT    HR 06/04/18 -  Tete Barboza, PT, DPT, CLT-AGUSTÍN Physical Therapist PT     08/02/16 -  Kari Harrison PTA Physical Therapy Assistant PT                    PT Recommendation and Plan     Plan of Care Reviewed With: patient  Progress: improving  Outcome Summary: Pt. was cga for bed mobility.Pt sat EOB for a long period before agreeing to walk. Pt. was CGA for transfers and gait. Pt. walked 50 with RW.WIll continue to work with pt. on progressive ambulation and endurance.           Outcome Measures     Row Name 07/11/18 1008 07/09/18 0925 07/08/18 1121       How much help from another person do you currently need...    Turning from your back to your side while in flat bed without using bedrails? 3  -AE 3  -AE 3  -MF    Moving from lying on back to sitting on the side of a flat bed without bedrails? 3  -AE 3  -AE 3  -MF    Moving to and from a bed to a chair (including a wheelchair)? 3  -AE 3  -AE 3  -MF    Standing up from a chair using your arms (e.g., wheelchair, bedside chair)? 3  -AE 3  -AE 3  -MF    Climbing 3-5 steps with a railing? 3  -AE 2  -AE 2  -MF    To walk in hospital room? 3  -AE 3  -AE 3  -MF    AM-PAC 6 Clicks Score 18  -AE 17  -AE 17  -MF       Functional Assessment    Outcome Measure Options AM-PAC 6 Clicks Basic Mobility (PT)  -AE AM-PAC 6 Clicks Basic Mobility (PT)  -AE AM-PAC 6 Clicks Basic Mobility (PT)  -MF      User Key  (r) = Recorded By, (t) = Taken By, (c) = Cosigned By    Initials Name Provider Type    AE Sil Ruiz PTA  Physical Therapy Assistant    JANE Harrison PTA Physical Therapy Assistant           Time Calculation:         PT Charges     Row Name 07/11/18 1040 07/11/18 1039 07/11/18 1008       Time Calculation    Start Time 0100  -AE 1008  -AE  --    Stop Time  -- 1032  -AE  --    Time Calculation (min)  -- 24 min  -AE  --    PT Received On  -- 07/11/18  -AE  --    PT Goal Re-Cert Due Date  -- 07/17/18  -AE  --       Time Calculation- PT    Total Timed Code Minutes- PT  -- 24 minute(s)  -AE  --       Timed Charges    44493 - PT Therapeutic Exercise Minutes  -- 8  -AE 8  -AE    02014 - Gait Training Minutes   -- 16  -AE 16  -AE      User Key  (r) = Recorded By, (t) = Taken By, (c) = Cosigned By    Initials Name Provider Type    AE Sil Ruiz PTA Physical Therapy Assistant        Therapy Suggested Charges     Code   Minutes Charges    77996 (CPT®) Hc Pt Neuromusc Re Education Ea 15 Min      32529 (CPT®) Hc Pt Ther Proc Ea 15 Min 16 1    48614 (CPT®) Hc Gait Training Ea 15 Min 32 2    34716 (CPT®) Hc Pt Therapeutic Act Ea 15 Min      34013 (CPT®) Hc Pt Manual Therapy Ea 15 Min      46459 (CPT®) Hc Pt Iontophoresis Ea 15 Min      56399 (CPT®) Hc Pt Elec Stim Ea-Per 15 Min      81407 (CPT®) Hc Pt Ultrasound Ea 15 Min      48966 (CPT®) Hc Pt Self Care/Mgmt/Train Ea 15 Min      Total  48 3        Therapy Charges for Today     Code Description Service Date Service Provider Modifiers Qty    28363096372 HC GAIT TRAINING EA 15 MIN 7/11/2018 Sil Ruiz PTA GP 1    48141731096 HC PT THER PROC EA 15 MIN 7/11/2018 Sil Ruiz PTA GP 1          PT G-Codes  Outcome Measure Options: AM-PAC 6 Clicks Basic Mobility (PT)  Score: 18  Functional Limitation: Mobility: Walking and moving around  Mobility: Walking and Moving Around Current Status (): At least 40 percent but less than 60 percent impaired, limited or restricted  Mobility: Walking and Moving Around Goal Status (): At least 20 percent but less than 40  percent impaired, limited or restricted    Sil Ruiz, PTA  7/11/2018

## 2018-07-11 NOTE — PROGRESS NOTES
"    [facility]  INPATIENT PROGRESS NOTE    Patient Name: Kellie Arzate  Date of Admission: 7/2/2018  Today's Date: 07/11/18  Length of Stay: 9  Primary Care Physician: Jose Moon MD    Subjective   Chief Complaint: follow-up aspiration PNA  Weakness - Generalized        Patient is sitting up in bed, getting ready to work with physical therapy.  He continues to have some underlying confusion.  He is alert and knows where he is, knows the month, the year, the president.  When asked if he is experiencing any pain, he comments that the \"wooden deck\" sometimes causes some pain in his back.  He voices no new complaints this morning.    Review of Systems     All pertinent negatives and positives are as above. All other systems have been reviewed and are negative unless otherwise stated.     Objective    Temp:  [98.2 °F (36.8 °C)-98.9 °F (37.2 °C)] 98.3 °F (36.8 °C)  Heart Rate:  [] 104  Resp:  [16-20] 18  BP: (135-146)/(59-86) 141/86  Physical Exam   Constitutional: He is oriented to person, place, and time. No distress.   HENT:   Head: Normocephalic.   Mouth/Throat: No oropharyngeal exudate.   Eyes: No scleral icterus.   Neck: No tracheal deviation present.   Cardiovascular: Normal heart sounds.    Pulmonary/Chest: No respiratory distress. He has no wheezes.   Improvement in bilateral breath sounds, including on the left   Abdominal: Soft.   Musculoskeletal: He exhibits no edema.   Neurological: He is alert and oriented to person, place, and time.   Knows he is at \"[facility]\", year 2018.  Month is July.  Knew the president was Trump; no focal deficits appreciated this morning but does have some confusion; speech is clear.     Skin: Skin is warm and dry. He is not diaphoretic.   Psychiatric: He has a normal mood and affect. His behavior is normal.   Vitals reviewed.    Results Review:  I have reviewed the labs, radiology results, and " diagnostic studies.    Laboratory Data:     Results from last 7 days  Lab Units 07/10/18  0717 07/09/18  0818 07/05/18  0411   WBC 10*3/mm3 16.04* 19.57* 22.87*   HEMOGLOBIN g/dL 11.2* 12.4* 9.5*   HEMATOCRIT % 37.8* 41.7 31.7*   PLATELETS 10*3/mm3 346 430* 427*          Results from last 7 days  Lab Units 07/10/18  0717 07/09/18  0818 07/05/18  0411   SODIUM mmol/L 138 139 143   POTASSIUM mmol/L 4.4 3.9 4.4   CHLORIDE mmol/L 97* 95* 100   CO2 mmol/L 31.0 34.0* 35.0*   BUN mg/dL 17 17 17   CREATININE mg/dL 0.58 0.62 0.54   CALCIUM mg/dL 8.6 8.9 9.0   BILIRUBIN mg/dL 0.7 0.6 0.4   ALK PHOS U/L 118 141* 181*   ALT (SGPT) U/L 395* 394* 72*   AST (SGOT) U/L 226* 216* 102*   GLUCOSE mg/dL 95 91 144*       Culture Data:   Blood Culture   Date Value Ref Range Status   07/02/2018 No growth at less than 24 hours  Preliminary   07/02/2018 No growth at less than 24 hours  Preliminary       Radiology Data:   Imaging Results (last 24 hours)     Procedure Component Value Units Date/Time    XR Chest 2 View [[id number]] Collected:  07/10/18 1021     Updated:  07/10/18 1026    Narrative:       EXAMINATION: XR CHEST 2 VW-  7/10/2018 10:21 AM CDT     TWO-VIEW CHEST: 3 images     HISTORY: Pneumonia      Frontal and lateral projection chest radiograph obtained.     COMPARISON:  7/6/2018, 7/2/2018 8/26/2016      FINDINGS:     There is been significant improvement in reticular nodular interstitial  and patchy airspace opacities in the left lung diffusely compared to the  previous examination with residual interstitial and airspace opacities  in the left upper lobe particularly compatible with partial resolution  of pneumonia.     Again noted are the chronic changes in the right upper lobe with linear  scarring and apical pleural thickening.                                                                                       Impression:       1. Partial resolution left sided pneumonia.        This report was finalized on 07/10/2018 10:23  by Dr. Ronak Souza MD.          I have reviewed the patient's current medications.     Assessment/Plan     Hospital Problem List     Aspiration pneumonia of left lung (CMS/HCC)        Assessment:  1.  Acute on chronic hypoxemic respiratory failure  2.  Sepsis  3.  Pneumonia - aspiration  4.  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  Weight Loss  6.  Elevated troponin - no chest pain  7.  Abnormal LFTs with worsening transaminitis  8.  History of PE in the past - now off of anticoagulation  9.  Severe protein-calorie malnutrition  10.  Tobacco dependence    11.  MICHELLE  12.  Confusion     Plan:   1.  Course of IV Zosyn completed  2.  Check acute hepatitis panel  3.  Liver US  4.  CMP, INR, GGT in AM  5.  Continue to wean steroids  6.  Repeat CXR starting to show improvement  7.  Scheduled nebs; Symbicort  8.  Trilogy per home settings  9.  S/p 3 days of IV Venofer  10.  Continue PT   11.  Decrease dose of Amitriptyline  12.  ST following  13.  Dispo planning: unfortunately it appears that SNF placement not an option.  Has good support of family, including nephew who will assist in his care at discharge; agreeable to  services      Adrien Merrill MD   07/11/18   10:01 AM

## 2018-07-11 NOTE — PLAN OF CARE
"Problem: Patient Care Overview  Goal: Plan of Care Review  Outcome: Ongoing (interventions implemented as appropriate)   07/11/18 1321   Coping/Psychosocial   Plan of Care Reviewed With patient   Plan of Care Review   Progress improving   OTHER   Outcome Summary ST tx completed. Pt tolerating current diet of mech soft and honey thick liquids. Prolonged mastication of mech soft items at times. Pt consumed approximately 50% of meal this date. Pt does stated he gets \"worn out\" chewing food; however, does not wish to go to puree diet at this point. ST educated pt on expelling bolus if it becomes too tough to masticate. Delayed throat clear 1x with honey thick liquids. Decreased laryngeal elevation with effortful swallows as well as with PO. Pt stated he was unable to complete cassidy exercise d/t sore tongue. Pt did state he is completing exercises throughout the day. Continue with current diet of mech soft and honey thick liquids. ST to continue to follow.          "

## 2018-07-12 LAB
ALBUMIN SERPL-MCNC: 3.1 G/DL (ref 3.5–5)
ALBUMIN/GLOB SERPL: 1 G/DL (ref 1.1–2.5)
ALP SERPL-CCNC: 121 U/L (ref 24–120)
ALT SERPL W P-5'-P-CCNC: 291 U/L (ref 0–54)
ANION GAP SERPL CALCULATED.3IONS-SCNC: 9 MMOL/L (ref 4–13)
AST SERPL-CCNC: 103 U/L (ref 7–45)
BILIRUB SERPL-MCNC: 0.4 MG/DL (ref 0.1–1)
BUN BLD-MCNC: 16 MG/DL (ref 5–21)
BUN/CREAT SERPL: 29.1 (ref 7–25)
CALCIUM SPEC-SCNC: 8.5 MG/DL (ref 8.4–10.4)
CHLORIDE SERPL-SCNC: 100 MMOL/L (ref 98–110)
CO2 SERPL-SCNC: 32 MMOL/L (ref 24–31)
CREAT BLD-MCNC: 0.55 MG/DL (ref 0.5–1.4)
GFR SERPL CREATININE-BSD FRML MDRD: 149 ML/MIN/1.73
GGT SERPL-CCNC: 128 U/L (ref 0–62)
GLOBULIN UR ELPH-MCNC: 3.2 GM/DL
GLUCOSE BLD-MCNC: 104 MG/DL (ref 70–100)
INR PPP: 1.21 (ref 0.91–1.09)
POTASSIUM BLD-SCNC: 3.6 MMOL/L (ref 3.5–5.3)
PROT SERPL-MCNC: 6.3 G/DL (ref 6.3–8.7)
PROTHROMBIN TIME: 15.7 SECONDS (ref 11.9–14.6)
SODIUM BLD-SCNC: 141 MMOL/L (ref 135–145)

## 2018-07-12 PROCEDURE — 92526 ORAL FUNCTION THERAPY: CPT

## 2018-07-12 PROCEDURE — 94799 UNLISTED PULMONARY SVC/PX: CPT

## 2018-07-12 PROCEDURE — 97116 GAIT TRAINING THERAPY: CPT

## 2018-07-12 PROCEDURE — 63710000001 PREDNISONE PER 5 MG: Performed by: INTERNAL MEDICINE

## 2018-07-12 PROCEDURE — 97535 SELF CARE MNGMENT TRAINING: CPT

## 2018-07-12 PROCEDURE — 97110 THERAPEUTIC EXERCISES: CPT

## 2018-07-12 PROCEDURE — 85610 PROTHROMBIN TIME: CPT | Performed by: INTERNAL MEDICINE

## 2018-07-12 PROCEDURE — 80053 COMPREHEN METABOLIC PANEL: CPT | Performed by: INTERNAL MEDICINE

## 2018-07-12 PROCEDURE — 25010000002 ENOXAPARIN PER 10 MG: Performed by: INTERNAL MEDICINE

## 2018-07-12 PROCEDURE — 82977 ASSAY OF GGT: CPT | Performed by: INTERNAL MEDICINE

## 2018-07-12 PROCEDURE — 94760 N-INVAS EAR/PLS OXIMETRY 1: CPT

## 2018-07-12 RX ORDER — PREDNISONE 1 MG/1
5 TABLET ORAL
Status: DISCONTINUED | OUTPATIENT
Start: 2018-07-13 | End: 2018-07-13

## 2018-07-12 RX ADMIN — Medication 250 MG: at 08:50

## 2018-07-12 RX ADMIN — DOCUSATE SODIUM 100 MG: 100 CAPSULE ORAL at 08:50

## 2018-07-12 RX ADMIN — IPRATROPIUM BROMIDE AND ALBUTEROL SULFATE 3 ML: 2.5; .5 SOLUTION RESPIRATORY (INHALATION) at 06:31

## 2018-07-12 RX ADMIN — IPRATROPIUM BROMIDE AND ALBUTEROL SULFATE 3 ML: 2.5; .5 SOLUTION RESPIRATORY (INHALATION) at 10:22

## 2018-07-12 RX ADMIN — NYSTATIN 500000 UNITS: 100000 SUSPENSION ORAL at 17:02

## 2018-07-12 RX ADMIN — IPRATROPIUM BROMIDE AND ALBUTEROL SULFATE 3 ML: 2.5; .5 SOLUTION RESPIRATORY (INHALATION) at 15:41

## 2018-07-12 RX ADMIN — PREDNISONE 10 MG: 10 TABLET ORAL at 08:50

## 2018-07-12 RX ADMIN — IPRATROPIUM BROMIDE AND ALBUTEROL SULFATE 3 ML: 2.5; .5 SOLUTION RESPIRATORY (INHALATION) at 22:46

## 2018-07-12 RX ADMIN — ENOXAPARIN SODIUM 40 MG: 100 INJECTION SUBCUTANEOUS at 15:24

## 2018-07-12 RX ADMIN — METOPROLOL TARTRATE 50 MG: 50 TABLET ORAL at 08:50

## 2018-07-12 RX ADMIN — ASPIRIN 81 MG: 81 TABLET ORAL at 08:50

## 2018-07-12 RX ADMIN — METOPROLOL TARTRATE 50 MG: 50 TABLET ORAL at 20:41

## 2018-07-12 RX ADMIN — IPRATROPIUM BROMIDE AND ALBUTEROL SULFATE 3 ML: 2.5; .5 SOLUTION RESPIRATORY (INHALATION) at 03:40

## 2018-07-12 RX ADMIN — Medication 250 MG: at 20:41

## 2018-07-12 RX ADMIN — NYSTATIN 500000 UNITS: 100000 SUSPENSION ORAL at 11:52

## 2018-07-12 RX ADMIN — CITALOPRAM 20 MG: 20 TABLET, FILM COATED ORAL at 08:50

## 2018-07-12 RX ADMIN — GUAIFENESIN 1200 MG: 600 TABLET, EXTENDED RELEASE ORAL at 20:41

## 2018-07-12 RX ADMIN — NYSTATIN 500000 UNITS: 100000 SUSPENSION ORAL at 20:42

## 2018-07-12 RX ADMIN — DOCUSATE SODIUM 100 MG: 100 CAPSULE ORAL at 20:41

## 2018-07-12 RX ADMIN — AMITRIPTYLINE HYDROCHLORIDE 25 MG: 25 TABLET, FILM COATED ORAL at 20:41

## 2018-07-12 RX ADMIN — GUAIFENESIN 1200 MG: 600 TABLET, EXTENDED RELEASE ORAL at 08:51

## 2018-07-12 RX ADMIN — FAMOTIDINE 20 MG: 20 TABLET, FILM COATED ORAL at 08:50

## 2018-07-12 RX ADMIN — MEGESTROL ACETATE 800 MG: 40 SUSPENSION ORAL at 08:51

## 2018-07-12 NOTE — THERAPY TREATMENT NOTE
Acute Care - Physical Therapy Treatment Note  [facility]     Patient Name: Kellie Arzate  : 1955  MRN: [id number]  Today's Date: 2018  Onset of Illness/Injury or Date of Surgery: 18  Date of Referral to PT: 18  Referring Physician: Dr. Merrill    Admit Date: 2018    Visit Dx:    ICD-10-CM ICD-9-CM   1. Aspiration pneumonia of left lung, unspecified aspiration pneumonia type, unspecified part of lung (CMS/[facility]) J69.0 507.0   2. SIRS (systemic inflammatory response syndrome) (CMS/[facility]) R65.10 995.90   3. Oropharyngeal dysphagia R13.12 787.22   4. Impaired functional mobility, balance, gait, and endurance Z74.09 V49.89     Patient Active Problem List   Diagnosis   • Aspiration pneumonia of left lung (CMS/[facility])       Therapy Treatment          Rehabilitation Treatment Summary     Row Name 18 1522 18 1400 18 1010       Treatment Time/Intention    Discipline physical therapy assistant  -AE speech language pathologist  -TM speech language pathologist  -    Document Type therapy note (daily note)  -AE therapy note (daily note)  -TM other (see comments)   Caregiver instruction  -TM    Subjective Information complains of;weakness  -AE complains of   oral pain, RN notified   -TM fatigue  -TM    Mode of Treatment  -- individual therapy;speech-language pathology  -TM speech-language pathology  -TM    Patient/Family Observations  -- Brother and female family member present at time of session.  -TM Brother, power of , present and educated on current reason for honey thick liquids   -TM    Care Plan Review  -- care plan/treatment goals reviewed;patient/other agree to care plan  -TM care plan/treatment goals reviewed  -TM    Care Plan Review, Other Participant(s)  -- sibling;family  -TM sibling  -TM    Patient Effort  -- adequate  -TM  --    Comment  --  -- Upon ST arrival, pt resting, yet awakened to speech. Brother stated pt fatigued following breakfast this AM and had  refused physical tx secondary to desire to rest. Pt also refused ST services at this time, requesting that ST return later today for tx. ST brought apple juice into pt's room for tx with plans to attempt nectar thick trials. Given this, ST thickened liquid to a honey thick consistency to leave at bedside for pt if he awakened and became thirsty. Demonstrated appropriate thickness for pt's brother and educated that pt showed instances of laryngeal penetration with nectar thick liquids on recent VFSS. Brother verbalized understanding and agreed for ST to return at a later time today for tx exercises.   -TM    Existing Precautions/Restrictions fall;oxygen therapy device and L/min  -AE  --  --    Recorded by [AE] Sil Ruiz, JONY 07/12/18 1558 [TM] Dinorah Thomas CCC-SLP 07/12/18 1449 [TM] Dinorah Thomas CCC-SLP 07/12/18 1100    Row Name 07/12/18 1522             Vital Signs    O2 Delivery Pre Treatment supplemental O2   2.5  -AE      Intra SpO2 (%) 89  -AE      O2 Delivery Intra Treatment supplemental O2   3L  -AE      O2 Delivery Post Treatment supplemental O2   2.5  -AE      Recorded by [AE] Sil Ruiz, JONY 07/12/18 1558      Row Name 07/12/18 1522             Bed Mobility Assessment/Treatment    Comment (Bed Mobility) sitting EOB  -AE      Recorded by [AE] Sil Ruiz, JONY 07/12/18 1558      Row Name 07/12/18 1522             Sit-Stand Transfer    Sit-Stand [place] (Transfers) contact guard;verbal cues  -AE      Recorded by [AE] Sil Ruiz PTA 07/12/18 1558      Row Name 07/12/18 1522             Stand-Sit Transfer    Stand-Sit [place] (Transfers) contact guard;verbal cues  -AE      Recorded by [AE] Sil Ruiz, [facility] 07/12/18 1558      Row Name 07/12/18 1522             Gait/Stairs Assessment/Training    11560 - Gait Training Minutes  15  -AE      Gait/Stairs Assessment/Training gait/ambulation independence  -AE      [place] Level (Gait) contact guard;minimum assist (75% patient  effort)  -AE      Assistive Device (Gait) walker, front-wheeled  -AE      Distance in Feet (Gait) 50   x 3  -AE      Pattern (Gait) swing-to  -AE      Deviations/Abnormal Patterns (Gait) stride length decreased  -AE      Bilateral Gait Deviations --   x 1 LOB  -AE      Recorded by [AE] Sil Ruzi, JONY 07/12/18 1558      Row Name 07/12/18 1522             Therapeutic Exercise    46963 - PT Therapeutic Exercise Minutes 9  -AE      Recorded by [AE] Sil Ruiz, JONY 07/12/18 1558      Row Name 07/12/18 1522             Therapeutic Exercise    Lower Extremity (Therapeutic Exercise) LAQ (long arc quad), bilateral  -AE      Lower Extremity Range of Motion (Therapeutic Exercise) ankle dorsiflexion/plantar flexion, bilateral  -AE      Exercise Type (Therapeutic Exercise) AROM (active range of motion)  -AE      Position (Therapeutic Exercise) seated  -AE      Sets/Reps (Therapeutic Exercise) 10  -AE      Recorded by [AE] Sil Ruiz, JONY 07/12/18 1558      Row Name 07/12/18 1522             Positioning and Restraints    Pre-Treatment Position in bed  -AE      Post Treatment Position bed  -AE      In Bed sitting EOB;call light within reach;with family/caregiver  -AE      Recorded by [AE] Sil Ruiz, JONY 07/12/18 1558      Row Name 07/12/18 1400             Pain Assessment    Additional Documentation Pain Scale: FACES Pre/Post-Treatment (Group)  -TM      Recorded by [TM] Dinorah Thomas CCC-SLP 07/12/18 1449      Row Name 07/12/18 1522 07/12/18 1400          Pain Scale: Numbers Pre/Post-Treatment    Pain Scale: Numbers, Pretreatment 0/10 - no pain  -AE 0/10 - no pain  -TM     Pain Scale: Numbers, Post-Treatment  -- 4/10  -TM     Pain Location  -- mouth (non-dental)  -TM     Pre/Post Treatment Pain Comment  -- RN notified, stated pt has order for Nystatin  -TM     Recorded by [AE] Sil Ruiz, JONY 07/12/18 1558 [TM] Dinorah Thomas CCC-SLP 07/12/18 1449     Row Name                Wound 07/10/18 0800 midline  coccyx pressure injury    Wound - Properties Group Date first assessed: 07/10/18 [AG] Time first assessed: 0800 [AG] Orientation: midline [AG] Location: coccyx [AG] Type: pressure injury [AG] Stage, Pressure Injury: Stage 1 [AG], blanchable  Recorded by:  [AG] Reyna Beatty RN 07/10/18 1529    Row Name 07/12/18 1400 07/12/18 1010          Outcome Summary/Treatment Plan (SLP)    Daily Summary of Progress (SLP) progress toward functional goals is good  -TM progress towards functional goals is fair  -TM     Barriers to Overall Progress (SLP) Cognitive impairment   -TM Weakness, cognition   -TM     Plan for Continued Treatment (SLP) ST to continue to follow and tx. Continue current diet of mech soft with honey thick liquids. Ok for small sips of thin liquid water in between meals, being at least 30 min post any other PO intake.  -TM ST to continue to follow and tx  -TM     Anticipated Dischage Disposition unknown  -TM unknown  -TM     Recorded by [TM] Dinorah Thomas CCC-SLP 07/12/18 1449 [TM] Dinorah Thomas CCC-SLP 07/12/18 1100       User Key  (r) = Recorded By, (t) = Taken By, (c) = Cosigned By    Initials Name Effective Dates Discipline    AE Sil Ruiz, PTA 06/22/15 -  PT    TM Dinorah Thomas CCC-SLP 08/02/16 -  SLP    AG Reyna Beatty RN 08/02/16 -  Nurse          Wound 07/10/18 0800 midline coccyx pressure injury (Active)   Dressing Appearance intact;no drainage 7/12/2018  8:00 AM   Dressing Care, Wound other (see comments) 7/12/2018  8:00 AM             Physical Therapy Education     Title: PT OT SLP Therapies (Active)     Topic: Physical Therapy (Active)     Point: Mobility training (Done)    Learning Progress Summary     Learner Status Readiness Method Response Comment Documented by    Patient Done Nonacceptance E VU,NR t/f's saftey ex's AE 07/12/18 1600     Active Acceptance E NR t/f's AE 07/11/18 1038     Active Acceptance E,D NR benefits of activity MF 07/08/18 1135     Active Acceptance E NR Educated  patient about need for assist with mobility and progression of activity with PT. HR 07/07/18 1353    Family Done Eager E VU POC benefits of sitting up AE 07/09/18 1014          Point: Home exercise program (Active)    Learning Progress Summary     Learner Status Readiness Method Response Comment Documented by    Patient Active Acceptance E NR Educated patient about need for assist with mobility and progression of activity with PT. HR 07/07/18 1353          Point: Body mechanics (Active)    Learning Progress Summary     Learner Status Readiness Method Response Comment Documented by    Patient Active Acceptance E NR Educated patient about need for assist with mobility and progression of activity with PT. HR 07/07/18 1353          Point: Precautions (Active)    Learning Progress Summary     Learner Status Readiness Method Response Comment Documented by    Patient Active Acceptance E NR Educated patient about need for assist with mobility and progression of activity with PT. HR 07/07/18 1353                      User Key     Initials Effective Dates Name Provider Type Discipline    AE 06/22/15 -  Sil Ruiz, PTA Physical Therapy Assistant PT    HR 06/04/18 -  Tete Barboza, PT, DPT, CLT-AGUSTÍN Physical Therapist PT     08/02/16 -  Kari Harrison, JONY Physical Therapy Assistant PT                    PT Recommendation and Plan     Plan of Care Reviewed With: patient  Progress: improving  Outcome Summary: Pt. was cga for bed mobility.Pt practiced sit to stand x 3 cga. Pt. walked 50 x 2 with 1 standing rest with RW.WIll continue to work with pt. on progressive ambulation and endurance.           Outcome Measures     Row Name 07/12/18 1522 07/11/18 1008          How much help from another person do you currently need...    Turning from your back to your side while in flat bed without using bedrails? 3  -AE 3  -AE     Moving from lying on back to sitting on the side of a flat bed without bedrails? 3  -AE 3  -AE      Moving to and from a bed to a chair (including a wheelchair)? 3  -AE 3  -AE     Standing up from a chair using your arms (e.g., wheelchair, bedside chair)? 3  -AE 3  -AE     Climbing 3-5 steps with a railing? 3  -AE 3  -AE     To walk in hospital room? 3  -AE 3  -AE     AM-PAC 6 Clicks Score 18  -AE 18  -AE        Functional Assessment    Outcome Measure Options AM-PAC 6 Clicks Basic Mobility (PT)  -AE AM-PAC 6 Clicks Basic Mobility (PT)  -AE       User Key  (r) = Recorded By, (t) = Taken By, (c) = Cosigned By    Initials Name Provider Type    AE Sil Ruiz PTA Physical Therapy Assistant           Time Calculation:         PT Charges     Row Name 07/12/18 1602 07/12/18 1522          Time Calculation    Start Time 1522  -AE  --     Stop Time 1545  -AE  --     Time Calculation (min) 23 min  -AE  --     PT Received On 07/12/18  -AE  --     PT Goal Re-Cert Due Date 07/17/18  -AE  --        Time Calculation- PT    Total Timed Code Minutes- PT 23 minute(s)  -AE  --        Timed Charges    88908 - PT Therapeutic Exercise Minutes 9  -AE 9  -AE     60432 - Gait Training Minutes  15  -AE 15  -AE       User Key  (r) = Recorded By, (t) = Taken By, (c) = Cosigned By    Initials Name Provider Type    AE Sil Ruiz PTA Physical Therapy Assistant        Therapy Suggested Charges     Code   Minutes Charges    86215 (CPT®) Hc Pt Neuromusc Re Education Ea 15 Min      35166 (CPT®) Hc Pt Ther Proc Ea 15 Min 18 1    74617 (CPT®) Hc Gait Training Ea 15 Min 30 2    26861 (CPT®) Hc Pt Therapeutic Act Ea 15 Min      99657 (CPT®) Hc Pt Manual Therapy Ea 15 Min      07741 (CPT®) Hc Pt Iontophoresis Ea 15 Min      90068 (CPT®) Hc Pt Elec Stim Ea-Per 15 Min      08974 (CPT®) Hc Pt Ultrasound Ea 15 Min      87195 (CPT®) Hc Pt Self Care/Mgmt/Train Ea 15 Min      Total  48 3        Therapy Charges for Today     Code Description Service Date Service Provider Modifiers Qty    98506989731 HC GAIT TRAINING EA 15 MIN 7/11/2018 Sil HOLLINS  Joseph, PTA GP 1    71716315787 HC PT THER PROC EA 15 MIN 7/11/2018 Sil Ruiz, PTA GP 1    38007331548 HC PT THER PROC EA 15 MIN 7/12/2018 Sil Ruiz, PTA GP 1    49443207564 HC GAIT TRAINING EA 15 MIN 7/12/2018 Sil Ruiz, PTA GP 2          PT G-Codes  Outcome Measure Options: AM-PAC 6 Clicks Basic Mobility (PT)  Score: 18  Functional Limitation: Mobility: Walking and moving around  Mobility: Walking and Moving Around Current Status (): At least 40 percent but less than 60 percent impaired, limited or restricted  Mobility: Walking and Moving Around Goal Status (): At least 20 percent but less than 40 percent impaired, limited or restricted    Sil Ruiz, JONY  7/12/2018

## 2018-07-12 NOTE — PLAN OF CARE
Problem: Patient Care Overview  Goal: Plan of Care Review  Outcome: Ongoing (interventions implemented as appropriate)   07/12/18 1102   Coping/Psychosocial   Plan of Care Reviewed With sibling;other (see comments)  (Brother )   Plan of Care Review   Progress improving   OTHER   Outcome Summary Upon ST arrival, pt resting, yet awakened to speech. Brother stated pt fatigued following breakfast this AM and had refused physical tx secondary to desire to rest. Pt also refused ST services at this time, requesting that ST return later today for tx. ST brought apple juice into pt's room for tx with plans to attempt nectar thick trials. Given this, ST thickened liquid to a honey thick consistency to leave at bedside for pt if he awakened and became thirsty. Demonstrated appropriate thickness for pt's brother and educated that pt showed instances of laryngeal penetration with nectar thick liquids on recent VFSS. Brother verbalized understanding and agreed for ST to return at a later time today for tx exercises. Thanks!

## 2018-07-12 NOTE — THERAPY TREATMENT NOTE
Acute Care - Speech Language Pathology   Swallow Treatment Note [facility]     Patient Name: Kellie Arzate  : 1955  MRN: [id number]  Today's Date: 2018  Onset of Illness/Injury or Date of Surgery: 18     Referring Physician: Dr. Merrill      Admit Date: 2018     Swallowing tx completed this PM. Beginning to consume lunch tray of [facility] soft with honey thick liquids at time of ST arrival. Brother and female family member present, stated they had thickened liquid to that of a consistency slightly thinner than honey thick, as previously thickened liquid had become more of a pudding thick consistency. Observed the thickened liquid fixed per family to be that of a nectar thick consistency. Allowed pt to attempt to determine if pt appeared to toleration for consideration of possible liquid upgrade. However, pt was noted to have quite consistent overt s/s of aspiration with presented trials, with either a delayed throat clear or cough noted. Only 1x instance of cough noted during other oral intake. Educated pt, who continues to need moderate reinforcements, and family on recommendation to continue with honey thick liquids, given the increased risk for aspiration with nectar thick and thin liquids, as the pt was noted to have a delayed cough when laryngeal penetration was observed with nectar thick liquids on recent VFSS. Also, educated on avoiding swallowing Nystatin, which can instead be utilized as a swish and spit or can be swabbed orally to decrease the risk for aspiration. They were also educated that regular ice cream is not safe at this time, reasons for such, and the recommendation for a magic cup to be utilized instead, which was able to be presented to the family, as it was present on the pt's meal tray at that time. Pt is ok for small sips of thin liquid water in between meals, being at least 30 minutes following any other PO intake, considering that the pt is not impulsive, consumes  small sips, avoids consecutive sips, and does not have increased congestion. ST to continue to follow and tx. Thanks! DAKOTAH Macario 7/12/2018 2:59 PM    Visit Dx:      ICD-10-CM ICD-9-CM   1. Aspiration pneumonia of left lung, unspecified aspiration pneumonia type, unspecified part of lung (CMS/[facility]) J69.0 507.0   2. SIRS (systemic inflammatory response syndrome) (CMS/[facility]) R65.10 995.90   3. Oropharyngeal dysphagia R13.12 787.22   4. Impaired functional mobility, balance, gait, and endurance Z74.09 V49.89     Patient Active Problem List   Diagnosis   • Aspiration pneumonia of left lung (CMS/[facility])       Therapy Treatment    Therapy Treatment / Health Promotion    Treatment Time/Intention  Discipline: speech language pathologist (07/12/18 1400 : DAKOTAH España)  Document Type: therapy note (daily note) (07/12/18 1400 : DAKOTAH España)  Subjective Information: complains of (oral pain, RN notified ) (07/12/18 1400 : DAKOTAH España)  Mode of Treatment: individual therapy, speech-language pathology (07/12/18 1400 : Dinorah Thomas CCC-SLP)  Patient/Family Observations: Brother and female family member present at time of session. (07/12/18 1400 : MI EspañaSLP)  Care Plan Review: care plan/treatment goals reviewed, patient/other agree to care plan (07/12/18 1400 : DAKOTAH España)  Care Plan Review, Other Participant(s): sibling, family (07/12/18 1400 : MI EspañaSLP)  Patient Effort: adequate (07/12/18 1400 : Dinorah Thomas CCC-SLP)  Comment: Upon ST arrival, pt resting, yet awakened to speech. Brother stated pt fatigued following breakfast this AM and had refused physical tx secondary to desire to rest. Pt also refused ST services at this time, requesting that ST return later today for tx. ST brought apple juice into pt's room for tx with plans to attempt nectar thick trials. Given this, ST thickened liquid to a honey thick consistency to leave at bedside  for pt if he awakened and became thirsty. Demonstrated appropriate thickness for pt's brother and educated that pt showed instances of laryngeal penetration with nectar thick liquids on recent VFSS. Brother verbalized understanding and agreed for ST to return at a later time today for tx exercises.  (07/12/18 1010 : DAKOTAH España)  Plan of Care Review  Plan of Care Reviewed With: patient, family, sibling (07/12/18 1450 : DAKOTAH España)    Vitals/Pain/Safety  Pain Assessment  Additional Documentation: Pain Scale: FACES Pre/Post-Treatment (Group) (07/12/18 1400 : DAKOTAH España)  Pain Scale: Numbers Pre/Post-Treatment  Pain Scale: Numbers, Pretreatment: 0/10 - no pain (07/12/18 1400 : DAKOTAH España)  Pain Scale: Numbers, Post-Treatment: 4/10 (07/12/18 1400 : DAKOTAH España)  Pain Location: mouth (non-dental) (07/12/18 1400 : DAKOTAH España)  Pre/Post Treatment Pain Comment: RN notified, stated pt has order for Nystatin (07/12/18 1400 : DAKOTAH España)  Pain Scale: Word Pre/Post-Treatment  Pain Location: mouth (non-dental) (07/12/18 1400 : DAKOTAH España)  Pain Scale: FACES Pre/Post-Treatment  Pain Location: mouth (non-dental) (07/12/18 1400 : DAKOTAH España)    Cognition, Communication, Swallow  Recommendations  Anticipated Dischage Disposition: unknown (07/12/18 1400 : DAKOTAH España)    Outcome Summary  Outcome Summary/Treatment Plan (SLP)  Daily Summary of Progress (SLP): progress toward functional goals is good (07/12/18 1400 : DAKOTAH España)  Barriers to Overall Progress (SLP): Cognitive impairment  (07/12/18 1400 : DAKOTAH España)  Plan for Continued Treatment (SLP): ST to continue to follow and tx. Continue current diet of [facility] soft with honey thick liquids. Ok for small sips of thin liquid water in between meals, being at least 30 min post any other PO intake. (07/12/18 1400 : Dinorah Thomas,  CCC-SLP)  Anticipated Dischage Disposition: unknown (07/12/18 1400 : Dinorah Thomas, CCC-SLP)            SLP GOALS     Row Name 07/12/18 1400 07/12/18 1010 07/11/18 1314       Oral Nutrition/Hydration Goal 1 (SLP)    Oral Nutrition/Hydration Goal 1, SLP LTG: Pt will tolerate LRD without overt s/s of aspiration.   -TM LTG: Pt will tolerate LRD without overt s/s of aspiration.   -TM LTG: Pt will tolerate LRD without overt s/s of aspiration.   -BN    Time Frame (Oral Nutrition/Hydration Goal 1, SLP) by discharge  -TM by discharge  -TM by discharge  -BN    Barriers (Oral Nutrition/Hydration Goal 1, SLP) Cognition  -TM Weakness, cognition   -TM Weakness, cognition   -BN    Progress/Outcomes (Oral Nutrition/Hydration Goal 1, SLP) continuing progress toward goal  -TM continuing progress toward goal  -TM continuing progress toward goal  -BN       Pharyngeal Strengthening Exercise Goal 1 (SLP)    Activity (Pharyngeal Strengthening Goal 1, SLP) increase squeeze/positive pressure generation  -TM increase squeeze/positive pressure generation  -TM increase squeeze/positive pressure generation  -BN    Increase Squeeze/Positive Pressure Generation hard effortful swallow;cassidy  -TM hard effortful swallow;cassidy  -TM hard effortful swallow;cassidy  -BN    [place]/Accuracy (Pharyngeal Strengthening Goal 1, SLP) with minimal cues (75-90% accuracy)  -TM with minimal cues (75-90% accuracy)  -TM with minimal cues (75-90% accuracy)  -BN    Time Frame (Pharyngeal Strengthening Goal 1, SLP) by discharge  -TM by discharge  -TM by discharge  -BN    Barriers (Pharyngeal Strengthening Goal 1, SLP) Cognition  -TM Weakness, cognition  -TM Weakness, cognition  -BN    Progress/Outcomes (Pharyngeal Strengthening Goal 1, SLP) goal ongoing  -TM goal ongoing  -TM continuing progress toward goal  -BN       Pharyngeal Strengthening Exercise Goal (SLP)    Pharyngeal Strengthening Goal, (SLP) Pt will complete 30 minutes of NMES applied to the  suprahyoids in conjunction with swallow exercises w/o any overt s/s of distress in order to improve swallow function.  -TM Pt will complete 30 minutes of NMES applied to the suprahyoids in conjunction with swallow exercises w/o any overt s/s of distress in order to improve swallow function.  -TM Pt will complete 30 minutes of NMES applied to the suprahyoids in conjunction with swallow exercises w/o any overt s/s of distress in order to improve swallow function.  -BN    Time Frame (Pharyngeal Strengthening Goal, SLP) by discharge  -TM by discharge  -TM by discharge  -BN    Barriers (Pharyngeal Strengthening Goal, SLP) Cognition  -TM n/a  -TM n/a  -BN    Progress/Outcomes (Pharyngeal Strengthening Goal, SLP) goal ongoing  -TM goal ongoing  -TM goal ongoing  -BN    Row Name 07/10/18 0932             Oral Nutrition/Hydration Goal 1 (SLP)    Oral Nutrition/Hydration Goal 1, SLP LTG: Pt will tolerate LRD without overt s/s of aspiration.   -TM      Time Frame (Oral Nutrition/Hydration Goal 1, SLP) by discharge  -TM      Barriers (Oral Nutrition/Hydration Goal 1, SLP) Weakness, cognition   -TM      Progress/Outcomes (Oral Nutrition/Hydration Goal 1, SLP) continuing progress toward goal  -TM         Pharyngeal Strengthening Exercise Goal 1 (SLP)    Activity (Pharyngeal Strengthening Goal 1, SLP) increase squeeze/positive pressure generation  -TM      Increase Squeeze/Positive Pressure Generation hard effortful swallow;cassidy  -TM      [place]/Accuracy (Pharyngeal Strengthening Goal 1, SLP) with minimal cues (75-90% accuracy)  -TM      Time Frame (Pharyngeal Strengthening Goal 1, SLP) by discharge  -TM      Barriers (Pharyngeal Strengthening Goal 1, SLP) Weakness, cognition  -TM      Progress/Outcomes (Pharyngeal Strengthening Goal 1, SLP) goal ongoing  -TM         Pharyngeal Strengthening Exercise Goal (SLP)    Pharyngeal Strengthening Goal, (SLP) Pt will complete 30 minutes of NMES applied to the suprahyoids in  conjunction with swallow exercises w/o any overt s/s of distress in order to improve swallow function.  -TM      Time Frame (Pharyngeal Strengthening Goal, SLP) by discharge  -TM      Barriers (Pharyngeal Strengthening Goal, SLP) n/a  -TM      Progress/Outcomes (Pharyngeal Strengthening Goal, SLP) goal ongoing  -TM        User Key  (r) = Recorded By, (t) = Taken By, (c) = Cosigned By    Initials Name Provider Type    TM Dinorah Thomas CCC-SLP Speech and Language Pathologist     Laura Rios CCC-SLP Speech and Language Pathologist          EDUCATION  The patient has been educated in the following areas:   Dysphagia (Swallowing Impairment).    SLP Recommendation and Plan                       Anticipated Dischage Disposition: unknown                Plan of Care Reviewed With: patient, family, sibling  Plan of Care Review  Plan of Care Reviewed With: patient, family, sibling  Daily Summary of Progress (SLP): progress toward functional goals is good  Plan for Continued Treatment (SLP): ST to continue to follow and tx. Continue current diet of mech soft with honey thick liquids. Ok for small sips of thin liquid water in between meals, being at least 30 min post any other PO intake.  Progress: no change  Outcome Summary: Swallowing tx completed this PM. Beginning to consume lunch tray of mech soft with honey thick liquids at time of ST arrival. Brother and female family member present, stated they had thickened liquid to that of a consistency slightly thinner than honey thick, as previously thickened liquid had become more of a pudding thick consistency. Observed the thickened liquid fixed per family to be that of a nectar thick consistency. Allowed pt to attempt to determine if pt appeared to toleration for consideration of possible liquid upgrade. However, pt was noted to have quite consistent overt s/s of aspiration with presented trials, with either a delayed throat clear or cough noted. Only 1x instance  of cough noted during other oral intake. Educated pt, who continues to need moderate reinforcements, and family on recommendation to continue with honey thick liquids, given the increased risk for aspiration with nectar thick and thin liquids, as the pt was noted to have a delayed cough when laryngeal penetration was observed with nectar thick liquids on recent VFSS. Also, educated on avoiding swallowing Nystatin, which can instead be utilized as a swish and spit or can be swabbed orally to decrease the risk for aspiration. They were also educated that regular ice cream is not safe at this time, reasons for such, and the recommendation for a magic cup to be utilized instead, which was able to be presented to the family, as it was present on the pt's meal tray at that time. Pt is ok for small sips of thin liquid water in between meals, being at least 30 minutes following any other PO intake, considering that the pt is not impulsive, consumes small sips, avoids consecutive sips, and does not have increased congestion. ST to continue to follow and tx.            Time Calculation:         Time Calculation- SLP     Row Name 07/12/18 1457 07/12/18 1103          Time Calculation- SLP    SLP Start Time 1400  -TM 1010  -TM     SLP Stop Time 1440  -TM 1020  -TM     SLP Time Calculation (min) 40 min  -TM 10 min  -TM     SLP Received On 07/12/18  -TM 07/12/18  -TM       User Key  (r) = Recorded By, (t) = Taken By, (c) = Cosigned By    Initials Name Provider Type    TM Dinorah Thomas CCC-SLP Speech and Language Pathologist          Therapy Charges for Today     Code Description Service Date Service Provider Modifiers Qty    26229381030  ST SELF CARE/MGMT/TRAIN EA 15 MIN 7/12/2018 MI EspañaSLP GN 1    [id number]  ST TREATMENT SWALLOW 3 7/12/2018 DAKOTAH España GN 1          SLP G-Codes  SLP NOMS Used?: Yes  Functional Limitations: Swallowing  Swallow Current Status (): At least 80 percent but less  than 100 percent impaired, limited or restricted  Swallow Goal Status (): At least 60 percent but less than 80 percent impaired, limited or restricted      Dinorah Thomas CCC-SLP  7/12/2018

## 2018-07-12 NOTE — THERAPY TREATMENT NOTE
Acute Care - Speech Language Pathology   Swallow Caregiver instruction [facility]     Patient Name: Kellie Arzate  : 1955  MRN: [id number]  Today's Date: 2018  Onset of Illness/Injury or Date of Surgery: 18     Referring Physician: Dr. Merrill      Admit Date: 2018    Upon ST arrival, pt resting, yet awakened to speech. Brother stated pt fatigued following breakfast this AM and had refused physical tx secondary to desire to rest. Pt also refused ST services at this time, requesting that ST return later today for tx. ST brought apple juice into pt's room for tx with plans to attempt nectar thick trials. Given this, ST thickened liquid to a honey thick consistency to leave at bedside for pt if he awakened and became thirsty. Demonstrated appropriate thickness for pt's brother and educated that pt showed instances of laryngeal penetration with nectar thick liquids on recent VFSS. Brother verbalized understanding and agreed for ST to return at a later time today for tx exercises. Thanks! DAKOTAH Macario 2018 11:04 AM    Visit Dx:      ICD-10-CM ICD-9-CM   1. Aspiration pneumonia of left lung, unspecified aspiration pneumonia type, unspecified part of lung (CMS/HCC) J69.0 507.0   2. SIRS (systemic inflammatory response syndrome) (CMS/HCC) R65.10 995.90   3. Oropharyngeal dysphagia R13.12 787.22   4. Impaired functional mobility, balance, gait, and endurance Z74.09 V49.89     Patient Active Problem List   Diagnosis   • Aspiration pneumonia of left lung (CMS/HCC)       Therapy Treatment    Therapy Treatment / Health Promotion    Treatment Time/Intention  Discipline: speech language pathologist (18 1010 : DAKOTAH España)  Document Type: other (see comments) (Caregiver instruction) (18 1010 : DAKOTAH España)  Subjective Information: fatigue (18 1010 : DAKOTAH España)  Mode of Treatment: speech-language pathology (18 1010 : Dinorah MORFIN  Thomas, CCC-SLP)  Patient/Family Observations: Brother, power of , present and educated on current reason for honey thick liquids  (07/12/18 1010 : DAKOTAH España)  Care Plan Review: care plan/treatment goals reviewed (07/12/18 1010 : DAKOTAH España)  Care Plan Review, Other Participant(s): sibling (07/12/18 1010 : DAKOTAH España)  Comment: Upon ST arrival, pt resting, yet awakened to speech. Brother stated pt fatigued following breakfast this AM and had refused physical tx secondary to desire to rest. Pt also refused ST services at this time, requesting that ST return later today for tx. ST brought apple juice into pt's room for tx with plans to attempt nectar thick trials. Given this, ST thickened liquid to a honey thick consistency to leave at bedside for pt if he awakened and became thirsty. Demonstrated appropriate thickness for pt's brother and educated that pt showed instances of laryngeal penetration with nectar thick liquids on recent VFSS. Brother verbalized understanding and agreed for ST to return at a later time today for tx exercises.  (07/12/18 1010 : DAKOTAH España)  Plan of Care Review  Plan of Care Reviewed With: sibling, other (see comments) (Brother ) (07/12/18 1102 : DAKOTAH España)    Vitals/Pain/Safety       Cognition, Communication, Swallow  Recommendations  Anticipated Dischage Disposition: unknown (07/12/18 1010 : DAKOTAH España)    Outcome Summary  Outcome Summary/Treatment Plan (SLP)  Daily Summary of Progress (SLP): progress towards functional goals is fair (07/12/18 1010 : DAKOTAH [place]aña)  Barriers to Overall Progress (SLP): Weakness, cognition  (07/12/18 1010 : DAKOTAH España)  Plan for Continued Treatment (SLP): ST to continue to follow and tx (07/12/18 1010 : DAKOTAH España)  Anticipated Dischage Disposition: unknown (07/12/18 1010 : DAKOTAH España)            SLP GOALS     Row Name  07/12/18 1010 07/11/18 1314 07/10/18 0932       Oral Nutrition/Hydration Goal 1 (SLP)    Oral Nutrition/Hydration Goal 1, SLP LTG: Pt will tolerate LRD without overt s/s of aspiration.   -TM LTG: Pt will tolerate LRD without overt s/s of aspiration.   -BN LTG: Pt will tolerate LRD without overt s/s of aspiration.   -TM    Time Frame (Oral Nutrition/Hydration Goal 1, SLP) by discharge  -TM by discharge  -BN by discharge  -TM    Barriers (Oral Nutrition/Hydration Goal 1, SLP) Weakness, cognition   -TM Weakness, cognition   -BN Weakness, cognition   -TM    Progress/Outcomes (Oral Nutrition/Hydration Goal 1, SLP) continuing progress toward goal  -TM continuing progress toward goal  -BN continuing progress toward goal  -TM       Pharyngeal Strengthening Exercise Goal 1 (SLP)    Activity (Pharyngeal Strengthening Goal 1, SLP) increase squeeze/positive pressure generation  -TM increase squeeze/positive pressure generation  -BN increase squeeze/positive pressure generation  -TM    Increase Squeeze/Positive Pressure Generation hard effortful swallow;cassidy  -TM hard effortful swallow;cassidy  -BN hard effortful swallow;cassidy  -TM    [place]/Accuracy (Pharyngeal Strengthening Goal 1, SLP) with minimal cues (75-90% accuracy)  -TM with minimal cues (75-90% accuracy)  -BN with minimal cues (75-90% accuracy)  -TM    Time Frame (Pharyngeal Strengthening Goal 1, SLP) by discharge  -TM by discharge  -BN by discharge  -TM    Barriers (Pharyngeal Strengthening Goal 1, SLP) Weakness, cognition  -TM Weakness, cognition  -BN Weakness, cognition  -TM    Progress/Outcomes (Pharyngeal Strengthening Goal 1, SLP) goal ongoing  -TM continuing progress toward goal  -BN goal ongoing  -TM       Pharyngeal Strengthening Exercise Goal (SLP)    Pharyngeal Strengthening Goal, (SLP) Pt will complete 30 minutes of NMES applied to the suprahyoids in conjunction with swallow exercises w/o any overt s/s of distress in order to improve swallow  function.  -TM Pt will complete 30 minutes of NMES applied to the suprahyoids in conjunction with swallow exercises w/o any overt s/s of distress in order to improve swallow function.  -BN Pt will complete 30 minutes of NMES applied to the suprahyoids in conjunction with swallow exercises w/o any overt s/s of distress in order to improve swallow function.  -TM    Time Frame (Pharyngeal Strengthening Goal, SLP) by discharge  -TM by discharge  -BN by discharge  -TM    Barriers (Pharyngeal Strengthening Goal, SLP) n/a  -TM n/a  -BN n/a  -TM    Progress/Outcomes (Pharyngeal Strengthening Goal, SLP) goal ongoing  -TM goal ongoing  -BN goal ongoing  -TM      User Key  (r) = Recorded By, (t) = Taken By, (c) = Cosigned By    Initials Name Provider Type    TM Dinorah Thomas, CCC-SLP Speech and Language Pathologist    BRYSON Rios, CCC-SLP Speech and Language Pathologist          EDUCATION  The patient has been educated in the following areas:   Dysphagia (Swallowing Impairment).    SLP Recommendation and Plan                       Anticipated Dischage Disposition: unknown                Plan of Care Reviewed With: sibling, other (see comments) (Brother )  Plan of Care Review  Plan of Care Reviewed With: sibling, other (see comments) (Brother )  Daily Summary of Progress (SLP): progress towards functional goals is fair  Plan for Continued Treatment (SLP): ST to continue to follow and tx  Progress: improving  Outcome Summary: Upon ST arrival, pt resting, yet awakened to speech. Brother stated pt fatigued following breakfast this AM and had refused physical tx secondary to desire to rest. Pt also refused ST services at this time, requesting that ST return later today for tx. ST brought apple juice into pt's room for tx with plans to attempt nectar thick trials. Given this, ST thickened liquid to a honey thick consistency to leave at bedside for pt if he awakened and became thirsty. Demonstrated appropriate thickness  for pt's brother and educated that pt showed instances of laryngeal penetration with nectar thick liquids on recent VFSS. Brother verbalized understanding and agreed for ST to return at a later time today for tx exercises. Thanks!            Time Calculation:         Time Calculation- SLP     Row Name 07/12/18 1103             Time Calculation- SLP    SLP Start Time 1010  -TM      SLP Stop Time 1020  -TM      SLP Time Calculation (min) 10 min  -TM      SLP Received On 07/12/18  -        User Key  (r) = Recorded By, (t) = Taken By, (c) = Cosigned By    Initials Name Provider Type    TM Dinorah Thomas CCC-SLP Speech and Language Pathologist          Therapy Charges for Today     Code Description Service Date Service Provider Modifiers Qty    56003726145 HC ST SELF CARE/MGMT/TRAIN EA 15 MIN 7/12/2018 DAKOTAH España GN 1          SLP G-Codes  SLP NOMS Used?: Yes  Functional Limitations: Swallowing  Swallow Current Status (): At least 80 percent but less than 100 percent impaired, limited or restricted  Swallow Goal Status (): At least 60 percent but less than 80 percent impaired, limited or restricted      DAKOTAH Macario  7/12/2018

## 2018-07-12 NOTE — PLAN OF CARE
Problem: Patient Care Overview  Goal: Plan of Care Review  Outcome: Ongoing (interventions implemented as appropriate)   07/12/18 6226   Coping/Psychosocial   Plan of Care Reviewed With patient   Plan of Care Review   Progress improving   OTHER   Outcome Summary Pt. was sitting EOB. Pt. walked 50 x 3 with 2 standing rest with RW on 3L 02. Pt had 1 LOB will continue to work with pt. on progressive ambulation and endurance.

## 2018-07-12 NOTE — PLAN OF CARE
Problem: Patient Care Overview  Goal: Plan of Care Review  Outcome: Ongoing (interventions implemented as appropriate)   07/12/18 1344   Coping/Psychosocial   Plan of Care Reviewed With patient   Plan of Care Review   Progress improving   OTHER   Outcome Summary Pt tells me today he is actually hungry; does seem to have improved appetite. RN states he ate well at breakfast. He does continue with Soft Texture/chopped diet and HT liquids. Pt tells me he things he can drink thin liquids and wanted me to give him thin coffee to try. Explained to him that he needed to work with SLP to determine appropriate thickness for his liquids and for now encouraged compliance with HT liquids unless otherwise directed by SLP. Encouraged intake, will continue magic cup BID, and will continue to follow up.       Problem: Nutrition, Imbalanced: Inadequate Oral Intake (Adult)  Goal: Improved Oral Intake  Outcome: Ongoing (interventions implemented as appropriate)   07/12/18 1344   Nutrition, Imbalanced: Inadequate Oral Intake (Adult)   Improved Oral Intake making progress toward outcome     Goal: Prevent Further Weight Loss  Outcome: Ongoing (interventions implemented as appropriate)   07/12/18 1344   Nutrition, Imbalanced: Inadequate Oral Intake (Adult)   Prevent Further Weight Loss making progress toward outcome

## 2018-07-12 NOTE — PLAN OF CARE
Problem: Fall Risk (Adult)  Goal: Absence of Fall  Outcome: Ongoing (interventions implemented as appropriate)      Problem: Patient Care Overview  Goal: Plan of Care Review  Outcome: Ongoing (interventions implemented as appropriate)   07/11/18 1779   Coping/Psychosocial   Plan of Care Reviewed With patient;family   Plan of Care Review   Progress no change   OTHER   Outcome Summary VSS, no c/o of pain, liver us results normal, up in chair most of day, Ronak, pt sibling and POA is at bedside, more alert and ate much better today, safety maintained       Problem: Sepsis/Septic Shock (Adult)  Goal: Signs and Symptoms of Listed Potential Problems Will be Absent, Minimized or Managed (Sepsis/Septic Shock)  Outcome: Ongoing (interventions implemented as appropriate)      Problem: Skin Injury Risk (Adult)  Goal: Skin Health and Integrity  Outcome: Ongoing (interventions implemented as appropriate)      Problem: Nutrition, Imbalanced: Inadequate Oral Intake (Adult)  Goal: Improved Oral Intake  Outcome: Ongoing (interventions implemented as appropriate)    Goal: Prevent Further Weight Loss  Outcome: Ongoing (interventions implemented as appropriate)

## 2018-07-12 NOTE — PLAN OF CARE
Problem: Fall Risk (Adult)  Goal: Absence of Fall  Outcome: Ongoing (interventions implemented as appropriate)      Problem: Patient Care Overview  Goal: Plan of Care Review  Outcome: Ongoing (interventions implemented as appropriate)   07/12/18 0339   Coping/Psychosocial   Plan of Care Reviewed With patient   Plan of Care Review   Progress no change   OTHER   Outcome Summary Patient has no c/o pain. Patient alert and ate pudding family brought in. Appetite is much better. Family at bedside. VSS. Safety maintained. Will continue to monitor.        Problem: Sepsis/Septic Shock (Adult)  Goal: Signs and Symptoms of Listed Potential Problems Will be Absent, Minimized or Managed (Sepsis/Septic Shock)  Outcome: Ongoing (interventions implemented as appropriate)      Problem: Skin Injury Risk (Adult)  Goal: Skin Health and Integrity  Outcome: Ongoing (interventions implemented as appropriate)      Problem: Nutrition, Imbalanced: Inadequate Oral Intake (Adult)  Goal: Improved Oral Intake  Outcome: Ongoing (interventions implemented as appropriate)    Goal: Prevent Further Weight Loss  Outcome: Ongoing (interventions implemented as appropriate)

## 2018-07-12 NOTE — PROGRESS NOTES
[facility] Medicine Services  INPATIENT PROGRESS NOTE    Patient Name: Kellie Arzate  Date of Admission: 7/2/2018  Today's Date: 07/12/18  Length of Stay: 10  Primary Care Physician: Jose Moon MD    Subjective   Chief Complaint: follow-up aspiration PNA  Weakness - Generalized        Patient appears to be doing much better this morning.  His brother is at bedside.  He was currently eating breakfast when I arrived.  He denies any new shortness of breath, and reports no new cough or congestion.  He is agreeable to continuing to work with physical therapy to get stronger.  It is both his desire, and the desire of his brother at bedside, for him to go home at discharge her family will assist in his care, and they are willing to consider home health services to include nursing care, physical therapy, and even speech therapy.    Review of Systems     All pertinent negatives and positives are as above. All other systems have been reviewed and are negative unless otherwise stated.     Objective    Temp:  [97.8 °F (36.6 °C)-99.4 °F (37.4 °C)] 98.7 °F (37.1 °C)  Heart Rate:  [] 118  Resp:  [16-20] 20  BP: ()/(46-63) 113/59  Physical Exam   Constitutional: No distress.   HENT:   Head: Normocephalic.   Mouth/Throat: Oropharyngeal exudate (small ulcerations noted on the tongue and lower gums; a few small areas of faint white exudate c/w early thrush) present.   Eyes: No scleral icterus.   Neck: No tracheal deviation present.   Cardiovascular: Normal heart sounds.    Pulmonary/Chest: No respiratory distress. He has no wheezes.   Improvement in bilateral breath sounds, including on the left   Abdominal: Soft.   Musculoskeletal: He exhibits no edema.   Neurological: He is alert. No cranial nerve deficit.   Skin: Skin is warm and dry. He is not diaphoretic.   Psychiatric: He has a normal mood and affect. His behavior is normal.   Vitals reviewed.    Results  Review:  I have reviewed the labs, radiology results, and diagnostic studies.    Laboratory Data:     Results from last 7 days  Lab Units 07/10/18  0717 07/09/18  0818   WBC 10*3/mm3 16.04* 19.57*   HEMOGLOBIN g/dL 11.2* 12.4*   HEMATOCRIT % 37.8* 41.7   PLATELETS 10*3/mm3 346 430*          Results from last 7 days  Lab Units 07/12/18  0424 07/10/18  0717 07/09/18  0818   SODIUM mmol/L 141 138 139   POTASSIUM mmol/L 3.6 4.4 3.9   CHLORIDE mmol/L 100 97* 95*   CO2 mmol/L 32.0* 31.0 34.0*   BUN mg/dL 16 17 17   CREATININE mg/dL 0.55 0.58 0.62   CALCIUM mg/dL 8.5 8.6 8.9   BILIRUBIN mg/dL 0.4 0.7 0.6   ALK PHOS U/L 121* 118 141*   ALT (SGPT) U/L 291* 395* 394*   AST (SGOT) U/L 103* 226* 216*   GLUCOSE mg/dL 104* 95 91       Culture Data:   Blood Culture   Date Value Ref Range Status   07/02/2018 No growth at less than 24 hours  Preliminary   07/02/2018 No growth at less than 24 hours  Preliminary       Radiology Data:   Imaging Results (last 24 hours)     Procedure Component Value Units Date/Time    US Liver [[id number]] Collected:  07/11/18 1413     Updated:  07/11/18 1419    Narrative:       EXAMINATION: US LIVER- 7/11/2018 2:13 PM CDT     HISTORY: Transaminitis; J69.0-Pneumonitis due to inhalation of food and  vomit; R65.10-Systemic inflammatory response syndrome (sirs) of  non-infectious origin without acute organ dysfunction; R13.12-Dysphagia,  oropharyngeal phase; Z74.09-Other reduced mobility.     REPORT: Sonographic images of the liver and upper abdomen were obtained.  Comparison is made with ultrasound of the abdomen 8/8/2016.     The proximal aorta measures 1.9 cm in diameter, normal. The IVC measures  1 cm in diameter, normal. The liver parenchyma appears homogeneous. No  liver mass is visualized. The common hepatic duct measures 3.5 mm in  diameter, normal. The visualized pancreas is unremarkable. No gallstones  are identified, there is no gallbladder wall thickening or  pericholecystic fluid. There is  question of a pericardial effusion.       Impression:       1. Normal ultrasound of the liver. The gallbladder is unremarkable and  no biliary ductal dilatation is identified.  2. Questionable pericardial effusion. Clinical correlation is  recommended.  This report was finalized on 07/11/2018 14:16 by Dr. Raheel Rodriguez MD.          I have reviewed the patient's current medications.     Assessment/Plan     Hospital Problem List     Aspiration pneumonia of left lung (CMS/HCC)        Assessment:  1.  Acute on chronic hypoxemic respiratory failure  2.  Sepsis  3.  Pneumonia - aspiration  4.  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  Weight Loss  6.  Elevated troponin - no chest pain  7.  Abnormal LFTs with worsening transaminitis - now trending down  8.  History of PE in the past - now off of anticoagulation  9.  Severe protein-calorie malnutrition  10.  Tobacco dependence    11.  MICHELLE  12.  Confusion     Plan:   1.  Course of IV Zosyn completed  2.  Acute hepatitis panel negative  3.  Liver US with no acute findings  4.  Repeat CMP in AM to confirm improvement in trend of LFTs  5.  Continue to wean steroids (Prednisone to 5mg today)  6.  Oral nystatin and miracle mouthwash (swish and spit)  7.  Scheduled nebs; Symbicort  8.  Trilogy per home settings  9.  S/p 3 days of IV Venofer  10.  Continue PT   11.  ST following  12.  Dispo planning: patient does not want SNF placement.  Brother is now here at bedside (he lives in [place]) and he reports he is the POA.  He reports he will assist in the care of his brother at discharge, and would like home health services to including nursing care, ST and PT.  Anticipate home in the next 1-2 days pending the above.    Adrien Merrill MD   07/12/18   9:35 AM

## 2018-07-12 NOTE — PLAN OF CARE
Problem: Patient Care Overview  Goal: Plan of Care Review  Outcome: Ongoing (interventions implemented as appropriate)   07/12/18 9080   Coping/Psychosocial   Plan of Care Reviewed With patient;family;sibling   Plan of Care Review   Progress no change   OTHER   Outcome Summary Swallowing tx completed this PM. Beginning to consume lunch tray of mech soft with honey thick liquids at time of ST arrival. Brother and female family member present, stated they had thickened liquid to that of a consistency slightly thinner than honey thick, as previously thickened liquid had become more of a pudding thick consistency. Observed the thickened liquid fixed per family to be that of a nectar thick consistency. Allowed pt to attempt to determine if pt appeared to toleration for consideration of possible liquid upgrade. However, pt was noted to have quite consistent overt s/s of aspiration with presented trials, with either a delayed throat clear or cough noted. Only 1x instance of cough noted during other oral intake. Educated pt, who continues to need moderate reinforcements, and family on recommendation to continue with honey thick liquids, given the increased risk for aspiration with nectar thick and thin liquids, as the pt was noted to have a delayed cough when laryngeal penetration was observed with nectar thick liquids on recent VFSS. Also, educated on avoiding swallowing Nystatin, which can instead be utilized as a swish and spit or can be swabbed orally to decrease the risk for aspiration. They were also educated that regular ice cream is not safe at this time, reasons for such, and the recommendation for a magic cup to be utilized instead, which was able to be presented to the family, as it was present on the pt's meal tray at that time. Pt is ok for small sips of thin liquid water in between meals, being at least 30 minutes following any other PO intake, considering that the pt is not impulsive, consumes small sips,  avoids consecutive sips, and does not have increased congestion. ST to continue to follow and tx.

## 2018-07-13 ENCOUNTER — APPOINTMENT (OUTPATIENT)
Dept: GENERAL RADIOLOGY | Facility: HOSPITAL | Age: 63
End: 2018-07-13

## 2018-07-13 LAB
ALBUMIN SERPL-MCNC: 3 G/DL (ref 3.5–5)
ALBUMIN/GLOB SERPL: 0.9 G/DL (ref 1.1–2.5)
ALP SERPL-CCNC: 102 U/L (ref 24–120)
ALT SERPL W P-5'-P-CCNC: 235 U/L (ref 0–54)
ANION GAP SERPL CALCULATED.3IONS-SCNC: 8 MMOL/L (ref 4–13)
AST SERPL-CCNC: 78 U/L (ref 7–45)
BILIRUB SERPL-MCNC: 0.4 MG/DL (ref 0.1–1)
BUN BLD-MCNC: 14 MG/DL (ref 5–21)
BUN/CREAT SERPL: 28 (ref 7–25)
CALCIUM SPEC-SCNC: 8.6 MG/DL (ref 8.4–10.4)
CHLORIDE SERPL-SCNC: 100 MMOL/L (ref 98–110)
CO2 SERPL-SCNC: 34 MMOL/L (ref 24–31)
CREAT BLD-MCNC: 0.5 MG/DL (ref 0.5–1.4)
GFR SERPL CREATININE-BSD FRML MDRD: >150 ML/MIN/1.73
GLOBULIN UR ELPH-MCNC: 3.3 GM/DL
GLUCOSE BLD-MCNC: 97 MG/DL (ref 70–100)
POTASSIUM BLD-SCNC: 3.4 MMOL/L (ref 3.5–5.3)
PROT SERPL-MCNC: 6.3 G/DL (ref 6.3–8.7)
SODIUM BLD-SCNC: 142 MMOL/L (ref 135–145)

## 2018-07-13 PROCEDURE — 25010000002 ENOXAPARIN PER 10 MG: Performed by: INTERNAL MEDICINE

## 2018-07-13 PROCEDURE — 80053 COMPREHEN METABOLIC PANEL: CPT | Performed by: INTERNAL MEDICINE

## 2018-07-13 PROCEDURE — 63710000001 PREDNISONE PER 5 MG: Performed by: INTERNAL MEDICINE

## 2018-07-13 PROCEDURE — 92611 MOTION FLUOROSCOPY/SWALLOW: CPT

## 2018-07-13 PROCEDURE — 94799 UNLISTED PULMONARY SVC/PX: CPT

## 2018-07-13 PROCEDURE — 74230 X-RAY XM SWLNG FUNCJ C+: CPT

## 2018-07-13 PROCEDURE — 94760 N-INVAS EAR/PLS OXIMETRY 1: CPT

## 2018-07-13 PROCEDURE — G8996 SWALLOW CURRENT STATUS: HCPCS

## 2018-07-13 PROCEDURE — 94669 MECHANICAL CHEST WALL OSCILL: CPT

## 2018-07-13 PROCEDURE — 92526 ORAL FUNCTION THERAPY: CPT

## 2018-07-13 PROCEDURE — G8997 SWALLOW GOAL STATUS: HCPCS

## 2018-07-13 RX ADMIN — MEGESTROL ACETATE 800 MG: 40 SUSPENSION ORAL at 08:24

## 2018-07-13 RX ADMIN — Medication 250 MG: at 08:24

## 2018-07-13 RX ADMIN — ENOXAPARIN SODIUM 40 MG: 100 INJECTION SUBCUTANEOUS at 15:33

## 2018-07-13 RX ADMIN — PREDNISONE 5 MG: 5 TABLET ORAL at 08:23

## 2018-07-13 RX ADMIN — NYSTATIN 500000 UNITS: 100000 SUSPENSION ORAL at 11:30

## 2018-07-13 RX ADMIN — AMITRIPTYLINE HYDROCHLORIDE 25 MG: 25 TABLET, FILM COATED ORAL at 21:00

## 2018-07-13 RX ADMIN — NYSTATIN 500000 UNITS: 100000 SUSPENSION ORAL at 21:00

## 2018-07-13 RX ADMIN — Medication 250 MG: at 21:00

## 2018-07-13 RX ADMIN — IPRATROPIUM BROMIDE AND ALBUTEROL SULFATE 3 ML: 2.5; .5 SOLUTION RESPIRATORY (INHALATION) at 07:37

## 2018-07-13 RX ADMIN — CITALOPRAM 20 MG: 20 TABLET, FILM COATED ORAL at 08:24

## 2018-07-13 RX ADMIN — METOPROLOL TARTRATE 50 MG: 50 TABLET ORAL at 21:00

## 2018-07-13 RX ADMIN — METOPROLOL TARTRATE 50 MG: 50 TABLET ORAL at 08:24

## 2018-07-13 RX ADMIN — IPRATROPIUM BROMIDE AND ALBUTEROL SULFATE 3 ML: 2.5; .5 SOLUTION RESPIRATORY (INHALATION) at 12:25

## 2018-07-13 RX ADMIN — BARIUM SULFATE 10 ML: 400 PASTE ORAL at 13:30

## 2018-07-13 RX ADMIN — DOCUSATE SODIUM 100 MG: 100 CAPSULE ORAL at 21:00

## 2018-07-13 RX ADMIN — ASPIRIN 81 MG: 81 TABLET ORAL at 08:24

## 2018-07-13 RX ADMIN — GUAIFENESIN 1200 MG: 600 TABLET, EXTENDED RELEASE ORAL at 21:00

## 2018-07-13 RX ADMIN — NYSTATIN 500000 UNITS: 100000 SUSPENSION ORAL at 08:24

## 2018-07-13 RX ADMIN — BARIUM SULFATE 60 ML: 400 SUSPENSION ORAL at 13:30

## 2018-07-13 RX ADMIN — IPRATROPIUM BROMIDE AND ALBUTEROL SULFATE 3 ML: 2.5; .5 SOLUTION RESPIRATORY (INHALATION) at 18:52

## 2018-07-13 RX ADMIN — BUDESONIDE AND FORMOTEROL FUMARATE DIHYDRATE 2 PUFF: 160; 4.5 AEROSOL RESPIRATORY (INHALATION) at 18:52

## 2018-07-13 RX ADMIN — IPRATROPIUM BROMIDE AND ALBUTEROL SULFATE 3 ML: 2.5; .5 SOLUTION RESPIRATORY (INHALATION) at 15:51

## 2018-07-13 RX ADMIN — GUAIFENESIN 1200 MG: 600 TABLET, EXTENDED RELEASE ORAL at 08:24

## 2018-07-13 RX ADMIN — FAMOTIDINE 20 MG: 20 TABLET, FILM COATED ORAL at 08:24

## 2018-07-13 RX ADMIN — BUDESONIDE AND FORMOTEROL FUMARATE DIHYDRATE 2 PUFF: 160; 4.5 AEROSOL RESPIRATORY (INHALATION) at 07:37

## 2018-07-13 RX ADMIN — DOCUSATE SODIUM 100 MG: 100 CAPSULE ORAL at 08:24

## 2018-07-13 RX ADMIN — BARIUM SULFATE 55 ML: 0.81 POWDER, FOR SUSPENSION ORAL at 13:30

## 2018-07-13 RX ADMIN — NYSTATIN 500000 UNITS: 100000 SUSPENSION ORAL at 17:13

## 2018-07-13 NOTE — PROGRESS NOTES
[facility] Medicine Services  INPATIENT PROGRESS NOTE    Patient Name: Kellie Arzate  Date of Admission: 7/2/2018  Today's Date: 07/13/18  Length of Stay: 11  Primary Care Physician: Jose Moon MD    Subjective   Chief Complaint: follow-up aspiration PNA   Weakness - Generalized        Patient appears to be doing much better this morning.  Sitting up on the bedside, talking on the phone. States that he will be ready to go home tomorrow. Looking forward to repeat swallowing study as patient states that he wants his morning cup of coffee.     Review of Systems     All pertinent negatives and positives are as above. All other systems have been reviewed and are negative unless otherwise stated.     Objective    Temp:  [98 °F (36.7 °C)-99.1 °F (37.3 °C)] 99.1 °F (37.3 °C)  Heart Rate:  [] 88  Resp:  [10-18] 16  BP: (105-125)/(49-67) 107/49  Physical Exam   Constitutional: No distress.   HENT:   Head: Normocephalic.   Mouth/Throat: Oropharyngeal exudate (small ulcerations noted on the tongue and lower gums; a few small areas of faint white exudate c/w early thrush) present.   Eyes: No scleral icterus.   Neck: No tracheal deviation present.   Cardiovascular: Normal heart sounds.    Pulmonary/Chest: No respiratory distress. He has no wheezes.   Improvement in bilateral breath sounds, including on the left   Abdominal: Soft.   Musculoskeletal: He exhibits no edema.   Neurological: He is alert. No cranial nerve deficit.   Skin: Skin is warm and dry. He is not diaphoretic.   Psychiatric: He has a normal mood and affect. His behavior is normal.   Vitals reviewed.    Results Review:  I have reviewed the labs, radiology results, and diagnostic studies.    Laboratory Data:     Results from last 7 days  Lab Units 07/10/18  0717 07/09/18  0818   WBC 10*3/mm3 16.04* 19.57*   HEMOGLOBIN g/dL 11.2* 12.4*   HEMATOCRIT % 37.8* 41.7   PLATELETS 10*3/mm3 346 430*           Results from last 7 days  Lab Units 07/13/18  0443 07/12/18  0424 07/10/18  0717   SODIUM mmol/L 142 141 138   POTASSIUM mmol/L 3.4* 3.6 4.4   CHLORIDE mmol/L 100 100 97*   CO2 mmol/L 34.0* 32.0* 31.0   BUN mg/dL 14 16 17   CREATININE mg/dL 0.50 0.55 0.58   CALCIUM mg/dL 8.6 8.5 8.6   BILIRUBIN mg/dL 0.4 0.4 0.7   ALK PHOS U/L 102 121* 118   ALT (SGPT) U/L 235* 291* 395*   AST (SGOT) U/L 78* 103* 226*   GLUCOSE mg/dL 97 104* 95       Culture Data:   Blood Culture   Date Value Ref Range Status   07/02/2018 No growth at less than 24 hours  Preliminary   07/02/2018 No growth at less than 24 hours  Preliminary       Radiology Data:   Imaging Results (last 24 hours)     Procedure Component Value Units Date/Time    FL Video Swallow With Speech [[id number]] Collected:  07/13/18 1336     Updated:  07/13/18 1344    Narrative:       EXAMINATION: Video swallow with speech 7/13/2018     Fluoroscopy time: 1.7 minutes. A single image was submitted for  interpretation.     HISTORY: Dysphagia     FINDINGS: Today's exam is compared to a previous study of 7/3/2018. A  dysphagia study was performed with administration of multiple  consistencies of contrast material. There is some premature loss of the  bolus with pooling within the vallecula. There is diminished mobility of  the epiglottis resulting in some significant stasis of material  following a swallow within the vallecula and piriform sinuses. This is  more noted with thicker contrast materials. There was one episode of  mild penetration noted on one of the initial swallows with honey thick.  No subsequent penetration or aspiration was noted with nectar thick or  thicker materials. There was penetration noted within contrast  materials.       Impression:       1.. Limited mobility of the epiglottis. There is some premature loss of  the bolus with pooling in the vallecula. The limited mobility the  epiglottis as well as pharyngeal weakness results in significant stasis  of  contrast material within the vallecula, particularly with the thicker  contrast materials. This does improve with subsequent swallows.  2. Penetration noted with one of the initial swallows of the honey  thick. However, following the initial swallow there was no penetration  or aspiration noted with nectar thick or thicker materials. Penetration  was demonstrated with thin contrast materials.  This report was finalized on 07/13/2018 13:41 by Dr. Rene Nguyen MD.          I have reviewed the patient's current medications.     Assessment/Plan     Hospital Problem List     Aspiration pneumonia of left lung (CMS/HCC)        Assessment:  1.  Acute on chronic hypoxemic respiratory failure  2.  Sepsis  3.  Pneumonia - aspiration  4.  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  Weight Loss  6.  Elevated troponin - no chest pain  7.  Abnormal LFTs with worsening transaminitis - now trending down  8.  History of PE in the past - now off of anticoagulation  9.  Severe protein-calorie malnutrition  10.  Tobacco dependence    11.  MICHELLE  12.  Confusion     Plan:   1.  Course of IV Zosyn completed  2.  Acute hepatitis panel negative  3.  Liver US with no acute findings  4.  Repeat CMP in AM to confirm improvement in trend of LFTs  5.  DC prednisone   6.  Oral nystatin and miracle mouthwash (swish and spit)  7.  Scheduled nebs; Symbicort  8.  Trilogy per home settings  9.  S/p 3 days of IV Venofer  10.  Continue PT   11.  ST following  12.  Dispo planning: DC home tomorrow      :Jameson Oliver,    07/13/18   2:48 PM

## 2018-07-13 NOTE — PLAN OF CARE
Problem: Patient Care Overview  Goal: Plan of Care Review   07/13/18 1037   Coping/Psychosocial   Plan of Care Reviewed With patient;other (see comments)  (RN)   Plan of Care Review   Progress improving   OTHER   Outcome Summary Swallowing tx completed this AM. Pt continues to c/o some oral pain in region of upper R gum and lingual, with multiple lingual blisters/lacerations noted. Remains on Nystatin. Educated pt on risk for aspiration in swallow Nystatin, yet educated that medication can also be swabbed orally or utilized via swish and spit method. Pt completed three trials of five effortful swallows for pharyngeal strengthening and three trials of five Kelly (lingual protrusion) swallows for base of tongue strengthening. No difficulty with initiation of swallows, yet did appear to have weak laryngeal elevation based on tactile judgement. Pt verbalized dislike for honey thick liquids, verbalizing he feels that he would even be more pleased with a thickened liquid of a thinner consistency. Given concerns noted with laryngeal penetration on VFSS on 07/03/2018, ST recommends repeat VFSS to determine if pt is safe for a liquid upgrade, as ST anticipates pt is nearing possible discharge from the facility. Pt and RN concur with plan. ST to follow up with further nutritional recommendations s/p VFSS today. Thanks!

## 2018-07-13 NOTE — PLAN OF CARE
Problem: Patient Care Overview  Goal: Plan of Care Review  Outcome: Ongoing (interventions implemented as appropriate)   07/13/18 1125   Coping/Psychosocial   Plan of Care Reviewed With patient   OTHER   Outcome Summary Pt. is independent with sit to stand to sit. Ambulated 170' with RWX and supervision. OX SAT on 3lpm between 93-97%. Will continue to benefit from strengthening and increased martin.

## 2018-07-13 NOTE — THERAPY TREATMENT NOTE
Acute Care - Speech Language Pathology   Swallow Treatment Note [facility]     Patient Name: Kellie Arzate  : 1955  MRN: [id number]  Today's Date: 2018  Onset of Illness/Injury or Date of Surgery: 18     Referring Physician: Dr. Merrill      Admit Date: 2018     Swallowing tx completed this AM. Pt continues to c/o some oral pain in region of upper R gum and lingual, with multiple lingual blisters/lacerations noted. Remains on Nystatin. Educated pt on risk for aspiration in swallow Nystatin, yet educated that medication can also be swabbed orally or utilized via swish and spit method. Pt completed three trials of five effortful swallows for pharyngeal strengthening and three trials of five Kelly (lingual protrusion) swallows for base of tongue strengthening. No difficulty with initiation of swallows, yet did appear to have weak laryngeal elevation based on tactile judgement. Pt verbalized dislike for honey thick liquids, verbalizing he feels that he would even be more pleased with a thickened liquid of a thinner consistency. Given concerns noted with laryngeal penetration on VFSS on 2018, ST recommends repeat VFSS to determine if pt is safe for a liquid upgrade, as ST anticipates pt is nearing possible discharge from the facility. Pt and RN concur with plan. ST to follow up with further nutritional recommendations s/p VFSS today. Thanks!   Dinorah Thomas, CCC-SLP 2018 2:28 PM    Visit Dx:      ICD-10-CM ICD-9-CM   1. Aspiration pneumonia of left lung, unspecified aspiration pneumonia type, unspecified part of lung (CMS/HCC) J69.0 507.0   2. SIRS (systemic inflammatory response syndrome) (CMS/HCC) R65.10 995.90   3. Oropharyngeal dysphagia R13.12 787.22   4. Impaired functional mobility, balance, gait, and endurance Z74.09 V49.89     Patient Active Problem List   Diagnosis   • Aspiration pneumonia of left lung (CMS/HCC)       Therapy Treatment    Therapy Treatment / Health  Promotion    Treatment Time/Intention  Discipline: speech language pathologist (07/13/18 1037 : DAKOTAH España)  Document Type: evaluation (07/13/18 1325 : DAKOTAH España)  Subjective Information: complains of (oral pain ) (07/13/18 1325 : DAKOTAH España)  Mode of Treatment: individual therapy, speech-language pathology (07/13/18 1037 : DAKOTAH España)  Patient/Family Observations: No family present at time of session. (07/13/18 1037 : DAKOTAH España)  Care Plan Review: care plan/treatment goals reviewed, patient/other agree to care plan (07/13/18 1037 : DAKOTAH España)  Care Plan Review, Other Participant(s): other (see comments) (Luisa SNOW ) (07/13/18 1037 : DAKOTAH España)  Patient Effort: good (07/13/18 1037 : DAKOTAH España)  Plan of Care Review  Plan of Care Reviewed With: patient, other (see comments) (RN) (07/13/18 1037 : DAKOTAH España)    Vitals/Pain/Safety  Pain Assessment  Additional Documentation: Pain Scale: Numbers Pre/Post-Treatment (Group) (07/13/18 1037 : DAKOTAH España)  Pain Scale: Numbers Pre/Post-Treatment  Pain Scale: Numbers, Pretreatment: 5/10 (07/13/18 1037 : DAKOTAH España)  Pain Scale: Numbers, Post-Treatment: 5/10 (07/13/18 1037 : DAKOTAH España)  Pain Location: mouth (non-dental) (07/13/18 1037 : DAKOTAH España)  Pain Scale: Word Pre/Post-Treatment  Pain Location: mouth (non-dental) (07/13/18 1037 : DAKOTAH España)  Pain Scale: FACES Pre/Post-Treatment  Pain Location: mouth (non-dental) (07/13/18 1037 : DAKOTAH España)    Cognition, Communication, Swallow  Recommendations  Anticipated Dischage Disposition: home with home health (07/13/18 1037 : Dinorah Thomas, CCC-SLP)    Outcome Summary  Outcome Summary/Treatment Plan (SLP)  Daily Summary of Progress (SLP): progress toward functional goals is good (07/13/18 1037 : Dinorah Thomas, CCC-SLP)  Barriers to  Overall Progress (SLP): N/A  (07/13/18 1037 : Dinorah Thomas CCC-SLP)  Plan for Continued Treatment (SLP): VFSS to be completed today in Radiology to determine if swallow fx has improved to level where advancement in liquid consistency is safe and appropriate.  (07/13/18 1037 : DAKOTAH España)  Anticipated Dischage Disposition: home with home health (07/13/18 1037 : DAKOTAH España)            SLP GOALS     Row Name 07/13/18 1037 07/12/18 1400 07/12/18 1010       Oral Nutrition/Hydration Goal 1 (SLP)    Oral Nutrition/Hydration Goal 1, SLP LTG: Pt will tolerate LRD without overt s/s of aspiration.   -TM LTG: Pt will tolerate LRD without overt s/s of aspiration.   -TM LTG: Pt will tolerate LRD without overt s/s of aspiration.   -TM    Time Frame (Oral Nutrition/Hydration Goal 1, SLP) by discharge  -TM by discharge  -TM by discharge  -TM    Barriers (Oral Nutrition/Hydration Goal 1, SLP) N/A  -TM Cognition  -TM Weakness, cognition   -TM    Progress/Outcomes (Oral Nutrition/Hydration Goal 1, SLP) continuing progress toward goal  -TM continuing progress toward goal  -TM continuing progress toward goal  -TM       Pharyngeal Strengthening Exercise Goal 1 (SLP)    Activity (Pharyngeal Strengthening Goal 1, SLP) increase squeeze/positive pressure generation  -TM increase squeeze/positive pressure generation  -TM increase squeeze/positive pressure generation  -TM    Increase Squeeze/Positive Pressure Generation hard effortful swallow;cassidy  -TM hard effortful swallow;cassidy  -TM hard effortful swallow;cassidy  -TM    [place]/Accuracy (Pharyngeal Strengthening Goal 1, SLP) with minimal cues (75-90% accuracy)  -TM with minimal cues (75-90% accuracy)  -TM with minimal cues (75-90% accuracy)  -TM    Time Frame (Pharyngeal Strengthening Goal 1, SLP) by discharge  -TM by discharge  -TM by discharge  -TM    Barriers (Pharyngeal Strengthening Goal 1, SLP) N/A   -TM Cognition  -TM Weakness, cognition  -TM     Progress/Outcomes (Pharyngeal Strengthening Goal 1, SLP) continuing progress toward goal  -TM goal ongoing  -TM goal ongoing  -TM       Pharyngeal Strengthening Exercise Goal (SLP)    Pharyngeal Strengthening Goal, (SLP) Pt will complete 30 minutes of NMES applied to the suprahyoids in conjunction with swallow exercises w/o any overt s/s of distress in order to improve swallow function.  -TM Pt will complete 30 minutes of NMES applied to the suprahyoids in conjunction with swallow exercises w/o any overt s/s of distress in order to improve swallow function.  -TM Pt will complete 30 minutes of NMES applied to the suprahyoids in conjunction with swallow exercises w/o any overt s/s of distress in order to improve swallow function.  -TM    Time Frame (Pharyngeal Strengthening Goal, SLP) by discharge  -TM by discharge  -TM by discharge  -TM    Barriers (Pharyngeal Strengthening Goal, SLP) N/A  -TM Cognition  -TM n/a  -TM    Progress/Outcomes (Pharyngeal Strengthening Goal, SLP) goal ongoing  -TM goal ongoing  -TM goal ongoing  -TM    Row Name 07/11/18 1314             Oral Nutrition/Hydration Goal 1 (SLP)    Oral Nutrition/Hydration Goal 1, SLP LTG: Pt will tolerate LRD without overt s/s of aspiration.   -BN      Time Frame (Oral Nutrition/Hydration Goal 1, SLP) by discharge  -BN      Barriers (Oral Nutrition/Hydration Goal 1, SLP) Weakness, cognition   -BN      Progress/Outcomes (Oral Nutrition/Hydration Goal 1, SLP) continuing progress toward goal  -BN         Pharyngeal Strengthening Exercise Goal 1 (SLP)    Activity (Pharyngeal Strengthening Goal 1, SLP) increase squeeze/positive pressure generation  -BN      Increase Squeeze/Positive Pressure Generation hard effortful swallow;cassidy  -BN      [place]/Accuracy (Pharyngeal Strengthening Goal 1, SLP) with minimal cues (75-90% accuracy)  -BN      Time Frame (Pharyngeal Strengthening Goal 1, SLP) by discharge  -BN      Barriers (Pharyngeal Strengthening Goal 1, SLP)  Weakness, cognition  -BN      Progress/Outcomes (Pharyngeal Strengthening Goal 1, SLP) continuing progress toward goal  -BN         Pharyngeal Strengthening Exercise Goal (SLP)    Pharyngeal Strengthening Goal, (SLP) Pt will complete 30 minutes of NMES applied to the suprahyoids in conjunction with swallow exercises w/o any overt s/s of distress in order to improve swallow function.  -BN      Time Frame (Pharyngeal Strengthening Goal, SLP) by discharge  -BN      Barriers (Pharyngeal Strengthening Goal, SLP) n/a  -BN      Progress/Outcomes (Pharyngeal Strengthening Goal, SLP) goal ongoing  -BN        User Key  (r) = Recorded By, (t) = Taken By, (c) = Cosigned By    Initials Name Provider Type    TM Dinorah Thomas, CCC-SLP Speech and Language Pathologist    BRYSON Rios CCC-SLP Speech and Language Pathologist          EDUCATION  The patient has been educated in the following areas:   Dysphagia (Swallowing Impairment).    SLP Recommendation and Plan                       Anticipated Dischage Disposition: home with home health                Plan of Care Reviewed With: patient, other (see comments) (RN)  Plan of Care Review  Plan of Care Reviewed With: patient, other (see comments) (RN)  Daily Summary of Progress (SLP): progress toward functional goals is good  Plan for Continued Treatment (SLP): VFSS to be completed today in Radiology to determine if swallow fx has improved to level where advancement in liquid consistency is safe and appropriate.   Progress: improving  Outcome Summary: Swallowing tx completed this AM. Pt continues to c/o some oral pain in region of upper R gum and lingual, with multiple lingual blisters/lacerations noted. Remains on Nystatin. Educated pt on risk for aspiration in swallow Nystatin, yet educated that medication can also be swabbed orally or utilized via swish and spit method. Pt completed three trials of five effortful swallows for pharyngeal strengthening and three trials  of five Kelly (lingual protrusion) swallows for base of tongue strengthening. No difficulty with initiation of swallows, yet did appear to have weak laryngeal elevation based on tactile judgement. Pt verbalized dislike for honey thick liquids, verbalizing he feels that he would even be more pleased with a thickened liquid of a thinner consistency. Given concerns noted with laryngeal penetration on VFSS on 07/03/2018, ST recommends repeat VFSS to determine if pt is safe for a liquid upgrade, as ST anticipates pt is nearing possible discharge from the facility. Pt and RN concur with plan. ST to follow up with further nutritional recommendations s/p VFSS today. Thanks!            Time Calculation:         Time Calculation- SLP     Row Name 07/13/18 1427             Time Calculation- SLP    SLP Start Time 1037  -TM      SLP Stop Time 1100  -TM      SLP Time Calculation (min) 23 min  -TM      SLP Received On 07/13/18  -        User Key  (r) = Recorded By, (t) = Taken By, (c) = Cosigned By    Initials Name Provider Type     Dinorah Thomas CCC-SLP Speech and Language Pathologist          Therapy Charges for Today     Code Description Service Date Service Provider Modifiers Qty    01703506745 HC ST SELF CARE/MGMT/TRAIN EA 15 MIN 7/12/2018 DAKOTAH España GN 1    68980327618 HC ST TREATMENT SWALLOW 3 7/12/2018 DAKOTAH España GN 1    18046979077 HC ST TREATMENT SWALLOW 2 7/13/2018 DAKOTAH España GN 1          SLP G-Codes  SLP NOMS Used?: Yes  Functional Limitations: Swallowing  Swallow Current Status (): At least 80 percent but less than 100 percent impaired, limited or restricted  Swallow Goal Status (): At least 60 percent but less than 80 percent impaired, limited or restricted      DAKOTAH Macario  7/13/2018

## 2018-07-13 NOTE — MBS/VFSS/FEES
Acute Care - Speech Language Pathology   Swallow VFSS in Radiology [facility]     Patient Name: Kellie Arzate  : 1955  MRN: [id number]  Today's Date: 2018  Onset of Illness/Injury or Date of Surgery: 18     Referring Physician: Dr. Merrill      Admit Date: 2018     SPEECH-LANGUAGE PATHOLOGY EVALUTION - VFSS  Subjective: The patient was seen on this date for a VFSS(Videofluoroscopic Swallowing Study).  Patient was alert and cooperative.    Significant hx: Hx of trach, dysphagia, PEG  Objective: Risks/benefits were reviewed with the patient, and consent was obtained. The study was completed with SLP and Radiologist present. The patient was seen in lateral view(s). Textures given included thin liquid, nectar thick liquid, honey thick liquid, puree consistency and mechanical soft consistency.  Assessment: VFSS completed for re-eval of swallow fx, considering pt underwent prior, acute VFSS on 2018 during which laryngeal penetration x2 was noted with nectar thick liquids. During today's study, pt was presented honey thick, nectar thick x2, thin, pudding thick, mechanical soft, and repeat thin liquid trial. Pt was noted to have moderately decreased bolus formation throughout the study with liquids, as well as decreased base of tongue strength with pudding thick, honey thick, and nectar thick (resulting in premature loss of the bolus). A delay in swallow initation, noted with all consistencies except honey thick, also contributed to premature loss of the bolus, resulting in laryngeal penetration before the swallow with thin liquids x1. Decreased laryngeal elevation, no anterior hyolaryngeal excursion, moderately decreased (yet often absent) epiglottic inversion, and poor pharyngeal contraction, resulting in significant pharyngeal residue, especially with pudding thick and mechanical soft (severe in vallecula and pyriform sinuses). Pt was noted to have laryngeal penetration post swallow of  pyriform sinus residue x3 with honey thick, with trace aspiration and throat clear noted. Questionable laryngeal penetration vs residue in laryngeal vestibule from prior trial with each of the nectar thick attempts. Penetration post swallow with thin x1 with questionable trace aspiration of the residue remaining in the pyriform sinuses post swallow. 1x instance of laryngeal penetration before the swallow with thin, as previously mentioned. Post swallow residue ranged from mild to moderate oropharyngeally, to severe pharyngeally, though severe residue was also to be cleared to mild to moderate with thin liquid wash. No further instances of laryngeal penetration or aspiration noted throughout the study.    SLP Findings: Patient presents with moderate oropharyngeal dysphagia.   Comments: The results of this study were discussed in full with the pt immediately following the study. The study results will also be discussed in full with the pt's RN, Luisa.   Recommendations: Diet Textures: nectar thick liquid, mechanical soft consistency food. Medications should be taken whole with thickened liquids. May have water and Ice between meals after oral care, under staff or family supervision and with the recommended strategies for safe swallowing.  Recommended Strategies: Upright for PO, small bites and sips, double swallow with every bite/sip and alternate liquids and solids. Oral care before breakfast, after all meals and PRN.  Dysphagia therapy is recommended and will be continued. Rationale: See above. Feel pt will benefit from NMES tx on outpatient/home health basis for dysphagia. Thanks!   Dinorah Thomas, CCC-SLP 7/13/2018 3:31 PM    Visit Dx:     ICD-10-CM ICD-9-CM   1. Aspiration pneumonia of left lung, unspecified aspiration pneumonia type, unspecified part of lung (CMS/[facility]) J69.0 507.0   2. SIRS (systemic inflammatory response syndrome) (CMS/[facility]) R65.10 995.90   3. Oropharyngeal dysphagia R13.12 787.22   4. Impaired  functional mobility, balance, gait, and endurance Z74.09 V49.89     Patient Active Problem List   Diagnosis   • Aspiration pneumonia of left lung (CMS/HCC)     Past Medical History:   Diagnosis Date   • Anxiety    • Arthritis    • COPD (chronic obstructive pulmonary disease) (CMS/HCC)    • Depression    • Hypertension    • Nephrolithiasis      Past Surgical History:   Procedure Laterality Date   • COLONOSCOPY  04/20/2007    Within Normal Limits.   • ENDOSCOPY  04/20/2007    urea neg, sbbx benign, mild gastritis, small hiatal hernia   • ENDOSCOPY N/A 10/5/2016    Procedure: ESOPHAGOGASTRODUODENOSCOPY WITH ANESTHESIA;  Surgeon: Kevyn Damon MD;  Location: [facility] ENDOSCOPY;  Service:    • HEMORRHOIDECTOMY     • PEG TUBE INSERTION  07/22/2016    Dr Mccarty   • TRACHEOSTOMY            SWALLOW EVALUATION (last 72 hours)      SLP Adult Swallow Evaluation     Row Name 07/13/18 1325                   Rehab Evaluation    Document Type evaluation  -TM        Subjective Information complains of   oral pain   -TM        Patient Observations alert;cooperative  -TM        Patient/Family Observations No family present at time of eval.  -TM        Patient Effort adequate  -TM           General Information    Patient Profile Reviewed yes  -TM        Pertinent History Of Current Problem CXR 07/10/2018 Partial resolution left sided pneumonia  -TM        Current Method of Nutrition soft textures;honey-thick liquids  -TM        Precautions/Limitations, Vision WFL  -TM        Precautions/Limitations, Hearing WFL  -TM        Prior Level of Function-Communication WFL  -TM        Prior Level of Function-Swallowing other (see comments)   Hx of dysphagia, hx of trach, hx of PEG   -TM        Plans/Goals Discussed with patient  -TM        Barriers to Rehab previous functional deficit  -TM        Patient's Goals for Discharge return to regular diet  -TM           Oral Motor and Function    Oral Lesions or Structural Abnormalities and/or variants  Multiple lingual lesions/blisters, being tx with Nystatin  -TM        Dentition Assessment edentulous, dentures not available;dentures are ill fitting;other (see comments)   Report of ill-fitting dentures is per pt, not observation  -TM        Secretion Management WNL/WFL  -TM        Mucosal Quality ulcerated;other (see comments)   Lingually  -TM        Volitional Swallow WFL;other (see comments)   Yet appears to have decreased laryngeal elevation   -TM        Volitional Cough reduced respiratory support  -TM           Oral Musculature and Cranial Nerve Assessment    Oral Motor General Assessment WFL  -TM           General Eating/Swallowing Observations    Respiratory Support Currently in Use nasal cannula  -TM           MBS/VFSS    Utensils Used spoon;straw  -TM        Consistencies Trialed pudding thick;honey-thick liquids;nectar/syrup-thick liquids;thin liquids;soft textures  -TM           MBS/VFSS Interpretation    Oral Prep Phase impaired oral phase of swallowing  -TM        Oral Transit Phase impaired  -TM        Oral Residue impaired  -TM        VFSS Summary VFSS completed for re-eval of swallow fx, considering pt underwent prior, acute VFSS on 07/03/2018 during which laryngeal penetration x2 was noted with nectar thick liquids. During today's study, pt was presented honey thick, nectar thick x2, thin, pudding thick, mechanical soft, and repeat thin liquid trial. Pt was noted to have moderately decreased bolus formation throughout the study with liquids, as well as decreased base of tongue strength with pudding thick, honey thick, and nectar thick (resulting in premature loss of the bolus). A delay in swallow initation, noted with all consistencies except honey thick, also contributed to premature loss of the bolus, resulting in laryngeal penetration before the swallow with thin liquids x1. Decreased laryngeal elevation, no anterior hyolaryngeal excursion, moderately decreased (yet often absent) epiglottic  inversion, and poor pharyngeal contraction, resulting in significant pharyngeal residue, especially with pudding thick and mechanical soft (severe in vallecula and pyriform sinuses). Pt was noted to have laryngeal penetration post swallow of pyriform sinus residue x3 with honey thick, with trace aspiration and throat clear noted. Questionable laryngeal penetration vs residue in laryngeal vestibule from prior trial with each of the nectar thick attempts. Penetration post swallow with thin x1 with questionable trace aspiration of the residue remaining in the pyriform sinuses post swallow. 1x instance of laryngeal penetration before the swallow with thin, as previously mentioned. Post swallow residue ranged from mild to moderate oropharyngeally, to severe pharyngeally, though severe residue was also to be cleared to mild to moderate with thin liquid wash. No further instances of laryngeal penetration or aspiration noted throughout the study.   -TM           Oral Preparatory Phase    Oral Preparatory Phase inadequate manipulation;prolonged manipulation  -TM        Prolonged Manipulation mechanical soft  -TM        Inadequate Manipulation all consistencies tested  -TM           Oral Transit Phase    Impaired Oral Transit Phase premature spillage of liquids into pharynx  -TM        Premature Spillage of Liquids into Pharynx all consistencies tested;secondary to reduced lingual control;discoordination of lingual movement  -TM           Oral Residue    Impaired Oral Residue lingual residue;palatal residue  -TM        Palatal Residue all consistencies tested  -TM        Lingual Residue all consistencies tested  -TM        Response to Oral Residue unable to clear residue  -TM           Initiation of Pharyngeal Swallow    Initiation of Pharyngeal Swallow bolus in valleculae;bolus in pyriform sinuses  -TM        Pharyngeal Phase impaired pharyngeal phase of swallowing  -TM        Penetration Before the Swallow thin liquids  -TM         Penetration After the Swallow honey-thick liquids;thin liquids  -TM        Aspiration After the Swallow honey-thick liquids;other (see comments)   Questionable with thin  -TM        Response to Penetration no response  -TM        Response to Aspiration throat clear  -TM        Pharyngeal Residue diffuse within pharynx;secondary to reduced base of tongue retraction;secondary to reduced posterior pharyngeal wall stripping;secondary to reduced laryngeal elevation;secondary to reduced hyolaryngeal excursion  -TM        Response to Residue cleared residue with spontaneous subsequent swallow;other (see comments)   Unable to fully clear, yet improved with multiple swallows  -TM           Clinical Impression    SLP Swallowing Diagnosis mild-moderate;oral dysfunction;mod-severe;pharyngeal dysfunction  -TM        Functional Impact risk of aspiration/pneumonia  -TM        Rehab Potential/Prognosis, Swallowing adequate, monitor progress closely  -TM        Criteria for Skilled Therapeutic Interventions Met demonstrates skilled criteria  -TM           Recommendations    Therapy Frequency (Swallow) daily  -TM        Predicted Duration Therapy Intervention (Days) until discharge  -TM        SLP Diet Recommendation soft textures;nectar thick liquids;ice chips between meals after oral care, with supervision;water between meals after oral care, with supervision  -TM        Recommended Precautions and Strategies upright posture during/after eating;small bites of food and sips of liquid  -TM        SLP Rec. for Method of Medication Administration meds whole;with thick liquids  -TM        Monitor for Signs of Aspiration yes;cough;gurgly voice;throat clearing;pneumonia;right lower lobe infiltrates  -TM        Anticipated Dischage Disposition home with home health  -TM           Swallow Goals (SLP)    Oral Nutrition/Hydration Goal Selection (SLP) oral nutrition/hydration, SLP goal 1  -TM        Lingual Strengthening Goal Selection  (SLP) lingual strengthening, SLP goal 1  -TM        Pharyngeal Strengthening Exercise Goal Selection (SLP) pharyngeal strengthening exercise, SLP goal 1;pharyngeal strengthening exercise, SLP goal (free text)  -TM        Additional Documentation pharyngeal strengthening exercise goal selection (SLP);lingual strengthening goal selection (SLP)  -TM           Oral Nutrition/Hydration Goal 1 (SLP)    Oral Nutrition/Hydration Goal 1, SLP LTG: Pt will tolerate LRD without overt s/s of aspiration.   -TM        Time Frame (Oral Nutrition/Hydration Goal 1, SLP) by discharge  -TM        Barriers (Oral Nutrition/Hydration Goal 1, SLP) N/A  -TM        Progress/Outcomes (Oral Nutrition/Hydration Goal 1, SLP) continuing progress toward goal  -TM           Lingual Strengthening Goal 1 (SLP)    Activity (Lingual Strengthening Goal 1, SLP) increase lingual tone/sensation/control/coordination/movement;increase tongue back strength  -TM        Increase Lingual Tone/Sensation/Control/Coordination/Movement lingual movement exercises  -TM        Increase Tongue Back Strength lingual movement exercises  -TM        [place]/Accuracy (Lingual Strengthening Goal 1, SLP) with minimal cues (75-90% accuracy)  -TM        Time Frame (Lingual Strengthening Goal 1, SLP) by discharge  -TM        Barriers (Lingual Strengthening Goal 1, SLP) N/A   -TM        Progress/Outcomes (Lingual Strengthening Goal 1, SLP) goal ongoing  -TM           Pharyngeal Strengthening Exercise Goal 1 (SLP)    Activity (Pharyngeal Strengthening Goal 1, SLP) increase squeeze/positive pressure generation;increase superior movement of the hyolaryngeal complex;increase anterior movement of the hyolaryngeal complex  -TM        Increase Superior Movement of the Hyolaryngeal Complex falsetto;Mendelsohn  -TM        Increase Anterior Movement of the Hyolaryngeal Complex shaker  -TM        Increase Squeeze/Positive Pressure Generation hard effortful swallow;cassidy  -TM         [place]/Accuracy (Pharyngeal Strengthening Goal 1, SLP) with minimal cues (75-90% accuracy)  -TM        Time Frame (Pharyngeal Strengthening Goal 1, SLP) by discharge  -TM        Barriers (Pharyngeal Strengthening Goal 1, SLP) N/A   -TM        Progress/Outcomes (Pharyngeal Strengthening Goal 1, SLP) goal ongoing  -TM           Pharyngeal Strengthening Exercise Goal (SLP)    Pharyngeal Strengthening Goal, (SLP) Pt will complete 30 minutes of NMES applied to the suprahyoids in conjunction with swallow exercises w/o any overt s/s of distress in order to improve swallow function.  -TM        Time Frame (Pharyngeal Strengthening Goal, SLP) by discharge  -TM        Barriers (Pharyngeal Strengthening Goal, SLP) N/A  -TM        Progress/Outcomes (Pharyngeal Strengthening Goal, SLP) goal ongoing  -TM          User Key  (r) = Recorded By, (t) = Taken By, (c) = Cosigned By    Initials Name Effective Dates    TM Dinorah Thomas CCC-SLP 08/02/16 -         EDUCATION  The patient has been educated in the following areas:   Dysphagia (Swallowing Impairment).    SLP Recommendation and Plan  SLP Swallowing Diagnosis: mild-moderate, oral dysfunction, mod-severe, pharyngeal dysfunction  SLP Diet Recommendation: soft textures, nectar thick liquids, ice chips between meals after oral care, with supervision, water between meals after oral care, with supervision  Recommended Precautions and Strategies: upright posture during/after eating, small bites of food and sips of liquid     Monitor for Signs of Aspiration: yes, cough, gurgly voice, throat clearing, pneumonia, right lower lobe infiltrates     Criteria for Skilled Therapeutic Interventions Met: demonstrates skilled criteria  Anticipated Dischage Disposition: home with home health  Rehab Potential/Prognosis, Swallowing: adequate, monitor progress closely  Therapy Frequency (Swallow): daily  Predicted Duration Therapy Intervention (Days): until discharge       Plan of Care Reviewed  With: patient  Plan of Care Review  Plan of Care Reviewed With: patient  Daily Summary of Progress (SLP): progress toward functional goals is good  Plan for Continued Treatment (SLP): VFSS to be completed today in Radiology to determine if swallow fx has improved to level where advancement in liquid consistency is safe and appropriate.   Progress:  (Eval)  Outcome Summary: VFSS completed for re-eval of swallow fx, considering pt underwent prior, acute VFSS on 07/03/2018 during which laryngeal penetration x2 was noted with nectar thick liquids. During today's study, pt was presented honey thick, nectar thick x2, thin, pudding thick, mechanical soft, and repeat thin liquid trial. Pt was noted to have moderately decreased bolus formation throughout the study with liquids, as well as decreased base of tongue strength with pudding thick, honey thick, and nectar thick (resulting in premature loss of the bolus). A delay in swallow initation, noted with all consistencies except honey thick, also contributed to premature loss of the bolus, resulting in laryngeal penetration before the swallow with thin liquids x1. Decreased laryngeal elevation, no anterior hyolaryngeal excursion, moderately decreased (yet often absent) epiglottic inversion, and poor pharyngeal contraction, resulting in significant pharyngeal residue, especially with pudding thick and mechanical soft (severe in vallecula and pyriform sinuses). Pt was noted to have laryngeal penetration post swallow of pyriform sinus residue x3 with honey thick, with trace aspiration and throat clear noted. Questionable laryngeal penetration vs residue in laryngeal vestibule from prior trial with each of the nectar thick attempts. Penetration post swallow with thin x1 with questionable trace aspiration of the residue remaining in the pyriform sinuses post swallow. 1x instance of laryngeal penetration before the swallow with thin, as previously mentioned. Post swallow residue  ranged from mild to moderate oropharyngeally, to severe pharyngeally, though severe residue was also to be cleared to mild to moderate with thin liquid wash. No further instances of laryngeal penetration or aspiration noted throughout the study. RECOMMENDATIONS: Continue current diet of mechanical soft diet consistency; upgrade to nectar thick liquid consistency; ok for ice chips or small sips of thin liquid water in between meals, being at least 30 min following any other PO intake; meds whole with nectar thick liquids; RN to monitor for increased lung congestion. ST to continue to follow and tx. Thanks!           SLP GOALS     Row Name 07/13/18 1325 07/13/18 1037 07/12/18 1400       Oral Nutrition/Hydration Goal 1 (SLP)    Oral Nutrition/Hydration Goal 1, SLP LTG: Pt will tolerate LRD without overt s/s of aspiration.   -TM LTG: Pt will tolerate LRD without overt s/s of aspiration.   -TM LTG: Pt will tolerate LRD without overt s/s of aspiration.   -TM    Time Frame (Oral Nutrition/Hydration Goal 1, SLP) by discharge  -TM by discharge  -TM by discharge  -TM    Barriers (Oral Nutrition/Hydration Goal 1, SLP) N/A  -TM N/A  -TM Cognition  -TM    Progress/Outcomes (Oral Nutrition/Hydration Goal 1, SLP) continuing progress toward goal  -TM continuing progress toward goal  -TM continuing progress toward goal  -TM       Lingual Strengthening Goal 1 (SLP)    Activity (Lingual Strengthening Goal 1, SLP) increase lingual tone/sensation/control/coordination/movement;increase tongue back strength  -TM  --  --    Increase Lingual Tone/Sensation/Control/Coordination/Movement lingual movement exercises  -TM  --  --    Increase Tongue Back Strength lingual movement exercises  -TM  --  --    [place]/Accuracy (Lingual Strengthening Goal 1, SLP) with minimal cues (75-90% accuracy)  -TM  --  --    Time Frame (Lingual Strengthening Goal 1, SLP) by discharge  -TM  --  --    Barriers (Lingual Strengthening Goal 1, SLP) N/A   -TM  --   --    Progress/Outcomes (Lingual Strengthening Goal 1, SLP) goal ongoing  -TM  --  --       Pharyngeal Strengthening Exercise Goal 1 (SLP)    Activity (Pharyngeal Strengthening Goal 1, SLP) increase squeeze/positive pressure generation;increase superior movement of the hyolaryngeal complex;increase anterior movement of the hyolaryngeal complex  -TM increase squeeze/positive pressure generation  -TM increase squeeze/positive pressure generation  -TM    Increase Superior Movement of the Hyolaryngeal Complex falsetto;Mendelsohn  -TM  --  --    Increase Anterior Movement of the Hyolaryngeal Complex shaker  -TM  --  --    Increase Squeeze/Positive Pressure Generation hard effortful swallow;cassidy  -TM hard effortful swallow;cassidy  -TM hard effortful swallow;cassidy  -TM    [place]/Accuracy (Pharyngeal Strengthening Goal 1, SLP) with minimal cues (75-90% accuracy)  -TM with minimal cues (75-90% accuracy)  -TM with minimal cues (75-90% accuracy)  -TM    Time Frame (Pharyngeal Strengthening Goal 1, SLP) by discharge  -TM by discharge  -TM by discharge  -TM    Barriers (Pharyngeal Strengthening Goal 1, SLP) N/A   -TM N/A   -TM Cognition  -TM    Progress/Outcomes (Pharyngeal Strengthening Goal 1, SLP) goal ongoing  -TM continuing progress toward goal  -TM goal ongoing  -TM       Pharyngeal Strengthening Exercise Goal (SLP)    Pharyngeal Strengthening Goal, (SLP) Pt will complete 30 minutes of NMES applied to the suprahyoids in conjunction with swallow exercises w/o any overt s/s of distress in order to improve swallow function.  -TM Pt will complete 30 minutes of NMES applied to the suprahyoids in conjunction with swallow exercises w/o any overt s/s of distress in order to improve swallow function.  -TM Pt will complete 30 minutes of NMES applied to the suprahyoids in conjunction with swallow exercises w/o any overt s/s of distress in order to improve swallow function.  -TM    Time Frame (Pharyngeal Strengthening Goal,  SLP) by discharge  -TM by discharge  -TM by discharge  -TM    Barriers (Pharyngeal Strengthening Goal, SLP) N/A  -TM N/A  -TM Cognition  -TM    Progress/Outcomes (Pharyngeal Strengthening Goal, SLP) goal ongoing  -TM goal ongoing  -TM goal ongoing  -TM    Row Name 07/12/18 1010 07/11/18 1314          Oral Nutrition/Hydration Goal 1 (SLP)    Oral Nutrition/Hydration Goal 1, SLP LTG: Pt will tolerate LRD without overt s/s of aspiration.   -TM LTG: Pt will tolerate LRD without overt s/s of aspiration.   -BN     Time Frame (Oral Nutrition/Hydration Goal 1, SLP) by discharge  -TM by discharge  -BN     Barriers (Oral Nutrition/Hydration Goal 1, SLP) Weakness, cognition   -TM Weakness, cognition   -BN     Progress/Outcomes (Oral Nutrition/Hydration Goal 1, SLP) continuing progress toward goal  -TM continuing progress toward goal  -BN        Pharyngeal Strengthening Exercise Goal 1 (SLP)    Activity (Pharyngeal Strengthening Goal 1, SLP) increase squeeze/positive pressure generation  -TM increase squeeze/positive pressure generation  -BN     Increase Squeeze/Positive Pressure Generation hard effortful swallow;cassidy  -TM hard effortful swallow;cassidy  -BN     [place]/Accuracy (Pharyngeal Strengthening Goal 1, SLP) with minimal cues (75-90% accuracy)  -TM with minimal cues (75-90% accuracy)  -BN     Time Frame (Pharyngeal Strengthening Goal 1, SLP) by discharge  -TM by discharge  -BN     Barriers (Pharyngeal Strengthening Goal 1, SLP) Weakness, cognition  -TM Weakness, cognition  -BN     Progress/Outcomes (Pharyngeal Strengthening Goal 1, SLP) goal ongoing  -TM continuing progress toward goal  -BN        Pharyngeal Strengthening Exercise Goal (SLP)    Pharyngeal Strengthening Goal, (SLP) Pt will complete 30 minutes of NMES applied to the suprahyoids in conjunction with swallow exercises w/o any overt s/s of distress in order to improve swallow function.  -TM Pt will complete 30 minutes of NMES applied to the suprahyoids  in conjunction with swallow exercises w/o any overt s/s of distress in order to improve swallow function.  -BN     Time Frame (Pharyngeal Strengthening Goal, SLP) by discharge  -TM by discharge  -BN     Barriers (Pharyngeal Strengthening Goal, SLP) n/a  -TM n/a  -BN     Progress/Outcomes (Pharyngeal Strengthening Goal, SLP) goal ongoing  -TM goal ongoing  -BN       User Key  (r) = Recorded By, (t) = Taken By, (c) = Cosigned By    Initials Name Provider Type    TM DAKOTAH [place] Speech and Language Pathologist    BRYSON Rios CCC-SLP Speech and Language Pathologist             SLP Outcome Measures (last 72 hours)      SLP Outcome Measures     Row Name 07/13/18 1425             SLP Outcome Measures    Outcome Measure Used? Adult NOMS  -TM         FCM Scores    FCM Chosen Swallowing  -TM      Swallowing FCM Score 4  -TM        User Key  (r) = Recorded By, (t) = Taken By, (c) = Cosigned By    Initials Name Effective Dates    TM DAKOTAH España 08/02/16 -            Time Calculation:         Time Calculation- SLP     Row Name 07/13/18 1515 07/13/18 1427          Time Calculation- SLP    SLP Start Time 1325  -TM 1037  -TM     SLP Stop Time 1515  -TM 1100  -TM     SLP Time Calculation (min) 110 min  -TM 23 min  -TM     SLP Received On 07/13/18  -TM 07/13/18  -     SLP Goal Re-Cert Due Date 07/23/18  -  --       User Key  (r) = Recorded By, (t) = Taken By, (c) = Cosigned By    Initials Name Provider Type    TM DAKOTAH España Speech and Language Pathologist          Therapy Charges for Today     Code Description Service Date Service Provider Modifiers Qty    01178062112 HC ST SELF CARE/MGMT/TRAIN EA 15 MIN 7/12/2018 DAKOTAH España GN 1    24246653357 HC ST TREATMENT SWALLOW 3 7/12/2018 DAKOTAH España GN 1    22511131906 HC ST TREATMENT SWALLOW 2 7/13/2018 DAKOTAH España GN 1    17331978372 HC ST SWALLOWING CURRENT STATUS 7/13/2018 DAKOTAH España  BRITNEY, CK 1    14455851547 HC ST SWALLOWING PROJECTED 7/13/2018 DAKOTAH España, CK 1    99074630113 HC ST MOTION FLUORO EVAL SWALLOW 7 7/13/2018 DAKOTAH España 1          SLP G-Codes  SLP NOMS Used?: Yes  Functional Limitations: Swallowing  Swallow Current Status (): At least 40 percent but less than 60 percent impaired, limited or restricted  Swallow Goal Status (): At least 40 percent but less than 60 percent impaired, limited or restricted    DAKOTAH Macario  7/13/2018

## 2018-07-13 NOTE — PLAN OF CARE
Problem: Patient Care Overview  Goal: Plan of Care Review  Outcome: Ongoing (interventions implemented as appropriate)   07/13/18 1513   Coping/Psychosocial   Plan of Care Reviewed With patient   Plan of Care Review   Progress (Eval)   OTHER   Outcome Summary VFSS completed for re-eval of swallow fx, considering pt underwent prior, acute VFSS on 07/03/2018 during which laryngeal penetration x2 was noted with nectar thick liquids. During today's study, pt was presented honey thick, nectar thick x2, thin, pudding thick, mechanical soft, and repeat thin liquid trial. Pt was noted to have moderately decreased bolus formation throughout the study with liquids, as well as decreased base of tongue strength with pudding thick, honey thick, and nectar thick (resulting in premature loss of the bolus). A delay in swallow initation, noted with all consistencies except honey thick, also contributed to premature loss of the bolus, resulting in laryngeal penetration before the swallow with thin liquids x1. Decreased laryngeal elevation, no anterior hyolaryngeal excursion, moderately decreased (yet often absent) epiglottic inversion, and poor pharyngeal contraction, resulting in significant pharyngeal residue, especially with pudding thick and mechanical soft (severe in vallecula and pyriform sinuses). Pt was noted to have laryngeal penetration post swallow of pyriform sinus residue x3 with honey thick, with trace aspiration and throat clear noted. Questionable laryngeal penetration vs residue in laryngeal vestibule from prior trial with each of the nectar thick attempts. Penetration post swallow with thin x1 with questionable trace aspiration of the residue remaining in the pyriform sinuses post swallow. 1x instance of laryngeal penetration before the swallow with thin, as previously mentioned. Post swallow residue ranged from mild to moderate oropharyngeally, to severe pharyngeally, though severe residue was also to be cleared to  mild to moderate with thin liquid wash. No further instances of laryngeal penetration or aspiration noted throughout the study. RECOMMENDATIONS: Continue current diet of mechanical soft diet consistency; upgrade to nectar thick liquid consistency; ok for ice chips or small sips of thin liquid water in between meals, being at least 30 min following any other PO intake; meds whole with nectar thick liquids; RN to monitor for increased lung congestion. ST to continue to follow and tx. Thanks!

## 2018-07-13 NOTE — PLAN OF CARE
Problem: Patient Care Overview  Goal: Plan of Care Review  Outcome: Ongoing (interventions implemented as appropriate)   07/13/18 1037   Coping/Psychosocial   Plan of Care Reviewed With patient;other (see comments)  (RN)   Plan of Care Review   Progress improving   OTHER   Outcome Summary Swallowing tx completed this AM. Pt continues to c/o some oral pain in region of upper R gum and lingual, with multiple lingual blisters/lacerations noted. Remains on Nystatin. Educated pt on risk for aspiration in swallow Nystatin, yet educated that medication can also be swabbed orally or utilized via swish and spit method. Pt completed three trials of five effortful swallows for pharyngeal strengthening and three trials of five Kelly (lingual protrusion) swallows for base of tongue strengthening. No difficulty with initiation of swallows, yet did appear to have weak laryngeal elevation based on tactile judgement. Pt verbalized dislike for honey thick liquids, verbalizing he feels that he would even be more pleased with a thickened liquid of a thinner consistency. Given concerns noted with laryngeal penetration on VFSS on 07/03/2018, ST recommends repeat VFSS to determine if pt is safe for a liquid upgrade, as ST anticipates pt is nearing possible discharge from the facility. Pt and RN concur with plan. ST to follow up with further nutritional recommendations s/p VFSS today. Thanks!

## 2018-07-13 NOTE — PLAN OF CARE
Problem: Fall Risk (Adult)  Goal: Absence of Fall  Outcome: Ongoing (interventions implemented as appropriate)      Problem: Patient Care Overview  Goal: Plan of Care Review  Outcome: Ongoing (interventions implemented as appropriate)   07/13/18 1520   Coping/Psychosocial   Plan of Care Reviewed With patient   Plan of Care Review   Progress improving   OTHER   Outcome Summary No complaints of pain or discomfort. VSS. Went for speech swallow study this shift. No changes to diet thus far. Patient walked with PT. Hopeful to return home noemi. Will continue to monitor.      Goal: Individualization and Mutuality  Outcome: Ongoing (interventions implemented as appropriate)    Goal: Discharge Needs Assessment  Outcome: Ongoing (interventions implemented as appropriate)    Goal: Interprofessional Rounds/Family Conf  Outcome: Ongoing (interventions implemented as appropriate)      Problem: Sepsis/Septic Shock (Adult)  Goal: Signs and Symptoms of Listed Potential Problems Will be Absent, Minimized or Managed (Sepsis/Septic Shock)  Outcome: Ongoing (interventions implemented as appropriate)      Problem: Skin Injury Risk (Adult)  Goal: Skin Health and Integrity  Outcome: Ongoing (interventions implemented as appropriate)      Problem: Nutrition, Imbalanced: Inadequate Oral Intake (Adult)  Goal: Improved Oral Intake  Outcome: Ongoing (interventions implemented as appropriate)    Goal: Prevent Further Weight Loss  Outcome: Ongoing (interventions implemented as appropriate)

## 2018-07-13 NOTE — PLAN OF CARE
Problem: Fall Risk (Adult)  Goal: Absence of Fall  Outcome: Ongoing (interventions implemented as appropriate)   07/13/18 0422   Fall Risk (Adult)   Absence of Fall making progress toward outcome       Problem: Patient Care Overview  Goal: Plan of Care Review  Outcome: Ongoing (interventions implemented as appropriate)   07/13/18 0422   Coping/Psychosocial   Plan of Care Reviewed With patient   Plan of Care Review   Progress improving   OTHER   Outcome Summary No S/S respiratory distress noted this shift. Pt. ambulated in hallway with family with O2. No S/S aspiration noted this shift. Meds given whole with applesauce. Will continue to monitor.      Goal: Individualization and Mutuality  Outcome: Ongoing (interventions implemented as appropriate)   07/13/18 0422   Individualization   Patient Specific Goals (Include Timeframe) Pt. goal to be discharged soon.     Goal: Interprofessional Rounds/Family Conf  Outcome: Ongoing (interventions implemented as appropriate)   07/13/18 0422   Interdisciplinary Rounds/Family Conf   Participants nursing;patient       Problem: Sepsis/Septic Shock (Adult)  Goal: Signs and Symptoms of Listed Potential Problems Will be Absent, Minimized or Managed (Sepsis/Septic Shock)  Outcome: Ongoing (interventions implemented as appropriate)   07/13/18 0422   Goal/Outcome Evaluation   Problems Assessed (Sepsis) all   Problems Present (Sepsis) hypoxia/hypoxemia       Problem: Skin Injury Risk (Adult)  Goal: Skin Health and Integrity  Outcome: Ongoing (interventions implemented as appropriate)   07/13/18 0422   Skin Injury Risk (Adult)   Skin Health and Integrity making progress toward outcome       Problem: Nutrition, Imbalanced: Inadequate Oral Intake (Adult)  Goal: Improved Oral Intake  Outcome: Ongoing (interventions implemented as appropriate)   07/13/18 0422   Nutrition, Imbalanced: Inadequate Oral Intake (Adult)   Improved Oral Intake making progress toward outcome     Goal: Prevent Further  Weight Loss  Outcome: Ongoing (interventions implemented as appropriate)   07/13/18 0004   Nutrition, Imbalanced: Inadequate Oral Intake (Adult)   Prevent Further Weight Loss making progress toward outcome

## 2018-07-14 VITALS
BODY MASS INDEX: 16.19 KG/M2 | DIASTOLIC BLOOD PRESSURE: 58 MMHG | RESPIRATION RATE: 18 BRPM | SYSTOLIC BLOOD PRESSURE: 104 MMHG | HEART RATE: 100 BPM | HEIGHT: 65 IN | WEIGHT: 97.2 LBS | TEMPERATURE: 97.9 F | OXYGEN SATURATION: 100 %

## 2018-07-14 PROCEDURE — 94760 N-INVAS EAR/PLS OXIMETRY 1: CPT

## 2018-07-14 PROCEDURE — 94799 UNLISTED PULMONARY SVC/PX: CPT

## 2018-07-14 PROCEDURE — 94669 MECHANICAL CHEST WALL OSCILL: CPT

## 2018-07-14 RX ORDER — ALPRAZOLAM 0.25 MG/1
0.25 TABLET ORAL 3 TIMES DAILY PRN
Qty: 15 TABLET | Refills: 0 | Status: SHIPPED | OUTPATIENT
Start: 2018-07-14 | End: 2018-07-19

## 2018-07-14 RX ORDER — AMITRIPTYLINE HYDROCHLORIDE 25 MG/1
25 TABLET, FILM COATED ORAL NIGHTLY
Qty: 10 TABLET | Refills: 0 | Status: ON HOLD | OUTPATIENT
Start: 2018-07-14 | End: 2019-12-13

## 2018-07-14 RX ORDER — MEGESTROL ACETATE 40 MG/ML
800 SUSPENSION ORAL DAILY
Qty: 800 ML | Refills: 0 | Status: ON HOLD | OUTPATIENT
Start: 2018-07-15 | End: 2019-12-13

## 2018-07-14 RX ORDER — BUDESONIDE AND FORMOTEROL FUMARATE DIHYDRATE 160; 4.5 UG/1; UG/1
2 AEROSOL RESPIRATORY (INHALATION)
Qty: 1 INHALER | Refills: 1 | Status: ON HOLD | OUTPATIENT
Start: 2018-07-14 | End: 2020-04-05 | Stop reason: ALTCHOICE

## 2018-07-14 RX ORDER — GUAIFENESIN 600 MG/1
1200 TABLET, EXTENDED RELEASE ORAL EVERY 12 HOURS SCHEDULED
Qty: 20 TABLET | Refills: 0 | Status: SHIPPED | OUTPATIENT
Start: 2018-07-14

## 2018-07-14 RX ADMIN — ASPIRIN 81 MG: 81 TABLET ORAL at 08:14

## 2018-07-14 RX ADMIN — NYSTATIN 500000 UNITS: 100000 SUSPENSION ORAL at 12:01

## 2018-07-14 RX ADMIN — NYSTATIN 500000 UNITS: 100000 SUSPENSION ORAL at 08:14

## 2018-07-14 RX ADMIN — DOCUSATE SODIUM 100 MG: 100 CAPSULE ORAL at 08:14

## 2018-07-14 RX ADMIN — Medication 250 MG: at 08:13

## 2018-07-14 RX ADMIN — GUAIFENESIN 1200 MG: 600 TABLET, EXTENDED RELEASE ORAL at 08:13

## 2018-07-14 RX ADMIN — IPRATROPIUM BROMIDE AND ALBUTEROL SULFATE 3 ML: 2.5; .5 SOLUTION RESPIRATORY (INHALATION) at 06:44

## 2018-07-14 RX ADMIN — FAMOTIDINE 20 MG: 20 TABLET, FILM COATED ORAL at 08:14

## 2018-07-14 RX ADMIN — IPRATROPIUM BROMIDE AND ALBUTEROL SULFATE 3 ML: 2.5; .5 SOLUTION RESPIRATORY (INHALATION) at 12:10

## 2018-07-14 RX ADMIN — METOPROLOL TARTRATE 50 MG: 50 TABLET ORAL at 08:14

## 2018-07-14 RX ADMIN — MEGESTROL ACETATE 800 MG: 40 SUSPENSION ORAL at 08:14

## 2018-07-14 RX ADMIN — BUDESONIDE AND FORMOTEROL FUMARATE DIHYDRATE 2 PUFF: 160; 4.5 AEROSOL RESPIRATORY (INHALATION) at 06:44

## 2018-07-14 RX ADMIN — CITALOPRAM 20 MG: 20 TABLET, FILM COATED ORAL at 08:14

## 2018-07-14 NOTE — PLAN OF CARE
Problem: Fall Risk (Adult)  Goal: Absence of Fall  Outcome: Ongoing (interventions implemented as appropriate)      Problem: Patient Care Overview  Goal: Plan of Care Review  Outcome: Ongoing (interventions implemented as appropriate)   07/14/18 0508   Coping/Psychosocial   Plan of Care Reviewed With patient   Plan of Care Review   Progress improving     Goal: Individualization and Mutuality  Outcome: Ongoing (interventions implemented as appropriate)   07/14/18 0508   Individualization   Patient Specific Goals (Include Timeframe) Pt. states he is going home today.     Goal: Interprofessional Rounds/Family Conf  Outcome: Ongoing (interventions implemented as appropriate)   07/14/18 0508   Interdisciplinary Rounds/Family Conf   Participants nursing;patient       Problem: Sepsis/Septic Shock (Adult)  Goal: Signs and Symptoms of Listed Potential Problems Will be Absent, Minimized or Managed (Sepsis/Septic Shock)  Outcome: Ongoing (interventions implemented as appropriate)   07/14/18 0508   Goal/Outcome Evaluation   Problems Assessed (Sepsis) all   Problems Present (Sepsis) hypoxia/hypoxemia       Problem: Skin Injury Risk (Adult)  Goal: Skin Health and Integrity  Outcome: Ongoing (interventions implemented as appropriate)   07/14/18 0508   Skin Injury Risk (Adult)   Skin Health and Integrity making progress toward outcome       Problem: Nutrition, Imbalanced: Inadequate Oral Intake (Adult)  Goal: Improved Oral Intake  Outcome: Ongoing (interventions implemented as appropriate)   07/14/18 0508   Nutrition, Imbalanced: Inadequate Oral Intake (Adult)   Improved Oral Intake making progress toward outcome     Goal: Prevent Further Weight Loss  Outcome: Ongoing (interventions implemented as appropriate)   07/14/18 0508   Nutrition, Imbalanced: Inadequate Oral Intake (Adult)   Prevent Further Weight Loss making progress toward outcome

## 2018-07-14 NOTE — DISCHARGE SUMMARY
[facility] Medicine Services  DISCHARGE SUMMARY       Date of Admission: 7/2/2018  Date of Discharge:  7/14/2018  Primary Care Physician: Jose Moon MD    Presenting Problem/History of Present Illness:  Aspiration pneumonia of left lung, unspecified aspiration pneumonia type, unspecified part of lung (CMS/HCC) [J69.0]     Final Discharge Diagnoses:  Hospital Problem List     Aspiration pneumonia of left lung (CMS/HCC)          Consults:  Pulmonology     Procedures Performed:   Swallowing studies    CXR:  IMPRESSION:  1. Partial resolution left sided pneumonia.    Pertinent Test Results:   Lab Results (last 24 hours)     ** No results found for the last 24 hours. **        Lab Results (last 72 hours)     Procedure Component Value Units Date/Time    Comprehensive Metabolic Panel [[id number]]  (Abnormal) Collected:  07/13/18 0443    Specimen:  Blood Updated:  07/13/18 0517     Glucose 97 mg/dL      BUN 14 mg/dL      Creatinine 0.50 mg/dL      Sodium 142 mmol/L      Potassium 3.4 (L) mmol/L      Chloride 100 mmol/L      CO2 34.0 (H) mmol/L      Calcium 8.6 mg/dL      Total Protein 6.3 g/dL      Albumin 3.00 (L) g/dL      ALT (SGPT) 235 (H) U/L      AST (SGOT) 78 (H) U/L      Alkaline Phosphatase 102 U/L      Total Bilirubin 0.4 mg/dL      eGFR Non African Amer >150 mL/min/1.73      Globulin 3.3 gm/dL      A/G Ratio 0.9 (L) g/dL      BUN/Creatinine Ratio 28.0 (H)     Anion Gap 8.0 mmol/L     Comprehensive Metabolic Panel [[id number]]  (Abnormal) Collected:  07/12/18 0424    Specimen:  Blood Updated:  07/12/18 0609     Glucose 104 (H) mg/dL      BUN 16 mg/dL      Creatinine 0.55 mg/dL      Sodium 141 mmol/L      Potassium 3.6 mmol/L      Chloride 100 mmol/L      CO2 32.0 (H) mmol/L      Calcium 8.5 mg/dL      Total Protein 6.3 g/dL      Albumin 3.10 (L) g/dL      ALT (SGPT) 291 (H) U/L      AST (SGOT) 103 (H) U/L      Alkaline Phosphatase 121 (H) U/L      Total Bilirubin  "0.4 mg/dL      eGFR Non African Amer 149 mL/min/1.73      Globulin 3.2 gm/dL      A/G Ratio 1.0 (L) g/dL      BUN/Creatinine Ratio 29.1 (H)     Anion Gap 9.0 mmol/L     Gamma GT [[id number]]  (Abnormal) Collected:  07/12/18 0424    Specimen:  Blood Updated:  07/12/18 0609      (H) U/L     Protime-INR [[id number]]  (Abnormal) Collected:  07/12/18 0424    Specimen:  Blood Updated:  07/12/18 0552     Protime 15.7 (H) Seconds      INR 1.21 (H)            Chief Complaint on Day of Discharge: None    History of Present Illness on Day of Discharge: Stable    Hospital Course:  The patient is a 63 y.o. male who presented to [facility] with past medical history significant for chronic obstructive pulmonary disease with home oxygen dependency that presented to our hospital today with a chief complaint of weakness, shortness of breath, and recent history of falls.  Patient does have a history of tobacco dependence and continues to smoke.  He reports is not been feeling well over the course of the past few days, and in fact his symptoms have progressively gotten worse. Patient was found to have an aspiration PNA. Diet was advanced per speech therapy recommendations. PNA was improving at discharge. Nebs and mucinex were added, and sedative medications were decreased.      Condition on Discharge:  Stable    Physical Exam on Discharge:  /58 (BP Location: Right arm, Patient Position: Lying)   Pulse 100   Temp 97.9 °F (36.6 °C) (Axillary)   Resp 18   Ht 165.1 cm (65\")   Wt 44.1 kg (97 lb 3.2 oz)   SpO2 100%   BMI 16.17 kg/m²   Physical Exam   Constitutional:   Thin and frail.    HENT:   Head: Normocephalic and atraumatic.   Nose: Nose normal.   Mouth/Throat: Oropharynx is clear and moist.   Eyes: Conjunctivae and EOM are normal.   Neck: Normal range of motion. Neck supple.   Cardiovascular: Normal rate, regular rhythm and normal heart sounds.    Pulmonary/Chest: Effort normal and breath sounds normal. "   Coarse in the bases.    Abdominal: Soft. Bowel sounds are normal.   Musculoskeletal: He exhibits no edema or tenderness.   Neurological: He is alert. No cranial nerve deficit.   Skin: Skin is warm and dry.   Psychiatric: He has a normal mood and affect.   Vitals reviewed.      Discharge Disposition:  Home or Self Care    Discharge Medications:     Discharge Medications      New Medications      Instructions Start Date   budesonide-formoterol 160-4.5 MCG/ACT inhaler  Commonly known as:  SYMBICORT   2 puffs, Inhalation, 2 Times Daily - RT      guaiFENesin 600 MG 12 hr tablet  Commonly known as:  MUCINEX   1,200 mg, Oral, Every 12 Hours Scheduled      megestrol 40 MG/ML suspension  Commonly known as:  MEGACE   800 mg, Oral, Daily   Start Date:  7/15/2018        Changes to Medications      Instructions Start Date   ALPRAZolam 0.25 MG tablet  Commonly known as:  XANAX  What changed:  · medication strength  · how much to take   0.25 mg, Oral, 3 Times Daily PRN      amitriptyline 25 MG tablet  Commonly known as:  ELAVIL  What changed:  · medication strength  · how much to take   25 mg, Oral, Nightly         Continue These Medications      Instructions Start Date   aspirin 81 MG EC tablet   81 mg, Oral, Daily      citalopram 20 MG tablet  Commonly known as:  CeleXA   20 mg, Oral, Daily      ipratropium-albuterol 0.5-2.5 mg/3 ml nebulizer  Commonly known as:  DUO-NEB   3 mL, Nebulization, Every 6 Hours PRN      metoprolol tartrate 50 MG tablet  Commonly known as:  LOPRESSOR   50 mg, Oral, 2 Times Daily      pantoprazole 40 MG EC tablet  Commonly known as:  PROTONIX   40 mg, Oral, Daily      simvastatin 20 MG tablet  Commonly known as:  ZOCOR   20 mg, Oral, Nightly         Stop These Medications    mirtazapine 15 MG tablet  Commonly known as:  REMERON     rOPINIRole 2 MG tablet  Commonly known as:  REQUIP            Discharge Diet:   Diet Instructions     Diet: Regular; Nectar / Syrup Thick       Discharge Diet:  Regular     Fluid Consistency:  [place] / Syrup Thick    Ground foods. Follow speech recommendations.        Recommendations: Diet Textures: nectar thick liquid, mechanical soft consistency food. Medications should be taken whole with thickened liquids. May have water and Ice between meals after oral care, under staff or family supervision and with the recommended strategies for safe swallowing.  Recommended Strategies: Upright for PO, small bites and sips, double swallow with every bite/sip and alternate liquids and solids. Oral care before breakfast, after all meals and PRN.  Dysphagia therapy is recommended and will be continued. Rationale: See above. Feel pt will benefit from NMES tx on outpatient/home health basis for dysphagia.    Activity at Discharge:   Activity Instructions     Activity as Tolerated       Fall, aspiration, and decubitus precautions.          Discharge Care Plan/Instructions:   CBC, BMP 2-3 days after dsicharge  CXR 2 weeks  Return if worsens  HH/Speech therapy  Aspiration, fall, and decubitus precautions    Follow-up Appointments:   No future appointments.  FU with Dr. Collins in one week    Test Results Pending at Discharge: none    Jameson Oliver DO  07/14/18  12:54 PM    Time: 40

## 2018-07-14 NOTE — DISCHARGE INSTRUCTIONS
1. Medications should be taken whole with thickened liquids.   2. May have water and Ice between meals after oral care, under staff or family supervision and with the recommended strategies for safe swallowing.  3. Upright for oral intake patient should take small bites and sips, double swallow with every bite/sip and alternate liquids and solids.   4. Oral care before breakfast, after all meals and as needed.

## 2018-07-15 NOTE — THERAPY DISCHARGE NOTE
Acute Care - Physical Therapy Progress Note/Discharge  [facility]     Patient Name: Kellie Arzate  : 1955  MRN: [id number]  Today's Date: 7/15/2018  Onset of Illness/Injury or Date of Surgery: 18  Date of Referral to PT: 18  Referring Physician: Dr. Merrill    Admit Date: 2018    Visit Dx:    ICD-10-CM ICD-9-CM   1. Aspiration pneumonia of left lung, unspecified aspiration pneumonia type, unspecified part of lung (CMS/[facility]) J69.0 507.0   2. SIRS (systemic inflammatory response syndrome) (CMS/HCC) R65.10 995.90   3. Oropharyngeal dysphagia R13.12 787.22   4. Impaired functional mobility, balance, gait, and endurance Z74.09 V49.89     Patient Active Problem List   Diagnosis   • Aspiration pneumonia of left lung (CMS/[facility])       Physical Therapy Education     Title: PT OT SLP Therapies (Resolved)     Topic: Physical Therapy (Resolved)     Point: Mobility training (Resolved)    Learning Progress Summary     Learner Status Readiness Method Response Comment Documented by    Patient Done Acceptance E DU Breathing technique, safety with RWX GOPAL 18 1124     Done Nonacceptance E VU,NR t/f's saftey ex's AE 18 1600     Active Acceptance E NR t/f's AE 18 1038     Active Acceptance E,D NR benefits of activity MF 18 1135     Active Acceptance E NR Educated patient about need for assist with mobility and progression of activity with PT. HR 18 1353    Family Done Eager E VU POC benefits of sitting up AE 18 1014          Point: Home exercise program (Resolved)    Learning Progress Summary     Learner Status Readiness Method Response Comment Documented by    Patient Active Acceptance E NR Educated patient about need for assist with mobility and progression of activity with PT. HR 18 1353          Point: Body mechanics (Resolved)    Learning Progress Summary     Learner Status Readiness Method Response Comment Documented by    Patient Active Acceptance E NR Educated  patient about need for assist with mobility and progression of activity with PT. HR 07/07/18 1353          Point: Precautions (Resolved)    Learning Progress Summary     Learner Status Readiness Method Response Comment Documented by    Patient Active Acceptance E NR Educated patient about need for assist with mobility and progression of activity with PT. HR 07/07/18 1353                      User Key     Initials Effective Dates Name Provider Type Discipline    AE 06/22/15 -  Sil Ruiz, PTA Physical Therapy Assistant PT    GOPAL 08/02/16 -  Mer Aly, JONY Physical Therapy Assistant PT    HR 06/04/18 -  Tete Barboza, PT, DPT, CLT-AGUSTÍN Physical Therapist PT    MF 08/02/16 -  Kari Harrison PTA Physical Therapy Assistant PT                    PT Rehab Goals     Row Name 07/15/18 1208             Bed Mobility Goal 1 (PT)    [place] Level/Cues Needed (Bed Mobility Goal 1, PT) independent   goal: cond ind  -GOPAL      Time Frame (Bed Mobility Goal 1, PT) --   goal met  -GOPAL         Transfer Goal 1 (PT)    [place] Level/Cues Needed (Transfer Goal 1, PT) independent   goal: cond indepenent  -GOPAL      Barriers (Transfers Goal 1, PT) goal met  -GOPAL         Gait Training Goal 1 (PT)    [place] Level (Gait Training Goal 1, PT) supervision required   goal:CGA  -GOPAL      Distance (Gait Goal 1, PT) --   goal:50'. amb 170'  -GOPAL      Barriers (Gait Training Goal 1, PT) goal met  -GOPAL        User Key  (r) = Recorded By, (t) = Taken By, (c) = Cosigned By    Initials Name Provider Type Discipline    GOPAL Mer Aly, [facility] [place] Physical Therapy Assistant PT        Therapy Treatment        Rehabilitation Treatment Summary     Row Name                Wound 07/10/18 0800 midline coccyx pressure injury    Wound - Properties Group Date first assessed: 07/10/18 [AG] Time first assessed: 0800 [AG] Orientation: midline [AG] Location: coccyx [AG] Type: pressure injury [AG] Stage, Pressure Injury: Stage 1 [AG],  blanchable  Recorded by:  [AG] Reyna Beatty RN 07/10/18 1529      User Key  (r) = Recorded By, (t) = Taken By, (c) = Cosigned By    Initials Name Effective Dates Discipline    AG Reyna Beatty RN 08/02/16 -  Nurse             PT Recommendation and Plan  Anticipated Discharge Disposition (PT): home  Outcome Summary/Treatment Plan (PT)  Anticipated Discharge Disposition (PT): home  Plan of Care Reviewed With: patient  Outcome Summary: Pt. is independent with sit to stand to sit. Ambulated 170' with RWX and supervision. OX SAT on 3lpm between 93-97%. Will continue to benefit from strengthening and increased martin.          Outcome Measures     Row Name 07/13/18 1100 07/12/18 1522          How much help from another person do you currently need...    Turning from your back to your side while in flat bed without using bedrails? 4  -GOPAL 3  -AE     Moving from lying on back to sitting on the side of a flat bed without bedrails? 4  -GOPAL 3  -AE     Moving to and from a bed to a chair (including a wheelchair)? 4  -GOPAL 3  -AE     Standing up from a chair using your arms (e.g., wheelchair, bedside chair)? 4  -GOPAL 3  -AE     Climbing 3-5 steps with a railing? 3  -GOPAL 3  -AE     To walk in hospital room? 4  -GOPAL 3  -AE     AM-PAC 6 Clicks Score 23  -GOPAL 18  -AE        Functional Assessment    Outcome Measure Options  -- AM-PAC 6 Clicks Basic Mobility (PT)  -AE       User Key  (r) = Recorded By, (t) = Taken By, (c) = Cosigned By    Initials Name Provider Type    AE Sil Ruiz, [facility] Physical Therapy Assistant    GOPAL Aly PTA Physical Therapy Assistant           Time Calculation:     Therapy Suggested Charges     Code   Minutes Charges    58339 (CPT®) Hc Pt Neuromusc Re Education Ea 15 Min      43390 (CPT®) Hc Pt Ther Proc Ea 15 Min 18 1    62958 (CPT®) Hc Gait Training Ea 15 Min 30 2    57738 (CPT®) Hc Pt Therapeutic Act Ea 15 Min      63583 (CPT®) Hc Pt Manual Therapy Ea 15 Min      84594 (CPT®) Hc Pt Iontophoresis Ea 15 Min       68869 (CPT®) Hc Pt Elec Stim Ea-Per 15 Min      51818 (CPT®) Hc Pt Ultrasound Ea 15 Min      00250 (CPT®) Hc Pt Self Care/Mgmt/Train Ea 15 Min      Total  48 3              PT G-Codes  Outcome Measure Options: AM-PAC 6 Clicks Basic Mobility (PT)  Score: 18  Functional Limitation: Mobility: Walking and moving around  Mobility: Walking and Moving Around Current Status (): At least 40 percent but less than 60 percent impaired, limited or restricted  Mobility: Walking and Moving Around Goal Status (): At least 20 percent but less than 40 percent impaired, limited or restricted    PT Discharge Summary  Anticipated Discharge Disposition (PT): home  Reason for Discharge: All goals achieved  Outcomes Achieved: Able to achieve all goals within established timeline  Discharge Destination: Home    Mer Aly, PTA  7/15/2018

## 2018-07-16 NOTE — THERAPY DISCHARGE NOTE
Acute Care - Speech Language Pathology Discharge Summary  [facility]       Patient Name: Kellie Arzate  : 1955  MRN: [id number]    Today's Date: 2018  Onset of Illness/Injury or Date of Surgery: 18       Referring Physician: Dr. Merrill        Admit Date: 2018    SLP Recommendation and Plan  Nectar thick liquid, Mechanical soft diet consistency. Medications should be taken whole with thickened liquids. May have water and Ice between meals after oral care, under staff or family supervision and with the recommended strategies for safe swallowing. Pt would benefit from continued ST services for dysphagia and would benefit from NMES tx. Thanks! Dinorah Thomas, CCC-SLP 2018 4:01 PM    Visit Dx:    ICD-10-CM ICD-9-CM   1. Aspiration pneumonia of left lung, unspecified aspiration pneumonia type, unspecified part of lung (CMS/[facility]) J69.0 507.0   2. SIRS (systemic inflammatory response syndrome) (CMS/[facility]) R65.10 995.90   3. Oropharyngeal dysphagia R13.12 787.22   4. Impaired functional mobility, balance, gait, and endurance Z74.09 V49.89                     SLP GOALS     Row Name 18 1558             Oral Nutrition/Hydration Goal 1 (SLP)    Oral Nutrition/Hydration Goal 1, SLP LTG: Pt will tolerate LRD without overt s/s of aspiration.   -TM      Time Frame (Oral Nutrition/Hydration Goal 1, SLP) by discharge  -TM      Barriers (Oral Nutrition/Hydration Goal 1, SLP) N/A  -TM      Progress/Outcomes (Oral Nutrition/Hydration Goal 1, SLP) goal partially met;discharged from facility  -TM         Lingual Strengthening Goal 1 (SLP)    Activity (Lingual Strengthening Goal 1, SLP) increase lingual tone/sensation/control/coordination/movement;increase tongue back strength  -TM      Increase Lingual Tone/Sensation/Control/Coordination/Movement lingual movement exercises  -TM      Increase Tongue Back Strength lingual movement exercises  -TM      [place]/Accuracy (Lingual Strengthening Goal 1,  SLP) with minimal cues (75-90% accuracy)  -TM      Time Frame (Lingual Strengthening Goal 1, SLP) by discharge  -TM      Barriers (Lingual Strengthening Goal 1, SLP) N/A   -TM      Progress/Outcomes (Lingual Strengthening Goal 1, SLP) goal partially met;discharged from facility  -TM         Pharyngeal Strengthening Exercise Goal 1 (SLP)    Activity (Pharyngeal Strengthening Goal 1, SLP) increase squeeze/positive pressure generation;increase superior movement of the hyolaryngeal complex;increase anterior movement of the hyolaryngeal complex  -TM      Increase Superior Movement of the Hyolaryngeal Complex falsetto;Mendelsohn  -TM      Increase Anterior Movement of the Hyolaryngeal Complex shaker  -TM      Increase Squeeze/Positive Pressure Generation hard effortful swallow;cassidy  -TM      [place]/Accuracy (Pharyngeal Strengthening Goal 1, SLP) with minimal cues (75-90% accuracy)  -TM      Time Frame (Pharyngeal Strengthening Goal 1, SLP) by discharge  -TM      Barriers (Pharyngeal Strengthening Goal 1, SLP) N/A   -TM      Progress/Outcomes (Pharyngeal Strengthening Goal 1, SLP) goal partially met;discharged from facility  -TM         Pharyngeal Strengthening Exercise Goal (SLP)    Pharyngeal Strengthening Goal, (SLP) Pt will complete 30 minutes of NMES applied to the suprahyoids in conjunction with swallow exercises w/o any overt s/s of distress in order to improve swallow function.  -TM      Time Frame (Pharyngeal Strengthening Goal, SLP) by discharge  -TM      Barriers (Pharyngeal Strengthening Goal, SLP) N/A  -TM      Progress/Outcomes (Pharyngeal Strengthening Goal, SLP) goal partially met;discharged from facility  -TM        User Key  (r) = Recorded By, (t) = Taken By, (c) = Cosigned By    Initials Name Provider Type    TM Dinorah Thomas CCC-SLP Speech and Language Pathologist                  SLP Discharge Summary  Anticipated Dischage Disposition: home  Progress Toward Achieving Short/long Term Goals:  goals partially met within established timelines  Discharge Destination: home      Dinorah Thomas CCC-SLP  7/16/2018

## 2018-07-16 NOTE — PROGRESS NOTES
Continued Stay Note   [facility]     Patient Name: Kellie Arzate  MRN: [id number]  Today's Date: 7/16/2018    Admit Date: 7/2/2018          Discharge Plan     Row Name 07/16/18 1322       Plan    Plan [facility]    Patient/Family in Agreement with Plan yes    Final Discharge Disposition Code 06 - home with home health care    Final Note Patient discharged on Saturday and Dr. Oliver did order home health care. Patient has requested [facility]. SW faxed referral to [facility] at [phone]. Staff at [facility] have spoken to patient and are aware of upcoming referral.        Expected Discharge Date and Time     Expected Discharge Date Expected Discharge Time    Jul 14, 2018             ELVIRA Arana

## 2018-07-16 NOTE — DISCHARGE PLACEMENT REQUEST
"Tracie Burgess (63 y.o. Male)     Date of Birth Social Security Number Address Home Phone MRN    [id number]  [id number] [place] [address] [phone] [id number]    [employer] Marital Status          None        Admission Date Admission Type Admitting Provider Attending Provider Department, Room/Bed    7/2/18 Emergency Jameson Oliver, [facility] 4C, 461/1    Discharge Date Discharge Disposition Discharge Destination        7/14/2018 Home or Self Care              Attending Provider:  (none)   Allergies:  No Known Allergies    Isolation:  None   Infection:  None   Code Status:  Prior    Ht:  165.1 cm (65\")   Wt:  44.1 kg (97 lb 3.2 oz)    Admission Cmt:  None   Principal Problem:  None                Active Insurance as of 7/2/2018     Primary Coverage     Payor Plan Insurance Group Employer/Plan Group    MEDICARE MEDICARE A & B      Payor Plan Address Payor Plan Phone Number Effective From Effective To    [address] [phone] 4/1/2013     [place] [id number]       Subscriber Name Subscriber Birth Date Member ID       TRACIE BURGESS 1955 [id number]                 Emergency Contacts      (Rel.) Home Phone Work Phone Mobile Phone    Cory Zendejas (Relative) -- -- [phone]    Kervin Zendejas (Relative) -- -- [phone]    Ankit Zendejas (Relative) -- -- [phone]    Ronak Burgess (Other) [phone] -- --               History & Physical      Adrien Merrill MD at 7/2/2018  3:25 PM              [facility] Medicine Services  HISTORY AND PHYSICAL    Date of Admission: 7/2/2018  Primary Care Physician: Jose Moon MD    Subjective     Chief Complaint: \"Just not feeling well\"    History of Present Illness  Patient is a 63-year-old  male with past medical history significant for chronic obstructive pulmonary disease with home oxygen dependency that presented to our hospital today with a chief " complaint of weakness, shortness of breath, and recent history of falls.  Patient does have a history of tobacco dependence and continues to smoke.  He reports is not been feeling well over the course of the past few days, and in fact his symptoms have progressively gotten worse.  He denies any fevers, but does report some occasional chills.  He reports a cough productive of thick, viscous, gray sputum.  He denies any known aspiration events.  He reports no recent nausea or vomiting.  He denies any abdominal pain or diarrhea.  He does report having some ongoing weight loss of unclear amount.  He lives by himself, and reports that he has felt very weak recently which is resulted into falls, without injury per his report.  He reports no current pain.  He denies having any chest pain or chest pressure.      Review of Systems     Otherwise complete ROS reviewed and negative except as mentioned in the HPI.    Past Medical History:   Past Medical History:   Diagnosis Date   • Anxiety    • Arthritis    • COPD (chronic obstructive pulmonary disease) (CMS/[facility])    • Depression    • Hypertension    • Nephrolithiasis      Past Surgical History:  Past Surgical History:   Procedure Laterality Date   • COLONOSCOPY  04/20/2007    Within Normal Limits.   • ENDOSCOPY  04/20/2007    urea neg, sbbx benign, mild gastritis, small hiatal hernia   • ENDOSCOPY N/A 10/5/2016    Procedure: ESOPHAGOGASTRODUODENOSCOPY WITH ANESTHESIA;  Surgeon: Kevyn Damon MD;  Location: [facility] ENDOSCOPY;  Service:    • HEMORRHOIDECTOMY     • PEG TUBE INSERTION  07/22/2016    Dr Mccarty   • TRACHEOSTOMY       Social History:  reports that he has been smoking Cigarettes.  He has been smoking about 0.50 packs per day. He has never used smokeless tobacco. He reports that he does not drink alcohol.    Family History: patient unaware of any significant past family history    Allergies:  No Known Allergies  Medications:  Prior to Admission medications    Medication  "Sig Start Date End Date Taking? Authorizing Provider   ALPRAZolam (XANAX) 1 MG tablet Take 1 mg by mouth 3 (Three) Times a Day As Needed for anxiety.    Historical Provider, MD   amitriptyline (ELAVIL) 50 MG tablet Take 50 mg by mouth Every Night.    Historical Provider, MD   citalopram (CeleXA) 20 MG tablet Take 20 mg by mouth Daily.    Historical Provider, MD   ipratropium-albuterol (DUO-NEB) 0.5-2.5 mg/mL nebulizer Take 3 mL by nebulization Every 6 (Six) Hours As Needed for wheezing.    Historical Provider, MD   metoprolol tartrate (LOPRESSOR) 50 MG tablet Take 50 mg by mouth 2 (Two) Times a Day.    Historical Provider, MD   pantoprazole (PROTONIX) 40 MG EC tablet Take 40 mg by mouth Daily.    Historical Provider, MD   Probiotic Product (PROBIOTIC ADVANCED PO) Take 1 tablet by mouth Daily.    Historical Provider, MD   tiotropium (SPIRIVA) 18 MCG per inhalation capsule Place 1 capsule into inhaler and inhale Daily.    Historical Provider, MD   warfarin (COUMADIN) 2 MG tablet Take 2 mg by mouth Daily.    Historical Provider, MD     Objective     Vital Signs: /68 (BP Location: Right arm, Patient Position: Lying)   Pulse (!) 123   Temp 98.8 °F (37.1 °C)   Resp 18   Ht 165.1 cm (65\")   Wt 49.6 kg (109 lb 6.4 oz)   SpO2 96%   BMI 18.21 kg/m²    Physical Exam   Constitutional: He is oriented to person, place, and time. No distress.   Thin and frail appearing   HENT:   Head: Normocephalic.   Mouth/Throat: No oropharyngeal exudate.   Eyes: Pupils are equal, round, and reactive to light. No scleral icterus.   Neck: No tracheal deviation present.   Old trach scar   Cardiovascular: Regular rhythm.    Rate approx 110-120   Pulmonary/Chest: Effort normal. No respiratory distress. He has wheezes.   Scattered coarse BSs worse on the left as compared to the right; currently on 3LNC   Abdominal: Soft. He exhibits no distension. There is no tenderness.   Musculoskeletal: He exhibits no edema.   Neurological: He is " alert and oriented to person, place, and time.   Skin: Skin is warm and dry. He is not diaphoretic.   Psychiatric: He has a normal mood and affect. His behavior is normal.   Vitals reviewed.      Results Reviewed:  Lab Results (last 24 hours)     Procedure Component Value Units Date/Time    Blood Culture - Blood, Blood, Venous Line [[id number]] Collected:  07/02/18 1246    Specimen:  Blood from Hand, Right Updated:  07/02/18 1424    Blood Culture - Blood, Blood, Venous Line [[id number]] Collected:  07/02/18 1246    Specimen:  Blood from Arm, Right Updated:  07/02/18 1423    CBC & Differential [[id number]] Collected:  07/02/18 1246    Specimen:  Blood Updated:  07/02/18 1347    Narrative:       The following orders were created for panel order CBC & Differential.  Procedure                               Abnormality         Status                     ---------                               -----------         ------                     Manual Differential[[id number]]          Abnormal            Final result               CBC Auto Differential[[id number]]        Abnormal            Final result                 Please view results for these tests on the individual orders.    Manual Differential [[id number]]  (Abnormal) Collected:  07/02/18 1246    Specimen:  Blood Updated:  07/02/18 1347     Neutrophil % 97.0 (H) %      Lymphocyte % 1.0 (L) %      Monocyte % 2.0 (L) %      Neutrophils Absolute 29.57 (H) 10*3/mm3      Lymphocytes Absolute 0.30 (L) 10*3/mm3      Monocytes Absolute 0.61 10*3/mm3      Anisocytosis Slight/1+     Hypochromia Slight/1+     Microcytes Mod/2+     Poikilocytes Slight/1+     Schistocytes Slight/1+     WBC Morphology Normal     Platelet Estimate Increased    CBC Auto Differential [[id number]]  (Abnormal) Collected:  07/02/18 1246    Specimen:  Blood Updated:  07/02/18 1347     WBC 30.48 (C) 10*3/mm3      RBC 4.95 10*6/mm3      Hemoglobin 10.3 (L) g/dL      Hematocrit 35.1 (L) %      MCV 70.9 (L) fL       MCH 20.8 (L) pg      MCHC 29.3 (L) g/dL      RDW 16.4 (H) %      RDW-SD 41.3 fl      MPV 9.1 fL      Platelets 493 (H) 10*3/mm3     Procalcitonin [[id number]]  (Abnormal) Collected:  07/02/18 1246    Specimen:  Blood Updated:  07/02/18 1332     Procalcitonin 0.35 (H) ng/mL     Narrative:       SIRS, sepsis, severe sepsis, and septic shock are categorized according to the criteria of the consensus conference of the American College of Chest Physicians/Society of Critical Care Medicine.    PCT < 0.5 ng/mL     Systemic infection (sepsis) is not likely.    PCT >0.5 and < 2.0 ng/mL Systemic infection (sepsis) is possible, but other conditions are known to elevate PCT as well.    PCT > 2.0 ng/mL     Systemic infection (sepsis) is likely, unless other causes are known.      PCT > 10.0 ng/mL    Important systemic inflammatory response, almost exclusively due to severe bacterial sepsis or septic shock.    PCT values of < 0.5 ng/mL do not exclude an infection, because localized infections (without systemic signs) may be associated with such low concentrations, or a systemic infection in its initial stages (<6 hours).  Increased PCT can occur without infection.  PCT concentrations between 0.5 and 2.0 ng/mL should be interpreted taking into account the patients history.  It is recommended to retest PCT within 6-24 hours if any concentrations < 2.0 ng/mL are obtained.    Troponin [[id number]]  (Abnormal) Collected:  07/02/18 1246    Specimen:  Blood Updated:  07/02/18 1329     Troponin I 0.190 (H) ng/mL     BNP [[id number]]  (Normal) Collected:  07/02/18 1246    Specimen:  Blood Updated:  07/02/18 1329     proBNP 570.0 pg/mL     Comprehensive Metabolic Panel [[id number]]  (Abnormal) Collected:  07/02/18 1246    Specimen:  Blood Updated:  07/02/18 1315     Glucose 130 (H) mg/dL      BUN 21 mg/dL      Creatinine 0.44 (L) mg/dL      Sodium 137 mmol/L      Potassium 4.6 mmol/L      Chloride 90 (L) mmol/L      CO2 37.0 (H) mmol/L       Calcium 9.5 mg/dL      Total Protein 7.3 g/dL      Albumin 3.40 (L) g/dL      ALT (SGPT) 69 (H) U/L      AST (SGOT) 60 (H) U/L      Alkaline Phosphatase 228 (H) U/L      Total Bilirubin 0.5 mg/dL      eGFR Non African Amer >150 mL/min/1.73      Globulin 3.9 gm/dL      A/G Ratio 0.9 (L) g/dL      BUN/Creatinine Ratio 47.7 (H)     Anion Gap 10.0 mmol/L     Protime-INR [[id number]]  (Abnormal) Collected:  07/02/18 1246    Specimen:  Blood Updated:  07/02/18 1312     Protime 15.5 (H) Seconds      INR 1.19 (H)    aPTT [[id number]]  (Abnormal) Collected:  07/02/18 1246    Specimen:  Blood Updated:  07/02/18 1312     PTT 41.6 (H) seconds     D-dimer, Quantitative [[id number]]  (Abnormal) Collected:  07/02/18 1246    Specimen:  Blood Updated:  07/02/18 1312     D-Dimer, Quantitative 2.35 (H) mg/L (FEU)     Narrative:       Reference Range is 0-0.50 mg/L FEU. However, results <0.50 mg/L FEU tends to rule out DVT or PE. Results >0.50 mg/L FEU are not useful in predicting absence or presence of DVT or PE.    Lactic Acid, Plasma [[id number]]  (Normal) Collected:  07/02/18 1246    Specimen:  Blood Updated:  07/02/18 1312     Lactate 1.6 mmol/L     Blood Gas, Arterial [[id number]]  (Abnormal) Collected:  07/02/18 1230    Specimen:  Arterial Blood Updated:  07/02/18 1241     Site Left Brachial     Kervin's Test Positive     pH, Arterial 7.410 pH units      pCO2, Arterial 59.2 (H) mm Hg      pO2, Arterial 61.9 (L) mm Hg      HCO3, Arterial 37.5 (H) mmol/L      Base Excess, Arterial 11.0 (H) mmol/L      O2 Saturation, Arterial 92.4 (L) %      Temperature 37.0 C      Barometric Pressure for Blood Gas 751 mmHg      Modality Nasal Cannula     Flow Rate 2.0 lpm      Ventilator Mode NA     Collected by [id number]        Imaging Results (last 24 hours)     Procedure Component Value Units Date/Time    CT Angiogram Chest With Contrast [[id number]] Collected:  07/02/18 1334     Updated:  07/02/18 1345    Narrative:       History:  63-year-old with  shortness of breath and tachycardia. History of  pulmonary embolus.      Reference:  CTA chest August 2016.     Technique:  Following the administration of intravenous contrast, helical  acquisition was obtained from the thoracic inlet through the diaphragm  during the arterial phase. Multiplanar reconstructed images including 3D  MIP were obtained.     For this CT exam, one or more of the following dose reduction techniques  was employed:  -automated exposure control  -mA and/or kVp adjustment for patient size  -iterative reconstruction      mGy-cm     Findings:     Vascular findings:  No pulmonary embolus is identified. No thoracic aortic aneurysm or  dissection. Atherosclerosis of the thoracic aorta. The heart is not  enlarged. There is a small pericardial effusion.     Nonvascular findings:  No axillary adenopathy. There is mediastinal lymphadenopathy. For  reference and aorticopulmonary window node measures 2 x 1 cm. Is  abnormal bilateral hilar jannie tissue as well. Granulomas no  calcification subcarinal region.     There is significant presumed aspirate in the trachea and mainstem  bronchi.     There is extensive consolidation throughout the left lung. Chronic  consolidation/scarring involves the right upper lobe apex with cystic  change. Severe pulmonary emphysema and architectural distortion is  noted. No pleural effusion or pneumothorax.     Imaging through the upper abdomen reveals no acute process.     No acute osseous abnormalities.          Impression:       1. No pulmonary embolus.  2. Extensive aspiration pneumonia throughout the left lung. Large amount  of aspirated material in the trachea and mainstem bronchi.  3. Follow-up CT chest in 1-2 months is needed to ensure resolution and  exclude an underlying left lung neoplasm.  4. Mediastinal and hilar adenopathy is probably reactive but attention  at follow-up is needed.           This report was finalized on 07/02/2018 13:41 by Dr Beto Mendenhall,  .    XR Hip With or Without Pelvis 2 - 3 View Right [[id number]] Collected:  07/02/18 1332     Updated:  07/02/18 1337    Narrative:       History:  63-year-old with right-sided low back pain. Fall.     Reference:  KUB July 2016.     FINDINGS:  Frontal view the pelvis with 2 views of the right hip.     The pelvic ring is intact. No fracture or dislocation of the right hip.  Joint spaces maintained. Limited view of the left hip is unremarkable.          Impression:       No fractures identified.  This report was finalized on 07/02/2018 13:34 by Dr Beto Mendenhall, .    XR Chest 1 View [[id number]] Collected:  07/02/18 1329     Updated:  07/02/18 1334    Narrative:       XR CHEST 1 VW- 7/2/2018 12:37 PM CDT     HISTORY: Cough and shortness of air     COMPARISON: 8/29/2016.     FINDINGS:   Bilateral airspace opacities are noted. Volume loss is seen in the RIGHT  lung. Interstitial and airspace opacities in the LEFT lung have  significantly increased since the previous study. This is seen on a  background of moderate to marked emphysema. The cardiomediastinal  silhouette and pulmonary vascularity are unchanged.      The osseous structures and surrounding soft tissues demonstrate no acute  abnormality.       Impression:       1. Diffuse opacities in the LEFT lung are new since the prior exam and  could be due to pneumonia superimposed upon chronic emphysema. Worsening  pulmonary fibrosis can cause a similar appearance.  2. Progressive scarring in the RIGHT upper lobe is noted. There is  compensatory hyperexpansion of the RIGHT lower lobe.  This report was finalized on 07/02/2018 13:31 by Dr. Kel Purcell MD.        I have personally reviewed and interpreted the radiology studies and ECG obtained at time of admission.     Assessment / Plan     Assessment:   Hospital Problem List     Aspiration pneumonia of left lung        Assessment:  1.  Acute on chronic hypoxemic respiratory failure  2.  Sepsis  3.  Pneumonia -  aspiration  4.  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  Weight Loss  6.  Elevated troponin - no cheset pain  7.  Abnormal LFTs  8.  History of PE in the past - now off of anticoagulation  9.  Concern for protein-calorie malnutrition  10.  Tobacco dependence    11.  MICHELLE    Plan:   1.  IV Zosyn and Levaquin  2.  IV Solumedrol  3.  Scheduled nebs; consider Mucomyst nebs  4.  Mucinex  5.  Supp 02  6.  Respiratory culture  7.  IVFs  8.  Nutrition consult  9.  NPO  10.  Speech therapy evaluation  11.  Trend troponin  12.  Lovenox at PPX dose for now  13.  PO BBlocker  14.  Continue outpatient CPAP per home settings  15.  Workup ongoing      Adrien Merrill MD   07/02/18   3:25 PM            Electronically signed by Adrien Merrill MD at 7/2/2018  3:37 PM       Physician Progress Notes (most recent note)      Jameson Oliver DO at 7/13/2018  2:48 PM              [facility] Medicine Services  INPATIENT PROGRESS NOTE    Patient Name: Kellie Arzate  Date of Admission: 7/2/2018  Today's Date: 07/13/18  Length of Stay: 11  Primary Care Physician: Jose Moon MD    Subjective   Chief Complaint: follow-up aspiration PNA   Weakness - Generalized        Patient appears to be doing much better this morning.  Sitting up on the bedside, talking on the phone. States that he will be ready to go home tomorrow. Looking forward to repeat swallowing study as patient states that he wants his morning cup of coffee.     Review of Systems     All pertinent negatives and positives are as above. All other systems have been reviewed and are negative unless otherwise stated.     Objective    Temp:  [98 °F (36.7 °C)-99.1 °F (37.3 °C)] 99.1 °F (37.3 °C)  Heart Rate:  [] 88  Resp:  [10-18] 16  BP: (105-125)/(49-67) 107/49  Physical Exam   Constitutional: No distress.   HENT:   Head: Normocephalic.   Mouth/Throat:  Oropharyngeal exudate (small ulcerations noted on the tongue and lower gums; a few small areas of faint white exudate c/w early thrush) present.   Eyes: No scleral icterus.   Neck: No tracheal deviation present.   Cardiovascular: Normal heart sounds.    Pulmonary/Chest: No respiratory distress. He has no wheezes.   Improvement in bilateral breath sounds, including on the left   Abdominal: Soft.   Musculoskeletal: He exhibits no edema.   Neurological: He is alert. No cranial nerve deficit.   Skin: Skin is warm and dry. He is not diaphoretic.   Psychiatric: He has a normal mood and affect. His behavior is normal.   Vitals reviewed.    Results Review:  I have reviewed the labs, radiology results, and diagnostic studies.    Laboratory Data:     Results from last 7 days  Lab Units 07/10/18  0717 07/09/18  0818   WBC 10*3/mm3 16.04* 19.57*   HEMOGLOBIN g/dL 11.2* 12.4*   HEMATOCRIT % 37.8* 41.7   PLATELETS 10*3/mm3 346 430*          Results from last 7 days  Lab Units 07/13/18  0443 07/12/18  0424 07/10/18  0717   SODIUM mmol/L 142 141 138   POTASSIUM mmol/L 3.4* 3.6 4.4   CHLORIDE mmol/L 100 100 97*   CO2 mmol/L 34.0* 32.0* 31.0   BUN mg/dL 14 16 17   CREATININE mg/dL 0.50 0.55 0.58   CALCIUM mg/dL 8.6 8.5 8.6   BILIRUBIN mg/dL 0.4 0.4 0.7   ALK PHOS U/L 102 121* 118   ALT (SGPT) U/L 235* 291* 395*   AST (SGOT) U/L 78* 103* 226*   GLUCOSE mg/dL 97 104* 95       Culture Data:   Blood Culture   Date Value Ref Range Status   07/02/2018 No growth at less than 24 hours  Preliminary   07/02/2018 No growth at less than 24 hours  Preliminary       Radiology Data:   Imaging Results (last 24 hours)     Procedure Component Value Units Date/Time    FL Video Swallow With Speech [[id number]] Collected:  07/13/18 1336     Updated:  07/13/18 1344    Narrative:       EXAMINATION: Video swallow with speech 7/13/2018     Fluoroscopy time: 1.7 minutes. A single image was submitted for  interpretation.     HISTORY: Dysphagia     FINDINGS:  Today's exam is compared to a previous study of 7/3/2018. A  dysphagia study was performed with administration of multiple  consistencies of contrast material. There is some premature loss of the  bolus with pooling within the vallecula. There is diminished mobility of  the epiglottis resulting in some significant stasis of material  following a swallow within the vallecula and piriform sinuses. This is  more noted with thicker contrast materials. There was one episode of  mild penetration noted on one of the initial swallows with honey thick.  No subsequent penetration or aspiration was noted with nectar thick or  thicker materials. There was penetration noted within contrast  materials.       Impression:       1.. Limited mobility of the epiglottis. There is some premature loss of  the bolus with pooling in the vallecula. The limited mobility the  epiglottis as well as pharyngeal weakness results in significant stasis  of contrast material within the vallecula, particularly with the thicker  contrast materials. This does improve with subsequent swallows.  2. Penetration noted with one of the initial swallows of the honey  thick. However, following the initial swallow there was no penetration  or aspiration noted with nectar thick or thicker materials. Penetration  was demonstrated with thin contrast materials.  This report was finalized on 07/13/2018 13:41 by Dr. Rene Nguyen MD.          I have reviewed the patient's current medications.     Assessment/Plan     Hospital Problem List     Aspiration pneumonia of left lung (CMS/HCC)        Assessment:  1.  Acute on chronic hypoxemic respiratory failure  2.  Sepsis  3.  Pneumonia - aspiration  4.  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  Weight Loss  6.  Elevated troponin - no chest pain  7.  Abnormal LFTs with worsening transaminitis - now trending down  8.  History of PE in the past - now off of  anticoagulation  9.  Severe protein-calorie malnutrition  10.  Tobacco dependence    11.  MICHELLE  12.  Confusion     Plan:   1.  Course of IV Zosyn completed  2.  Acute hepatitis panel negative  3.  Liver US with no acute findings  4.  Repeat CMP in AM to confirm improvement in trend of LFTs  5.  DC prednisone   6.  Oral nystatin and miracle mouthwash (swish and spit)  7.  Scheduled nebs; Symbicort  8.  Trilogy per home settings  9.  S/p 3 days of IV Venofer  10.  Continue PT   11.  ST following  12.  Dispo planning: DC home tomorrow      :Jameson Oliver DO   07/13/18   2:48 PM    Electronically signed by Jameson Oliver DO at 7/13/2018  2:54 PM            Discharge Summary      Jameson Oliver DO at 7/14/2018 12:54 PM              [facility] Medicine Services  DISCHARGE SUMMARY       Date of Admission: 7/2/2018  Date of Discharge:  7/14/2018  Primary Care Physician: Jose Moon MD    Presenting Problem/History of Present Illness:  Aspiration pneumonia of left lung, unspecified aspiration pneumonia type, unspecified part of lung (CMS/HCC) [J69.0]     Final Discharge Diagnoses:  Hospital Problem List     Aspiration pneumonia of left lung (CMS/HCC)          Consults:  Pulmonology     Procedures Performed:   Swallowing studies    CXR:  IMPRESSION:  1. Partial resolution left sided pneumonia.    Pertinent Test Results:   Lab Results (last 24 hours)     ** No results found for the last 24 hours. **        Lab Results (last 72 hours)     Procedure Component Value Units Date/Time    Comprehensive Metabolic Panel [[id number]]  (Abnormal) Collected:  07/13/18 0443    Specimen:  Blood Updated:  07/13/18 0517     Glucose 97 mg/dL      BUN 14 mg/dL      Creatinine 0.50 mg/dL      Sodium 142 mmol/L      Potassium 3.4 (L) mmol/L      Chloride 100 mmol/L      CO2 34.0 (H) mmol/L      Calcium 8.6 mg/dL      Total Protein 6.3 g/dL      Albumin 3.00 (L) g/dL      ALT (SGPT) 235  (H) U/L      AST (SGOT) 78 (H) U/L      Alkaline Phosphatase 102 U/L      Total Bilirubin 0.4 mg/dL      eGFR Non African Amer >150 mL/min/1.73      Globulin 3.3 gm/dL      A/G Ratio 0.9 (L) g/dL      BUN/Creatinine Ratio 28.0 (H)     Anion Gap 8.0 mmol/L     Comprehensive Metabolic Panel [[id number]]  (Abnormal) Collected:  07/12/18 0424    Specimen:  Blood Updated:  07/12/18 0609     Glucose 104 (H) mg/dL      BUN 16 mg/dL      Creatinine 0.55 mg/dL      Sodium 141 mmol/L      Potassium 3.6 mmol/L      Chloride 100 mmol/L      CO2 32.0 (H) mmol/L      Calcium 8.5 mg/dL      Total Protein 6.3 g/dL      Albumin 3.10 (L) g/dL      ALT (SGPT) 291 (H) U/L      AST (SGOT) 103 (H) U/L      Alkaline Phosphatase 121 (H) U/L      Total Bilirubin 0.4 mg/dL      eGFR Non African Amer 149 mL/min/1.73      Globulin 3.2 gm/dL      A/G Ratio 1.0 (L) g/dL      BUN/Creatinine Ratio 29.1 (H)     Anion Gap 9.0 mmol/L     Gamma GT [[id number]]  (Abnormal) Collected:  07/12/18 0424    Specimen:  Blood Updated:  07/12/18 0609      (H) U/L     Protime-INR [[id number]]  (Abnormal) Collected:  07/12/18 0424    Specimen:  Blood Updated:  07/12/18 0552     Protime 15.7 (H) Seconds      INR 1.21 (H)            Chief Complaint on Day of Discharge: None    History of Present Illness on Day of Discharge: Stable    Hospital Course:  The patient is a 63 y.o. male who presented to [facility] with past medical history significant for chronic obstructive pulmonary disease with home oxygen dependency that presented to our hospital today with a chief complaint of weakness, shortness of breath, and recent history of falls.  Patient does have a history of tobacco dependence and continues to smoke.  He reports is not been feeling well over the course of the past few days, and in fact his symptoms have progressively gotten worse. Patient was found to have an aspiration PNA. Diet was advanced per speech therapy recommendations. PNA was  "improving at discharge. Nebs and mucinex were added, and sedative medications were decreased.      Condition on Discharge:  Stable    Physical Exam on Discharge:  /58 (BP Location: Right arm, Patient Position: Lying)   Pulse 100   Temp 97.9 °F (36.6 °C) (Axillary)   Resp 18   Ht 165.1 cm (65\")   Wt 44.1 kg (97 lb 3.2 oz)   SpO2 100%   BMI 16.17 kg/m²    Physical Exam   Constitutional:   Thin and frail.    HENT:   Head: Normocephalic and atraumatic.   Nose: Nose normal.   Mouth/Throat: Oropharynx is clear and moist.   Eyes: Conjunctivae and EOM are normal.   Neck: Normal range of motion. Neck supple.   Cardiovascular: Normal rate, regular rhythm and normal heart sounds.    Pulmonary/Chest: Effort normal and breath sounds normal.   Coarse in the bases.    Abdominal: Soft. Bowel sounds are normal.   Musculoskeletal: He exhibits no edema or tenderness.   Neurological: He is alert. No cranial nerve deficit.   Skin: Skin is warm and dry.   Psychiatric: He has a normal mood and affect.   Vitals reviewed.      Discharge Disposition:  Home or Self Care    Discharge Medications:     Discharge Medications      New Medications      Instructions Start Date   budesonide-formoterol 160-4.5 MCG/ACT inhaler  Commonly known as:  SYMBICORT   2 puffs, Inhalation, 2 Times Daily - RT      guaiFENesin 600 MG 12 hr tablet  Commonly known as:  MUCINEX   1,200 mg, Oral, Every 12 Hours Scheduled      megestrol 40 MG/ML suspension  Commonly known as:  MEGACE   800 mg, Oral, Daily   Start Date:  7/15/2018        Changes to Medications      Instructions Start Date   ALPRAZolam 0.25 MG tablet  Commonly known as:  XANAX  What changed:  · medication strength  · how much to take   0.25 mg, Oral, 3 Times Daily PRN      amitriptyline 25 MG tablet  Commonly known as:  ELAVIL  What changed:  · medication strength  · how much to take   25 mg, Oral, Nightly         Continue These Medications      Instructions Start Date   aspirin 81 MG EC " tablet   81 mg, Oral, Daily      citalopram 20 MG tablet  Commonly known as:  CeleXA   20 mg, Oral, Daily      ipratropium-albuterol 0.5-2.5 mg/3 ml nebulizer  Commonly known as:  DUO-NEB   3 mL, Nebulization, Every 6 Hours PRN      metoprolol tartrate 50 MG tablet  Commonly known as:  LOPRESSOR   50 mg, Oral, 2 Times Daily      pantoprazole 40 MG EC tablet  Commonly known as:  PROTONIX   40 mg, Oral, Daily      simvastatin 20 MG tablet  Commonly known as:  ZOCOR   20 mg, Oral, Nightly         Stop These Medications    mirtazapine 15 MG tablet  Commonly known as:  REMERON     rOPINIRole 2 MG tablet  Commonly known as:  REQUIP            Discharge Diet:   Diet Instructions     Diet: Regular; Nectar / Syrup Thick       Discharge Diet:  Regular    Fluid Consistency:  Nectar / Syrup Thick    Ground foods. Follow speech recommendations.        Recommendations: Diet Textures: nectar thick liquid, mechanical soft consistency food. Medications should be taken whole with thickened liquids. May have water and Ice between meals after oral care, under staff or family supervision and with the recommended strategies for safe swallowing.  Recommended Strategies: Upright for PO, small bites and sips, double swallow with every bite/sip and alternate liquids and solids. Oral care before breakfast, after all meals and PRN.  Dysphagia therapy is recommended and will be continued. Rationale: See above. Feel pt will benefit from NMES tx on outpatient/home health basis for dysphagia.    Activity at Discharge:   Activity Instructions     Activity as Tolerated       Fall, aspiration, and decubitus precautions.          Discharge Care Plan/Instructions:   CBC, BMP 2-3 days after dsicharge  CXR 2 weeks  Return if worsens  HH/Speech therapy  Aspiration, fall, and decubitus precautions    Follow-up Appointments:   No future appointments.  FU with Dr. Collins in one week    Test Results Pending at Discharge: none    Jameson Oliver,    07/14/18  12:54 PM    Time: 40          Electronically signed by Jameson Oliver DO at 7/14/2018  1:00 PM

## 2018-08-09 ENCOUNTER — DOCUMENTATION (OUTPATIENT)
Dept: RESPIRATORY THERAPY | Facility: HOSPITAL | Age: 63
End: 2018-08-09

## 2018-08-09 NOTE — PROGRESS NOTES
Called and talked to Dr. Jose Moon, the primary care physician for this patient.  We discussed the results of the chest CT scan done in the ED on 7/2/18 and I provided the Dr with the fax number to refer this patient to the lung nodule clinic to see Dr Pearson.  Edy Mead CRT

## 2019-12-12 ENCOUNTER — HOSPITAL ENCOUNTER (INPATIENT)
Facility: HOSPITAL | Age: 64
LOS: 3 days | Discharge: HOME | End: 2019-12-17
Attending: FAMILY MEDICINE | Admitting: FAMILY MEDICINE

## 2019-12-12 DIAGNOSIS — R26.9 GAIT ABNORMALITY: ICD-10-CM

## 2019-12-12 PROBLEM — E46 PROTEIN-CALORIE MALNUTRITION (HCC): Status: ACTIVE | Noted: 2019-12-12

## 2019-12-12 PROBLEM — J96.22 ACUTE ON CHRONIC RESPIRATORY FAILURE WITH HYPERCAPNIA (HCC): Status: ACTIVE | Noted: 2019-12-12

## 2019-12-12 PROBLEM — D64.9 ANEMIA: Status: ACTIVE | Noted: 2019-12-12

## 2019-12-12 PROBLEM — F17.200 TOBACCO DEPENDENCE: Status: ACTIVE | Noted: 2019-12-12

## 2019-12-12 PROBLEM — R93.89 ABNORMAL CHEST X-RAY: Status: ACTIVE | Noted: 2019-12-12

## 2019-12-12 PROBLEM — Z03.89: Status: ACTIVE | Noted: 2019-12-12

## 2019-12-12 PROCEDURE — 25010000002 ENOXAPARIN PER 10 MG: Performed by: NURSE PRACTITIONER

## 2019-12-12 PROCEDURE — 94640 AIRWAY INHALATION TREATMENT: CPT

## 2019-12-12 PROCEDURE — 94799 UNLISTED PULMONARY SVC/PX: CPT

## 2019-12-12 PROCEDURE — G0378 HOSPITAL OBSERVATION PER HR: HCPCS

## 2019-12-12 RX ORDER — PREDNISONE 20 MG/1
40 TABLET ORAL
Status: DISCONTINUED | OUTPATIENT
Start: 2019-12-13 | End: 2019-12-12

## 2019-12-12 RX ORDER — SODIUM CHLORIDE 0.9 % (FLUSH) 0.9 %
10 SYRINGE (ML) INJECTION EVERY 12 HOURS SCHEDULED
Status: DISCONTINUED | OUTPATIENT
Start: 2019-12-12 | End: 2019-12-17 | Stop reason: HOSPADM

## 2019-12-12 RX ORDER — PANTOPRAZOLE SODIUM 40 MG/1
40 TABLET, DELAYED RELEASE ORAL DAILY
Status: DISCONTINUED | OUTPATIENT
Start: 2019-12-13 | End: 2019-12-17 | Stop reason: HOSPADM

## 2019-12-12 RX ORDER — BUDESONIDE AND FORMOTEROL FUMARATE DIHYDRATE 160; 4.5 UG/1; UG/1
2 AEROSOL RESPIRATORY (INHALATION)
Status: DISCONTINUED | OUTPATIENT
Start: 2019-12-12 | End: 2019-12-17 | Stop reason: HOSPADM

## 2019-12-12 RX ORDER — SODIUM CHLORIDE 0.9 % (FLUSH) 0.9 %
10 SYRINGE (ML) INJECTION AS NEEDED
Status: DISCONTINUED | OUTPATIENT
Start: 2019-12-12 | End: 2019-12-17 | Stop reason: HOSPADM

## 2019-12-12 RX ORDER — GUAIFENESIN 600 MG/1
1200 TABLET, EXTENDED RELEASE ORAL EVERY 12 HOURS SCHEDULED
Status: DISCONTINUED | OUTPATIENT
Start: 2019-12-12 | End: 2019-12-17 | Stop reason: HOSPADM

## 2019-12-12 RX ORDER — ACETAMINOPHEN 650 MG/1
650 SUPPOSITORY RECTAL EVERY 4 HOURS PRN
Status: DISCONTINUED | OUTPATIENT
Start: 2019-12-12 | End: 2019-12-17 | Stop reason: HOSPADM

## 2019-12-12 RX ORDER — ONDANSETRON 4 MG/1
4 TABLET, FILM COATED ORAL EVERY 6 HOURS PRN
Status: DISCONTINUED | OUTPATIENT
Start: 2019-12-12 | End: 2019-12-17 | Stop reason: HOSPADM

## 2019-12-12 RX ORDER — ONDANSETRON 2 MG/ML
4 INJECTION INTRAMUSCULAR; INTRAVENOUS EVERY 6 HOURS PRN
Status: DISCONTINUED | OUTPATIENT
Start: 2019-12-12 | End: 2019-12-17 | Stop reason: HOSPADM

## 2019-12-12 RX ORDER — ACETAMINOPHEN 325 MG/1
650 TABLET ORAL EVERY 4 HOURS PRN
Status: DISCONTINUED | OUTPATIENT
Start: 2019-12-12 | End: 2019-12-17 | Stop reason: HOSPADM

## 2019-12-12 RX ORDER — ASPIRIN 81 MG/1
81 TABLET ORAL DAILY
Status: DISCONTINUED | OUTPATIENT
Start: 2019-12-13 | End: 2019-12-17 | Stop reason: HOSPADM

## 2019-12-12 RX ORDER — SODIUM CHLORIDE 9 MG/ML
50 INJECTION, SOLUTION INTRAVENOUS CONTINUOUS
Status: DISCONTINUED | OUTPATIENT
Start: 2019-12-12 | End: 2019-12-17 | Stop reason: HOSPADM

## 2019-12-12 RX ORDER — ACETAMINOPHEN 160 MG/5ML
650 SOLUTION ORAL EVERY 4 HOURS PRN
Status: DISCONTINUED | OUTPATIENT
Start: 2019-12-12 | End: 2019-12-17 | Stop reason: HOSPADM

## 2019-12-12 RX ORDER — PREDNISONE 20 MG/1
40 TABLET ORAL
Status: DISCONTINUED | OUTPATIENT
Start: 2019-12-13 | End: 2019-12-14

## 2019-12-12 RX ORDER — IPRATROPIUM BROMIDE AND ALBUTEROL SULFATE 2.5; .5 MG/3ML; MG/3ML
3 SOLUTION RESPIRATORY (INHALATION)
Status: DISCONTINUED | OUTPATIENT
Start: 2019-12-12 | End: 2019-12-14

## 2019-12-12 RX ADMIN — ENOXAPARIN SODIUM 30 MG: 30 INJECTION SUBCUTANEOUS at 23:18

## 2019-12-12 RX ADMIN — IPRATROPIUM BROMIDE AND ALBUTEROL SULFATE 3 ML: 2.5; .5 SOLUTION RESPIRATORY (INHALATION) at 23:53

## 2019-12-12 RX ADMIN — SODIUM CHLORIDE, PRESERVATIVE FREE 10 ML: 5 INJECTION INTRAVENOUS at 23:21

## 2019-12-12 RX ADMIN — SODIUM CHLORIDE 75 ML/HR: 9 INJECTION, SOLUTION INTRAVENOUS at 23:20

## 2019-12-12 RX ADMIN — GUAIFENESIN 1200 MG: 600 TABLET, EXTENDED RELEASE ORAL at 23:17

## 2019-12-12 RX ADMIN — PREDNISONE 40 MG: 20 TABLET ORAL at 23:50

## 2019-12-13 ENCOUNTER — APPOINTMENT (OUTPATIENT)
Dept: CT IMAGING | Facility: HOSPITAL | Age: 64
End: 2019-12-13

## 2019-12-13 PROBLEM — A15.0 TB (PULMONARY TUBERCULOSIS): Status: ACTIVE | Noted: 2019-12-13

## 2019-12-13 LAB
ALBUMIN SERPL-MCNC: 3.9 G/DL (ref 3.5–5.2)
ALBUMIN SERPL-MCNC: 3.9 G/DL (ref 3.5–5.2)
ALBUMIN/GLOB SERPL: 1.1 G/DL
ALBUMIN/GLOB SERPL: 1.2 G/DL
ALP SERPL-CCNC: 68 U/L (ref 39–117)
ALP SERPL-CCNC: 69 U/L (ref 39–117)
ALT SERPL W P-5'-P-CCNC: 10 U/L (ref 1–41)
ALT SERPL W P-5'-P-CCNC: 9 U/L (ref 1–41)
ANION GAP SERPL CALCULATED.3IONS-SCNC: 4 MMOL/L (ref 5–15)
ANION GAP SERPL CALCULATED.3IONS-SCNC: 7 MMOL/L (ref 5–15)
AST SERPL-CCNC: 13 U/L (ref 1–40)
AST SERPL-CCNC: 14 U/L (ref 1–40)
BASOPHILS # BLD AUTO: 0 10*3/MM3 (ref 0–0.2)
BASOPHILS # BLD AUTO: 0.02 10*3/MM3 (ref 0–0.2)
BASOPHILS NFR BLD AUTO: 0 % (ref 0–1.5)
BASOPHILS NFR BLD AUTO: 0.3 % (ref 0–1.5)
BILIRUB SERPL-MCNC: <0.2 MG/DL (ref 0.2–1.2)
BILIRUB SERPL-MCNC: <0.2 MG/DL (ref 0.2–1.2)
BUN BLD-MCNC: 10 MG/DL (ref 8–23)
BUN BLD-MCNC: 9 MG/DL (ref 8–23)
BUN/CREAT SERPL: 20.9 (ref 7–25)
BUN/CREAT SERPL: 25 (ref 7–25)
CALCIUM SPEC-SCNC: 9.1 MG/DL (ref 8.6–10.5)
CALCIUM SPEC-SCNC: 9.2 MG/DL (ref 8.6–10.5)
CHLORIDE SERPL-SCNC: 92 MMOL/L (ref 98–107)
CHLORIDE SERPL-SCNC: 93 MMOL/L (ref 98–107)
CHOLEST SERPL-MCNC: 128 MG/DL (ref 0–200)
CO2 SERPL-SCNC: 40 MMOL/L (ref 22–29)
CO2 SERPL-SCNC: 43 MMOL/L (ref 22–29)
CREAT BLD-MCNC: 0.4 MG/DL (ref 0.76–1.27)
CREAT BLD-MCNC: 0.43 MG/DL (ref 0.76–1.27)
DEPRECATED RDW RBC AUTO: 41.7 FL (ref 37–54)
DEPRECATED RDW RBC AUTO: 43 FL (ref 37–54)
EOSINOPHIL # BLD AUTO: 0 10*3/MM3 (ref 0–0.4)
EOSINOPHIL # BLD AUTO: 0.01 10*3/MM3 (ref 0–0.4)
EOSINOPHIL NFR BLD AUTO: 0 % (ref 0.3–6.2)
EOSINOPHIL NFR BLD AUTO: 0.1 % (ref 0.3–6.2)
ERYTHROCYTE [DISTWIDTH] IN BLOOD BY AUTOMATED COUNT: 15.6 % (ref 12.3–15.4)
ERYTHROCYTE [DISTWIDTH] IN BLOOD BY AUTOMATED COUNT: 15.7 % (ref 12.3–15.4)
GFR SERPL CREATININE-BSD FRML MDRD: >150 ML/MIN/1.73
GFR SERPL CREATININE-BSD FRML MDRD: >150 ML/MIN/1.73
GLOBULIN UR ELPH-MCNC: 3.3 GM/DL
GLOBULIN UR ELPH-MCNC: 3.5 GM/DL
GLUCOSE BLD-MCNC: 138 MG/DL (ref 65–99)
GLUCOSE BLD-MCNC: 149 MG/DL (ref 65–99)
HBA1C MFR BLD: 5.1 % (ref 4.8–5.6)
HCT VFR BLD AUTO: 30.5 % (ref 37.5–51)
HCT VFR BLD AUTO: 32.8 % (ref 37.5–51)
HDLC SERPL-MCNC: 48 MG/DL (ref 40–60)
HGB BLD-MCNC: 8.5 G/DL (ref 13–17.7)
HGB BLD-MCNC: 8.7 G/DL (ref 13–17.7)
IMM GRANULOCYTES # BLD AUTO: 0.03 10*3/MM3 (ref 0–0.05)
IMM GRANULOCYTES NFR BLD AUTO: 0.4 % (ref 0–0.5)
LDLC SERPL CALC-MCNC: 67 MG/DL (ref 0–100)
LDLC/HDLC SERPL: 1.39 {RATIO}
LYMPHOCYTES # BLD AUTO: 0.3 10*3/MM3 (ref 0.7–3.1)
LYMPHOCYTES # BLD AUTO: 0.49 10*3/MM3 (ref 0.7–3.1)
LYMPHOCYTES NFR BLD AUTO: 5.2 % (ref 19.6–45.3)
LYMPHOCYTES NFR BLD AUTO: 6.7 % (ref 19.6–45.3)
MCH RBC QN AUTO: 20.2 PG (ref 26.6–33)
MCH RBC QN AUTO: 20.6 PG (ref 26.6–33)
MCHC RBC AUTO-ENTMCNC: 26.5 G/DL (ref 31.5–35.7)
MCHC RBC AUTO-ENTMCNC: 27.9 G/DL (ref 31.5–35.7)
MCV RBC AUTO: 73.8 FL (ref 79–97)
MCV RBC AUTO: 76.3 FL (ref 79–97)
MONOCYTES # BLD AUTO: 0.06 10*3/MM3 (ref 0.1–0.9)
MONOCYTES # BLD AUTO: 0.1 10*3/MM3 (ref 0.1–0.9)
MONOCYTES NFR BLD AUTO: 1 % (ref 5–12)
MONOCYTES NFR BLD AUTO: 1.4 % (ref 5–12)
NEUTROPHILS # BLD AUTO: 5.42 10*3/MM3 (ref 1.7–7)
NEUTROPHILS # BLD AUTO: 6.71 10*3/MM3 (ref 1.7–7)
NEUTROPHILS NFR BLD AUTO: 91.1 % (ref 42.7–76)
NEUTROPHILS NFR BLD AUTO: 93.6 % (ref 42.7–76)
NRBC BLD AUTO-RTO: 0 /100 WBC (ref 0–0.2)
PLATELET # BLD AUTO: 229 10*3/MM3 (ref 140–450)
PLATELET # BLD AUTO: 239 10*3/MM3 (ref 140–450)
PMV BLD AUTO: 8.5 FL (ref 6–12)
PMV BLD AUTO: 8.8 FL (ref 6–12)
POTASSIUM BLD-SCNC: 3.9 MMOL/L (ref 3.5–5.2)
POTASSIUM BLD-SCNC: 4.1 MMOL/L (ref 3.5–5.2)
PROT SERPL-MCNC: 7.2 G/DL (ref 6–8.5)
PROT SERPL-MCNC: 7.4 G/DL (ref 6–8.5)
RBC # BLD AUTO: 4.13 10*6/MM3 (ref 4.14–5.8)
RBC # BLD AUTO: 4.3 10*6/MM3 (ref 4.14–5.8)
SODIUM BLD-SCNC: 139 MMOL/L (ref 136–145)
SODIUM BLD-SCNC: 140 MMOL/L (ref 136–145)
TRIGL SERPL-MCNC: 67 MG/DL (ref 0–150)
TSH SERPL DL<=0.05 MIU/L-ACNC: 0.26 UIU/ML (ref 0.27–4.2)
VLDLC SERPL-MCNC: 13.4 MG/DL
WBC NRBC COR # BLD: 5.79 10*3/MM3 (ref 3.4–10.8)
WBC NRBC COR # BLD: 7.36 10*3/MM3 (ref 3.4–10.8)

## 2019-12-13 PROCEDURE — 63710000001 PREDNISONE PER 1 MG: Performed by: NURSE PRACTITIONER

## 2019-12-13 PROCEDURE — 25010000002 ENOXAPARIN PER 10 MG: Performed by: NURSE PRACTITIONER

## 2019-12-13 PROCEDURE — 0H98XZZ DRAINAGE OF BUTTOCK SKIN, EXTERNAL APPROACH: ICD-10-PCS | Performed by: FAMILY MEDICINE

## 2019-12-13 PROCEDURE — 80053 COMPREHEN METABOLIC PANEL: CPT | Performed by: NURSE PRACTITIONER

## 2019-12-13 PROCEDURE — 83036 HEMOGLOBIN GLYCOSYLATED A1C: CPT | Performed by: NURSE PRACTITIONER

## 2019-12-13 PROCEDURE — 94799 UNLISTED PULMONARY SVC/PX: CPT

## 2019-12-13 PROCEDURE — 85025 COMPLETE CBC W/AUTO DIFF WBC: CPT | Performed by: FAMILY MEDICINE

## 2019-12-13 PROCEDURE — 85025 COMPLETE CBC W/AUTO DIFF WBC: CPT | Performed by: NURSE PRACTITIONER

## 2019-12-13 PROCEDURE — 84439 ASSAY OF FREE THYROXINE: CPT | Performed by: FAMILY MEDICINE

## 2019-12-13 PROCEDURE — G0378 HOSPITAL OBSERVATION PER HR: HCPCS

## 2019-12-13 PROCEDURE — 84443 ASSAY THYROID STIM HORMONE: CPT | Performed by: FAMILY MEDICINE

## 2019-12-13 PROCEDURE — 80053 COMPREHEN METABOLIC PANEL: CPT | Performed by: FAMILY MEDICINE

## 2019-12-13 PROCEDURE — 25010000002 IOPAMIDOL 61 % SOLUTION: Performed by: FAMILY MEDICINE

## 2019-12-13 PROCEDURE — 86480 TB TEST CELL IMMUN MEASURE: CPT | Performed by: FAMILY MEDICINE

## 2019-12-13 PROCEDURE — 94640 AIRWAY INHALATION TREATMENT: CPT

## 2019-12-13 PROCEDURE — 71260 CT THORAX DX C+: CPT

## 2019-12-13 PROCEDURE — 80061 LIPID PANEL: CPT | Performed by: NURSE PRACTITIONER

## 2019-12-13 RX ORDER — ESCITALOPRAM OXALATE 10 MG/1
20 TABLET ORAL DAILY
Status: DISCONTINUED | OUTPATIENT
Start: 2019-12-13 | End: 2019-12-17 | Stop reason: HOSPADM

## 2019-12-13 RX ORDER — LORATADINE 10 MG/1
10 TABLET ORAL DAILY
Status: ON HOLD | COMMUNITY
End: 2020-04-05

## 2019-12-13 RX ORDER — MONTELUKAST SODIUM 10 MG/1
10 TABLET ORAL NIGHTLY
Status: ON HOLD | COMMUNITY
End: 2020-04-05 | Stop reason: SDDI

## 2019-12-13 RX ORDER — TRAZODONE HYDROCHLORIDE 50 MG/1
50 TABLET ORAL NIGHTLY
Status: DISCONTINUED | OUTPATIENT
Start: 2019-12-13 | End: 2019-12-17 | Stop reason: HOSPADM

## 2019-12-13 RX ORDER — ATORVASTATIN CALCIUM 10 MG/1
10 TABLET, FILM COATED ORAL NIGHTLY
Status: DISCONTINUED | OUTPATIENT
Start: 2019-12-13 | End: 2019-12-17 | Stop reason: HOSPADM

## 2019-12-13 RX ORDER — ALPRAZOLAM 1 MG/1
1 TABLET ORAL 3 TIMES DAILY PRN
COMMUNITY

## 2019-12-13 RX ORDER — MEGESTROL ACETATE 40 MG/1
80 TABLET ORAL 2 TIMES DAILY
Status: DISCONTINUED | OUTPATIENT
Start: 2019-12-13 | End: 2019-12-17 | Stop reason: HOSPADM

## 2019-12-13 RX ORDER — ESCITALOPRAM OXALATE 20 MG/1
20 TABLET ORAL DAILY
Status: ON HOLD | COMMUNITY
End: 2020-04-05 | Stop reason: ALTCHOICE

## 2019-12-13 RX ORDER — TRAZODONE HYDROCHLORIDE 50 MG/1
50 TABLET ORAL NIGHTLY
Status: ON HOLD | COMMUNITY
End: 2020-04-05 | Stop reason: ALTCHOICE

## 2019-12-13 RX ORDER — MEGESTROL ACETATE 40 MG/1
80 TABLET ORAL 2 TIMES DAILY
Status: ON HOLD | COMMUNITY
End: 2020-04-05 | Stop reason: SDDI

## 2019-12-13 RX ORDER — MONTELUKAST SODIUM 10 MG/1
10 TABLET ORAL NIGHTLY
Status: DISCONTINUED | OUTPATIENT
Start: 2019-12-13 | End: 2019-12-17 | Stop reason: HOSPADM

## 2019-12-13 RX ORDER — ALBUTEROL SULFATE 90 UG/1
2 AEROSOL, METERED RESPIRATORY (INHALATION) EVERY 4 HOURS PRN
COMMUNITY
End: 2020-05-11 | Stop reason: SDUPTHER

## 2019-12-13 RX ADMIN — PANTOPRAZOLE SODIUM 40 MG: 40 TABLET, DELAYED RELEASE ORAL at 09:14

## 2019-12-13 RX ADMIN — METOPROLOL TARTRATE 12.5 MG: 25 TABLET, FILM COATED ORAL at 21:10

## 2019-12-13 RX ADMIN — ESCITALOPRAM 20 MG: 10 TABLET, FILM COATED ORAL at 17:09

## 2019-12-13 RX ADMIN — TRAZODONE HYDROCHLORIDE 50 MG: 50 TABLET ORAL at 21:10

## 2019-12-13 RX ADMIN — PREDNISONE 40 MG: 20 TABLET ORAL at 09:13

## 2019-12-13 RX ADMIN — GUAIFENESIN 1200 MG: 600 TABLET, EXTENDED RELEASE ORAL at 09:13

## 2019-12-13 RX ADMIN — IPRATROPIUM BROMIDE AND ALBUTEROL SULFATE 3 ML: 2.5; .5 SOLUTION RESPIRATORY (INHALATION) at 12:07

## 2019-12-13 RX ADMIN — IOPAMIDOL 100 ML: 612 INJECTION, SOLUTION INTRAVENOUS at 08:15

## 2019-12-13 RX ADMIN — GUAIFENESIN 1200 MG: 600 TABLET, EXTENDED RELEASE ORAL at 21:10

## 2019-12-13 RX ADMIN — ATORVASTATIN CALCIUM 10 MG: 10 TABLET, FILM COATED ORAL at 21:10

## 2019-12-13 RX ADMIN — ACETAMINOPHEN 650 MG: 325 TABLET, FILM COATED ORAL at 15:12

## 2019-12-13 RX ADMIN — BUDESONIDE AND FORMOTEROL FUMARATE DIHYDRATE 2 PUFF: 160; 4.5 AEROSOL RESPIRATORY (INHALATION) at 08:13

## 2019-12-13 RX ADMIN — MEGESTROL ACETATE 80 MG: 40 TABLET ORAL at 21:10

## 2019-12-13 RX ADMIN — ASPIRIN 81 MG: 81 TABLET ORAL at 09:13

## 2019-12-13 RX ADMIN — SODIUM CHLORIDE 75 ML/HR: 9 INJECTION, SOLUTION INTRAVENOUS at 16:23

## 2019-12-13 RX ADMIN — IPRATROPIUM BROMIDE AND ALBUTEROL SULFATE 3 ML: 2.5; .5 SOLUTION RESPIRATORY (INHALATION) at 08:13

## 2019-12-13 RX ADMIN — ENOXAPARIN SODIUM 30 MG: 30 INJECTION SUBCUTANEOUS at 21:09

## 2019-12-13 RX ADMIN — ACETAMINOPHEN 650 MG: 325 TABLET, FILM COATED ORAL at 01:56

## 2019-12-13 RX ADMIN — IPRATROPIUM BROMIDE AND ALBUTEROL SULFATE 3 ML: 2.5; .5 SOLUTION RESPIRATORY (INHALATION) at 16:30

## 2019-12-13 RX ADMIN — MONTELUKAST SODIUM 10 MG: 10 TABLET, FILM COATED ORAL at 21:10

## 2019-12-13 NOTE — SIGNIFICANT NOTE
Patient complains of right hip pain.On assessment, right hip pain evaluated and appears to be a cyst.Cyst observed on posterior left scapula as well.

## 2019-12-13 NOTE — H&P
"    [facility] Medicine Services  HISTORY AND PHYSICAL    Date of Admission: 12/12/2019  Primary Care Physician: Jose Moon MD    Subjective     Chief Complaint: Transfer from [facility] emergency department with concerns for tuberculosis    History of Present Illness  Kellie Arzate is a 64-year-old male with a past medical history of COPD with ongoing tobacco use, hypertension, mixed hyperlipidemia, chronic respiratory failure on oxygen 3 L nasal cannula continuously, Pseudomonas pneumonia-AFB culture negative 7/15/2016, aspiration pneumonia 7/2/2018 anxiety/depression and arthritis.  Patient was going to his primary care provider, Dr. Moon, today for routine every 3-month evaluation.  He states, \"I have to go to get my medications refilled\".  He he reports running out of air night his portable tank but his body.  He developed severe shortness of breathing.  He states he turned his oxygen on as high as it would go but by the time he arrived at PCP office he was \"really short of breath.  Dr. Moon instructed him to present to [facility] emergency department.  ABGs revealed PO2 of 212, PCO2 of 93 and pH of 7.35.  Oxygen was decreased to 6 L and is currently now on 4 L and oxygen saturation is 94%.  Patient states he feels fine.  He reports a.m. cough with clear sputum production.  Due to chest x-ray at outlying facility with concerns - findings in the apex of the right lung could be related to remote or smoldering TB, patient was transferred to [facility] for higher level of care.  Currently he has absolutely no complaints.    Review of Systems   A 10 point review of systems was completed, all negative except for those discussed in HPI    Past Medical History:   Past Medical History:   Diagnosis Date   • Anxiety    • Arthritis    • Chronic respiratory failure with hypoxia, on home O2 therapy (CMS/[facility]), 3 L    • COPD (chronic obstructive " pulmonary disease) (CMS/HCC)    • Depression    • Hypertension    • Mixed hyperlipidemia    • Nephrolithiasis    • Pneumonia, pseudomonal 7/2016 with negative AFB, aspiration 7/2018        Past Surgical History:   Past Surgical History:   Procedure Laterality Date   • COLONOSCOPY  04/20/2007    Within Normal Limits.   • ENDOSCOPY  04/20/2007    urea neg, sbbx benign, mild gastritis, small hiatal hernia   • ENDOSCOPY N/A 10/5/2016    Procedure: ESOPHAGOGASTRODUODENOSCOPY WITH ANESTHESIA;  Surgeon: Kevyn Damon MD;  Location: [facility] ENDOSCOPY;  Service:    • HEMORRHOIDECTOMY     • PEG TUBE INSERTION  07/22/2016    Dr Mccarty   • TRACHEOSTOMY         Family History: family history includes Dementia in his mother; No Known Problems in his father.    Social History:  reports that he has been smoking cigarettes. He has been smoking about 0.50 packs per day. He has never used smokeless tobacco. He reports that he does not drink alcohol.    Code Status: Full, if unable speak for himself his brother Ronak is his POA      Allergies:  Allergies   Allergen Reactions   • Demerol [Meperidine] Anaphylaxis       Medications:  Prior to Admission medications    Medication Sig Start Date End Date Taking? Authorizing Provider   amitriptyline (ELAVIL) 25 MG tablet Take 1 tablet by mouth Every Night. 7/14/18   Jameson Oliver DO   aspirin 81 MG EC tablet Take 81 mg by mouth Daily.    ProviderChristian MD   budesonide-formoterol (SYMBICORT) 160-4.5 MCG/ACT inhaler Inhale 2 puffs 2 (Two) Times a Day. 7/14/18   Jameson Oliver DO   citalopram (CeleXA) 20 MG tablet Take 20 mg by mouth Daily.    ProviderChristian MD   guaiFENesin (MUCINEX) 600 MG 12 hr tablet Take 2 tablets by mouth Every 12 (Twelve) Hours. 7/14/18   Jameson Oliver DO   ipratropium-albuterol (DUO-NEB) 0.5-2.5 mg/mL nebulizer Take 3 mL by nebulization Every 6 (Six) Hours As Needed for wheezing.    ProviderChristian MD   megestrol (MEGACE) 40 MG/ML  "suspension Take 20 mL by mouth Daily. 7/15/18   Jameson Oliver,    metoprolol tartrate (LOPRESSOR) 50 MG tablet Take 50 mg by mouth 2 (Two) Times a Day.    Provider, MD Christian   pantoprazole (PROTONIX) 40 MG EC tablet Take 40 mg by mouth Daily.    Provider, MD Christian   simvastatin (ZOCOR) 20 MG tablet Take 20 mg by mouth Every Night.    Provider, MD Christian       Objective     /55 (BP Location: Right arm, Patient Position: Lying)   Pulse 85   Temp 98.8 °F (37.1 °C) (Oral)   Resp 17   Ht 165.1 cm (65\")   Wt 49.2 kg (108 lb 8 oz)   SpO2 99%   BMI 18.06 kg/m²   Physical Exam   Constitutional: He is oriented to person, place, and time. He appears well-developed. No distress.   Excessively thin however this is patient's norm, no weight loss since July, 2018   HENT:   Head: Normocephalic and atraumatic.   Edentulous   Eyes: Pupils are equal, round, and reactive to light. Conjunctivae and EOM are normal. No scleral icterus.   Neck: Normal range of motion. Neck supple. No JVD present. No tracheal deviation present.   Cardiovascular: Normal rate, regular rhythm, normal heart sounds and intact distal pulses. Exam reveals no gallop.   No murmur heard.  Pulmonary/Chest: Effort normal. No respiratory distress. He has wheezes ( Scattered throughout bilaterally). He has no rales.   Diminished throughout   Abdominal: Soft. Bowel sounds are normal. He exhibits no distension. There is no tenderness. There is no guarding.   Musculoskeletal: Normal range of motion. He exhibits no edema.   Neurological: He is alert and oriented to person, place, and time.   Skin: Skin is warm and dry. No rash noted. He is not diaphoretic. No erythema. No pallor.   Psychiatric: He has a normal mood and affect. His behavior is normal.   Vitals reviewed.      Pertinent Data:   Obtained at outlying facility  ABGs-pH 7.35, PCO2 93, PO2 202, HCO3 51  WBC 7.9, hemoglobin 9, hematocrit 34.4, platelet count 240,000  D-dimer " 0.65  Sodium 141, potassium 3.7, chloride 92, CO2 51, BUN 13, creatinine 0.6, glucose 117  CK-MB 1.3, myoglobin 23.8, troponin 0 0.061, proBNP 474    Chest x-ray revealed a great deal of fibrotic lung disease in the left upper lobe and there is fibro-atelectatic change in the cupula of the right lung.  It could represent smoldering tuberculosis.  The lower lung fields are clear, except for chronic changes.  The heart is compressed by noncompliant lungs.  There is some calcification in the subcarinal region from old histoplasmosis.    *The findings in the apex of the right lung could be related to remote or smoldering TB.  It could also be related to histoplasmosis.  The findings are improved from a chest film done a year ago.    7/3/2018 echocardiogram  Interpretation Summary     · Left ventricular systolic function is normal. Estimated EF = 60%.  · Right ventricular cavity is mildly dilated.  · Normal RV function  · Mild pulmonary hypertension is present.  · There is a trivial pericardial effusion. There is no evidence of cardiac tamponade.          Assessment / Plan     Assessment:   Active Hospital Problems    Diagnosis   • Encounter for observation for suspected tuberculosis   • Abnormal chest x-ray, findings apex right lung suspect TB   • Acute on chronic respiratory failure with hypercapnia (CMS/HCC)   • Tobacco dependence   • Anemia   • Protein-calorie malnutrition (CMS/HCC)       Plan:   1.  Admit as observation  2.  Obtain QuantiFERON-TB gold  3.  CT of the chest with contrast in a.m.  4.  Incentive spirometry, supplemental O2 continuous pulse oximetry, duo nebs, Mucinex, Symbicort  5.  Prednisone 40 mg orally daily  6.  RT to initiate breathing treatment protocol  7.  DVT prophylaxis with Lovenox  8.  Home medications reviewed, accurate reconciliation will need to be completed in the a.m.  9.  Normal saline 75 mL/hour  10.  Labs in a.m.  11.  Consult nutrition for malnutrition evaluation in a.m.      I  discussed the patient's findings and my recommendations with: Lui Vasquez MD  Time spent: 45 minutes       CRISPIN Calvo  12/12/19   11:20 PM

## 2019-12-13 NOTE — PLAN OF CARE
Problem: Patient Care Overview  Goal: Plan of Care Review  Outcome: Ongoing (interventions implemented as appropriate)  Flowsheets (Taken 12/13/2019 0304)  Progress: no change  Outcome Summary: pt admitted to floor for TB rule out; pt does have COPD and is on 3L O2 at home; pt is currently on 4L here; Pt states he is claustrophobic; he does not like the door being closed but understands that he is in precautions in a negative pressure room; pt c/o mild cramping in legs; otherwise no c/o pain; pt rested fair; VSS; safety discussed and maintained; will continue to monitor

## 2019-12-13 NOTE — PROGRESS NOTES
[facility] Medicine Services  INPATIENT PROGRESS NOTE    Length of Stay: 0  Date of Admission: 12/12/2019  Primary Care Physician: Jose Moon MD    Subjective   Chief Complaint: Respiratory failure hypercapnia hypoxia.  COPD/the patient.  Possible TB.    HPI   Patient is currently in isolation.  Continue oxygen.  Patient pain of sebaceous cyst and right buttocks.  I&D was done a lot of cottage cheese that she removed and dressing was applied.  Patient denies any chest pain.  Patient requiring 3 L of oxygen at this time.    Review of Systems   Constitutional: Positive for activity change, appetite change and fatigue. Negative for chills and fever.   HENT: Negative for hearing loss, nosebleeds, tinnitus and trouble swallowing.    Eyes: Negative for visual disturbance.   Respiratory: Positive for shortness of breath and wheezing. Negative for cough and chest tightness.    Cardiovascular: Negative for chest pain, palpitations and leg swelling.   Gastrointestinal: Negative for abdominal distention, abdominal pain, blood in stool, constipation, diarrhea, nausea and vomiting.   Endocrine: Negative for cold intolerance, heat intolerance, polydipsia, polyphagia and polyuria.   Genitourinary: Negative for decreased urine volume, difficulty urinating, dysuria, flank pain, frequency and hematuria.   Musculoskeletal: Positive for arthralgias, gait problem and myalgias. Negative for joint swelling.   Skin: Negative for rash.   Allergic/Immunologic: Negative for immunocompromised state.   Neurological: Positive for weakness. Negative for dizziness, syncope, light-headedness and headaches.   Hematological: Negative for adenopathy. Does not bruise/bleed easily.   Psychiatric/Behavioral: Negative for confusion and sleep disturbance. The patient is not nervous/anxious.           All pertinent negatives and positives are as above. All other systems have been reviewed and are  negative unless otherwise stated.     Objective    Temp:  [97.4 °F (36.3 °C)-98.8 °F (37.1 °C)] 98.7 °F (37.1 °C)  Heart Rate:  [] 109  Resp:  [16-17] 16  BP: (102-128)/(48-58) 128/58    Intake/Output Summary (Last 24 hours) at 12/13/2019 1433  Last data filed at 12/13/2019 1059  Gross per 24 hour   Intake 240 ml   Output 525 ml   Net -285 ml     Physical Exam   Constitutional: He is oriented to person, place, and time. He appears well-developed.   HENT:   Head: Normocephalic and atraumatic.   Eyes: Pupils are equal, round, and reactive to light. Conjunctivae are normal.   Neck: Neck supple. No JVD present.   Cardiovascular: Normal heart sounds and intact distal pulses. Exam reveals no gallop and no friction rub.   No murmur heard.  Tachycardia 105.   Pulmonary/Chest: No respiratory distress. He has no rales. He exhibits no tenderness.   Wheezing.  Diminished breath sound bilateral.   Abdominal: Soft. Bowel sounds are normal. He exhibits no distension. There is no tenderness. There is no rebound and no guarding.   Musculoskeletal: He exhibits no edema, tenderness or deformity.   Neurological: He is alert and oriented to person, place, and time. He displays normal reflexes. No cranial nerve deficit. He exhibits abnormal muscle tone. Coordination abnormal.   Skin: Skin is warm and dry. Capillary refill takes 2 to 3 seconds. No rash noted.   Psychiatric: He has a normal mood and affect. His behavior is normal.   Nursing note and vitals reviewed.      Results Review:  Lab Results (last 24 hours)     Procedure Component Value Units Date/Time    QuantiFERON TB Plus Client Incubated [[id number]] Collected:  12/13/19 0536    Specimen:  Blood Updated:  12/13/19 0835    Comprehensive Metabolic Panel [[id number]]  (Abnormal) Collected:  12/13/19 0536    Specimen:  Blood Updated:  12/13/19 0624     Glucose 138 mg/dL      BUN 9 mg/dL      Creatinine 0.43 mg/dL      Sodium 139 mmol/L      Potassium 4.1 mmol/L      Chloride 92  mmol/L      CO2 43.0 mmol/L      Calcium 9.1 mg/dL      Total Protein 7.2 g/dL      Albumin 3.90 g/dL      ALT (SGPT) 9 U/L      AST (SGOT) 14 U/L      Alkaline Phosphatase 69 U/L      Total Bilirubin <0.2 mg/dL      eGFR Non African Amer >150 mL/min/1.73      Globulin 3.3 gm/dL      A/G Ratio 1.2 g/dL      BUN/Creatinine Ratio 20.9     Anion Gap 4.0 mmol/L     Narrative:       GFR Normal >60  Chronic Kidney Disease <60  Kidney Failure <15      Lipid Panel [[id number]] Collected:  12/13/19 0536    Specimen:  Blood Updated:  12/13/19 0624     Total Cholesterol 128 mg/dL      Triglycerides 67 mg/dL      HDL Cholesterol 48 mg/dL      LDL Cholesterol  67 mg/dL      VLDL Cholesterol 13.4 mg/dL      LDL/HDL Ratio 1.39    Narrative:       Cholesterol Reference Ranges  (U.S. Department of Health and Human Services ATP III Classifications)    Desirable          <200 mg/dL  Borderline High    200-239 mg/dL  High Risk          >240 mg/dL      Triglyceride Reference Ranges  (U.S. Department of Health and Human Services ATP III Classifications)    Normal           <150 mg/dL  Borderline High  150-199 mg/dL  High             200-499 mg/dL  Very High        >500 mg/dL    HDL Reference Ranges  (U.S. Department of Health and Human Services ATP III Classifcations)    Low     <40 mg/dl (major risk factor for CHD)  High    >60 mg/dl ('negative' risk factor for CHD)        LDL Reference Ranges  (U.S. Department of Health and Human Services ATP III Classifcations)    Optimal          <100 mg/dL  Near Optimal     100-129 mg/dL  Borderline High  130-159 mg/dL  High             160-189 mg/dL  Very High        >189 mg/dL    CBC Auto Differential [[id number]]  (Abnormal) Collected:  12/13/19 0536    Specimen:  Blood Updated:  12/13/19 0617     WBC 7.36 10*3/mm3      RBC 4.30 10*6/mm3      Hemoglobin 8.7 g/dL      Hematocrit 32.8 %      MCV 76.3 fL      MCH 20.2 pg      MCHC 26.5 g/dL      RDW 15.6 %      RDW-SD 43.0 fl      MPV 8.8 fL       Platelets 239 10*3/mm3      Neutrophil % 91.1 %      Lymphocyte % 6.7 %      Monocyte % 1.4 %      Eosinophil % 0.1 %      Basophil % 0.3 %      Immature Grans % 0.4 %      Neutrophils, Absolute 6.71 10*3/mm3      Lymphocytes, Absolute 0.49 10*3/mm3      Monocytes, Absolute 0.10 10*3/mm3      Eosinophils, Absolute 0.01 10*3/mm3      Basophils, Absolute 0.02 10*3/mm3      Immature Grans, Absolute 0.03 10*3/mm3      nRBC 0.0 /100 WBC     Hemoglobin A1c [[id number]]  (Normal) Collected:  12/13/19 0536    Specimen:  Blood Updated:  12/13/19 0610     Hemoglobin A1C 5.10 %     Narrative:       Hemoglobin A1C Ranges:    Increased Risk for Diabetes  5.7% to 6.4%  Diabetes                     >= 6.5%  Diabetic Goal                < 7.0%           Cultures:  No results found for: BLOODCX, URINECX, WOUNDCX, MRSACX, RESPCX, STOOLCX    Radiology Data:    Imaging Results (Last 24 Hours)     Procedure Component Value Units Date/Time    CT Chest With Contrast [[id number]] Collected:  12/13/19 0752     Updated:  12/13/19 0807    Narrative:       Exam: CT CHEST W CONTRAST- -- 12/13/2019 7:25 AM CST     Indication: Chronic dyspnea, evaluate for acute infectious process     Comparison: 07/02/2018     DOSE LENGTH PRODUCT: 175 mGy cm. Automated exposure control was also  utilized to decrease patient radiation dose.     Findings:      There secretions layering in the posterior trachea.      Chronic severe bilateral right greater than left upper lobe traction  bronchiectasis with surrounding soft tissue thickening/consolidation.  There is also a background of severe emphysema. No new area of  consolidation compared to the July 2018 exam. Small nodular densities in  the left lower lobe on the prior study have resolved. No pleural  effusion. No suspicious pulmonary nodule.     Enlarged 1.5 cm right hilar lymph node on image 55. No other thoracic  adenopathy. No central pulmonary artery filling defect. Main pulmonary  trunk and thoracic aorta  are normal in caliber. The thyroid is uniform.  No pericardial effusion. Tiny low-density right liver lesion on image  172 is too small to characterize. No acute finding in the upper abdomen.  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     No suspicious focal bone lesion.       Impression:       1. 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 This is likely related to an indolent/chronic  atypical infectious process. Extent of this process has improved  compared to an exam from July 2018. Enlarged right hilar lymph node is  likely reactive to this process.  2. Severe emphysema.  3. 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  This report was finalized on 12/13/2019 08:04 by Dr. Jaden Cooney MD.          Allergies   Allergen Reactions   • Demerol [Meperidine] Anaphylaxis       Scheduled meds:     aspirin 81 mg Oral Daily   budesonide-formoterol 2 puff Inhalation BID - RT   enoxaparin 30 mg Subcutaneous Nightly   guaiFENesin 1,200 mg Oral Q12H   ipratropium-albuterol 3 mL Nebulization 4x Daily - RT   pantoprazole 40 mg Oral Daily   predniSONE 40 mg Oral Daily With Breakfast   sodium chloride 10 mL Intravenous Q12H       PRN meds:  •  acetaminophen **OR** acetaminophen **OR** acetaminophen  •  influenza vaccine  •  ondansetron **OR** ondansetron  •  sodium chloride    Assessment/Plan       Encounter for observation for suspected tuberculosis    Abnormal chest x-ray, findings apex right lung suspect TB    Acute on chronic respiratory failure with hypercapnia (CMS/HCC)    Tobacco dependence    Anemia    Protein-calorie malnutrition (CMS/HCC)    TB (pulmonary tuberculosis)      Plan:    Respiratory failure with hypercapnia and hypoxia/COPD exacerbation.  Incentive spirometer.  Supplemental oxygen.  DuoNeb.  Mucinex.  Symbicort.  Prednisone.  Respiratory protocol.    Possible TB/apex of the  right lungs.  QuantiFERON TB Gold pending.  CT of the chest scan with contrast-severe chronic right greater than left lobe traction bronchiectasis with surrounding chronic lung tissue thickening/consolidation-related to indolent/chronic atypical infection process-extend of this process has improved compared to examination 2018, enlarged right hilar lymph node is likely reactive to this process, severe emphysema, severe atherosclerosis narrowing of the celiac artery origin.  Patient follow with a lung specialist at [facility].    Tobacco abuse.  Discussed patient cutting back and stopping.    Buttock sebaceous cyst.  Right cheek.  Area was cleaned with alcohol.  I&D removal of multiple thick white cottage cheeselike substance.  Area was clean and  dressing was applied after.    Hypertension/hyperlipidemia.  Continue aspirin.  Continue Lipitor.  Continue Lopressor.    Reflux.  Continue Protonix.  Zofran as needed.    Depression/anxiety.  Continue trazodone.    Arthritis.    DVT prophylaxis.    Malnourished.  Nutrition consult.  Regular diet.  Nutrition supplement.  Continue Megace.    Deconditioning.  PT consult.    Discharge Plannin-3 days.  Patient is on chronic 2 L oxygen at home.    Guillermo Boss MD   19   2:33 PM

## 2019-12-14 PROBLEM — A15.9 TUBERCULOSIS: Status: ACTIVE | Noted: 2019-12-14

## 2019-12-14 LAB — T4 FREE SERPL-MCNC: 1.06 NG/DL (ref 0.93–1.7)

## 2019-12-14 PROCEDURE — 25010000002 ENOXAPARIN PER 10 MG: Performed by: NURSE PRACTITIONER

## 2019-12-14 PROCEDURE — 63710000001 PREDNISONE PER 1 MG: Performed by: NURSE PRACTITIONER

## 2019-12-14 PROCEDURE — 94799 UNLISTED PULMONARY SVC/PX: CPT

## 2019-12-14 RX ORDER — IPRATROPIUM BROMIDE AND ALBUTEROL SULFATE 2.5; .5 MG/3ML; MG/3ML
3 SOLUTION RESPIRATORY (INHALATION) EVERY 4 HOURS PRN
Status: DISCONTINUED | OUTPATIENT
Start: 2019-12-14 | End: 2019-12-17 | Stop reason: HOSPADM

## 2019-12-14 RX ORDER — IPRATROPIUM BROMIDE AND ALBUTEROL SULFATE 2.5; .5 MG/3ML; MG/3ML
3 SOLUTION RESPIRATORY (INHALATION)
Status: DISCONTINUED | OUTPATIENT
Start: 2019-12-14 | End: 2019-12-17 | Stop reason: HOSPADM

## 2019-12-14 RX ORDER — PREDNISONE 20 MG/1
20 TABLET ORAL
Status: COMPLETED | OUTPATIENT
Start: 2019-12-15 | End: 2019-12-16

## 2019-12-14 RX ADMIN — ENOXAPARIN SODIUM 30 MG: 30 INJECTION SUBCUTANEOUS at 20:10

## 2019-12-14 RX ADMIN — GUAIFENESIN 1200 MG: 600 TABLET, EXTENDED RELEASE ORAL at 20:10

## 2019-12-14 RX ADMIN — SODIUM CHLORIDE 75 ML/HR: 9 INJECTION, SOLUTION INTRAVENOUS at 06:18

## 2019-12-14 RX ADMIN — SODIUM CHLORIDE, PRESERVATIVE FREE 10 ML: 5 INJECTION INTRAVENOUS at 08:57

## 2019-12-14 RX ADMIN — ASPIRIN 81 MG: 81 TABLET ORAL at 08:57

## 2019-12-14 RX ADMIN — METOPROLOL TARTRATE 12.5 MG: 25 TABLET, FILM COATED ORAL at 08:57

## 2019-12-14 RX ADMIN — BUDESONIDE AND FORMOTEROL FUMARATE DIHYDRATE 2 PUFF: 160; 4.5 AEROSOL RESPIRATORY (INHALATION) at 07:51

## 2019-12-14 RX ADMIN — PREDNISONE 40 MG: 20 TABLET ORAL at 08:57

## 2019-12-14 RX ADMIN — ACETAMINOPHEN 650 MG: 325 TABLET, FILM COATED ORAL at 17:05

## 2019-12-14 RX ADMIN — MEGESTROL ACETATE 80 MG: 40 TABLET ORAL at 08:58

## 2019-12-14 RX ADMIN — ATORVASTATIN CALCIUM 10 MG: 10 TABLET, FILM COATED ORAL at 20:10

## 2019-12-14 RX ADMIN — METOPROLOL TARTRATE 12.5 MG: 25 TABLET, FILM COATED ORAL at 20:09

## 2019-12-14 RX ADMIN — GUAIFENESIN 1200 MG: 600 TABLET, EXTENDED RELEASE ORAL at 08:58

## 2019-12-14 RX ADMIN — SODIUM CHLORIDE 50 ML/HR: 9 INJECTION, SOLUTION INTRAVENOUS at 22:04

## 2019-12-14 RX ADMIN — MONTELUKAST SODIUM 10 MG: 10 TABLET, FILM COATED ORAL at 20:10

## 2019-12-14 RX ADMIN — PANTOPRAZOLE SODIUM 40 MG: 40 TABLET, DELAYED RELEASE ORAL at 08:57

## 2019-12-14 RX ADMIN — TRAZODONE HYDROCHLORIDE 50 MG: 50 TABLET ORAL at 22:04

## 2019-12-14 RX ADMIN — IPRATROPIUM BROMIDE AND ALBUTEROL SULFATE 3 ML: 2.5; .5 SOLUTION RESPIRATORY (INHALATION) at 15:28

## 2019-12-14 RX ADMIN — IPRATROPIUM BROMIDE AND ALBUTEROL SULFATE 3 ML: 2.5; .5 SOLUTION RESPIRATORY (INHALATION) at 07:41

## 2019-12-14 RX ADMIN — ESCITALOPRAM 20 MG: 10 TABLET, FILM COATED ORAL at 08:57

## 2019-12-14 RX ADMIN — MEGESTROL ACETATE 80 MG: 40 TABLET ORAL at 20:10

## 2019-12-14 NOTE — PROGRESS NOTES
RT Nebulizer Protocol    Assessment tool to be used for patients with existing breathing treatments ordered by hospitalists                                                                  0  1  2  3  4      Respiratory History   No Smoking    Smoking History      1 Pack/Day      Pulmonary Disease   x   Exacerbation        Respiratory Rate   Normal   x   20-25      Dyspneic      Accessory Muscles      Severe Dyspnea        Breath Sounds   Clear   x   Crackles      Crackles/ Rhonchi      Wheezing      Absent/ Severe Wheezing        Chest   X-ray   Clear      1 Lobe Infiltration/ Consolidation/ PE      2 Lobe Same Lung Infiltration/ Consolidation/ PE       2 Lobe Infiltration/ Both Lungs/ Consolidation/ PE      Both Lungs/ More Than 1 Lobe/ Atelectasis/ Consolidation/  PE        Cough   Strong Non- Productive x   Excessive Secretions/ Strong Cough      Excessive Secretions/ Weak Cough      Thick Bronchial Secretions/ Weak Cough      Thick Bronchial Secretions/ No Cough        Total Patient Score =  3  0-4=Q4 PRN  5-9=TID and Q4 PRN  10-14=QID and Q3 PRN  15-19=Q4 and Q2 PRN  20=Q3 and Q2 PRN    Bronchopulmonary Hygiene (CPT)   Q4 ATC Copious secretions, dyspnea, unable to sleep, mucus plug    QID & Q4 PRN Moderate secretions    TID Small amounts of secretions w/ poor cough and history of secretions    BID Unable to deep breathe and cough spontaneously       Lung Expansion Therapy (PEP)   Q4 & PRN at night Severe atelectasis, poor oxygenation    QID  High risk for persistent atelectasis, existence of same    TID At risk for developing atelectasis    BID Unable to deep breathe and cough spontaneously     Instruct, 1 follow up Patients able to perform well on their own      Patient takes treatments at home 2 to 4 times a day. Will do treatments tid here and q4 prn. Patient agrees with this as to this is close to home treatment plan.

## 2019-12-14 NOTE — PLAN OF CARE
Problem: Chronic Obstructive Pulmonary Disease (Adult)  Goal: Signs and Symptoms of Listed Potential Problems Will be Absent, Minimized or Managed (Chronic Obstructive Pulmonary Disease)  Outcome: Ongoing (interventions implemented as appropriate)  Flowsheets (Taken 12/14/2019 0312 by Miladis Mandel RN)  Problems Assessed (Chronic Obstructive Pulmonary Disease (COPD)): all  Problems Present (COPD, Bronch/Emphy): situational response     Problem: Infection, Risk/Actual (Adult)  Goal: Infection Prevention/Resolution  Outcome: Ongoing (interventions implemented as appropriate)  Flowsheets (Taken 12/14/2019 1731)  Infection Prevention/Resolution: making progress toward outcome     Problem: Patient Care Overview  Goal: Plan of Care Review  Flowsheets  Taken 12/14/2019 0312 by Miladis Mandel RN  Progress: improving  Plan of Care Reviewed With: patient  Taken 12/14/2019 1731 by Lydia Ballard RN  Outcome Summary: C/o pain x1 this shift, ok to d/c if test comes back negative, vss, safety maintained  Goal: Individualization and Mutuality  Flowsheets (Taken 12/14/2019 1731)  Patient Specific Preferences: does not like he has to stay untill test results come back, however he understands the need to do so

## 2019-12-14 NOTE — PROGRESS NOTES
[facility] Medicine Services  INPATIENT PROGRESS NOTE    Length of Stay: 0  Date of Admission: 12/12/2019  Primary Care Physician: Jose Moon MD    Subjective   Chief Complaint: Respiratory failure hypercapnia hypoxia.  COPD/the patient.  Possible TB.    HPI   Patient condition about the same.  This is patient baseline.  Patient denies any chest pain.  Patient is well on 2 L of oxygen.    Review of Systems   Constitutional: Positive for activity change, appetite change and fatigue. Negative for chills and fever.   HENT: Negative for hearing loss, nosebleeds, tinnitus and trouble swallowing.    Eyes: Negative for visual disturbance.   Respiratory: Positive for shortness of breath and wheezing. Negative for cough and chest tightness.    Cardiovascular: Negative for chest pain, palpitations and leg swelling.   Gastrointestinal: Negative for abdominal distention, abdominal pain, blood in stool, constipation, diarrhea, nausea and vomiting.   Endocrine: Negative for cold intolerance, heat intolerance, polydipsia, polyphagia and polyuria.   Genitourinary: Negative for decreased urine volume, difficulty urinating, dysuria, flank pain, frequency and hematuria.   Musculoskeletal: Positive for arthralgias, gait problem and myalgias. Negative for joint swelling.   Skin: Negative for rash.   Allergic/Immunologic: Negative for immunocompromised state.   Neurological: Positive for weakness. Negative for dizziness, syncope, light-headedness and headaches.   Hematological: Negative for adenopathy. Does not bruise/bleed easily.   Psychiatric/Behavioral: Negative for confusion and sleep disturbance. The patient is not nervous/anxious.           All pertinent negatives and positives are as above. All other systems have been reviewed and are negative unless otherwise stated.     Objective    Temp:  [97.9 °F (36.6 °C)-98.8 °F (37.1 °C)] 98.6 °F (37 °C)  Heart Rate:  []  98  Resp:  [16-20] 18  BP: (108-142)/(53-66) 142/62    Intake/Output Summary (Last 24 hours) at 12/14/2019 1037  Last data filed at 12/14/2019 0618  Gross per 24 hour   Intake 2560 ml   Output 625 ml   Net 1935 ml     Physical Exam  Constitutional: He is oriented to person, place, and time. He appears well-developed.   HENT:   Head: Normocephalic and atraumatic.   Eyes: Pupils are equal, round, and reactive to light. Conjunctivae are normal.   Neck: Neck supple. No JVD present.   Cardiovascular: Normal heart sounds and intact distal pulses. Exam reveals no gallop and no friction rub.   No murmur heard.  Heart rate 98.   Pulmonary/Chest: No respiratory distress. He has no rales. He exhibits no tenderness.   Wheezing.  Diminished breath sound bilateral.   Abdominal: Soft. Bowel sounds are normal. He exhibits no distension. There is no tenderness. There is no rebound and no guarding.   Musculoskeletal: He exhibits no edema, tenderness or deformity.   Neurological: He is alert and oriented to person, place, and time. He displays normal reflexes. No cranial nerve deficit. He exhibits abnormal muscle tone. Coordination abnormal.   Skin: Skin is warm and dry. Capillary refill takes 2 to 3 seconds. No rash noted.   Psychiatric: He has a normal mood and affect. His behavior is normal.   Nursing note and vitals reviewed.     Results Review:  Lab Results (last 24 hours)     Procedure Component Value Units Date/Time    Comprehensive Metabolic Panel [[id number]]  (Abnormal) Collected:  12/13/19 1609    Specimen:  Blood Updated:  12/13/19 1639     Glucose 149 mg/dL      BUN 10 mg/dL      Creatinine 0.40 mg/dL      Sodium 140 mmol/L      Potassium 3.9 mmol/L      Chloride 93 mmol/L      CO2 40.0 mmol/L      Calcium 9.2 mg/dL      Total Protein 7.4 g/dL      Albumin 3.90 g/dL      ALT (SGPT) 10 U/L      AST (SGOT) 13 U/L      Alkaline Phosphatase 68 U/L      Total Bilirubin <0.2 mg/dL      eGFR Non African Amer >150 mL/min/1.73       Globulin 3.5 gm/dL      A/G Ratio 1.1 g/dL      BUN/Creatinine Ratio 25.0     Anion Gap 7.0 mmol/L     Narrative:       GFR Normal >60  Chronic Kidney Disease <60  Kidney Failure <15      TSH [[id number]]  (Abnormal) Collected:  12/13/19 1609    Specimen:  Blood Updated:  12/13/19 1639     TSH 0.258 uIU/mL     CBC & Differential [[id number]] Collected:  12/13/19 1609    Specimen:  Blood Updated:  12/13/19 1626    Narrative:       The following orders were created for panel order CBC & Differential.  Procedure                               Abnormality         Status                     ---------                               -----------         ------                     CBC Auto Differential[[id number]]        Abnormal            Final result                 Please view results for these tests on the individual orders.    CBC Auto Differential [[id number]]  (Abnormal) Collected:  12/13/19 1609    Specimen:  Blood Updated:  12/13/19 1626     WBC 5.79 10*3/mm3      RBC 4.13 10*6/mm3      Hemoglobin 8.5 g/dL      Hematocrit 30.5 %      MCV 73.8 fL      MCH 20.6 pg      MCHC 27.9 g/dL      RDW 15.7 %      RDW-SD 41.7 fl      MPV 8.5 fL      Platelets 229 10*3/mm3      Neutrophil % 93.6 %      Lymphocyte % 5.2 %      Monocyte % 1.0 %      Eosinophil % 0.0 %      Basophil % 0.0 %      Neutrophils, Absolute 5.42 10*3/mm3      Lymphocytes, Absolute 0.30 10*3/mm3      Monocytes, Absolute 0.06 10*3/mm3      Eosinophils, Absolute 0.00 10*3/mm3      Basophils, Absolute 0.00 10*3/mm3            Cultures:  No results found for: BLOODCX, URINECX, WOUNDCX, MRSACX, RESPCX, STOOLCX    Radiology Data:    Imaging Results (Last 24 Hours)     ** No results found for the last 24 hours. **          Allergies   Allergen Reactions   • Demerol [Meperidine] Anaphylaxis       Scheduled meds:     aspirin 81 mg Oral Daily   atorvastatin 10 mg Oral Nightly   budesonide-formoterol 2 puff Inhalation BID - RT   enoxaparin 30 mg Subcutaneous Nightly    escitalopram 20 mg Oral Daily   guaiFENesin 1,200 mg Oral Q12H   ipratropium-albuterol 3 mL Nebulization TID - RT   megestrol 80 mg Oral BID   metoprolol tartrate 12.5 mg Oral Q12H   montelukast 10 mg Oral Nightly   pantoprazole 40 mg Oral Daily   predniSONE 40 mg Oral Daily With Breakfast   sodium chloride 10 mL Intravenous Q12H   traZODone 50 mg Oral Nightly       PRN meds:  •  acetaminophen **OR** acetaminophen **OR** acetaminophen  •  influenza vaccine  •  ipratropium-albuterol  •  ondansetron **OR** ondansetron  •  sodium chloride    Assessment/Plan       Encounter for observation for suspected tuberculosis    Abnormal chest x-ray, findings apex right lung suspect TB    Acute on chronic respiratory failure with hypercapnia (CMS/HCC)    Tobacco dependence    Anemia    Protein-calorie malnutrition (CMS/HCC)    TB (pulmonary tuberculosis)      Plan:  Respiratory failure with hypercapnia and hypoxia/COPD exacerbation.  Incentive spirometer.  Supplemental oxygen.  DuoNeb.  Mucinex.  Symbicort.  Prednisone.  Respiratory protocol.  Chronic respiratory failure on home oxygen at 2 L.  I think patient is currently at his baseline.  Patient follow with a lung specialist at [facility].  CT scan of the chest improving compared to previous CT scan July 2018.     Possible TB/apex of the right lungs.  QuantiFERON TB Gold pending.  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    Tobacco abuse.  Discussed patient cutting back and stopping.  Patient is not willing to stop smoking.     Buttock sebaceous cyst.  Right cheek.  Area  was cleaned with alcohol.  I&D removal of multiple thick white cottage cheeselike substance.  Area was clean and  dressing was applied after.     Hypertension/hyperlipidemia.  Continue aspirin.  Continue Lipitor.  Continue Lopressor.     Reflux.  Continue Protonix.  Zofran as needed.     Depression/anxiety.  Continue trazodone.    Low TSH.  Free T4 pending.     Arthritis.     DVT prophylaxis.     Malnourished.  Nutrition consult.  Regular diet.  Nutrition supplement.  Continue Megace.     Deconditioning.  PT consult.     Discharge Plannin-3 days.  Patient is on chronic 2 L oxygen at home.    Guillermo Boss MD   19   10:37 AM

## 2019-12-15 LAB
ALBUMIN SERPL-MCNC: 3.4 G/DL (ref 3.5–5.2)
ALBUMIN/GLOB SERPL: 1.1 G/DL
ALP SERPL-CCNC: 55 U/L (ref 39–117)
ALT SERPL W P-5'-P-CCNC: 9 U/L (ref 1–41)
ANION GAP SERPL CALCULATED.3IONS-SCNC: 3 MMOL/L (ref 5–15)
AST SERPL-CCNC: 14 U/L (ref 1–40)
BASOPHILS # BLD AUTO: 0.03 10*3/MM3 (ref 0–0.2)
BASOPHILS NFR BLD AUTO: 0.4 % (ref 0–1.5)
BILIRUB SERPL-MCNC: 0.2 MG/DL (ref 0.2–1.2)
BUN BLD-MCNC: 8 MG/DL (ref 8–23)
BUN/CREAT SERPL: 21.6 (ref 7–25)
CALCIUM SPEC-SCNC: 8.8 MG/DL (ref 8.6–10.5)
CHLORIDE SERPL-SCNC: 97 MMOL/L (ref 98–107)
CO2 SERPL-SCNC: 41 MMOL/L (ref 22–29)
CREAT BLD-MCNC: 0.37 MG/DL (ref 0.76–1.27)
DEPRECATED RDW RBC AUTO: 42.9 FL (ref 37–54)
EOSINOPHIL # BLD AUTO: 0.06 10*3/MM3 (ref 0–0.4)
EOSINOPHIL NFR BLD AUTO: 0.8 % (ref 0.3–6.2)
ERYTHROCYTE [DISTWIDTH] IN BLOOD BY AUTOMATED COUNT: 16 % (ref 12.3–15.4)
GFR SERPL CREATININE-BSD FRML MDRD: >150 ML/MIN/1.73
GLOBULIN UR ELPH-MCNC: 3 GM/DL
GLUCOSE BLD-MCNC: 88 MG/DL (ref 65–99)
HCT VFR BLD AUTO: 30.7 % (ref 37.5–51)
HGB BLD-MCNC: 8.5 G/DL (ref 13–17.7)
IMM GRANULOCYTES # BLD AUTO: 0.03 10*3/MM3 (ref 0–0.05)
IMM GRANULOCYTES NFR BLD AUTO: 0.4 % (ref 0–0.5)
LYMPHOCYTES # BLD AUTO: 1.61 10*3/MM3 (ref 0.7–3.1)
LYMPHOCYTES NFR BLD AUTO: 20.8 % (ref 19.6–45.3)
MCH RBC QN AUTO: 20.4 PG (ref 26.6–33)
MCHC RBC AUTO-ENTMCNC: 27.7 G/DL (ref 31.5–35.7)
MCV RBC AUTO: 73.8 FL (ref 79–97)
MONOCYTES # BLD AUTO: 0.75 10*3/MM3 (ref 0.1–0.9)
MONOCYTES NFR BLD AUTO: 9.7 % (ref 5–12)
NEUTROPHILS # BLD AUTO: 5.27 10*3/MM3 (ref 1.7–7)
NEUTROPHILS NFR BLD AUTO: 67.9 % (ref 42.7–76)
NRBC BLD AUTO-RTO: 0 /100 WBC (ref 0–0.2)
PLATELET # BLD AUTO: 288 10*3/MM3 (ref 140–450)
PMV BLD AUTO: 9.4 FL (ref 6–12)
POTASSIUM BLD-SCNC: 3.7 MMOL/L (ref 3.5–5.2)
PROT SERPL-MCNC: 6.4 G/DL (ref 6–8.5)
RBC # BLD AUTO: 4.16 10*6/MM3 (ref 4.14–5.8)
SODIUM BLD-SCNC: 141 MMOL/L (ref 136–145)
WBC NRBC COR # BLD: 7.75 10*3/MM3 (ref 3.4–10.8)

## 2019-12-15 PROCEDURE — 94799 UNLISTED PULMONARY SVC/PX: CPT

## 2019-12-15 PROCEDURE — 25010000002 ENOXAPARIN PER 10 MG: Performed by: NURSE PRACTITIONER

## 2019-12-15 PROCEDURE — 63710000001 PREDNISONE PER 1 MG: Performed by: FAMILY MEDICINE

## 2019-12-15 PROCEDURE — 97165 OT EVAL LOW COMPLEX 30 MIN: CPT

## 2019-12-15 PROCEDURE — 85025 COMPLETE CBC W/AUTO DIFF WBC: CPT | Performed by: FAMILY MEDICINE

## 2019-12-15 PROCEDURE — 80053 COMPREHEN METABOLIC PANEL: CPT | Performed by: FAMILY MEDICINE

## 2019-12-15 RX ADMIN — ENOXAPARIN SODIUM 30 MG: 30 INJECTION SUBCUTANEOUS at 21:48

## 2019-12-15 RX ADMIN — SODIUM CHLORIDE, PRESERVATIVE FREE 10 ML: 5 INJECTION INTRAVENOUS at 10:04

## 2019-12-15 RX ADMIN — TRAZODONE HYDROCHLORIDE 50 MG: 50 TABLET ORAL at 21:48

## 2019-12-15 RX ADMIN — MEGESTROL ACETATE 80 MG: 40 TABLET ORAL at 10:04

## 2019-12-15 RX ADMIN — ATORVASTATIN CALCIUM 10 MG: 10 TABLET, FILM COATED ORAL at 21:47

## 2019-12-15 RX ADMIN — MEGESTROL ACETATE 80 MG: 40 TABLET ORAL at 21:48

## 2019-12-15 RX ADMIN — GUAIFENESIN 1200 MG: 600 TABLET, EXTENDED RELEASE ORAL at 21:48

## 2019-12-15 RX ADMIN — IPRATROPIUM BROMIDE AND ALBUTEROL SULFATE 3 ML: 2.5; .5 SOLUTION RESPIRATORY (INHALATION) at 21:15

## 2019-12-15 RX ADMIN — METOPROLOL TARTRATE 12.5 MG: 25 TABLET, FILM COATED ORAL at 21:47

## 2019-12-15 RX ADMIN — GUAIFENESIN 1200 MG: 600 TABLET, EXTENDED RELEASE ORAL at 10:03

## 2019-12-15 RX ADMIN — MONTELUKAST SODIUM 10 MG: 10 TABLET, FILM COATED ORAL at 21:47

## 2019-12-15 RX ADMIN — BUDESONIDE AND FORMOTEROL FUMARATE DIHYDRATE 2 PUFF: 160; 4.5 AEROSOL RESPIRATORY (INHALATION) at 21:15

## 2019-12-15 RX ADMIN — ESCITALOPRAM 20 MG: 10 TABLET, FILM COATED ORAL at 10:04

## 2019-12-15 RX ADMIN — IPRATROPIUM BROMIDE AND ALBUTEROL SULFATE 3 ML: 2.5; .5 SOLUTION RESPIRATORY (INHALATION) at 14:30

## 2019-12-15 RX ADMIN — PANTOPRAZOLE SODIUM 40 MG: 40 TABLET, DELAYED RELEASE ORAL at 10:03

## 2019-12-15 RX ADMIN — SODIUM CHLORIDE 50 ML/HR: 9 INJECTION, SOLUTION INTRAVENOUS at 18:09

## 2019-12-15 RX ADMIN — ASPIRIN 81 MG: 81 TABLET ORAL at 10:03

## 2019-12-15 RX ADMIN — METOPROLOL TARTRATE 12.5 MG: 25 TABLET, FILM COATED ORAL at 10:04

## 2019-12-15 RX ADMIN — ACETAMINOPHEN 650 MG: 325 TABLET, FILM COATED ORAL at 21:48

## 2019-12-15 RX ADMIN — IPRATROPIUM BROMIDE AND ALBUTEROL SULFATE 3 ML: 2.5; .5 SOLUTION RESPIRATORY (INHALATION) at 06:28

## 2019-12-15 RX ADMIN — PREDNISONE 20 MG: 20 TABLET ORAL at 10:03

## 2019-12-15 RX ADMIN — BUDESONIDE AND FORMOTEROL FUMARATE DIHYDRATE 2 PUFF: 160; 4.5 AEROSOL RESPIRATORY (INHALATION) at 06:28

## 2019-12-15 NOTE — PLAN OF CARE
Problem: Patient Care Overview  Goal: Plan of Care Review  Outcome: Ongoing (interventions implemented as appropriate)  Flowsheets (Taken 12/15/2019 1124 by Bucky Gilman, OTR/L, CNT)  Progress: no change  Plan of Care Reviewed With: patient  Goal: Individualization and Mutuality  Outcome: Ongoing (interventions implemented as appropriate)  Flowsheets (Taken 12/15/2019 1715)  Patient Specific Preferences: Likes coffee, half cup, full strength     Problem: Chronic Obstructive Pulmonary Disease (Adult)  Goal: Signs and Symptoms of Listed Potential Problems Will be Absent, Minimized or Managed (Chronic Obstructive Pulmonary Disease)  Outcome: Ongoing (interventions implemented as appropriate)  Flowsheets (Taken 12/15/2019 0412 by Miladis Mandel, RN)  Problems Assessed (Chronic Obstructive Pulmonary Disease (COPD)): all  Problems Present (COPD, Bronch/Emphy): functional deficit     Problem: Infection, Risk/Actual (Adult)  Goal: Infection Prevention/Resolution  Outcome: Ongoing (interventions implemented as appropriate)  Flowsheets (Taken 12/15/2019 1715)  Infection Prevention/Resolution: making progress toward outcome

## 2019-12-15 NOTE — PLAN OF CARE
Problem: Patient Care Overview  Goal: Plan of Care Review  Outcome: Ongoing (interventions implemented as appropriate)  Flowsheets (Taken 12/15/2019 3837)  Progress: no change  Plan of Care Reviewed With: patient  Outcome Summary: pt had no c/o pain this shift; pt has rested well; VSS; safety discussed maintained; will continue to monitor

## 2019-12-15 NOTE — PLAN OF CARE
Problem: Patient Care Overview  Goal: Plan of Care Review  Flowsheets (Taken 12/15/2019 1124)  Progress: no change  Plan of Care Reviewed With: patient  Outcome Summary: OT júnior completed.  The pt is independent with all ADL.  He has been transferring back and forth to [facility] as needed.  He demonstrated independent use of urinal, donning socks, bed mobility and transfers.  He held to IV pole to walk to door, window and back to bed on supplemental O2.  He refused to wear N95 mask and leave the room to walk a greater distance.  He maintained an O2 sat of 94% after walking.  He does appear shaky and weak with ROM testing but is intact in all basic ADL.  No OT warranted at this time.  OT will sign off.  Pt would benefit from daily O2 walks in hallway.  He said he would be agreeable to try wearing mask in order to walk in hallway, just not agreeable this session.

## 2019-12-15 NOTE — PROGRESS NOTES
[facility] Medicine Services  INPATIENT PROGRESS NOTE    Length of Stay: 1  Date of Admission: 12/12/2019  Primary Care Physician: Jose Moon MD    Subjective   Chief Complaint: Respiratory failure hypercapnia hypoxia.  COPD/the patient.  Possible TB.    HPI   Patient denies any chest pain or shortness of breath.  Again discussed the patient was smoking.    Review of Systems   Constitutional: Positive for activity change, appetite change and fatigue. Negative for chills and fever.   HENT: Negative for hearing loss, nosebleeds, tinnitus and trouble swallowing.    Eyes: Negative for visual disturbance.   Respiratory: Positive for shortness of breath and wheezing. Negative for cough and chest tightness.    Cardiovascular: Negative for chest pain, palpitations and leg swelling.   Gastrointestinal: Negative for abdominal distention, abdominal pain, blood in stool, constipation, diarrhea, nausea and vomiting.   Endocrine: Negative for cold intolerance, heat intolerance, polydipsia, polyphagia and polyuria.   Genitourinary: Negative for decreased urine volume, difficulty urinating, dysuria, flank pain, frequency and hematuria.   Musculoskeletal: Positive for arthralgias, gait problem and myalgias. Negative for joint swelling.   Skin: Negative for rash.   Allergic/Immunologic: Negative for immunocompromised state.   Neurological: Positive for weakness. Negative for dizziness, syncope, light-headedness and headaches.   Hematological: Negative for adenopathy. Does not bruise/bleed easily.   Psychiatric/Behavioral: Negative for confusion and sleep disturbance. The patient is not nervous/anxious.      All pertinent negatives and positives are as above. All other systems have been reviewed and are negative unless otherwise stated.     Objective    Temp:  [98.2 °F (36.8 °C)-98.4 °F (36.9 °C)] 98.4 °F (36.9 °C)  Heart Rate:  [] 101  Resp:  [16-20] 20  BP:  (120-132)/(50-62) 130/62    Intake/Output Summary (Last 24 hours) at 12/15/2019 1208  Last data filed at 12/15/2019 0926  Gross per 24 hour   Intake 0 ml   Output 1925 ml   Net -1925 ml     Physical Exam  Constitutional: He is oriented to person, place, and time. He appears well-developed.   HENT:   Head: Normocephalic and atraumatic.   Eyes: Pupils are equal, round, and reactive to light. Conjunctivae are normal.   Neck: Neck supple. No JVD present.   Cardiovascular: Normal heart sounds and intact distal pulses. Exam reveals no gallop and no friction rub.   No murmur heard.  Heart rate 98.   Pulmonary/Chest: No respiratory distress. He has no rales. He exhibits no tenderness.   Wheezing.  Diminished breath sound bilateral.   Abdominal: Soft. Bowel sounds are normal. He exhibits no distension. There is no tenderness. There is no rebound and no guarding.   Musculoskeletal: He exhibits no edema, tenderness or deformity.   Neurological: He is alert and oriented to person, place, and time. He displays normal reflexes. No cranial nerve deficit. He exhibits abnormal muscle tone. Coordination abnormal.   Skin: Skin is warm and dry. Capillary refill takes 2 to 3 seconds. No rash noted.   Psychiatric: He has a normal mood and affect. His behavior is normal.   Nursing note and vitals reviewed.  Results Review:  Lab Results (last 24 hours)     Procedure Component Value Units Date/Time    Comprehensive Metabolic Panel [[id number]]  (Abnormal) Collected:  12/15/19 0549    Specimen:  Blood Updated:  12/15/19 0704     Glucose 88 mg/dL      BUN 8 mg/dL      Creatinine 0.37 mg/dL      Sodium 141 mmol/L      Potassium 3.7 mmol/L      Chloride 97 mmol/L      CO2 41.0 mmol/L      Calcium 8.8 mg/dL      Total Protein 6.4 g/dL      Albumin 3.40 g/dL      ALT (SGPT) 9 U/L      AST (SGOT) 14 U/L      Alkaline Phosphatase 55 U/L      Total Bilirubin 0.2 mg/dL      eGFR Non African Amer >150 mL/min/1.73      Globulin 3.0 gm/dL      A/G Ratio  1.1 g/dL      BUN/Creatinine Ratio 21.6     Anion Gap 3.0 mmol/L     Narrative:       GFR Normal >60  Chronic Kidney Disease <60  Kidney Failure <15      CBC & Differential [[id number]] Collected:  12/15/19 0549    Specimen:  Blood Updated:  12/15/19 0652    Narrative:       The following orders were created for panel order CBC & Differential.  Procedure                               Abnormality         Status                     ---------                               -----------         ------                     CBC Auto Differential[[id number]]        Abnormal            Final result                 Please view results for these tests on the individual orders.    CBC Auto Differential [[id number]]  (Abnormal) Collected:  12/15/19 0549    Specimen:  Blood Updated:  12/15/19 0652     WBC 7.75 10*3/mm3      RBC 4.16 10*6/mm3      Hemoglobin 8.5 g/dL      Hematocrit 30.7 %      MCV 73.8 fL      MCH 20.4 pg      MCHC 27.7 g/dL      RDW 16.0 %      RDW-SD 42.9 fl      MPV 9.4 fL      Platelets 288 10*3/mm3      Neutrophil % 67.9 %      Lymphocyte % 20.8 %      Monocyte % 9.7 %      Eosinophil % 0.8 %      Basophil % 0.4 %      Immature Grans % 0.4 %      Neutrophils, Absolute 5.27 10*3/mm3      Lymphocytes, Absolute 1.61 10*3/mm3      Monocytes, Absolute 0.75 10*3/mm3      Eosinophils, Absolute 0.06 10*3/mm3      Basophils, Absolute 0.03 10*3/mm3      Immature Grans, Absolute 0.03 10*3/mm3      nRBC 0.0 /100 WBC            Cultures:  No results found for: BLOODCX, URINECX, WOUNDCX, MRSACX, RESPCX, STOOLCX    Radiology Data:    Imaging Results (Last 24 Hours)     ** No results found for the last 24 hours. **          Allergies   Allergen Reactions   • Demerol [Meperidine] Anaphylaxis       Scheduled meds:     aspirin 81 mg Oral Daily   atorvastatin 10 mg Oral Nightly   budesonide-formoterol 2 puff Inhalation BID - RT   enoxaparin 30 mg Subcutaneous Nightly   escitalopram 20 mg Oral Daily   guaiFENesin 1,200 mg Oral Q12H    ipratropium-albuterol 3 mL Nebulization TID - RT   megestrol 80 mg Oral BID   metoprolol tartrate 12.5 mg Oral Q12H   montelukast 10 mg Oral Nightly   pantoprazole 40 mg Oral Daily   predniSONE 20 mg Oral Daily With Breakfast   sodium chloride 10 mL Intravenous Q12H   traZODone 50 mg Oral Nightly       PRN meds:  •  acetaminophen **OR** acetaminophen **OR** acetaminophen  •  influenza vaccine  •  ipratropium-albuterol  •  ondansetron **OR** ondansetron  •  sodium chloride    Assessment/Plan       Encounter for observation for suspected tuberculosis    Abnormal chest x-ray, findings apex right lung suspect TB    Acute on chronic respiratory failure with hypercapnia (CMS/HCC)    Tobacco dependence    Anemia    Protein-calorie malnutrition (CMS/HCC)    TB (pulmonary tuberculosis)    Tuberculosis      Plan:  Respiratory failure with hypercapnia and hypoxia/COPD exacerbation.  Incentive spirometer.  Supplemental oxygen.  DuoNeb.  Mucinex.  Symbicort.  Prednisone.  Respiratory protocol.  Chronic respiratory failure on home oxygen at 2 L.  I think patient is currently at his baseline.  Patient follow with a lung specialist at [facility].  CT scan of the chest improving compared to previous CT scan July 2018.     Possible TB/apex of the right lungs.  QuantiFERON TB Gold pending.  Continue isolation.  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     Tobacco abuse.  Discussed patient cutting back and stopping.  Patient is not willing to stop smoking.     Buttock sebaceous cyst.  Right cheek.  Area was cleaned with alcohol.   I&D removal of multiple thick white cottage cheeselike substance.  Area was clean and  dressing was applied after.     Hypertension/hyperlipidemia.  Continue aspirin.  Continue Lipitor.  Continue Lopressor.     Reflux.  Continue Protonix.  Zofran as needed.     Depression/anxiety.  Continue trazodone.  Continue Lexapro.     Low TSH.  Free T4 - normal.     Arthritis.     DVT prophylaxis.     Malnourished.  Nutrition consult.  Regular diet.  Nutrition supplement.  Continue Megace.     Deconditioning.  PT consult.     Discharge Plannin-3 days.  Patient is on chronic 2 L oxygen at home.    Guillermo Boss MD   12/15/19   12:08 PM

## 2019-12-15 NOTE — PROGRESS NOTES
RT Nebulizer Protocol    Assessment tool to be used for patients with existing breathing treatments ordered by hospitalists                                                                  0  1  2  3  4      Respiratory History   No Smoking    Smoking History      1 Pack/Day      Pulmonary Disease   x   Exacerbation        Respiratory Rate   Normal   x   20-25      Dyspneic      Accessory Muscles      Severe Dyspnea        Breath Sounds   Clear x   Crackles      Crackles/ Rhonchi      Wheezing      Absent/ Severe Wheezing        Chest   X-ray   Clear      1 Lobe Infiltration/ Consolidation/ PE      2 Lobe Same Lung Infiltration/ Consolidation/ PE       2 Lobe Infiltration/ Both Lungs/ Consolidation/ PE      Both Lungs/ More Than 1 Lobe/ Atelectasis/ Consolidation/  PE        Cough   Strong Non- Productive   x   Excessive Secretions/ Strong Cough      Excessive Secretions/ Weak Cough      Thick Bronchial Secretions/ Weak Cough      Thick Bronchial Secretions/ No Cough        Total Patient Score =  3  0-4=Q4 PRN  5-9=TID and Q4 PRN  10-14=QID and Q3 PRN  15-19=Q4 and Q2 PRN  20=Q3 and Q2 PRN    Bronchopulmonary Hygiene (CPT)   Q4 ATC Copious secretions, dyspnea, unable to sleep, mucus plug    QID & Q4 PRN Moderate secretions    TID Small amounts of secretions w/ poor cough and history of secretions    BID Unable to deep breathe and cough spontaneously       Lung Expansion Therapy (PEP)   Q4 & PRN at night Severe atelectasis, poor oxygenation    QID  High risk for persistent atelectasis, existence of same    TID At risk for developing atelectasis    BID Unable to deep breathe and cough spontaneously     Instruct, 1 follow up Patients able to perform well on their own        Pt. Currently taking txs. Tid and is ok with keeping txs. Tid and q4 prn

## 2019-12-16 LAB
ANION GAP SERPL CALCULATED.3IONS-SCNC: 5 MMOL/L (ref 5–15)
BASOPHILS # BLD AUTO: 0.02 10*3/MM3 (ref 0–0.2)
BASOPHILS NFR BLD AUTO: 0.3 % (ref 0–1.5)
BUN BLD-MCNC: 8 MG/DL (ref 8–23)
BUN/CREAT SERPL: 18.2 (ref 7–25)
CALCIUM SPEC-SCNC: 8.8 MG/DL (ref 8.6–10.5)
CHLORIDE SERPL-SCNC: 95 MMOL/L (ref 98–107)
CO2 SERPL-SCNC: 37 MMOL/L (ref 22–29)
CREAT BLD-MCNC: 0.44 MG/DL (ref 0.76–1.27)
DEPRECATED RDW RBC AUTO: 41 FL (ref 37–54)
EOSINOPHIL # BLD AUTO: 0.07 10*3/MM3 (ref 0–0.4)
EOSINOPHIL NFR BLD AUTO: 1 % (ref 0.3–6.2)
ERYTHROCYTE [DISTWIDTH] IN BLOOD BY AUTOMATED COUNT: 16 % (ref 12.3–15.4)
GFR SERPL CREATININE-BSD FRML MDRD: >150 ML/MIN/1.73
GLUCOSE BLD-MCNC: 89 MG/DL (ref 65–99)
HCT VFR BLD AUTO: 30.1 % (ref 37.5–51)
HGB BLD-MCNC: 8.6 G/DL (ref 13–17.7)
LYMPHOCYTES # BLD AUTO: 1.77 10*3/MM3 (ref 0.7–3.1)
LYMPHOCYTES NFR BLD AUTO: 25.7 % (ref 19.6–45.3)
MCH RBC QN AUTO: 20.4 PG (ref 26.6–33)
MCHC RBC AUTO-ENTMCNC: 28.6 G/DL (ref 31.5–35.7)
MCV RBC AUTO: 71.3 FL (ref 79–97)
MONOCYTES # BLD AUTO: 0.72 10*3/MM3 (ref 0.1–0.9)
MONOCYTES NFR BLD AUTO: 10.4 % (ref 5–12)
NEUTROPHILS # BLD AUTO: 4.29 10*3/MM3 (ref 1.7–7)
NEUTROPHILS NFR BLD AUTO: 62.3 % (ref 42.7–76)
PLATELET # BLD AUTO: 280 10*3/MM3 (ref 140–450)
PMV BLD AUTO: 9.1 FL (ref 6–12)
POTASSIUM BLD-SCNC: 3.6 MMOL/L (ref 3.5–5.2)
RBC # BLD AUTO: 4.22 10*6/MM3 (ref 4.14–5.8)
SODIUM BLD-SCNC: 137 MMOL/L (ref 136–145)
WBC NRBC COR # BLD: 6.89 10*3/MM3 (ref 3.4–10.8)

## 2019-12-16 PROCEDURE — 25010000002 ENOXAPARIN PER 10 MG: Performed by: NURSE PRACTITIONER

## 2019-12-16 PROCEDURE — 63710000001 PREDNISONE PER 1 MG: Performed by: FAMILY MEDICINE

## 2019-12-16 PROCEDURE — 97162 PT EVAL MOD COMPLEX 30 MIN: CPT

## 2019-12-16 PROCEDURE — 94799 UNLISTED PULMONARY SVC/PX: CPT

## 2019-12-16 PROCEDURE — 85025 COMPLETE CBC W/AUTO DIFF WBC: CPT | Performed by: FAMILY MEDICINE

## 2019-12-16 PROCEDURE — 80048 BASIC METABOLIC PNL TOTAL CA: CPT | Performed by: FAMILY MEDICINE

## 2019-12-16 RX ORDER — ALPRAZOLAM 0.5 MG/1
1 TABLET ORAL 3 TIMES DAILY PRN
Status: DISCONTINUED | OUTPATIENT
Start: 2019-12-16 | End: 2019-12-17 | Stop reason: HOSPADM

## 2019-12-16 RX ADMIN — BUDESONIDE AND FORMOTEROL FUMARATE DIHYDRATE 2 PUFF: 160; 4.5 AEROSOL RESPIRATORY (INHALATION) at 22:38

## 2019-12-16 RX ADMIN — PREDNISONE 20 MG: 20 TABLET ORAL at 08:34

## 2019-12-16 RX ADMIN — ESCITALOPRAM 20 MG: 10 TABLET, FILM COATED ORAL at 08:34

## 2019-12-16 RX ADMIN — IPRATROPIUM BROMIDE AND ALBUTEROL SULFATE 3 ML: 2.5; .5 SOLUTION RESPIRATORY (INHALATION) at 07:26

## 2019-12-16 RX ADMIN — SODIUM CHLORIDE, PRESERVATIVE FREE 10 ML: 5 INJECTION INTRAVENOUS at 08:34

## 2019-12-16 RX ADMIN — METOPROLOL TARTRATE 12.5 MG: 25 TABLET, FILM COATED ORAL at 21:15

## 2019-12-16 RX ADMIN — MONTELUKAST SODIUM 10 MG: 10 TABLET, FILM COATED ORAL at 21:15

## 2019-12-16 RX ADMIN — METOPROLOL TARTRATE 12.5 MG: 25 TABLET, FILM COATED ORAL at 08:34

## 2019-12-16 RX ADMIN — TRAZODONE HYDROCHLORIDE 50 MG: 50 TABLET ORAL at 21:15

## 2019-12-16 RX ADMIN — MEGESTROL ACETATE 80 MG: 40 TABLET ORAL at 08:34

## 2019-12-16 RX ADMIN — PANTOPRAZOLE SODIUM 40 MG: 40 TABLET, DELAYED RELEASE ORAL at 08:34

## 2019-12-16 RX ADMIN — ATORVASTATIN CALCIUM 10 MG: 10 TABLET, FILM COATED ORAL at 21:15

## 2019-12-16 RX ADMIN — GUAIFENESIN 1200 MG: 600 TABLET, EXTENDED RELEASE ORAL at 21:15

## 2019-12-16 RX ADMIN — ALPRAZOLAM 1 MG: 0.5 TABLET ORAL at 15:23

## 2019-12-16 RX ADMIN — BUDESONIDE AND FORMOTEROL FUMARATE DIHYDRATE 2 PUFF: 160; 4.5 AEROSOL RESPIRATORY (INHALATION) at 07:26

## 2019-12-16 RX ADMIN — ENOXAPARIN SODIUM 30 MG: 30 INJECTION SUBCUTANEOUS at 21:22

## 2019-12-16 RX ADMIN — ASPIRIN 81 MG: 81 TABLET ORAL at 08:34

## 2019-12-16 RX ADMIN — IPRATROPIUM BROMIDE AND ALBUTEROL SULFATE 3 ML: 2.5; .5 SOLUTION RESPIRATORY (INHALATION) at 22:37

## 2019-12-16 RX ADMIN — MEGESTROL ACETATE 80 MG: 40 TABLET ORAL at 21:15

## 2019-12-16 RX ADMIN — GUAIFENESIN 1200 MG: 600 TABLET, EXTENDED RELEASE ORAL at 08:34

## 2019-12-16 NOTE — THERAPY EVALUATION
Patient Name: Kellie Arzate  : 1955    MRN: [id number]                              Today's Date: 2019       Admit Date: 2019    Visit Dx:     ICD-10-CM ICD-9-CM   1. Gait abnormality R26.9 781.2     Patient Active Problem List   Diagnosis   • Aspiration pneumonia of left lung (CMS/[facility])   • Encounter for observation for suspected tuberculosis   • Abnormal chest x-ray, findings apex right lung suspect TB   • Acute on chronic respiratory failure with hypercapnia (CMS/[facility])   • Tobacco dependence   • Anemia   • Protein-calorie malnutrition (CMS/[facility])   • TB (pulmonary tuberculosis)   • Tuberculosis     Past Medical History:   Diagnosis Date   • Anxiety    • Arthritis    • Chronic respiratory failure with hypoxia, on home O2 therapy (CMS/[facility]), 3 L    • COPD (chronic obstructive pulmonary disease) (CMS/[facility])    • Depression    • Hypertension    • Mixed hyperlipidemia    • Nephrolithiasis    • Pneumonia, pseudomonal 2016 with negative AFB, aspiration 2018      Past Surgical History:   Procedure Laterality Date   • COLONOSCOPY  2007    Within Normal Limits.   • ENDOSCOPY  2007    urea neg, sbbx benign, mild gastritis, small hiatal hernia   • ENDOSCOPY N/A 10/5/2016    Procedure: ESOPHAGOGASTRODUODENOSCOPY WITH ANESTHESIA;  Surgeon: Kevyn Damon MD;  Location: [facility] ENDOSCOPY;  Service:    • HEMORRHOIDECTOMY     • PEG TUBE INSERTION  2016    Dr Mccarty   • TRACHEOSTOMY       General Information     Row Name 19 1145          PT Evaluation Time/Intention    Document Type  evaluation;other (see comments) see MAR;   -JE     Mode of Treatment  physical therapy  -     Row Name 19 1148          General Information    Patient Profile Reviewed?  yes  -JE     Prior Level of Function  min assist:;bathing;dressing;dependent:;cooking;home management;driving;shopping has std wx, shower seat, has O2  -JE     Existing Precautions/Restrictions  fall;oxygen therapy device and L/min  -JE      Barriers to Rehab  previous functional deficit  -[facility] Name 12/16/19 1145          Relationship/Environment    Lives With  sibling(s);child(corina), adult  -     Name(s) of Who Lives With Patient  dtr; brother stays w/ pt also  -[facility] Name 12/16/19 1145          Resource/Environmental Concerns    Current Living Arrangements  home/apartment/condo  -[facility] Name 12/16/19 1145          Home Main Entrance    Number of Stairs, Main Entrance  three  -     Stair Railings, Main Entrance  railing on right side (ascending)  -[facility] Name 12/16/19 1145          Cognitive Assessment/Intervention- PT/OT    Orientation Status (Cognition)  oriented x 4  -     Personal Safety Interventions  gait belt;supervised activity;muscle strengthening facilitated;fall prevention program maintained;nonskid shoes/slippers when out of bed  -[facility] Name 12/16/19 1145          Safety Issues, Functional Mobility    Safety Issues Affecting Function (Mobility)  impulsivity;insight into deficits/self awareness;safety precaution awareness  -     Impairments Affecting Function (Mobility)  balance;endurance/activity tolerance;shortness of breath;strength  -       User Key  (r) = Recorded By, (t) = Taken By, (c) = Cosigned By    Initials Name Provider Type    Mikayla Aponte, PT Physical Therapist        Mobility     Row Name 12/16/19 1145          Bed Mobility Assessment/Treatment    Bed Mobility Assessment/Treatment  scooting/bridging;supine-sit;sit-supine  -     Scooting/Bridging [place] (Bed Mobility)  independent  [facility]     Supine-Sit [place] (Bed Mobility)  supervision;independent  [facility]     Sit-Supine [place] (Bed Mobility)  supervision;independent  -[facility] Name 12/16/19 1145          Sit-Stand Transfer    Sit-Stand [place] (Transfers)  contact guard;verbal cues  -[facility] Name 12/16/19 1145          Gait/Stairs Assessment/Training    Gait/Stairs Assessment/Training  gait/ambulation  independence;distance ambulated;gait pattern;gait deviations  -     [place] Level (Gait)  minimum assist (75% patient effort);verbal cues  -     Distance in Feet (Gait)  70 ft  -     Deviations/Abnormal Patterns (Gait)  gait speed decreased;stride length decreased  -     Comment (Gait/Stairs)  pt would benefit from rwx w/ noted unsteadiness w/ gait  -       User Key  (r) = Recorded By, (t) = Taken By, (c) = Cosigned By    Initials Name Provider Type    Mikayla Aponte, PT Physical Therapist        Obj/Interventions     Row Name 12/16/19 1145          General ROM    GENERAL ROM COMMENTS  AROM all 4 extremities WFLs  -     Row Name 12/16/19 1145          MMT (Manual Muscle Testing)    General MMT Comments  grossly 4 to 4+/5 throughout  -[facility] Name 12/16/19 1145          Therapeutic Exercise    Comment (Therapeutic Exercise)  education for frequent aps in supine and sitting throughout waking hours; education for LE in sitting to include seated marching and LAQ  -[facility] Name 12/16/19 1145          Static Sitting Balance    Level of [place] (Unsupported Sitting, Static Balance)  independent  -     Sitting Position (Unsupported Sitting, Static Balance)  sitting on edge of bed  -[facility] Name 12/16/19 1145          Static Standing Balance    Level of [place] (Supported Standing, Static Balance)  contact guard assist  -[facility] Name 12/16/19 1145          Sensory Assessment/Intervention    Sensory General Assessment  -- no reported or noted c/os N/T  -       User Key  (r) = Recorded By, (t) = Taken By, (c) = Cosigned By    Initials Name Provider Type    Mikayla Aponte, PT Physical Therapist        Goals/Plan     Row Name 12/16/19 1145          Bed Mobility Goal 1 (PT)    Activity/Assistive Device (Bed Mobility Goal 1, PT)  bed mobility activities, all  -     [place] Level/Cues Needed (Bed Mobility Goal 1, PT)  independent  -     Time Frame (Bed Mobility Goal 1,  PT)  long term goal (LTG);10 days  -JE     Progress/Outcomes (Bed Mobility Goal 1, PT)  goal ongoing  -     Row Name 12/16/19 1145          Transfer Goal 1 (PT)    Activity/Assistive Device (Transfer Goal 1, PT)  sit-to-stand/stand-to-sit;bed-to-chair/chair-to-bed;walker, rolling  -JE     [place] Level/Cues Needed (Transfer Goal 1, PT)  standby assist;conditional independence  -JE     Time Frame (Transfer Goal 1, PT)  long term goal (LTG);10 days  -JE     Progress/Outcome (Transfer Goal 1, PT)  goal ongoing  -     Row Name 12/16/19 1145          Gait Training Goal 1 (PT)    Activity/Assistive Device (Gait Training Goal 1, PT)  gait (walking locomotion);assistive device use;decrease fall risk;diminish gait deviation;improve balance and speed;increase endurance/gait distance;increase energy conservation;walker, rolling  -JE     [place] Level (Gait Training Goal 1, PT)  standby assist  -JE     Distance (Gait Goal 1, PT)  150 ft  -JE     Time Frame (Gait Training Goal 1, PT)  long term goal (LTG);10 days  -JE     Progress/Outcome (Gait Training Goal 1, PT)  goal ongoing  -     Row Name 12/16/19 1145          Stairs Goal 1 (PT)    Activity/Assistive Device (Stairs Goal 1, PT)  ascending stairs;descending stairs;using handrail, right;step-to-step;decrease fall risk;improve balance and speed  -JE     [place] Level/Cues Needed (Stairs Goal 1, PT)  contact guard assist  -     Number of Stairs (Stairs Goal 1, PT)  3  -JE     Time Frame (Stairs Goal 1, PT)  long term goal (LTG);10 days  -JE     Progress/Outcome (Stairs Goal 1, PT)  goal ongoing  -     Row Name 12/16/19 1145          Patient Education Goal (PT)    Activity (Patient Education Goal, PT)  energy conservation techniques  -JE     [place]/Cues/Accuracy (Memory Goal 2, PT)  demonstrates adequately;independent;verbalizes understanding  -JE     Time Frame (Patient Education Goal, PT)  long term goal (LTG);10 days  -JE     Progress/Outcome  (Patient Education Goal, PT)  goal ongoing  -       User Key  (r) = Recorded By, (t) = Taken By, (c) = Cosigned By    Initials Name Provider Type    Mikayla Aponte, PT Physical Therapist        Clinical Impression     Row Name 12/16/19 1145          Pain Assessment    Additional Documentation  Pain Scale: Numbers Pre/Post-Treatment (Group)  -     Row Name 12/16/19 1145          Pain Scale: Numbers Pre/Post-Treatment    Pain Scale: Numbers, Pretreatment  0/10 - no pain  -     Pain Scale: Numbers, Post-Treatment  0/10 - no pain  -     Row Name 12/16/19 1148          Plan of Care Review    Plan of Care Reviewed With  patient;daughter;sibling  -     Progress  improving  -     Outcome Summary  PT eval completed.  Pt motivated to improve and return home.  O2 dependent and reports this is his baseline.  Pt demonstrates decrease balance w/ increase fall risk.   Pt will benefit from continued PT services to improve time tolerance to out of bed activity, knowledge of energy conservation, to improve overall strength, balance and safety awareness improving I w/ functional mobility and reducing fall risk.  Recommend home w/ assist and HH at discharge.    -     Row Name 12/16/19 1141          Physical Therapy Clinical Impression    Patient/Family Goals Statement (PT Clinical Impression)  return home  -     Criteria for Skilled Interventions Met (PT Clinical Impression)  yes;treatment indicated  -     Rehab Potential (PT Clinical Summary)  good, to achieve stated therapy goals  -     Row Name 12/16/19 1142          Vital Signs    Pretreatment Heart Rate (beats/min)  96  -JE     Intratreatment Heart Rate (beats/min)  114  -JE     Posttreatment Heart Rate (beats/min)  98  -JE     Pre SpO2 (%)  98  -JE     O2 Delivery Pre Treatment  supplemental O2  -JE     Intra SpO2 (%)  100  -JE     O2 Delivery Intra Treatment  supplemental O2  -JE     Post SpO2 (%)  98  -JE     O2 Delivery Post Treatment  supplemental O2   -JE     Pre Patient Position  Supine  -JE     Intra Patient Position  Standing  -JE     Post Patient Position  Sitting up in bed  -     Row Name 12/16/19 1145          Positioning and Restraints    Pre-Treatment Position  in bed  -JE     Post Treatment Position  bed  -JE     In Bed  sitting;call light within reach;encouraged to call for assist;with family/caregiver;side rails up x2  -       User Key  (r) = Recorded By, (t) = Taken By, (c) = Cosigned By    Initials Name Provider Type    Mikayla Aponte, PT Physical Therapist        Outcome Measures     Row Name 12/16/19 1145          How much help from another person do you currently need...    Turning from your back to your side while in flat bed without using bedrails?  4  -JE     Moving from lying on back to sitting on the side of a flat bed without bedrails?  4  -JE     Moving to and from a bed to a chair (including a wheelchair)?  3  -JE     Standing up from a chair using your arms (e.g., wheelchair, bedside chair)?  3  -JE     Climbing 3-5 steps with a railing?  2  -JE     To walk in hospital room?  2  -JE     AM-PAC 6 Clicks Score (PT)  18  -     Row Name 12/16/19 1145          Functional Assessment    Outcome Measure Options  AM-PAC 6 Clicks Basic Mobility (PT)  -       User Key  (r) = Recorded By, (t) = Taken By, (c) = Cosigned By    Initials Name Provider Type    Mikayla Aponte, PT Physical Therapist          PT Recommendation and Plan  Planned Therapy Interventions (PT Eval): balance training, bed mobility training, gait training, home exercise program, patient/family education, postural re-education, ROM (range of motion), stair training, strengthening, transfer training, other (see comments)(safety/falls prevention; energy conservation)  Outcome Summary/Treatment Plan (PT)  Anticipated Equipment Needs at Discharge (PT): front wheeled walker(reports he has a std walker)  Anticipated Discharge Disposition (PT): home with assist, home with  home health  Plan of Care Reviewed With: patient, daughter, sibling  Progress: improving  Outcome Summary: PT eval completed.  Pt motivated to improve and return home.  O2 dependent and reports this is his baseline.  Pt demonstrates decrease balance w/ increase fall risk.   Pt will benefit from continued PT services to improve time tolerance to out of bed activity, knowledge of energy conservation, to improve overall strength, balance and safety awareness improving I w/ functional mobility and reducing fall risk.  Recommend home w/ assist and HH at discharge.       Time Calculation:   PT Charges     Row Name 12/16/19 1501             Time Calculation    Start Time  1145  -      Stop Time  1230  -      Time Calculation (min)  45 min  -      PT Received On  12/16/19  -      PT Goal Re-Cert Due Date  12/26/19  -        User Key  (r) = Recorded By, (t) = Taken By, (c) = Cosigned By    Initials Name Provider Type    Mikayla Aponte, PT Physical Therapist        Therapy Charges for Today     Code Description Service Date Service Provider Modifiers Qty    38875500619 HC PT EVAL MOD COMPLEXITY 3 12/16/2019 Mikayla Silva, PT GP 1          PT G-Codes  Outcome Measure Options: AM-PAC 6 Clicks Basic Mobility (PT)  AM-PAC 6 Clicks Score (PT): 18  AM-PAC 6 Clicks Score (OT): 24    Mikayla Silva PT  12/16/2019

## 2019-12-16 NOTE — PLAN OF CARE
Problem: Patient Care Overview  Goal: Plan of Care Review  Outcome: Ongoing (interventions implemented as appropriate)  Flowsheets (Taken 12/16/2019 1145)  Progress: improving  Plan of Care Reviewed With: patient;daughter;sibling  Outcome Summary: PT eval completed.  Pt motivated to improve and return home.  O2 dependent and reports this is his baseline.  Pt demonstrates decrease balance w/ increase fall risk.   Pt will benefit from continued PT services to improve time tolerance to out of bed activity, knowledge of energy conservation, to improve overall strength, balance and safety awareness improving I w/ functional mobility and reducing fall risk.  Recommend home w/ assist and HH at discharge.

## 2019-12-16 NOTE — PROGRESS NOTES
"    [facility] Medicine Services  INPATIENT PROGRESS NOTE    Patient Name: Kellie Arzate  Date of Admission: 12/12/2019  Today's Date: 12/16/19  Length of Stay: 2  Primary Care Physician: Jose Moon MD    Subjective   Chief Complaint: \"I want to go home.\"  HPI   Doing well.  Afebrile.  No night sweats or hemoptysis.  Tolerating his home 3L.  Wants to go home.        Review of Systems   Constitutional: Negative for fatigue and fever.   HENT: Negative for congestion and ear pain.    Eyes: Negative for redness and visual disturbance.   Respiratory: Negative for cough, shortness of breath and wheezing.    Cardiovascular: Negative for chest pain and palpitations.   Gastrointestinal: Negative for abdominal pain, diarrhea, nausea and vomiting.   Endocrine: Negative for cold intolerance and heat intolerance.   Genitourinary: Negative for dysuria and frequency.   Musculoskeletal: Negative for arthralgias and back pain.   Skin: Negative for rash and wound.   Neurological: Negative for dizziness and headaches.   Psychiatric/Behavioral: Negative for confusion. The patient is not nervous/anxious.         All pertinent negatives and positives are as above. All other systems have been reviewed and are negative unless otherwise stated.     Objective    Temp:  [98.1 °F (36.7 °C)-99.1 °F (37.3 °C)] 98.1 °F (36.7 °C)  Heart Rate:  [72-88] 72  Resp:  [16-20] 20  BP: (132-148)/(65-70) 132/65  Physical Exam   Constitutional: He is oriented to person, place, and time. He appears well-developed and well-nourished.   HENT:   Head: Normocephalic and atraumatic.   Right Ear: External ear normal.   Left Ear: External ear normal.   Nose: Nose normal.   Mouth/Throat: Oropharynx is clear and moist.   Eyes: Pupils are equal, round, and reactive to light. Conjunctivae and EOM are normal. Right eye exhibits no discharge. Left eye exhibits no discharge. No scleral icterus.   Neck: Normal range of " motion. Neck supple. No tracheal deviation present. No thyromegaly present.   Cardiovascular: Normal rate, regular rhythm, normal heart sounds and intact distal pulses. Exam reveals no gallop and no friction rub.   No murmur heard.  Pulmonary/Chest: Effort normal and breath sounds normal. No stridor. No respiratory distress. He has no wheezes. He has no rales. He exhibits no tenderness.   Abdominal: Soft. Bowel sounds are normal. He exhibits no distension and no mass. There is no tenderness. There is no rebound and no guarding. No hernia.   Musculoskeletal: Normal range of motion. He exhibits no edema or deformity.   Lymphadenopathy:     He has no cervical adenopathy.   Neurological: He is alert and oriented to person, place, and time. He has normal reflexes. He displays normal reflexes. No cranial nerve deficit. He exhibits normal muscle tone. Coordination normal.   Skin: Skin is warm and dry. No rash noted. No erythema. No pallor.   Psychiatric: He has a normal mood and affect. His behavior is normal. Judgment and thought content normal.   Vitals reviewed.          Results Review:  I have reviewed the labs, radiology results, and diagnostic studies.    Laboratory Data:   Results from last 7 days   Lab Units 12/16/19  0511 12/15/19  0549 12/13/19  1609   WBC 10*3/mm3 6.89 7.75 5.79   HEMOGLOBIN g/dL 8.6* 8.5* 8.5*   HEMATOCRIT % 30.1* 30.7* 30.5*   PLATELETS 10*3/mm3 280 288 229        Results from last 7 days   Lab Units 12/16/19  0511 12/15/19  0549 12/13/19  1609 12/13/19  0536   SODIUM mmol/L 137 141 140 139   POTASSIUM mmol/L 3.6 3.7 3.9 4.1   CHLORIDE mmol/L 95* 97* 93* 92*   CO2 mmol/L 37.0* 41.0* 40.0* 43.0*   BUN mg/dL 8 8 10 9   CREATININE mg/dL 0.44* 0.37* 0.40* 0.43*   CALCIUM mg/dL 8.8 8.8 9.2 9.1   BILIRUBIN mg/dL  --  0.2 <0.2* <0.2*   ALK PHOS U/L  --  55 68 69   ALT (SGPT) U/L  --  9 10 9   AST (SGOT) U/L  --  14 13 14   GLUCOSE mg/dL 89 88 149* 138*       Culture Data:   No results found for:  BLOODCX, URINECX, WOUNDCX, MRSACX, RESPCX, STOOLCX    Radiology Data:   Imaging Results (Last 24 Hours)     ** No results found for the last 24 hours. **          I have reviewed the patient's current medications.     Assessment/Plan     Active Hospital Problems    Diagnosis   • Tuberculosis   • TB (pulmonary tuberculosis)   • Encounter for observation for suspected tuberculosis   • Abnormal chest x-ray, findings apex right lung suspect TB   • Acute on chronic respiratory failure with hypercapnia (CMS/HCC)   • Tobacco dependence   • Anemia   • Protein-calorie malnutrition (CMS/HCC)       1.  Abnormal chest imaging  -On review of imaging, radiology report mentions this CT shows an improvement over prior CT scan done in July.  I fear that this is a chronic finding that will always raise concern when seen by someone not familiar with his imaging.  I explained this to the patient and family.  Discussed with infection control who recommended waiting on Quantiferon    2.  Chronic Hypoxic respiratory failure  -continue oxygen    3.  COPD  -duonebs PRN    4.  Anxiety  -restart Xanax              Discharge Planning: I expect the patient to be discharged to home in 1 day    Alon Bowens MD   12/16/19   1:35 PM

## 2019-12-16 NOTE — PLAN OF CARE
Problem: Patient Care Overview  Goal: Plan of Care Review  Outcome: Ongoing (interventions implemented as appropriate)  Flowsheets (Taken 12/16/2019 0421)  Progress: improving  Plan of Care Reviewed With: patient  Outcome Summary: pt c/o generalized body aches from laying in bed; encouraged pt to increase his activity level; pt has rested well; VSS; safety maintained; will continue to monitor

## 2019-12-16 NOTE — PROGRESS NOTES
RT Nebulizer Protocol    Assessment tool to be used for patients with existing breathing treatments ordered by hospitalists                                                                  0  1  2  3  4      Respiratory History   No Smoking      Smoking History      1 Pack/Day      Pulmonary Disease   x   Exacerbation        Respiratory Rate   Normal   x   20-25      Dyspneic      Accessory Muscles      Severe Dyspnea        Breath Sounds   Clear   x   Crackles      Crackles/ Rhonchi      Wheezing      Absent/ Severe Wheezing        Chest   X-ray   Clear      1 Lobe Infiltration/ Consolidation/ PE      2 Lobe Same Lung Infiltration/ Consolidation/ PE       2 Lobe Infiltration/ Both Lungs/ Consolidation/ PE      Both Lungs/ More Than 1 Lobe/ Atelectasis/ Consolidation/  PE        Cough   Strong Non- Productive   x   Excessive Secretions/ Strong Cough      Excessive Secretions/ Weak Cough      Thick Bronchial Secretions/ Weak Cough      Thick Bronchial Secretions/ No Cough        Total Patient Score =   0-4=Q4 PRN  5-9=TID and Q4 PRN  10-14=QID and Q3 PRN  15-19=Q4 and Q2 PRN  20=Q3 and Q2 PRN    Bronchopulmonary Hygiene (CPT)   Q4 ATC Copious secretions, dyspnea, unable to sleep, mucus plug    QID & Q4 PRN Moderate secretions    TID Small amounts of secretions w/ poor cough and history of secretions    BID Unable to deep breathe and cough spontaneously       Lung Expansion Therapy (PEP)   Q4 & PRN at night Severe atelectasis, poor oxygenation    QID  High risk for persistent atelectasis, existence of same    TID At risk for developing atelectasis    BID Unable to deep breathe and cough spontaneously     Instruct, 1 follow up Patients able to perform well on their own        Patient takes aerosol treatments at home q 6 prn. He is currently getting duoneb TID and q 4 prn for wheezing and shortness of air. He states he is OK with this schedule. We will continue with current aerosol treatment schedule.

## 2019-12-17 VITALS
BODY MASS INDEX: 18.52 KG/M2 | TEMPERATURE: 97.6 F | WEIGHT: 111.13 LBS | HEART RATE: 72 BPM | HEIGHT: 65 IN | DIASTOLIC BLOOD PRESSURE: 62 MMHG | SYSTOLIC BLOOD PRESSURE: 126 MMHG | OXYGEN SATURATION: 97 % | RESPIRATION RATE: 20 BRPM

## 2019-12-17 PROBLEM — A15.0 TB (PULMONARY TUBERCULOSIS): Status: RESOLVED | Noted: 2019-12-13 | Resolved: 2019-12-17

## 2019-12-17 PROBLEM — A15.9 TUBERCULOSIS: Status: RESOLVED | Noted: 2019-12-14 | Resolved: 2019-12-17

## 2019-12-17 LAB
ANION GAP SERPL CALCULATED.3IONS-SCNC: 5 MMOL/L (ref 5–15)
BASOPHILS # BLD AUTO: 0.02 10*3/MM3 (ref 0–0.2)
BASOPHILS NFR BLD AUTO: 0.3 % (ref 0–1.5)
BUN BLD-MCNC: 11 MG/DL (ref 8–23)
BUN/CREAT SERPL: 22 (ref 7–25)
CALCIUM SPEC-SCNC: 8.8 MG/DL (ref 8.6–10.5)
CHLORIDE SERPL-SCNC: 98 MMOL/L (ref 98–107)
CO2 SERPL-SCNC: 37 MMOL/L (ref 22–29)
CREAT BLD-MCNC: 0.5 MG/DL (ref 0.76–1.27)
DEPRECATED RDW RBC AUTO: 41.7 FL (ref 37–54)
EOSINOPHIL # BLD AUTO: 0.1 10*3/MM3 (ref 0–0.4)
EOSINOPHIL NFR BLD AUTO: 1.4 % (ref 0.3–6.2)
ERYTHROCYTE [DISTWIDTH] IN BLOOD BY AUTOMATED COUNT: 16.2 % (ref 12.3–15.4)
GFR SERPL CREATININE-BSD FRML MDRD: >150 ML/MIN/1.73
GLUCOSE BLD-MCNC: 122 MG/DL (ref 65–99)
HCT VFR BLD AUTO: 28.8 % (ref 37.5–51)
HGB BLD-MCNC: 8.3 G/DL (ref 13–17.7)
LYMPHOCYTES # BLD AUTO: 1.6 10*3/MM3 (ref 0.7–3.1)
LYMPHOCYTES NFR BLD AUTO: 23 % (ref 19.6–45.3)
MCH RBC QN AUTO: 20.6 PG (ref 26.6–33)
MCHC RBC AUTO-ENTMCNC: 28.8 G/DL (ref 31.5–35.7)
MCV RBC AUTO: 71.6 FL (ref 79–97)
MONOCYTES # BLD AUTO: 0.86 10*3/MM3 (ref 0.1–0.9)
MONOCYTES NFR BLD AUTO: 12.4 % (ref 5–12)
NEUTROPHILS # BLD AUTO: 4.34 10*3/MM3 (ref 1.7–7)
NEUTROPHILS NFR BLD AUTO: 62.5 % (ref 42.7–76)
PLATELET # BLD AUTO: 268 10*3/MM3 (ref 140–450)
PMV BLD AUTO: 8.9 FL (ref 6–12)
POTASSIUM BLD-SCNC: 3.2 MMOL/L (ref 3.5–5.2)
RBC # BLD AUTO: 4.02 10*6/MM3 (ref 4.14–5.8)
SODIUM BLD-SCNC: 140 MMOL/L (ref 136–145)
WBC NRBC COR # BLD: 6.95 10*3/MM3 (ref 3.4–10.8)

## 2019-12-17 PROCEDURE — 85025 COMPLETE CBC W/AUTO DIFF WBC: CPT | Performed by: FAMILY MEDICINE

## 2019-12-17 PROCEDURE — 93005 ELECTROCARDIOGRAM TRACING: CPT | Performed by: FAMILY MEDICINE

## 2019-12-17 PROCEDURE — 97110 THERAPEUTIC EXERCISES: CPT

## 2019-12-17 PROCEDURE — 97116 GAIT TRAINING THERAPY: CPT

## 2019-12-17 PROCEDURE — 93010 ELECTROCARDIOGRAM REPORT: CPT | Performed by: INTERNAL MEDICINE

## 2019-12-17 PROCEDURE — 94799 UNLISTED PULMONARY SVC/PX: CPT

## 2019-12-17 PROCEDURE — 80048 BASIC METABOLIC PNL TOTAL CA: CPT | Performed by: FAMILY MEDICINE

## 2019-12-17 RX ADMIN — PANTOPRAZOLE SODIUM 40 MG: 40 TABLET, DELAYED RELEASE ORAL at 08:20

## 2019-12-17 RX ADMIN — MEGESTROL ACETATE 80 MG: 40 TABLET ORAL at 08:20

## 2019-12-17 RX ADMIN — ALPRAZOLAM 1 MG: 0.5 TABLET ORAL at 08:24

## 2019-12-17 RX ADMIN — BUDESONIDE AND FORMOTEROL FUMARATE DIHYDRATE 2 PUFF: 160; 4.5 AEROSOL RESPIRATORY (INHALATION) at 06:32

## 2019-12-17 RX ADMIN — ALPRAZOLAM 1 MG: 0.5 TABLET ORAL at 01:00

## 2019-12-17 RX ADMIN — ALPRAZOLAM 1 MG: 0.5 TABLET ORAL at 15:13

## 2019-12-17 RX ADMIN — GUAIFENESIN 1200 MG: 600 TABLET, EXTENDED RELEASE ORAL at 08:19

## 2019-12-17 RX ADMIN — ASPIRIN 81 MG: 81 TABLET ORAL at 08:19

## 2019-12-17 RX ADMIN — ESCITALOPRAM 20 MG: 10 TABLET, FILM COATED ORAL at 08:18

## 2019-12-17 RX ADMIN — IPRATROPIUM BROMIDE AND ALBUTEROL SULFATE 3 ML: 2.5; .5 SOLUTION RESPIRATORY (INHALATION) at 06:31

## 2019-12-17 RX ADMIN — METOPROLOL TARTRATE 12.5 MG: 25 TABLET, FILM COATED ORAL at 08:19

## 2019-12-17 NOTE — THERAPY TREATMENT NOTE
Acute Care - Physical Therapy Treatment Note  [facility]     Patient Name: Kellie Arzate  : 1955  MRN: [id number]  Today's Date: 2019  Onset of Illness/Injury or Date of Surgery: 19     Referring Physician: Dr. Boss    Admit Date: 2019    Visit Dx:    ICD-10-CM ICD-9-CM   1. Gait abnormality R26.9 781.2     Patient Active Problem List   Diagnosis   • Aspiration pneumonia of left lung (CMS/HCC)   • Encounter for observation for suspected tuberculosis   • Abnormal chest x-ray, findings apex right lung suspect TB   • Acute on chronic respiratory failure with hypercapnia (CMS/HCC)   • Tobacco dependence   • Anemia   • Protein-calorie malnutrition (CMS/HCC)   • TB (pulmonary tuberculosis)   • Tuberculosis       Therapy Treatment    Rehabilitation Treatment Summary     Row Name 19             Treatment Time/Intention    Discipline  physical therapy assistant  -LC      Document Type  therapy note (daily note)  -LC      Subjective Information  no complaints  -LC2      Mode of Treatment  physical therapy  -LC      Recorded by [LC] Masha Diallo, [facility] 19  [LC2] Masha Diallo, [facility] 19      Row Name 19             Sit-Stand Transfer    Sit-Stand Wood (Transfers)  stand by assist;contact guard  -LC      Recorded by [LC] Masha Diallo, [facility] 19      Row Name 19             Stand-Sit Transfer    Stand-Sit [place] (Transfers)  independent  -LC      Recorded by [LC] Masha Diallo, PTA 19      Row Name 19             Gait/Stairs Assessment/Training    [place] Level (Gait)  contact guard  -LC      Distance in Feet (Gait)  100ft x4  -LC      Deviations/Abnormal Patterns (Gait)  gait speed decreased;stride length decreased  -LC      Comment (Gait/Stairs)  Patient requires 3L of O2 and he pushed IV pole, no LOB noted  -LC      Recorded by [LC] Masha Diallo, PTA 19      Row Name 19              Therapeutic Exercise    Comment (Therapeutic Exercise)  education provided for daisyx in sitting, he performed standing marches x15 reps with HHA  -LC      Recorded by [] Masha Diallo, PTA 12/17/19 0922      Row Name 12/17/19 0858             Positioning and Restraints    Pre-Treatment Position  in bed  -LC      Post Treatment Position  bed  -LC      In Bed  sitting EOB;call light within reach;encouraged to call for assist  -LC      Recorded by [] Masha Diallo, PTA 12/17/19 0922      Row Name 12/17/19 0858             Pain Scale: Numbers Pre/Post-Treatment    Pain Scale: Numbers, Pretreatment  0/10 - no pain  -LC      Pain Scale: Numbers, Post-Treatment  0/10 - no pain  -LC      Recorded by [] Masha Diallo, PTA 12/17/19 0922        User Key  (r) = Recorded By, (t) = Taken By, (c) = Cosigned By    Initials Name Effective Dates Discipline     Masha Diallo, PTA 10/18/19 -  PT                       PT Recommendation and Plan        Outcome Measures     Row Name 12/15/19 1020             How much help from another is currently needed...    Putting on and taking off regular lower body clothing?  4  -AC      Bathing (including washing, rinsing, and drying)  4  -AC      Toileting (which includes using toilet bed pan or urinal)  4  -AC      Putting on and taking off regular upper body clothing  4  -AC      Taking care of personal grooming (such as brushing teeth)  4  -AC      Eating meals  4  -AC      AM-PAC 6 Clicks Score (OT)  24  -AC         Functional Assessment    Outcome Measure Options  AM-PAC 6 Clicks Daily Activity (OT)  -AC        User Key  (r) = Recorded By, (t) = Taken By, (c) = Cosigned By    Initials Name Provider Type    AC Bucky Gilman, OTR/L, CNT Occupational Therapist         Time Calculation:   PT Charges     Row Name 12/17/19 0922             Time Calculation    Start Time  0858  -      Stop Time  0921  -      Time Calculation (min)  23 min  -      PT Received On  12/17/19   -LC      PT Goal Re-Cert Due Date  12/26/19  -LC         Time Calculation- PT    Total Timed Code Minutes- PT  23 minute(s)  -        User Key  (r) = Recorded By, (t) = Taken By, (c) = Cosigned By    Initials Name Provider Type    Masha Iqbal PTA Physical Therapy Assistant        Therapy Charges for Today     Code Description Service Date Service Provider Modifiers Qty    27522539746 HC GAIT TRAINING EA 15 MIN 12/17/2019 Masha Diallo PTA GP 1    40606369045 HC PT THER PROC EA 15 MIN 12/17/2019 Masha Diallo PTA GP 1          PT G-Codes  Outcome Measure Options: AM-PAC 6 Clicks Basic Mobility (PT)  AM-PAC 6 Clicks Score (PT): 18  AM-PAC 6 Clicks Score (OT): 24    Masha Diallo PTA  12/17/2019

## 2019-12-17 NOTE — PLAN OF CARE
Patient is anxious to go home.PRN anxiety medication given.Still awaiting results from quantiferon test.Patient has no c/o of pain.Safety maintained,vss, will follow.

## 2019-12-17 NOTE — PROGRESS NOTES
Malnutrition Severity Assessment    Patient Name:  Kellie Arzate  YOB: 1955  MRN: [id number]  Admit Date:  12/12/2019    Patient meets criteria for : Severe Malnutrition    Comments:  If in agreement with malnutrition assessment, please attest documentation. Thanks.     Malnutrition Severity Assessment  Malnutrition Type: Chronic Disease - Related Malnutrition     Malnutrition Type (last 8 hours)      Malnutrition Severity Assessment     Row Name 12/17/19 1214       Malnutrition Severity Assessment    Malnutrition Type  Chronic Disease - Related Malnutrition    Row Name 12/17/19 1214       Muscle Loss    Loss of Muscle Mass Findings  Severe    [place] Region  Moderate - slight depression    Clavicle Bone Region  Severe - protruding prominent bone    Acromion Bone Region  Severe - squared shoulders, bones, and acromion process protrusion prominent    Dorsal Hand Region  Severe - prominent depression    Patellar Region  Severe - prominent bone, square looking, very little muscle definition    Anterior Thigh Region  Severe - line/depression along thigh, obviously thin    Posterior Calf Region  Severe - thin with very little definition/firmness    Row Name 12/17/19 1214       Fat Loss    Subcutaneous Fat Loss Findings  Severe    Upper Arm Region  Severe - mostly skin, very little space between folds, fingers touch    Thoracic & Lumbar Region  Severe - ribs visible with prominent depressions, iliac crest very prominent    Row Name 12/17/19 1214       Criteria Met (Must meet criteria for severity in at least 2 of these categories: M Wasting, Fat Loss, Fluid, Secondary Signs, Wt. Status, Intake)    Patient meets criteria for   Severe Malnutrition          Electronically signed by:  KAYLIE Chiu RDN  12/17/19 12:15 PM

## 2019-12-17 NOTE — PLAN OF CARE
Problem: Patient Care Overview  Goal: Plan of Care Review  Outcome: Ongoing (interventions implemented as appropriate)  Flowsheets  Taken 12/17/2019 0923 by Masha Diallo PTA  Progress: improving  Taken 12/17/2019 0818 by Linda Cortez RN  Plan of Care Reviewed With: patient  Note:   Patient at EOB and agrees to work with therapy. He is CGA for sit to stands and with gait. He ambulated 100' x4 with standing and seated rest breaks due to decreased endurance. He requires 3L of O2 and he pushes IV pole, no LOB noted. He performed standing marches with HHA x15 reps. He is progressing well. He will benefit from continued PT. Will cont to monitor.

## 2019-12-17 NOTE — PROGRESS NOTES
RT Nebulizer Protocol    Assessment tool to be used for patients with existing breathing treatments ordered by hospitalists                                                                  0  1  2  3  4      Respiratory History   No Smoking      Smoking History      1 Pack/Day      Pulmonary Disease   x   Exacerbation        Respiratory Rate   Normal   x   20-25      Dyspneic      Accessory Muscles      Severe Dyspnea        Breath Sounds   Clear   x   Crackles      Crackles/ Rhonchi      Wheezing      Absent/ Severe Wheezing        Chest   X-ray   Clear      1 Lobe Infiltration/ Consolidation/ PE      2 Lobe Same Lung Infiltration/ Consolidation/ PE       2 Lobe Infiltration/ Both Lungs/ Consolidation/ PE      Both Lungs/ More Than 1 Lobe/ Atelectasis/ Consolidation/  PE        Cough   Strong Non- Productive   x   Excessive Secretions/ Strong Cough      Excessive Secretions/ Weak Cough      Thick Bronchial Secretions/ Weak Cough      Thick Bronchial Secretions/ No Cough        Total Patient Score =  3  0-4=Q4 PRN  5-9=TID and Q4 PRN  10-14=QID and Q3 PRN  15-19=Q4 and Q2 PRN  20=Q3 and Q2 PRN    Bronchopulmonary Hygiene (CPT)   Q4 ATC Copious secretions, dyspnea, unable to sleep, mucus plug    QID & Q4 PRN Moderate secretions    TID Small amounts of secretions w/ poor cough and history of secretions    BID Unable to deep breathe and cough spontaneously       Lung Expansion Therapy (PEP)   Q4 & PRN at night Severe atelectasis, poor oxygenation    QID  High risk for persistent atelectasis, existence of same    TID At risk for developing atelectasis    BID Unable to deep breathe and cough spontaneously     Instruct, 1 follow up Patients able to perform well on their own      Patient currently is on TID and q 4 prn aerosol treatments with Duoneb he states this schedule is working well for him. He takes treatments q 6 prn at home. Will cont with current treatment schedule.

## 2019-12-17 NOTE — PLAN OF CARE
Problem: Patient Care Overview  Goal: Plan of Care Review  Outcome: Ongoing (interventions implemented as appropriate)  Flowsheets (Taken 12/17/2019 0340)  Progress: improving  Plan of Care Reviewed With: patient  Outcome Summary: pt had no c/o pain this shift; pt has increased his mobility in his room; working with PT on dayshift; pt rested fair; VSS; safety maintained; will continue to monitor

## 2019-12-18 ENCOUNTER — READMISSION MANAGEMENT (OUTPATIENT)
Dept: CALL CENTER | Facility: HOSPITAL | Age: 64
End: 2019-12-18

## 2019-12-18 PROBLEM — Z03.89: Status: RESOLVED | Noted: 2019-12-12 | Resolved: 2019-12-18

## 2019-12-18 LAB
QUANTIFERON CRITERIA: ABNORMAL
QUANTIFERON MITOGEN VALUE: 0.11 IU/ML
QUANTIFERON NIL VALUE: 0.01 IU/ML
QUANTIFERON TB1 AG VALUE: 0.02 IU/ML
QUANTIFERON TB2 AG VALUE: 0.01 IU/ML
QUANTIFERON-TB GOLD PLUS: ABNORMAL

## 2019-12-18 NOTE — DISCHARGE SUMMARY
"    [facility] Medicine Services  DISCHARGE SUMMARY       Date of Admission: 12/12/2019  Date of Discharge:  12/18/2019  Primary Care Physician: Jose Moon MD    Presenting Problem/History of Present Illness:  TB (pulmonary tuberculosis) [A15.0]  Tuberculosis [A15.9]     Final Discharge Diagnoses:  Active Hospital Problems    Diagnosis   • Abnormal chest x-ray, findings apex right lung suspect TB   • Acute on chronic respiratory failure with hypercapnia (CMS/HCC)   • Tobacco dependence   • Anemia   • Protein-calorie malnutrition (CMS/HCC)      1.  Abnormal chest imaging  -On review of imaging, radiology report mentions this CT shows an improvement over prior CT scan done in July.  I fear that this is a chronic finding that will always raise concern when seen by someone not familiar with his imaging.  I explained this to the patient and family.  Discussed with infection control who recommended waiting on Quantiferon     2.  Chronic Hypoxic respiratory failure  -continue oxygen     3.  COPD  -duonebs PRN     4.  Anxiety  -restart Xanax       Consults: NA    Procedures Performed: NA    Pertinent Test Results:   CT Chest:    1. 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 This is likely related to an indolent/chronic  atypical infectious process. Extent of this process has improved  compared to an exam from July 2018. Enlarged right hilar lymph node is  likely reactive to this process.  2. Severe emphysema.  3. 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      Chief Complaint on Day of Discharge: \"I want to go home.\"    History of Present Illness on Day of Discharge:   Patient is very frustrated and agitated.      Hospital Course:  The patient is a 64 y.o. male who presented to [facility] with abnormal chest imaging at an outside facility that " "raised concern for possible TB.    On comparison to imaging here, new CT chest shows improvement compared to old CT.  Patient appears to have chronic scarring of his right upper lobe.      Quantiferon was obtained and is pending.      Patient is tolerating his home 3L NC.  He is very aggrivated and does not want to be here.  He reportedly has been cursing per nursing staff.    Clinically I don't feel he has TB especially in light of the improvement in his imaging.  I discussed with Dr. Boss who had the patient before me who agrees with this.    I discussed this with Yudith Sanders from infection control who says discharge is appropriate if I feel he is medically ok for d/c, and that the health dept can follow up on the results.      He Is comfortable with being discharged and with the discharge plan.  He was given the chance to ask questions and all questions were answered to his satisfaction.      Condition on Discharge:  Stable    Physical Exam on Discharge:  /62 (BP Location: Left arm, Patient Position: Lying)   Pulse 72   Temp 97.6 °F (36.4 °C) (Oral)   Resp 20   Ht 165.1 cm (65\")   Wt 50.4 kg (111 lb 2 oz)   SpO2 97%   BMI 18.49 kg/m²   Physical Exam   Constitutional: He is oriented to person, place, and time. He appears well-developed and well-nourished. Nasal cannula in place.   HENT:   Head: Normocephalic and atraumatic.   Right Ear: External ear normal.   Left Ear: External ear normal.   Nose: Nose normal.   Mouth/Throat: Oropharynx is clear and moist.   Eyes: Pupils are equal, round, and reactive to light. Conjunctivae and EOM are normal. Right eye exhibits no discharge. Left eye exhibits no discharge. No scleral icterus.   Neck: Normal range of motion. Neck supple. No tracheal deviation present. No thyromegaly present.   Cardiovascular: Normal rate, regular rhythm, normal heart sounds and intact distal pulses. Exam reveals no gallop and no friction rub.   No murmur heard.  Pulmonary/Chest: " Effort normal. No stridor. No respiratory distress. He has decreased breath sounds. He has no wheezes. He has no rales. He exhibits no tenderness.   Abdominal: Soft. Bowel sounds are normal. He exhibits no distension and no mass. There is no tenderness. There is no rebound and no guarding. No hernia.   Musculoskeletal: Normal range of motion. He exhibits no edema or deformity.   Lymphadenopathy:     He has no cervical adenopathy.   Neurological: He is alert and oriented to person, place, and time. He has normal reflexes. He displays normal reflexes. No cranial nerve deficit. He exhibits normal muscle tone. Coordination normal.   Skin: Skin is warm and dry. No rash noted. No erythema. No pallor.   Psychiatric: He has a normal mood and affect. His behavior is normal. Judgment and thought content normal.   Vitals reviewed.        Discharge Disposition:  Home or Self Care    Discharge Medications:     Discharge Medications      Continue These Medications      Instructions Start Date   albuterol sulfate  (90 Base) MCG/ACT inhaler  Commonly known as:  PROVENTIL HFA;VENTOLIN HFA;PROAIR HFA   2 puffs, Inhalation, Every 4 Hours PRN      ALPRAZolam 1 MG tablet  Commonly known as:  XANAX   1 mg, Oral, 3 Times Daily PRN      aspirin 81 MG EC tablet   81 mg, Oral, Daily      budesonide-formoterol 160-4.5 MCG/ACT inhaler  Commonly known as:  SYMBICORT   2 puffs, Inhalation, 2 Times Daily - RT      escitalopram 20 MG tablet  Commonly known as:  LEXAPRO   20 mg, Oral, Daily      guaiFENesin 600 MG 12 hr tablet  Commonly known as:  MUCINEX   1,200 mg, Oral, Every 12 Hours Scheduled      ipratropium-albuterol 0.5-2.5 mg/3 ml nebulizer  Commonly known as:  DUO-NEB   3 mL, Nebulization, Every 6 Hours PRN      loratadine 10 MG tablet  Commonly known as:  CLARITIN   10 mg, Oral, Daily      megestrol 40 MG tablet  Commonly known as:  MEGACE   80 mg, Oral, 2 Times Daily      metoprolol tartrate 50 MG tablet  Commonly known as:   LOPRESSOR   50 mg, Oral, 2 Times Daily      montelukast 10 MG tablet  Commonly known as:  SINGULAIR   10 mg, Oral, Nightly      pantoprazole 40 MG EC tablet  Commonly known as:  PROTONIX   40 mg, Oral, Daily      simvastatin 20 MG tablet  Commonly known as:  ZOCOR   20 mg, Oral, Nightly      traZODone 50 MG tablet  Commonly known as:  DESYREL   50 mg, Oral, Nightly             Discharge Diet:   Diet Instructions     Regular               Activity at Discharge:   Activity Instructions     Activity as tolerated               Discharge Care Plan/Instructions:   Follow up with PCP    Follow-up Appointments:   No future appointments.    Test Results Pending at Discharge:   Warren Bowens MD  12/18/19  1:48 PM    Time: 60 minutes

## 2019-12-18 NOTE — OUTREACH NOTE
Prep Survey      Responses   Facility patient discharged from?  [facility]   Is patient eligible?  Yes   Discharge diagnosis  Obs. for suspected TB, abnormal CXR, acute on chronic resp. failure with hypercapnia, tobacco dependence, anemia, protein-calorie malnutrtion   Does the patient have one of the following disease processes/diagnoses(primary or secondary)?  Other   Does the patient have Home health ordered?  No   Is there a DME ordered?  No   Comments regarding appointments  See AVS   Prep survey completed?  Yes          Yudith Carlin RN

## 2019-12-19 ENCOUNTER — READMISSION MANAGEMENT (OUTPATIENT)
Dept: CALL CENTER | Facility: HOSPITAL | Age: 64
End: 2019-12-19

## 2019-12-19 NOTE — OUTREACH NOTE
Medical Week 1 Survey      Responses   Facility patient discharged from?  [place]   Does the patient have one of the following disease processes/diagnoses(primary or secondary)?  Other   Is there a successful TCM telephone encounter documented?  No   Week 1 attempt successful?  No   Unsuccessful attempts  Attempt 1          Daisy Rodriguez RN

## 2019-12-19 NOTE — THERAPY DISCHARGE NOTE
Acute Care - Physical Therapy Discharge Summary  [facility]       Patient Name: Kellie Arzate  : 1955  MRN: [id number]    Today's Date: 2019  Onset of Illness/Injury or Date of Surgery: 19       Referring Physician: Dr. Boss      Admit Date: 2019      PT Recommendation and Plan    Visit Dx:    ICD-10-CM ICD-9-CM   1. Gait abnormality R26.9 781.2               Rehab Goal Summary     Row Name 19 0741             Bed Mobility Goal 1 (PT)    Activity/Assistive Device (Bed Mobility Goal 1, PT)  bed mobility activities, all  -MF      [place] Level/Cues Needed (Bed Mobility Goal 1, PT)  independent  -MF      Time Frame (Bed Mobility Goal 1, PT)  long term goal (LTG);10 days  -MF      Progress/Outcomes (Bed Mobility Goal 1, PT)  goal not met  -MF         Transfer Goal 1 (PT)    Activity/Assistive Device (Transfer Goal 1, PT)  sit-to-stand/stand-to-sit;bed-to-chair/chair-to-bed;walker, rolling  -MF      [place] Level/Cues Needed (Transfer Goal 1, PT)  standby assist;conditional independence  -MF      Time Frame (Transfer Goal 1, PT)  long term goal (LTG);10 days  -MF      Progress/Outcome (Transfer Goal 1, PT)  goal not met  -MF         Gait Training Goal 1 (PT)    Activity/Assistive Device (Gait Training Goal 1, PT)  gait (walking locomotion);assistive device use;decrease fall risk;diminish gait deviation;improve balance and speed;increase endurance/gait distance;increase energy conservation;walker, rolling  -MF      [place] Level (Gait Training Goal 1, PT)  standby assist  -MF      Distance (Gait Goal 1, PT)  150 ft  -MF      Time Frame (Gait Training Goal 1, PT)  long term goal (LTG);10 days  -MF      Progress/Outcome (Gait Training Goal 1, PT)  goal not met  -MF         Stairs Goal 1 (PT)    Activity/Assistive Device (Stairs Goal 1, PT)  ascending stairs;descending stairs;using handrail, right;step-to-step;decrease fall risk;improve balance and speed  -MF       [place] Level/Cues Needed (Stairs Goal 1, PT)  contact guard assist  -      Number of Stairs (Stairs Goal 1, PT)  3  -MF      Time Frame (Stairs Goal 1, PT)  long term goal (LTG);10 days  -MF      Progress/Outcome (Stairs Goal 1, PT)  goal not met  -MF         Patient Education Goal (PT)    Activity (Patient Education Goal, PT)  energy conservation techniques  -      [place]/Cues/Accuracy (Memory Goal 2, PT)  demonstrates adequately;independent;verbalizes understanding  -      Time Frame (Patient Education Goal, PT)  long term goal (LTG);10 days  -MF      Progress/Outcome (Patient Education Goal, PT)  goal not met  -        User Key  (r) = Recorded By, (t) = Taken By, (c) = Cosigned By    Initials Name Provider Type Discipline    Kari Carrero, PTA Physical Therapy Assistant PT              PT Discharge Summary  Anticipated Discharge Disposition (PT): home  Reason for Discharge: Discharge from facility  Outcomes Achieved: Unable to make functional progress toward goals at this time  Discharge Destination: Home      Kari Harrison PTA   12/19/2019

## 2019-12-23 ENCOUNTER — READMISSION MANAGEMENT (OUTPATIENT)
Dept: CALL CENTER | Facility: HOSPITAL | Age: 64
End: 2019-12-23

## 2019-12-23 NOTE — OUTREACH NOTE
Medical Week 1 Survey      Responses   Facility patient discharged from?  [place]   Does the patient have one of the following disease processes/diagnoses(primary or secondary)?  Other   Is there a successful TCM telephone encounter documented?  No   Week 1 attempt successful?  No   Unsuccessful attempts  Attempt 2          Ingrid Moy RN

## 2019-12-27 ENCOUNTER — READMISSION MANAGEMENT (OUTPATIENT)
Dept: CALL CENTER | Facility: HOSPITAL | Age: 64
End: 2019-12-27

## 2019-12-27 NOTE — OUTREACH NOTE
Medical Week 2 Survey      Responses   Facility patient discharged from?  [place]   Does the patient have one of the following disease processes/diagnoses(primary or secondary)?  Other   Week 2 attempt successful?  No   Unsuccessful attempts  Attempt 1          Jessica Cameron RN

## 2019-12-30 ENCOUNTER — READMISSION MANAGEMENT (OUTPATIENT)
Dept: CALL CENTER | Facility: HOSPITAL | Age: 64
End: 2019-12-30

## 2019-12-30 NOTE — OUTREACH NOTE
Medical Week 2 Survey      Responses   Facility patient discharged from?  [place]   Does the patient have one of the following disease processes/diagnoses(primary or secondary)?  Other   Week 2 attempt successful?  No   Unsuccessful attempts  Attempt 2          Hannah Aquino RN

## 2020-01-01 ENCOUNTER — HOSPITAL ENCOUNTER (INPATIENT)
Age: 65
LOS: 1 days | DRG: 297 | End: 2020-07-25
Attending: INTERNAL MEDICINE | Admitting: INTERNAL MEDICINE
Payer: MEDICARE

## 2020-01-01 VITALS
RESPIRATION RATE: 14 BRPM | DIASTOLIC BLOOD PRESSURE: 55 MMHG | OXYGEN SATURATION: 76 % | HEART RATE: 106 BPM | SYSTOLIC BLOOD PRESSURE: 66 MMHG

## 2020-01-01 PROCEDURE — 2000000000 HC ICU R&B

## 2020-01-01 PROCEDURE — 02HV33Z INSERTION OF INFUSION DEVICE INTO SUPERIOR VENA CAVA, PERCUTANEOUS APPROACH: ICD-10-PCS | Performed by: INTERNAL MEDICINE

## 2020-01-01 PROCEDURE — 6360000002 HC RX W HCPCS: Performed by: INTERNAL MEDICINE

## 2020-01-01 PROCEDURE — 2580000003 HC RX 258: Performed by: INTERNAL MEDICINE

## 2020-01-01 PROCEDURE — 2500000003 HC RX 250 WO HCPCS: Performed by: INTERNAL MEDICINE

## 2020-01-01 PROCEDURE — 94002 VENT MGMT INPAT INIT DAY: CPT

## 2020-01-01 PROCEDURE — 2700000000 HC OXYGEN THERAPY PER DAY

## 2020-01-01 RX ORDER — EPINEPHRINE 0.1 MG/ML
SYRINGE (ML) INJECTION DAILY PRN
Status: COMPLETED | OUTPATIENT
Start: 2020-01-01 | End: 2020-01-01

## 2020-01-01 RX ORDER — NOREPINEPHRINE BIT/0.9 % NACL 16MG/250ML
INFUSION BOTTLE (ML) INTRAVENOUS CONTINUOUS PRN
Status: COMPLETED | OUTPATIENT
Start: 2020-01-01 | End: 2020-01-01

## 2020-01-01 RX ORDER — DOPAMINE HYDROCHLORIDE 160 MG/100ML
INJECTION, SOLUTION INTRAVENOUS CONTINUOUS PRN
Status: COMPLETED | OUTPATIENT
Start: 2020-01-01 | End: 2020-01-01

## 2020-01-01 RX ORDER — CALCIUM CHLORIDE 100 MG/ML
INJECTION INTRAVENOUS; INTRAVENTRICULAR DAILY PRN
Status: COMPLETED | OUTPATIENT
Start: 2020-01-01 | End: 2020-01-01

## 2020-01-01 RX ORDER — SODIUM CHLORIDE 9 MG/ML
INJECTION, SOLUTION INTRAVENOUS CONTINUOUS PRN
Status: COMPLETED | OUTPATIENT
Start: 2020-01-01 | End: 2020-01-01

## 2020-01-01 RX ORDER — NOREPINEPHRINE BITARTRATE 1 MG/ML
INJECTION, SOLUTION INTRAVENOUS DAILY PRN
Status: COMPLETED | OUTPATIENT
Start: 2020-01-01 | End: 2020-01-01

## 2020-01-01 RX ADMIN — SODIUM BICARBONATE 50 MEQ: 84 INJECTION, SOLUTION INTRAVENOUS at 19:19

## 2020-01-01 RX ADMIN — DOPAMINE HYDROCHLORIDE 10 MCG/KG/MIN: 160 INJECTION, SOLUTION INTRAVENOUS at 19:21

## 2020-01-01 RX ADMIN — CALCIUM CHLORIDE 1 G: 100 INJECTION, SOLUTION INTRAVENOUS at 19:22

## 2020-01-01 RX ADMIN — Medication 1 MG: at 19:29

## 2020-01-01 RX ADMIN — Medication 1 MG: at 19:20

## 2020-01-01 RX ADMIN — Medication 1 MG: at 19:32

## 2020-01-01 RX ADMIN — Medication 10 MCG/MIN: at 19:22

## 2020-01-01 RX ADMIN — Medication 1 MG: at 19:26

## 2020-01-01 RX ADMIN — Medication 20 MCG/MIN: at 19:27

## 2020-01-01 RX ADMIN — Medication 1 MG: at 19:23

## 2020-01-01 RX ADMIN — Medication 1 MG: at 19:17

## 2020-01-01 RX ADMIN — SODIUM CHLORIDE 999 ML/HR: 9 INJECTION, SOLUTION INTRAVENOUS at 19:22

## 2020-04-04 ENCOUNTER — HOSPITAL ENCOUNTER (INPATIENT)
Facility: HOSPITAL | Age: 65
LOS: 8 days | Discharge: HOME | End: 2020-04-12
Attending: EMERGENCY MEDICINE | Admitting: FAMILY MEDICINE

## 2020-04-04 ENCOUNTER — APPOINTMENT (OUTPATIENT)
Dept: GENERAL RADIOLOGY | Facility: HOSPITAL | Age: 65
End: 2020-04-04

## 2020-04-04 DIAGNOSIS — Z74.09 IMPAIRED MOBILITY: ICD-10-CM

## 2020-04-04 DIAGNOSIS — R06.89 HYPERCARBIA: Primary | ICD-10-CM

## 2020-04-04 PROBLEM — J44.1 COPD WITH ACUTE EXACERBATION (HCC): Status: ACTIVE | Noted: 2020-04-04

## 2020-04-04 PROBLEM — J96.22 ACUTE ON CHRONIC RESPIRATORY FAILURE WITH HYPOXIA AND HYPERCAPNIA (HCC): Status: ACTIVE | Noted: 2020-04-04

## 2020-04-04 PROBLEM — J96.21 ACUTE ON CHRONIC RESPIRATORY FAILURE WITH HYPOXIA AND HYPERCAPNIA (HCC): Status: ACTIVE | Noted: 2020-04-04

## 2020-04-04 LAB
ALBUMIN SERPL-MCNC: 3.8 G/DL (ref 3.5–5.2)
ALBUMIN/GLOB SERPL: 1 G/DL
ALP SERPL-CCNC: 76 U/L (ref 39–117)
ALT SERPL W P-5'-P-CCNC: 8 U/L (ref 1–41)
ANION GAP SERPL CALCULATED.3IONS-SCNC: 7 MMOL/L (ref 5–15)
ARTERIAL PATENCY WRIST A: POSITIVE
ARTERIAL PATENCY WRIST A: POSITIVE
AST SERPL-CCNC: 12 U/L (ref 1–40)
ATMOSPHERIC PRESS: 752 MMHG
ATMOSPHERIC PRESS: 752 MMHG
BASE EXCESS BLDA CALC-SCNC: 18.3 MMOL/L (ref 0–2)
BASE EXCESS BLDA CALC-SCNC: 20.7 MMOL/L (ref 0–2)
BASOPHILS # BLD AUTO: 0.03 10*3/MM3 (ref 0–0.2)
BASOPHILS NFR BLD AUTO: 0.4 % (ref 0–1.5)
BDY SITE: ABNORMAL
BDY SITE: ABNORMAL
BILIRUB SERPL-MCNC: 0.3 MG/DL (ref 0.2–1.2)
BODY TEMPERATURE: 37 C
BODY TEMPERATURE: 37 C
BUN BLD-MCNC: 10 MG/DL (ref 8–23)
BUN/CREAT SERPL: 27.8 (ref 7–25)
CALCIUM SPEC-SCNC: 9.8 MG/DL (ref 8.6–10.5)
CHLORIDE SERPL-SCNC: 89 MMOL/L (ref 98–107)
CO2 SERPL-SCNC: 45 MMOL/L (ref 22–29)
CREAT BLD-MCNC: 0.36 MG/DL (ref 0.76–1.27)
D DIMER PPP FEU-MCNC: 0.64 MG/L (FEU) (ref 0–0.5)
D-LACTATE SERPL-SCNC: 0.9 MMOL/L (ref 0.5–2)
DEPRECATED RDW RBC AUTO: 43.1 FL (ref 37–54)
EOSINOPHIL # BLD AUTO: 0.04 10*3/MM3 (ref 0–0.4)
EOSINOPHIL NFR BLD AUTO: 0.5 % (ref 0.3–6.2)
ERYTHROCYTE [DISTWIDTH] IN BLOOD BY AUTOMATED COUNT: 12.5 % (ref 12.3–15.4)
GAS FLOW AIRWAY: 2 LPM
GAS FLOW AIRWAY: 38 LPM
GFR SERPL CREATININE-BSD FRML MDRD: >150 ML/MIN/1.73
GLOBULIN UR ELPH-MCNC: 3.7 GM/DL
GLUCOSE BLD-MCNC: 123 MG/DL (ref 65–99)
HCO3 BLDA-SCNC: 48.6 MMOL/L (ref 20–26)
HCO3 BLDA-SCNC: 48.9 MMOL/L (ref 20–26)
HCT VFR BLD AUTO: 44.5 % (ref 37.5–51)
HGB BLD-MCNC: 13.8 G/DL (ref 13–17.7)
HOLD SPECIMEN: NORMAL
HOROWITZ INDEX BLD+IHG-RTO: 38 %
IMM GRANULOCYTES # BLD AUTO: 0.02 10*3/MM3 (ref 0–0.05)
IMM GRANULOCYTES NFR BLD AUTO: 0.3 % (ref 0–0.5)
LYMPHOCYTES # BLD AUTO: 0.94 10*3/MM3 (ref 0.7–3.1)
LYMPHOCYTES NFR BLD AUTO: 12.8 % (ref 19.6–45.3)
Lab: ABNORMAL
MCH RBC QN AUTO: 29.8 PG (ref 26.6–33)
MCHC RBC AUTO-ENTMCNC: 31 G/DL (ref 31.5–35.7)
MCV RBC AUTO: 96.1 FL (ref 79–97)
MODALITY: ABNORMAL
MODALITY: ABNORMAL
MONOCYTES # BLD AUTO: 0.61 10*3/MM3 (ref 0.1–0.9)
MONOCYTES NFR BLD AUTO: 8.3 % (ref 5–12)
NEUTROPHILS # BLD AUTO: 5.69 10*3/MM3 (ref 1.7–7)
NEUTROPHILS NFR BLD AUTO: 77.7 % (ref 42.7–76)
NOTIFIED BY: ABNORMAL
NOTIFIED BY: ABNORMAL
NOTIFIED WHO: ABNORMAL
NOTIFIED WHO: ABNORMAL
NRBC BLD AUTO-RTO: 0 /100 WBC (ref 0–0.2)
NT-PROBNP SERPL-MCNC: 242.9 PG/ML (ref 5–900)
PCO2 BLDA: 69.7 MM HG (ref 35–45)
PCO2 BLDA: 92.5 MM HG (ref 35–45)
PH BLDA: 7.33 PH UNITS (ref 7.35–7.45)
PH BLDA: 7.45 PH UNITS (ref 7.35–7.45)
PLATELET # BLD AUTO: 224 10*3/MM3 (ref 140–450)
PMV BLD AUTO: 8.6 FL (ref 6–12)
PO2 BLDA: 53.3 MM HG (ref 83–108)
PO2 BLDA: 69.7 MM HG (ref 83–108)
POTASSIUM BLD-SCNC: 4.7 MMOL/L (ref 3.5–5.2)
PROT SERPL-MCNC: 7.5 G/DL (ref 6–8.5)
RBC # BLD AUTO: 4.63 10*6/MM3 (ref 4.14–5.8)
SAO2 % BLDCOA: 89.8 % (ref 94–99)
SAO2 % BLDCOA: 93.5 % (ref 94–99)
SODIUM BLD-SCNC: 141 MMOL/L (ref 136–145)
TROPONIN T SERPL-MCNC: <0.01 NG/ML (ref 0–0.03)
VENTILATOR MODE: ABNORMAL
VENTILATOR MODE: ABNORMAL
WBC NRBC COR # BLD: 7.33 10*3/MM3 (ref 3.4–10.8)
WHOLE BLOOD HOLD SPECIMEN: NORMAL
WHOLE BLOOD HOLD SPECIMEN: NORMAL

## 2020-04-04 PROCEDURE — 80053 COMPREHEN METABOLIC PANEL: CPT | Performed by: EMERGENCY MEDICINE

## 2020-04-04 PROCEDURE — 82803 BLOOD GASES ANY COMBINATION: CPT

## 2020-04-04 PROCEDURE — 87040 BLOOD CULTURE FOR BACTERIA: CPT | Performed by: EMERGENCY MEDICINE

## 2020-04-04 PROCEDURE — 94660 CPAP INITIATION&MGMT: CPT

## 2020-04-04 PROCEDURE — 87040 BLOOD CULTURE FOR BACTERIA: CPT | Performed by: FAMILY MEDICINE

## 2020-04-04 PROCEDURE — 94640 AIRWAY INHALATION TREATMENT: CPT

## 2020-04-04 PROCEDURE — 93010 ELECTROCARDIOGRAM REPORT: CPT | Performed by: INTERNAL MEDICINE

## 2020-04-04 PROCEDURE — 25010000002 METHYLPREDNISOLONE PER 125 MG: Performed by: EMERGENCY MEDICINE

## 2020-04-04 PROCEDURE — 83880 ASSAY OF NATRIURETIC PEPTIDE: CPT | Performed by: EMERGENCY MEDICINE

## 2020-04-04 PROCEDURE — 94799 UNLISTED PULMONARY SVC/PX: CPT

## 2020-04-04 PROCEDURE — 36600 WITHDRAWAL OF ARTERIAL BLOOD: CPT

## 2020-04-04 PROCEDURE — 25010000002 METHYLPREDNISOLONE PER 125 MG: Performed by: FAMILY MEDICINE

## 2020-04-04 PROCEDURE — 93005 ELECTROCARDIOGRAM TRACING: CPT | Performed by: EMERGENCY MEDICINE

## 2020-04-04 PROCEDURE — 85025 COMPLETE CBC W/AUTO DIFF WBC: CPT | Performed by: EMERGENCY MEDICINE

## 2020-04-04 PROCEDURE — 84484 ASSAY OF TROPONIN QUANT: CPT | Performed by: EMERGENCY MEDICINE

## 2020-04-04 PROCEDURE — 99284 EMERGENCY DEPT VISIT MOD MDM: CPT

## 2020-04-04 PROCEDURE — 85379 FIBRIN DEGRADATION QUANT: CPT | Performed by: EMERGENCY MEDICINE

## 2020-04-04 PROCEDURE — 83605 ASSAY OF LACTIC ACID: CPT | Performed by: EMERGENCY MEDICINE

## 2020-04-04 PROCEDURE — 71045 X-RAY EXAM CHEST 1 VIEW: CPT

## 2020-04-04 RX ORDER — IPRATROPIUM BROMIDE AND ALBUTEROL SULFATE 2.5; .5 MG/3ML; MG/3ML
3 SOLUTION RESPIRATORY (INHALATION)
Status: DISCONTINUED | OUTPATIENT
Start: 2020-04-04 | End: 2020-04-12 | Stop reason: HOSPADM

## 2020-04-04 RX ORDER — METOPROLOL TARTRATE 50 MG/1
50 TABLET, FILM COATED ORAL EVERY 12 HOURS SCHEDULED
Status: DISCONTINUED | OUTPATIENT
Start: 2020-04-04 | End: 2020-04-12 | Stop reason: HOSPADM

## 2020-04-04 RX ORDER — METHYLPREDNISOLONE SODIUM SUCCINATE 125 MG/2ML
125 INJECTION, POWDER, LYOPHILIZED, FOR SOLUTION INTRAMUSCULAR; INTRAVENOUS ONCE
Status: COMPLETED | OUTPATIENT
Start: 2020-04-04 | End: 2020-04-04

## 2020-04-04 RX ORDER — METHYLPREDNISOLONE SODIUM SUCCINATE 125 MG/2ML
60 INJECTION, POWDER, LYOPHILIZED, FOR SOLUTION INTRAMUSCULAR; INTRAVENOUS EVERY 6 HOURS
Status: DISCONTINUED | OUTPATIENT
Start: 2020-04-04 | End: 2020-04-05

## 2020-04-04 RX ORDER — ESCITALOPRAM OXALATE 10 MG/1
20 TABLET ORAL DAILY
Status: DISCONTINUED | OUTPATIENT
Start: 2020-04-04 | End: 2020-04-12 | Stop reason: HOSPADM

## 2020-04-04 RX ORDER — SODIUM CHLORIDE 0.9 % (FLUSH) 0.9 %
10 SYRINGE (ML) INJECTION AS NEEDED
Status: DISCONTINUED | OUTPATIENT
Start: 2020-04-04 | End: 2020-04-12 | Stop reason: HOSPADM

## 2020-04-04 RX ORDER — SODIUM CHLORIDE 0.9 % (FLUSH) 0.9 %
10 SYRINGE (ML) INJECTION AS NEEDED
Status: DISCONTINUED | OUTPATIENT
Start: 2020-04-04 | End: 2020-04-04

## 2020-04-04 RX ORDER — IPRATROPIUM BROMIDE AND ALBUTEROL SULFATE 2.5; .5 MG/3ML; MG/3ML
3 SOLUTION RESPIRATORY (INHALATION) ONCE
Status: COMPLETED | OUTPATIENT
Start: 2020-04-04 | End: 2020-04-04

## 2020-04-04 RX ORDER — MEGESTROL ACETATE 40 MG/1
40 TABLET ORAL DAILY
Status: DISCONTINUED | OUTPATIENT
Start: 2020-04-04 | End: 2020-04-12 | Stop reason: HOSPADM

## 2020-04-04 RX ORDER — GUAIFENESIN 600 MG/1
1200 TABLET, EXTENDED RELEASE ORAL EVERY 12 HOURS SCHEDULED
Status: DISCONTINUED | OUTPATIENT
Start: 2020-04-04 | End: 2020-04-12 | Stop reason: HOSPADM

## 2020-04-04 RX ORDER — ASPIRIN 81 MG/1
81 TABLET ORAL DAILY
Status: DISCONTINUED | OUTPATIENT
Start: 2020-04-04 | End: 2020-04-12 | Stop reason: HOSPADM

## 2020-04-04 RX ORDER — ONDANSETRON 2 MG/ML
4 INJECTION INTRAMUSCULAR; INTRAVENOUS EVERY 6 HOURS PRN
Status: DISCONTINUED | OUTPATIENT
Start: 2020-04-04 | End: 2020-04-12 | Stop reason: HOSPADM

## 2020-04-04 RX ORDER — SODIUM CHLORIDE 0.9 % (FLUSH) 0.9 %
10 SYRINGE (ML) INJECTION EVERY 12 HOURS SCHEDULED
Status: DISCONTINUED | OUTPATIENT
Start: 2020-04-04 | End: 2020-04-12 | Stop reason: HOSPADM

## 2020-04-04 RX ORDER — PANTOPRAZOLE SODIUM 40 MG/1
40 TABLET, DELAYED RELEASE ORAL DAILY
Status: DISCONTINUED | OUTPATIENT
Start: 2020-04-04 | End: 2020-04-12 | Stop reason: HOSPADM

## 2020-04-04 RX ORDER — BUDESONIDE AND FORMOTEROL FUMARATE DIHYDRATE 160; 4.5 UG/1; UG/1
2 AEROSOL RESPIRATORY (INHALATION)
Status: DISCONTINUED | OUTPATIENT
Start: 2020-04-04 | End: 2020-04-12 | Stop reason: HOSPADM

## 2020-04-04 RX ORDER — MONTELUKAST SODIUM 10 MG/1
10 TABLET ORAL NIGHTLY
Status: DISCONTINUED | OUTPATIENT
Start: 2020-04-04 | End: 2020-04-05

## 2020-04-04 RX ORDER — ATORVASTATIN CALCIUM 10 MG/1
10 TABLET, FILM COATED ORAL NIGHTLY
Status: DISCONTINUED | OUTPATIENT
Start: 2020-04-04 | End: 2020-04-12 | Stop reason: HOSPADM

## 2020-04-04 RX ORDER — CITALOPRAM 40 MG/1
40 TABLET ORAL DAILY
COMMUNITY

## 2020-04-04 RX ADMIN — METHYLPREDNISOLONE SODIUM SUCCINATE 60 MG: 125 INJECTION, POWDER, FOR SOLUTION INTRAMUSCULAR; INTRAVENOUS at 20:48

## 2020-04-04 RX ADMIN — IPRATROPIUM BROMIDE AND ALBUTEROL SULFATE 3 ML: 2.5; .5 SOLUTION RESPIRATORY (INHALATION) at 16:00

## 2020-04-04 RX ADMIN — ATORVASTATIN CALCIUM 10 MG: 10 TABLET, FILM COATED ORAL at 20:47

## 2020-04-04 RX ADMIN — DOXYCYCLINE 100 MG: 100 INJECTION, POWDER, LYOPHILIZED, FOR SOLUTION INTRAVENOUS at 18:56

## 2020-04-04 RX ADMIN — GUAIFENESIN 1200 MG: 600 TABLET, EXTENDED RELEASE ORAL at 20:47

## 2020-04-04 RX ADMIN — METOPROLOL TARTRATE 50 MG: 50 TABLET, FILM COATED ORAL at 20:47

## 2020-04-04 RX ADMIN — SODIUM CHLORIDE, PRESERVATIVE FREE 10 ML: 5 INJECTION INTRAVENOUS at 20:48

## 2020-04-04 RX ADMIN — IPRATROPIUM BROMIDE AND ALBUTEROL SULFATE 3 ML: 2.5; .5 SOLUTION RESPIRATORY (INHALATION) at 19:57

## 2020-04-04 RX ADMIN — BUDESONIDE AND FORMOTEROL FUMARATE DIHYDRATE 2 PUFF: 160; 4.5 AEROSOL RESPIRATORY (INHALATION) at 20:00

## 2020-04-04 RX ADMIN — METHYLPREDNISOLONE SODIUM SUCCINATE 125 MG: 125 INJECTION, POWDER, FOR SOLUTION INTRAMUSCULAR; INTRAVENOUS at 15:12

## 2020-04-04 RX ADMIN — MONTELUKAST SODIUM 10 MG: 10 TABLET, FILM COATED ORAL at 20:47

## 2020-04-04 NOTE — H&P
[facility] Medicine Services  HISTORY AND PHYSICAL    Date of Admission: 4/4/2020  Primary Care Physician: Jose Moon MD    Subjective     Chief Complaint: COPD exacerbation/respiratory failure with hypercapnia hypoxia.    History of Present Illness  Patient is a 65-year-old  male presented ER with respiratory distress/shortness of breath.  Patient is a chronic COPD here.  Patient stated he has been short of for last 3 to 4 days.  Patient on chronic 3 L of oxygen at home.  Patient also state he use a BiPAP at home.  Patient denies any chest pain.  Patient denies any fever night sweats or chills.  Patient is also complained of generalized weakness.  Denies any to do anything.  Patient still smoke about a pack a day.  Patient does complaining of productive cough with clear phlegm.     Review of Systems   Constitutional: Positive for activity change, appetite change and fatigue. Negative for chills and fever.   HENT: Negative for hearing loss, nosebleeds, tinnitus and trouble swallowing.    Eyes: Negative for visual disturbance.   Respiratory: Positive for cough, shortness of breath and wheezing. Negative for chest tightness.    Cardiovascular: Negative for chest pain, palpitations and leg swelling.   Gastrointestinal: Negative for abdominal distention, abdominal pain, blood in stool, constipation, diarrhea, nausea and vomiting.   Endocrine: Negative for cold intolerance, heat intolerance, polydipsia, polyphagia and polyuria.   Genitourinary: Negative for decreased urine volume, difficulty urinating, dysuria, flank pain, frequency and hematuria.   Musculoskeletal: Positive for arthralgias, gait problem and myalgias. Negative for joint swelling.   Skin: Negative for rash.   Allergic/Immunologic: Negative for immunocompromised state.   Neurological: Positive for weakness. Negative for dizziness, syncope, light-headedness and headaches.   Hematological:  Negative for adenopathy. Does not bruise/bleed easily.   Psychiatric/Behavioral: Positive for confusion. Negative for sleep disturbance. The patient is not nervous/anxious.         Otherwise complete ROS reviewed and negative except as mentioned in the HPI.      Past Medical History:   Past Medical History:   Diagnosis Date   • Anxiety    • Arthritis    • Chronic respiratory failure with hypoxia, on home O2 therapy (CMS/[facility]), 3 L    • COPD (chronic obstructive pulmonary disease) (CMS/[facility])    • Depression    • Hypertension    • Mixed hyperlipidemia    • Nephrolithiasis    • Pneumonia, pseudomonal 7/2016 with negative AFB, aspiration 7/2018        Past Surgical History:  Past Surgical History:   Procedure Laterality Date   • COLONOSCOPY  04/20/2007    Within Normal Limits.   • ENDOSCOPY  04/20/2007    urea neg, sbbx benign, mild gastritis, small hiatal hernia   • ENDOSCOPY N/A 10/5/2016    Procedure: ESOPHAGOGASTRODUODENOSCOPY WITH ANESTHESIA;  Surgeon: Kevyn Damon MD;  Location: [facility] ENDOSCOPY;  Service:    • HEMORRHOIDECTOMY     • PEG TUBE INSERTION  07/22/2016    Dr Mccarty   • TRACHEOSTOMY         Family History: family history includes Dementia in his mother; No Known Problems in his father.    Social History:  reports that he has been smoking cigarettes. He has been smoking about 0.50 packs per day. He has never used smokeless tobacco. Drug use questions deferred to the physician. He reports that he does not drink alcohol.    Medications:  Prior to Admission medications    Medication Sig Start Date End Date Taking? Authorizing Provider   ALPRAZolam (XANAX) 1 MG tablet Take 1 mg by mouth 3 (Three) Times a Day As Needed for Anxiety.   Yes Provider, MD Christian   citalopram (CeleXA) 20 MG tablet Take 20 mg by mouth Daily.   Yes Provider, MD Christian   metoprolol tartrate (LOPRESSOR) 50 MG tablet Take 50 mg by mouth 2 (Two) Times a Day.   Yes Provider, MD Christian   montelukast (SINGULAIR) 10 MG tablet  "Take 10 mg by mouth Every Night.   Yes Christian Briseno MD   simvastatin (ZOCOR) 20 MG tablet Take 20 mg by mouth Every Night.   Yes Christian Briseno MD   albuterol sulfate  (90 Base) MCG/ACT inhaler Inhale 2 puffs Every 4 (Four) Hours As Needed for Wheezing.    Christian Briseno MD   aspirin 81 MG EC tablet Take 81 mg by mouth Daily.    Christian Briseno MD   budesonide-formoterol (SYMBICORT) 160-4.5 MCG/ACT inhaler Inhale 2 puffs 2 (Two) Times a Day.  Patient not taking: Reported on 12/13/2019 7/14/18   Jameson Oliver DO   escitalopram (LEXAPRO) 20 MG tablet Take 20 mg by mouth Daily.    Christian Briseno MD   guaiFENesin (MUCINEX) 600 MG 12 hr tablet Take 2 tablets by mouth Every 12 (Twelve) Hours. 7/14/18   Jameson Oliver DO   ipratropium-albuterol (DUO-NEB) 0.5-2.5 mg/mL nebulizer Take 3 mL by nebulization Every 6 (Six) Hours As Needed for wheezing.    Christian Briseno MD   loratadine (CLARITIN) 10 MG tablet Take 10 mg by mouth Daily.    Christian Briseno MD   megestrol (MEGACE) 40 MG tablet Take 80 mg by mouth 2 (Two) Times a Day.    Christian Briseno MD   pantoprazole (PROTONIX) 40 MG EC tablet Take 40 mg by mouth Daily.    Christian Briseno MD   traZODone (DESYREL) 50 MG tablet Take 50 mg by mouth Every Night.    Christian Briseno MD     Allergies:  Allergies   Allergen Reactions   • Demerol [Meperidine] Anaphylaxis       Objective     Vital Signs: /59   Pulse 62   Temp 98 °F (36.7 °C) (Oral)   Resp 22   Ht 165.1 cm (65\")   Wt 41.3 kg (91 lb)   SpO2 99%   BMI 15.14 kg/m²   Physical Exam   Constitutional: He is oriented to person, place, and time. He appears well-developed.   Cachectic.   HENT:   Head: Normocephalic.   Eyes: Pupils are equal, round, and reactive to light. Conjunctivae are normal.   Neck: Neck supple. No JVD present.   Cardiovascular: Normal rate, regular rhythm, normal heart sounds and intact distal pulses. Exam reveals " no gallop and no friction rub.   No murmur heard.  Pulmonary/Chest: No respiratory distress. He has wheezes. He has no rales. He exhibits no tenderness.   Diminish breath sound bilateral, wheezing.   Abdominal: Soft. Bowel sounds are normal. He exhibits no distension. There is no tenderness. There is no rebound and no guarding.   Musculoskeletal: He exhibits no edema, tenderness or deformity.   Neurological: He is alert and oriented to person, place, and time. He displays normal reflexes. No cranial nerve deficit. He exhibits abnormal muscle tone. Coordination abnormal.   Skin: Skin is warm and dry. Capillary refill takes 2 to 3 seconds. No rash noted.   Psychiatric: He has a normal mood and affect. His behavior is normal.   Nursing note and vitals reviewed.          Results Reviewed:    Lab Results (last 24 hours)     Procedure Component Value Units Date/Time    Blood Gas, Arterial [[id number]]  (Abnormal) Collected:  04/04/20 1340    Specimen:  Arterial Blood Updated:  04/04/20 1342     Site Right Radial     Kervin's Test Positive     pH, Arterial 7.331 pH units      Comment: 84 Value below reference range        pCO2, Arterial 92.5 mm Hg      Comment: 86 Value above critical limit        pO2, Arterial 69.7 mm Hg      Comment: 84 Value below reference range        HCO3, Arterial 48.9 mmol/L      Comment: 83 Value above reference range        Base Excess, Arterial 18.3 mmol/L      Comment: 83 Value above reference range        O2 Saturation, Arterial 93.5 %      Comment: 84 Value below reference range        Temperature 37.0 C      Barometric Pressure for Blood Gas 752 mmHg      Modality Nasal Cannula     Flow Rate 2.0 lpm      Ventilator Mode NA     Notified Who DR JEAN     Notified By [id number]     Notified Time 04/04/2020 13:47     Collected by [id number]     Comment: Meter: [id number]     :  [id number]       [place] Draw [[id number]] Collected:  04/04/20 1118    Specimen:  Blood Updated:  04/04/20 1230     Narrative:       The following orders were created for panel order [place] Draw.  Procedure                               Abnormality         Status                     ---------                               -----------         ------                     Light Blue Top[[id number]]                                   Final result               Green Top (Gel)[[id number]]                                  Final result               Lavender Top[[id number]]                                     Final result               Red Top[[id number]]                                          Final result               [facility] Blood Culture Brandon...[[id number]]                                                 Gray Top - Ice[[id number]]                                   Final result                 Please view results for these tests on the individual orders.    Light Blue Top [[id number]] Collected:  04/04/20 1118    Specimen:  Blood Updated:  04/04/20 1230     Extra Tube hold for add-on     Comment: Auto resulted       Green Top (Gel) [[id number]] Collected:  04/04/20 1118    Specimen:  Blood Updated:  04/04/20 1230     Extra Tube Hold for add-ons.     Comment: Auto resulted.       Lavender Top [[id number]] Collected:  04/04/20 1118    Specimen:  Blood Updated:  04/04/20 1230     Extra Tube hold for add-on     Comment: Auto resulted       Red Top [[id number]] Collected:  04/04/20 1118    Specimen:  Blood Updated:  04/04/20 1230     Extra Tube Hold for add-ons.     Comment: Auto resulted.       Gray Top - Ice [[id number]] Collected:  04/04/20 1118    Specimen:  Blood Updated:  04/04/20 1151     Extra Tube --    Comprehensive Metabolic Panel [[id number]]  (Abnormal) Collected:  04/04/20 1118    Specimen:  Blood Updated:  04/04/20 1149     Glucose 123 mg/dL      BUN 10 mg/dL      Creatinine 0.36 mg/dL      Sodium 141 mmol/L      Potassium 4.7 mmol/L      Chloride 89 mmol/L      CO2 45.0 mmol/L      Calcium 9.8 mg/dL      Total Protein 7.5 g/dL       Albumin 3.80 g/dL      ALT (SGPT) 8 U/L      AST (SGOT) 12 U/L      Alkaline Phosphatase 76 U/L      Total Bilirubin 0.3 mg/dL      eGFR Non African Amer >150 mL/min/1.73      Globulin 3.7 gm/dL      A/G Ratio 1.0 g/dL      BUN/Creatinine Ratio 27.8     Anion Gap 7.0 mmol/L     Narrative:       GFR Normal >60  Chronic Kidney Disease <60  Kidney Failure <15      Troponin [[id number]]  (Normal) Collected:  04/04/20 1118    Specimen:  Blood Updated:  04/04/20 1149     Troponin T <0.010 ng/mL     Narrative:       Troponin T Reference Range:  <= 0.03 ng/mL-   Negative for AMI  >0.03 ng/mL-     Abnormal for myocardial necrosis.  Clinicians would have to utilize clinical acumen, EKG, Troponin and serial changes to determine if it is an Acute Myocardial Infarction or myocardial injury due to an underlying chronic condition.       Results may be falsely decreased if patient taking Biotin.      BNP [[id number]]  (Normal) Collected:  04/04/20 1118    Specimen:  Blood Updated:  04/04/20 1149     proBNP 242.9 pg/mL     Narrative:       Among patients with dyspnea, NT-proBNP is highly sensitive for the detection of acute congestive heart failure. In addition NT-proBNP of <300 pg/ml effectively rules out acute congestive heart failure with 99% negative predictive value.    Results may be falsely decreased if patient taking Biotin.      Lactic Acid, Plasma [[id number]]  (Normal) Collected:  04/04/20 1118    Specimen:  Blood Updated:  04/04/20 1147     Lactate 0.9 mmol/L     D-dimer, Quantitative [[id number]]  (Abnormal) Collected:  04/04/20 1118    Specimen:  Blood Updated:  04/04/20 1142     D-Dimer, Quantitative 0.64 mg/L (FEU)     Narrative:       Reference Range is 0-0.50 mg/L FEU. However, results <0.50 mg/L FEU tends to rule out DVT or PE. Results >0.50 mg/L FEU are not useful in predicting absence or presence of DVT or PE.      CBC & Differential [[id number]] Collected:  04/04/20 1118    Specimen:  Blood Updated:  04/04/20 1132       Narrative:       The following orders were created for panel order CBC & Differential.  Procedure                               Abnormality         Status                     ---------                               -----------         ------                     CBC Auto Differential[[id number]]        Abnormal            Final result                 Please view results for these tests on the individual orders.    CBC Auto Differential [[id number]]  (Abnormal) Collected:  04/04/20 1118    Specimen:  Blood Updated:  04/04/20 1132     WBC 7.33 10*3/mm3      RBC 4.63 10*6/mm3      Hemoglobin 13.8 g/dL      Hematocrit 44.5 %      MCV 96.1 fL      MCH 29.8 pg      MCHC 31.0 g/dL      RDW 12.5 %      RDW-SD 43.1 fl      MPV 8.6 fL      Platelets 224 10*3/mm3      Neutrophil % 77.7 %      Lymphocyte % 12.8 %      Monocyte % 8.3 %      Eosinophil % 0.5 %      Basophil % 0.4 %      Immature Grans % 0.3 %      Neutrophils, Absolute 5.69 10*3/mm3      Lymphocytes, Absolute 0.94 10*3/mm3      Monocytes, Absolute 0.61 10*3/mm3      Eosinophils, Absolute 0.04 10*3/mm3      Basophils, Absolute 0.03 10*3/mm3      Immature Grans, Absolute 0.02 10*3/mm3      nRBC 0.0 /100 WBC            Radiology Data:    Imaging Results (Last 24 Hours)     Procedure Component Value Units Date/Time    XR Chest 1 View [[id number]] Collected:  04/04/20 1215     Updated:  04/04/20 1222    Narrative:       EXAMINATION: XR CHEST 1 VW-. 4/4/2020 12:15 PM CDT     CHEST, ONE VIEW:     HISTORY: Shortness of breath     COMPARISON: 07/10/2018, 07/06/2018 and 17 2018     A single frontal chest radiograph was obtained.     FINDINGS:     Severe pulmonary emphysema observed.     There are chronic lung changes identified in the lung apices with  pleural thickening and fibrosis, greater on the right.     No definite acute infiltrates observed.     The heart is normal in size, pulmonary circulation appropriate, without  heart failure.     Calcified subcarinal lymph  nodes appreciated.     No acute osseous abnormalities observed.                                     Impression:       1. Severe pulmonary emphysema and advanced upper lobe chronic lung  changes.  2. No acute cardiopulmonary process.     This report was finalized on 04/04/2020 12:19 by Dr. Ronak Souza MD.          I have personally reviewed and interpreted the radiology studies and ECG obtained at time of admission.     Assessment / Plan      Assessment & Plan  Active Hospital Problems    Diagnosis   • Hypercarbia   • COPD with acute exacerbation (CMS/HCC)   • Acute on chronic respiratory failure with hypoxia and hypercapnia (CMS/HCC)     Plan.    COPD exacerbation/acute on chronic respiratory failure with hypercapnia and hypoxia.  Continue Solu-Medrol.  DuoNeb 4 times daily.  Doxycycline.  BiPAP PRN.  Patient is on chronic 3 L oxygen at home.  Patient does have a CPAP/BiPAP at home.  Continue BiPAP.  Continue Symbicort.  Continue Mucinex.  Incentive spirometer.  Acapella.  Continue Singulair.  Start doxycycline.  Chest x-ray-severe pulmonary emphysema and advanced upper lobe chronic lung changes, no acute cardiopulmonary process.    Anxiety/depression.  Hold Xanax for now patient seems to sleepy.  Continue Lexapro.  Hold trazodone due to sedation.    Hypertension/hyperlipidemia.  Continue aspirin.  Continue Lopressor.  Continue Zocor.    Reflux.  Continue Protonix.  Zofran as needed.    Chronic smoker.  Discussed patient cutting back and stopping.  Patient refused nicotine patch.    Nutrition.  Regular diet.  Megace.    Code Status: full code     I discussed the patient's findings and my recommendations with: patient    Estimated length of stay: 2-5 days.    Guillermo Boss MD   04/04/20   15:13

## 2020-04-04 NOTE — PLAN OF CARE
Problem: Patient Care Overview  Goal: Plan of Care Review  Outcome: Ongoing (interventions implemented as appropriate)  Flowsheets  Taken 4/4/2020 1845  Progress: no change  Outcome Summary: Pt admitted to 4B from ER. Pt currently on vapo-therm with sats in high 80's-low 90's. No complaints of pain. Pressure injury to coccyx present upon admission. VSS. NSR 70-90 with PACs on tele. Will continue to monitor.  Taken 4/4/2020 6233  Plan of Care Reviewed With: patient

## 2020-04-04 NOTE — ED PROVIDER NOTES
"Subjective   Patient presents with a complaint of intermittent shortness of breath.  As he describes it it wakes him up at night when he feels like he cannot catch his wind.  He gets up in the morning feels very weak and shaky try to get to his table and is sometimes again hits him at night.  During the day he actually seems to be pretty much okay.  He says he does not feel great but he is all right.  He does have some cough but is not any worse than usual and is mostly clear stuff which is his usual cough.  He is not had any fever or chills.  He does use oxygen at home all the time.  He does have a CPAP machine.  When I asked him how long this is been bothering him and what prompted an ER visit today he says is been bothering him since his last admission for pneumonia.  According to our records that was in 2018.  I asked him if something was different and suddenly worse that he need to be seen now but he says now that he can tell it has been bothering him and apparently decided to get it checked out by \"the experts at the hospital\".      History provided by:  Patient   used: No    Shortness of Breath   Severity:  Severe  Onset quality:  Gradual  Duration: \"since last admission here for pneumonia\", according to records was 2018.  Timing:  Intermittent  Progression:  Unchanged  Chronicity:  Recurrent  Context: not activity, not animal exposure, not emotional upset, not fumes, not known allergens, not occupational exposure, not pollens, not smoke exposure, not strong odors, not URI and not weather changes    Relieved by:  Nothing  Worsened by:  Nothing (at night in sleep)  Ineffective treatments:  None tried  Associated symptoms: cough and sputum production    Associated symptoms: no abdominal pain, no chest pain, no claudication, no diaphoresis, no ear pain, no fever, no headaches, no hemoptysis, no neck pain, no PND, no rash, no sore throat, no syncope, no swollen glands, no vomiting and no " wheezing    Risk factors: no recent alcohol use, no family hx of DVT, no hx of cancer, no hx of PE/DVT, no obesity, no oral contraceptive use, no prolonged immobilization, no recent surgery and no tobacco use        Review of Systems   Constitutional: Negative.  Negative for diaphoresis and fever.   HENT: Negative.  Negative for ear pain and sore throat.    Respiratory: Positive for cough, sputum production and shortness of breath. Negative for hemoptysis and wheezing.    Cardiovascular: Negative.  Negative for chest pain, claudication, syncope and PND.   Gastrointestinal: Negative.  Negative for abdominal pain and vomiting.   Genitourinary: Negative.    Musculoskeletal: Negative.  Negative for neck pain.   Skin: Negative.  Negative for rash.   Neurological: Negative.  Negative for headaches.   Hematological: Negative.    Psychiatric/Behavioral: Negative.    All other systems reviewed and are negative.      Past Medical History:   Diagnosis Date   • Anxiety    • Arthritis    • Chronic respiratory failure with hypoxia, on home O2 therapy (CMS/Formerly [facility]), 3 L    • COPD (chronic obstructive pulmonary disease) (CMS/Formerly [facility])    • Depression    • Hypertension    • Mixed hyperlipidemia    • Nephrolithiasis    • Pneumonia, pseudomonal 7/2016 with negative AFB, aspiration 7/2018        Allergies   Allergen Reactions   • Demerol [Meperidine] Anaphylaxis       Past Surgical History:   Procedure Laterality Date   • COLONOSCOPY  04/20/2007    Within Normal Limits.   • ENDOSCOPY  04/20/2007    urea neg, sbbx benign, mild gastritis, small hiatal hernia   • ENDOSCOPY N/A 10/5/2016    Procedure: ESOPHAGOGASTRODUODENOSCOPY WITH ANESTHESIA;  Surgeon: Kevyn Damon MD;  Location: [facility] ENDOSCOPY;  Service:    • HEMORRHOIDECTOMY     • PEG TUBE INSERTION  07/22/2016    Dr Mccarty   • TRACHEOSTOMY         Family History   Problem Relation Age of Onset   • Dementia Mother    • No Known Problems Father        Social History     Socioeconomic History     • Marital status:      Spouse name: Not on file   • Number of children: Not on file   • Years of education: Not on file   • Highest education level: Not on file   Tobacco Use   • Smoking status: Current Every Day Smoker     Packs/day: 0.50     Types: Cigarettes   • Smokeless tobacco: Never Used   Substance and Sexual Activity   • Alcohol use: No   • Drug use: Defer   • Sexual activity: Defer       Prior to Admission medications    Medication Sig Start Date End Date Taking? Authorizing Provider   ALPRAZolam (XANAX) 1 MG tablet Take 1 mg by mouth 3 (Three) Times a Day As Needed for Anxiety.   Yes Christian Briseno MD   citalopram (CeleXA) 20 MG tablet Take 20 mg by mouth Daily.   Yes ProviderChristian MD   metoprolol tartrate (LOPRESSOR) 50 MG tablet Take 50 mg by mouth 2 (Two) Times a Day.   Yes Christian Briseno MD   montelukast (SINGULAIR) 10 MG tablet Take 10 mg by mouth Every Night.   Yes Christian Briseno MD   simvastatin (ZOCOR) 20 MG tablet Take 20 mg by mouth Every Night.   Yes ProviderChristian MD   albuterol sulfate  (90 Base) MCG/ACT inhaler Inhale 2 puffs Every 4 (Four) Hours As Needed for Wheezing.    ProviderChristian MD   aspirin 81 MG EC tablet Take 81 mg by mouth Daily.    ProviderChristian MD   budesonide-formoterol (SYMBICORT) 160-4.5 MCG/ACT inhaler Inhale 2 puffs 2 (Two) Times a Day.  Patient not taking: Reported on 12/13/2019 7/14/18   Jameson Oliver DO   escitalopram (LEXAPRO) 20 MG tablet Take 20 mg by mouth Daily.    ProviderChristian MD   guaiFENesin (MUCINEX) 600 MG 12 hr tablet Take 2 tablets by mouth Every 12 (Twelve) Hours. 7/14/18   Jameson Oliver DO   ipratropium-albuterol (DUO-NEB) 0.5-2.5 mg/mL nebulizer Take 3 mL by nebulization Every 6 (Six) Hours As Needed for wheezing.    ProviderChristian MD   loratadine (CLARITIN) 10 MG tablet Take 10 mg by mouth Daily.    ProviderChristian MD   megestrol (MEGACE) 40 MG tablet  Take 80 mg by mouth 2 (Two) Times a Day.    ProviderChristian MD   pantoprazole (PROTONIX) 40 MG EC tablet Take 40 mg by mouth Daily.    ProviderChristian MD   traZODone (DESYREL) 50 MG tablet Take 50 mg by mouth Every Night.    ProviderChristian MD       Medications   sodium chloride 0.9 % flush 10 mL (has no administration in time range)   sodium chloride 0.9 % flush 10 mL (has no administration in time range)   methylPREDNISolone sodium succinate (SOLU-Medrol) injection 125 mg (has no administration in time range)   ipratropium-albuterol (DUO-NEB) nebulizer solution 3 mL (has no administration in time range)       Vitals:    04/04/20 1121   BP:    Pulse:    Resp:    Temp: 98 °F (36.7 °C)   SpO2:          Objective   Physical Exam   Constitutional: He is oriented to person, place, and time. He appears well-developed and well-nourished.   Neck: Normal range of motion. Neck supple.   Cardiovascular: Normal rate and regular rhythm.   Pulmonary/Chest: Effort normal. He has decreased breath sounds in the right middle field and the left middle field.   Abdominal: Soft. Bowel sounds are normal.   Musculoskeletal: Normal range of motion.   Neurological: He is alert and oriented to person, place, and time.   Skin: Skin is warm and dry. Capillary refill takes less than 2 seconds.   Psychiatric: He has a normal mood and affect. His behavior is normal.   Nursing note and vitals reviewed.      Procedures         Lab Results (last 24 hours)     Procedure Component Value Units Date/Time    CBC & Differential [[id number]] Collected:  04/04/20 1118    Specimen:  Blood Updated:  04/04/20 1132    Narrative:       The following orders were created for panel order CBC & Differential.  Procedure                               Abnormality         Status                     ---------                               -----------         ------                     CBC Auto Differential[[id number]]        Abnormal            Final  result                 Please view results for these tests on the individual orders.    Comprehensive Metabolic Panel [[id number]]  (Abnormal) Collected:  04/04/20 1118    Specimen:  Blood Updated:  04/04/20 1149     Glucose 123 mg/dL      BUN 10 mg/dL      Creatinine 0.36 mg/dL      Sodium 141 mmol/L      Potassium 4.7 mmol/L      Chloride 89 mmol/L      CO2 45.0 mmol/L      Calcium 9.8 mg/dL      Total Protein 7.5 g/dL      Albumin 3.80 g/dL      ALT (SGPT) 8 U/L      AST (SGOT) 12 U/L      Alkaline Phosphatase 76 U/L      Total Bilirubin 0.3 mg/dL      eGFR Non African Amer >150 mL/min/1.73      Globulin 3.7 gm/dL      A/G Ratio 1.0 g/dL      BUN/Creatinine Ratio 27.8     Anion Gap 7.0 mmol/L     Narrative:       GFR Normal >60  Chronic Kidney Disease <60  Kidney Failure <15      BNP [[id number]]  (Normal) Collected:  04/04/20 1118    Specimen:  Blood Updated:  04/04/20 1149     proBNP 242.9 pg/mL     Narrative:       Among patients with dyspnea, NT-proBNP is highly sensitive for the detection of acute congestive heart failure. In addition NT-proBNP of <300 pg/ml effectively rules out acute congestive heart failure with 99% negative predictive value.    Results may be falsely decreased if patient taking Biotin.      D-dimer, Quantitative [[id number]]  (Abnormal) Collected:  04/04/20 1118    Specimen:  Blood Updated:  04/04/20 1142     D-Dimer, Quantitative 0.64 mg/L (FEU)     Narrative:       Reference Range is 0-0.50 mg/L FEU. However, results <0.50 mg/L FEU tends to rule out DVT or PE. Results >0.50 mg/L FEU are not useful in predicting absence or presence of DVT or PE.      Troponin [[id number]]  (Normal) Collected:  04/04/20 1118    Specimen:  Blood Updated:  04/04/20 1149     Troponin T <0.010 ng/mL     Narrative:       Troponin T Reference Range:  <= 0.03 ng/mL-   Negative for AMI  >0.03 ng/mL-     Abnormal for myocardial necrosis.  Clinicians would have to utilize clinical acumen, EKG, Troponin and serial  changes to determine if it is an Acute Myocardial Infarction or myocardial injury due to an underlying chronic condition.       Results may be falsely decreased if patient taking Biotin.      Lactic Acid, Plasma [[id number]]  (Normal) Collected:  04/04/20 1118    Specimen:  Blood Updated:  04/04/20 1147     Lactate 0.9 mmol/L     CBC Auto Differential [[id number]]  (Abnormal) Collected:  04/04/20 1118    Specimen:  Blood Updated:  04/04/20 1132     WBC 7.33 10*3/mm3      RBC 4.63 10*6/mm3      Hemoglobin 13.8 g/dL      Hematocrit 44.5 %      MCV 96.1 fL      MCH 29.8 pg      MCHC 31.0 g/dL      RDW 12.5 %      RDW-SD 43.1 fl      MPV 8.6 fL      Platelets 224 10*3/mm3      Neutrophil % 77.7 %      Lymphocyte % 12.8 %      Monocyte % 8.3 %      Eosinophil % 0.5 %      Basophil % 0.4 %      Immature Grans % 0.3 %      Neutrophils, Absolute 5.69 10*3/mm3      Lymphocytes, Absolute 0.94 10*3/mm3      Monocytes, Absolute 0.61 10*3/mm3      Eosinophils, Absolute 0.04 10*3/mm3      Basophils, Absolute 0.03 10*3/mm3      Immature Grans, Absolute 0.02 10*3/mm3      nRBC 0.0 /100 WBC     Blood Gas, Arterial [[id number]]  (Abnormal) Collected:  04/04/20 1340    Specimen:  Arterial Blood Updated:  04/04/20 1342     Site Right Radial     Kervin's Test Positive     pH, Arterial 7.331 pH units      Comment: 84 Value below reference range        pCO2, Arterial 92.5 mm Hg      Comment: 86 Value above critical limit        pO2, Arterial 69.7 mm Hg      Comment: 84 Value below reference range        HCO3, Arterial 48.9 mmol/L      Comment: 83 Value above reference range        Base Excess, Arterial 18.3 mmol/L      Comment: 83 Value above reference range        O2 Saturation, Arterial 93.5 %      Comment: 84 Value below reference range        Temperature 37.0 C      Barometric Pressure for Blood Gas 752 mmHg      Modality Nasal Cannula     Flow Rate 2.0 lpm      Ventilator Mode NA     Notified Who DR JEAN     Notified By [id number]      Notified Time 04/04/2020 13:47     Collected by [id number]     Comment: Meter: [id number]     :  [id number]             XR Chest 1 View   Final Result   1. Severe pulmonary emphysema and advanced upper lobe chronic lung   changes.   2. No acute cardiopulmonary process.       This report was finalized on 04/04/2020 12:19 by Dr. Ronak Souza MD.          ED Course  ED Course as of Apr 04 1449   Sat Apr 04, 2020   1448 I spoke with Dr. Boxer about the patient.  We did not feel like he needed coronavirus testing.  He has had no symptoms consistent with that.  This is all consistent with his COPD.  We will admit for that treatment and I have started such treatment.    [TR]      ED Course User Index  [TR] Quinton Soto Jr., MD          MDM  Number of Diagnoses or Management Options  Hypercarbia: new and requires workup     Amount and/or Complexity of Data Reviewed  Clinical lab tests: ordered and reviewed  Tests in the radiology section of CPT®: ordered and reviewed  Tests in the medicine section of CPT®: ordered and reviewed    Risk of Complications, Morbidity, and/or Mortality  Presenting problems: moderate  Diagnostic procedures: moderate  Management options: moderate    Patient Progress  Patient progress: stable      Final diagnoses:   Hypercarbia          Quinton Soto Jr., MD  04/04/20 5002

## 2020-04-05 LAB
ALBUMIN SERPL-MCNC: 3.9 G/DL (ref 3.5–5.2)
ALBUMIN/GLOB SERPL: 1.2 G/DL
ALP SERPL-CCNC: 70 U/L (ref 39–117)
ALT SERPL W P-5'-P-CCNC: 7 U/L (ref 1–41)
ANION GAP SERPL CALCULATED.3IONS-SCNC: 9 MMOL/L (ref 5–15)
AST SERPL-CCNC: 12 U/L (ref 1–40)
BASOPHILS # BLD AUTO: 0 10*3/MM3 (ref 0–0.2)
BASOPHILS NFR BLD AUTO: 0 % (ref 0–1.5)
BILIRUB SERPL-MCNC: 0.3 MG/DL (ref 0.2–1.2)
BUN BLD-MCNC: 11 MG/DL (ref 8–23)
BUN/CREAT SERPL: 31.4 (ref 7–25)
CALCIUM SPEC-SCNC: 9.5 MG/DL (ref 8.6–10.5)
CHLORIDE SERPL-SCNC: 89 MMOL/L (ref 98–107)
CHOLEST SERPL-MCNC: 148 MG/DL (ref 0–200)
CO2 SERPL-SCNC: 42 MMOL/L (ref 22–29)
CREAT BLD-MCNC: 0.35 MG/DL (ref 0.76–1.27)
DEPRECATED RDW RBC AUTO: 41.3 FL (ref 37–54)
EOSINOPHIL # BLD AUTO: 0 10*3/MM3 (ref 0–0.4)
EOSINOPHIL NFR BLD AUTO: 0 % (ref 0.3–6.2)
ERYTHROCYTE [DISTWIDTH] IN BLOOD BY AUTOMATED COUNT: 12.2 % (ref 12.3–15.4)
GFR SERPL CREATININE-BSD FRML MDRD: >150 ML/MIN/1.73
GLOBULIN UR ELPH-MCNC: 3.2 GM/DL
GLUCOSE BLD-MCNC: 162 MG/DL (ref 65–99)
HBA1C MFR BLD: 5 % (ref 4.8–5.6)
HCT VFR BLD AUTO: 39.9 % (ref 37.5–51)
HDLC SERPL-MCNC: 50 MG/DL (ref 40–60)
HGB BLD-MCNC: 12.6 G/DL (ref 13–17.7)
IMM GRANULOCYTES # BLD AUTO: 0.03 10*3/MM3 (ref 0–0.05)
IMM GRANULOCYTES NFR BLD AUTO: 0.5 % (ref 0–0.5)
LDLC SERPL CALC-MCNC: 80 MG/DL (ref 0–100)
LDLC/HDLC SERPL: 1.61 {RATIO}
LYMPHOCYTES # BLD AUTO: 0.5 10*3/MM3 (ref 0.7–3.1)
LYMPHOCYTES NFR BLD AUTO: 8.7 % (ref 19.6–45.3)
MCH RBC QN AUTO: 29.4 PG (ref 26.6–33)
MCHC RBC AUTO-ENTMCNC: 31.6 G/DL (ref 31.5–35.7)
MCV RBC AUTO: 93.2 FL (ref 79–97)
MONOCYTES # BLD AUTO: 0.04 10*3/MM3 (ref 0.1–0.9)
MONOCYTES NFR BLD AUTO: 0.7 % (ref 5–12)
NEUTROPHILS # BLD AUTO: 5.17 10*3/MM3 (ref 1.7–7)
NEUTROPHILS NFR BLD AUTO: 90.1 % (ref 42.7–76)
NRBC BLD AUTO-RTO: 0 /100 WBC (ref 0–0.2)
PLATELET # BLD AUTO: 234 10*3/MM3 (ref 140–450)
PMV BLD AUTO: 9.2 FL (ref 6–12)
POTASSIUM BLD-SCNC: 3.8 MMOL/L (ref 3.5–5.2)
PROT SERPL-MCNC: 7.1 G/DL (ref 6–8.5)
RBC # BLD AUTO: 4.28 10*6/MM3 (ref 4.14–5.8)
SODIUM BLD-SCNC: 140 MMOL/L (ref 136–145)
TRIGL SERPL-MCNC: 88 MG/DL (ref 0–150)
TSH SERPL DL<=0.05 MIU/L-ACNC: 0.28 UIU/ML (ref 0.27–4.2)
VLDLC SERPL-MCNC: 17.6 MG/DL
WBC NRBC COR # BLD: 5.74 10*3/MM3 (ref 3.4–10.8)

## 2020-04-05 PROCEDURE — 94660 CPAP INITIATION&MGMT: CPT

## 2020-04-05 PROCEDURE — 94799 UNLISTED PULMONARY SVC/PX: CPT

## 2020-04-05 PROCEDURE — 80061 LIPID PANEL: CPT | Performed by: FAMILY MEDICINE

## 2020-04-05 PROCEDURE — 83036 HEMOGLOBIN GLYCOSYLATED A1C: CPT | Performed by: FAMILY MEDICINE

## 2020-04-05 PROCEDURE — 25010000002 METHYLPREDNISOLONE PER 40 MG: Performed by: FAMILY MEDICINE

## 2020-04-05 PROCEDURE — 25010000002 METHYLPREDNISOLONE PER 125 MG: Performed by: FAMILY MEDICINE

## 2020-04-05 PROCEDURE — 85025 COMPLETE CBC W/AUTO DIFF WBC: CPT | Performed by: FAMILY MEDICINE

## 2020-04-05 PROCEDURE — 84443 ASSAY THYROID STIM HORMONE: CPT | Performed by: FAMILY MEDICINE

## 2020-04-05 PROCEDURE — 80053 COMPREHEN METABOLIC PANEL: CPT | Performed by: FAMILY MEDICINE

## 2020-04-05 RX ORDER — METHYLPREDNISOLONE SODIUM SUCCINATE 40 MG/ML
40 INJECTION, POWDER, LYOPHILIZED, FOR SOLUTION INTRAMUSCULAR; INTRAVENOUS EVERY 6 HOURS
Status: DISCONTINUED | OUTPATIENT
Start: 2020-04-05 | End: 2020-04-06

## 2020-04-05 RX ORDER — FERROUS SULFATE 325(65) MG
325 TABLET ORAL
COMMUNITY

## 2020-04-05 RX ORDER — ALPRAZOLAM 0.5 MG/1
0.5 TABLET ORAL 3 TIMES DAILY PRN
Status: DISCONTINUED | OUTPATIENT
Start: 2020-04-05 | End: 2020-04-12 | Stop reason: HOSPADM

## 2020-04-05 RX ORDER — ACETAMINOPHEN 325 MG/1
650 TABLET ORAL EVERY 6 HOURS PRN
Status: DISCONTINUED | OUTPATIENT
Start: 2020-04-05 | End: 2020-04-12 | Stop reason: HOSPADM

## 2020-04-05 RX ADMIN — IPRATROPIUM BROMIDE AND ALBUTEROL SULFATE 3 ML: 2.5; .5 SOLUTION RESPIRATORY (INHALATION) at 10:25

## 2020-04-05 RX ADMIN — METOPROLOL TARTRATE 50 MG: 50 TABLET, FILM COATED ORAL at 08:05

## 2020-04-05 RX ADMIN — GUAIFENESIN 1200 MG: 600 TABLET, EXTENDED RELEASE ORAL at 21:37

## 2020-04-05 RX ADMIN — ASPIRIN 81 MG: 81 TABLET ORAL at 08:05

## 2020-04-05 RX ADMIN — METHYLPREDNISOLONE SODIUM SUCCINATE 40 MG: 40 INJECTION, POWDER, FOR SOLUTION INTRAMUSCULAR; INTRAVENOUS at 14:09

## 2020-04-05 RX ADMIN — PANTOPRAZOLE SODIUM 40 MG: 40 TABLET, DELAYED RELEASE ORAL at 08:04

## 2020-04-05 RX ADMIN — IPRATROPIUM BROMIDE AND ALBUTEROL SULFATE 3 ML: 2.5; .5 SOLUTION RESPIRATORY (INHALATION) at 06:33

## 2020-04-05 RX ADMIN — IPRATROPIUM BROMIDE AND ALBUTEROL SULFATE 3 ML: 2.5; .5 SOLUTION RESPIRATORY (INHALATION) at 14:09

## 2020-04-05 RX ADMIN — BUDESONIDE AND FORMOTEROL FUMARATE DIHYDRATE 2 PUFF: 160; 4.5 AEROSOL RESPIRATORY (INHALATION) at 19:37

## 2020-04-05 RX ADMIN — METOPROLOL TARTRATE 50 MG: 50 TABLET, FILM COATED ORAL at 21:37

## 2020-04-05 RX ADMIN — ATORVASTATIN CALCIUM 10 MG: 10 TABLET, FILM COATED ORAL at 21:37

## 2020-04-05 RX ADMIN — DOXYCYCLINE 100 MG: 100 INJECTION, POWDER, LYOPHILIZED, FOR SOLUTION INTRAVENOUS at 04:28

## 2020-04-05 RX ADMIN — METHYLPREDNISOLONE SODIUM SUCCINATE 40 MG: 40 INJECTION, POWDER, FOR SOLUTION INTRAMUSCULAR; INTRAVENOUS at 21:37

## 2020-04-05 RX ADMIN — ALPRAZOLAM 0.5 MG: 0.5 TABLET ORAL at 14:04

## 2020-04-05 RX ADMIN — DOXYCYCLINE 100 MG: 100 INJECTION, POWDER, LYOPHILIZED, FOR SOLUTION INTRAVENOUS at 16:47

## 2020-04-05 RX ADMIN — SODIUM CHLORIDE, PRESERVATIVE FREE 10 ML: 5 INJECTION INTRAVENOUS at 08:05

## 2020-04-05 RX ADMIN — IPRATROPIUM BROMIDE AND ALBUTEROL SULFATE 3 ML: 2.5; .5 SOLUTION RESPIRATORY (INHALATION) at 19:36

## 2020-04-05 RX ADMIN — BUDESONIDE AND FORMOTEROL FUMARATE DIHYDRATE 2 PUFF: 160; 4.5 AEROSOL RESPIRATORY (INHALATION) at 06:39

## 2020-04-05 RX ADMIN — GUAIFENESIN 1200 MG: 600 TABLET, EXTENDED RELEASE ORAL at 08:05

## 2020-04-05 RX ADMIN — METHYLPREDNISOLONE SODIUM SUCCINATE 60 MG: 125 INJECTION, POWDER, FOR SOLUTION INTRAMUSCULAR; INTRAVENOUS at 04:28

## 2020-04-05 RX ADMIN — SODIUM CHLORIDE, PRESERVATIVE FREE 10 ML: 5 INJECTION INTRAVENOUS at 21:37

## 2020-04-05 RX ADMIN — METHYLPREDNISOLONE SODIUM SUCCINATE 60 MG: 125 INJECTION, POWDER, FOR SOLUTION INTRAMUSCULAR; INTRAVENOUS at 08:05

## 2020-04-05 RX ADMIN — ESCITALOPRAM 20 MG: 10 TABLET, FILM COATED ORAL at 08:05

## 2020-04-05 RX ADMIN — MEGESTROL ACETATE 40 MG: 40 TABLET ORAL at 08:05

## 2020-04-05 NOTE — PROGRESS NOTES
[facility] Medicine Services  INPATIENT PROGRESS NOTE    Length of Stay: 1  Date of Admission: 4/4/2020  Primary Care Physician: Jose Moon MD    Subjective   Chief Complaint:  COPD exacerbation/respiratory failure with hypercapnia hypoxia.    HPI   Patient is breathing better today.  Blood gases also improving.  Patient is currently on Vapotherm 30/40.  He denies any chest pain.  Patient is less confused today.    Review of Systems   Constitutional: Positive for activity change, appetite change and fatigue. Negative for chills and fever.   HENT: Negative for hearing loss, nosebleeds, tinnitus and trouble swallowing.    Eyes: Negative for visual disturbance.   Respiratory: Positive for cough, shortness of breath and wheezing. Negative for chest tightness.    Cardiovascular: Negative for chest pain, palpitations and leg swelling.   Gastrointestinal: Negative for abdominal distention, abdominal pain, blood in stool, constipation, diarrhea, nausea and vomiting.   Endocrine: Negative for cold intolerance, heat intolerance, polydipsia, polyphagia and polyuria.   Genitourinary: Negative for decreased urine volume, difficulty urinating, dysuria, flank pain, frequency and hematuria.   Musculoskeletal: Positive for arthralgias, gait problem and myalgias. Negative for joint swelling.   Skin: Negative for rash.   Allergic/Immunologic: Negative for immunocompromised state.   Neurological: Positive for weakness. Negative for dizziness, syncope, light-headedness and headaches.   Hematological: Negative for adenopathy. Does not bruise/bleed easily.   Psychiatric/Behavioral: Positive for confusion. Negative for sleep disturbance. The patient is not nervous/anxious.   All pertinent negatives and positives are as above. All other systems have been reviewed and are negative unless otherwise stated.     Objective    Temp:  [98 °F (36.7 °C)-98.7 °F (37.1 °C)] 98.2 °F (36.8  °C)  Heart Rate:  [55-88] 88  Resp:  [16-22] 16  BP: (110-136)/(55-82) 122/59    Intake/Output Summary (Last 24 hours) at 4/5/2020 0956  Last data filed at 4/5/2020 0858  Gross per 24 hour   Intake 240 ml   Output 270 ml   Net -30 ml     Physical Exam  Constitutional: He is oriented to person, place, and time. He appears well-developed.   Cachectic.   HENT:   Head: Normocephalic.   Eyes: Pupils are equal, round, and reactive to light. Conjunctivae are normal.   Neck: Neck supple. No JVD present.   Cardiovascular: Normal rate, regular rhythm, normal heart sounds and intact distal pulses. Exam reveals no gallop and no friction rub.   No murmur heard.  Pulmonary/Chest: No respiratory distress. He has wheezes. He has no rales. He exhibits no tenderness.   Diminish breath sound bilateral, wheezing.   Abdominal: Soft. Bowel sounds are normal. He exhibits no distension. There is no tenderness. There is no rebound and no guarding.   Musculoskeletal: He exhibits no edema, tenderness or deformity.   Neurological: He is alert and oriented to person, place, and time. He displays normal reflexes. No cranial nerve deficit. He exhibits abnormal muscle tone. Coordination abnormal.   Skin: Skin is warm and dry. Capillary refill takes 2 to 3 seconds. No rash noted.   Psychiatric: He has a normal mood and affect. His behavior is normal.   Nursing note and vitals reviewed.     Results Review:  Lab Results (last 24 hours)     Procedure Component Value Units Date/Time    Lipid Panel [[id number]] Collected:  04/05/20 0324    Specimen:  Blood Updated:  04/05/20 0509     Total Cholesterol 148 mg/dL      Triglycerides 88 mg/dL      HDL Cholesterol 50 mg/dL      LDL Cholesterol  80 mg/dL      VLDL Cholesterol 17.6 mg/dL      LDL/HDL Ratio 1.61    Narrative:       Cholesterol Reference Ranges  (U.S. Department of Health and Human Services ATP III Classifications)    Desirable          <200 mg/dL  Borderline High    200-239 mg/dL  High Risk           >240 mg/dL      Triglyceride Reference Ranges  (U.S. Department of Health and Human Services ATP III Classifications)    Normal           <150 mg/dL  Borderline High  150-199 mg/dL  High             200-499 mg/dL  Very High        >500 mg/dL    HDL Reference Ranges  (U.S. Department of Health and Human Services ATP III Classifcations)    Low     <40 mg/dl (major risk factor for CHD)  High    >60 mg/dl ('negative' risk factor for CHD)        LDL Reference Ranges  (U.S. Department of Health and Human Services ATP III Classifcations)    Optimal          <100 mg/dL  Near Optimal     100-129 mg/dL  Borderline High  130-159 mg/dL  High             160-189 mg/dL  Very High        >189 mg/dL    Comprehensive Metabolic Panel [[id number]]  (Abnormal) Collected:  04/05/20 0324    Specimen:  Blood Updated:  04/05/20 0446     Glucose 162 mg/dL      BUN 11 mg/dL      Creatinine 0.35 mg/dL      Sodium 140 mmol/L      Potassium 3.8 mmol/L      Chloride 89 mmol/L      CO2 42.0 mmol/L      Calcium 9.5 mg/dL      Total Protein 7.1 g/dL      Albumin 3.90 g/dL      ALT (SGPT) 7 U/L      AST (SGOT) 12 U/L      Alkaline Phosphatase 70 U/L      Total Bilirubin 0.3 mg/dL      eGFR Non African Amer >150 mL/min/1.73      Globulin 3.2 gm/dL      A/G Ratio 1.2 g/dL      BUN/Creatinine Ratio 31.4     Anion Gap 9.0 mmol/L     Narrative:       GFR Normal >60  Chronic Kidney Disease <60  Kidney Failure <15      TSH [[id number]]  (Normal) Collected:  04/05/20 0324    Specimen:  Blood Updated:  04/05/20 0446     TSH 0.284 uIU/mL     CBC Auto Differential [[id number]]  (Abnormal) Collected:  04/05/20 0324    Specimen:  Blood Updated:  04/05/20 0411     WBC 5.74 10*3/mm3      RBC 4.28 10*6/mm3      Hemoglobin 12.6 g/dL      Hematocrit 39.9 %      MCV 93.2 fL      MCH 29.4 pg      MCHC 31.6 g/dL      RDW 12.2 %      RDW-SD 41.3 fl      MPV 9.2 fL      Platelets 234 10*3/mm3      Neutrophil % 90.1 %      Lymphocyte % 8.7 %      Monocyte % 0.7 %       Eosinophil % 0.0 %      Basophil % 0.0 %      Immature Grans % 0.5 %      Neutrophils, Absolute 5.17 10*3/mm3      Lymphocytes, Absolute 0.50 10*3/mm3      Monocytes, Absolute 0.04 10*3/mm3      Eosinophils, Absolute 0.00 10*3/mm3      Basophils, Absolute 0.00 10*3/mm3      Immature Grans, Absolute 0.03 10*3/mm3      nRBC 0.0 /100 WBC     Hemoglobin A1c [[id number]] Collected:  04/05/20 0324    Specimen:  Blood Updated:  04/05/20 0403    Blood Gas, Arterial [[id number]]  (Abnormal) Collected:  04/04/20 1932    Specimen:  Arterial Blood Updated:  04/04/20 1942     Site Right Radial     Kervin's Test Positive     pH, Arterial 7.452 pH units      Comment: 83 Value above reference range        pCO2, Arterial 69.7 mm Hg      Comment: 86 Value above critical limit        pO2, Arterial 53.3 mm Hg      Comment: 85 Value below critical limit        HCO3, Arterial 48.6 mmol/L      Comment: 83 Value above reference range        Base Excess, Arterial 20.7 mmol/L      Comment: 83 Value above reference range        O2 Saturation, Arterial 89.8 %      Comment: 84 Value below reference range        Temperature 37.0 C      Barometric Pressure for Blood Gas 752 mmHg      Modality Heated HFNC     FIO2 38 %      Flow Rate 38.0 lpm      Ventilator Mode NA     Notified [facility] [id number]     Notified By [id number]     Notified Time 04/04/2020 19:47     Collected by [id number]     Comment: Meter: [id number]     :  [id number]       Blood Culture - Blood, Arm, Right [[id number]] Collected:  04/04/20 1118    Specimen:  Blood from Arm, Right Updated:  04/04/20 1835    Blood Culture - Blood, Arm, Left [[id number]] Collected:  04/04/20 1517    Specimen:  Blood from Arm, Left Updated:  04/04/20 1541    Blood Gas, Arterial [[id number]]  (Abnormal) Collected:  04/04/20 1340    Specimen:  Arterial Blood Updated:  04/04/20 1342     Site Right Radial     Kervin's Test Positive     pH, Arterial 7.331 pH units      Comment: 84 Value below reference range         pCO2, Arterial 92.5 mm Hg      Comment: 86 Value above critical limit        pO2, Arterial 69.7 mm Hg      Comment: 84 Value below reference range        HCO3, Arterial 48.9 mmol/L      Comment: 83 Value above reference range        Base Excess, Arterial 18.3 mmol/L      Comment: 83 Value above reference range        O2 Saturation, Arterial 93.5 %      Comment: 84 Value below reference range        Temperature 37.0 C      Barometric Pressure for Blood Gas 752 mmHg      Modality Nasal Cannula     Flow Rate 2.0 lpm      Ventilator Mode NA     Notified Who DR JEAN     Notified By [id number]     Notified Time 04/04/2020 13:47     Collected by [id number]     Comment: Meter: [id number]     :  [id number]       [place] Draw [[id number]] Collected:  04/04/20 1118    Specimen:  Blood Updated:  04/04/20 1230    Narrative:       The following orders were created for panel order [place] Draw.  Procedure                               Abnormality         Status                     ---------                               -----------         ------                     Light Blue Top[[id number]]                                   Final result               Green Top (Gel)[[id number]]                                  Final result               Lavender Top[[id number]]                                     Final result               Red Top[[id number]]                                          Final result               [place] Blood Culture [place]...[[id number]]                                                 Gray Top - Ice[[id number]]                                   Final result                 Please view results for these tests on the individual orders.    Light Blue Top [[id number]] Collected:  04/04/20 1118    Specimen:  Blood Updated:  04/04/20 1230     Extra Tube hold for add-on     Comment: Auto resulted       Green Top (Gel) [[id number]] Collected:  04/04/20 1118    Specimen:  Blood Updated:  04/04/20 1230     Extra Tube Hold for  add-ons.     Comment: Auto resulted.       Lavender Top [[id number]] Collected:  04/04/20 1118    Specimen:  Blood Updated:  04/04/20 1230     Extra Tube hold for add-on     Comment: Auto resulted       Red Top [[id number]] Collected:  04/04/20 1118    Specimen:  Blood Updated:  04/04/20 1230     Extra Tube Hold for add-ons.     Comment: Auto resulted.       Gray Top - Ice [[id number]] Collected:  04/04/20 1118    Specimen:  Blood Updated:  04/04/20 1151     Extra Tube --    Comprehensive Metabolic Panel [[id number]]  (Abnormal) Collected:  04/04/20 1118    Specimen:  Blood Updated:  04/04/20 1149     Glucose 123 mg/dL      BUN 10 mg/dL      Creatinine 0.36 mg/dL      Sodium 141 mmol/L      Potassium 4.7 mmol/L      Chloride 89 mmol/L      CO2 45.0 mmol/L      Calcium 9.8 mg/dL      Total Protein 7.5 g/dL      Albumin 3.80 g/dL      ALT (SGPT) 8 U/L      AST (SGOT) 12 U/L      Alkaline Phosphatase 76 U/L      Total Bilirubin 0.3 mg/dL      eGFR Non African Amer >150 mL/min/1.73      Globulin 3.7 gm/dL      A/G Ratio 1.0 g/dL      BUN/Creatinine Ratio 27.8     Anion Gap 7.0 mmol/L     Narrative:       GFR Normal >60  Chronic Kidney Disease <60  Kidney Failure <15      Troponin [[id number]]  (Normal) Collected:  04/04/20 1118    Specimen:  Blood Updated:  04/04/20 1149     Troponin T <0.010 ng/mL     Narrative:       Troponin T Reference Range:  <= 0.03 ng/mL-   Negative for AMI  >0.03 ng/mL-     Abnormal for myocardial necrosis.  Clinicians would have to utilize clinical acumen, EKG, Troponin and serial changes to determine if it is an Acute Myocardial Infarction or myocardial injury due to an underlying chronic condition.       Results may be falsely decreased if patient taking Biotin.      BNP [[id number]]  (Normal) Collected:  04/04/20 1118    Specimen:  Blood Updated:  04/04/20 1149     proBNP 242.9 pg/mL     Narrative:       Among patients with dyspnea, NT-proBNP is highly sensitive for the detection of acute  congestive heart failure. In addition NT-proBNP of <300 pg/ml effectively rules out acute congestive heart failure with 99% negative predictive value.    Results may be falsely decreased if patient taking Biotin.      Lactic Acid, Plasma [[id number]]  (Normal) Collected:  04/04/20 1118    Specimen:  Blood Updated:  04/04/20 1147     Lactate 0.9 mmol/L     D-dimer, Quantitative [[id number]]  (Abnormal) Collected:  04/04/20 1118    Specimen:  Blood Updated:  04/04/20 1142     D-Dimer, Quantitative 0.64 mg/L (FEU)     Narrative:       Reference Range is 0-0.50 mg/L FEU. However, results <0.50 mg/L FEU tends to rule out DVT or PE. Results >0.50 mg/L FEU are not useful in predicting absence or presence of DVT or PE.      CBC & Differential [[id number]] Collected:  04/04/20 1118    Specimen:  Blood Updated:  04/04/20 1132    Narrative:       The following orders were created for panel order CBC & Differential.  Procedure                               Abnormality         Status                     ---------                               -----------         ------                     CBC Auto Differential[[id number]]        Abnormal            Final result                 Please view results for these tests on the individual orders.    CBC Auto Differential [[id number]]  (Abnormal) Collected:  04/04/20 1118    Specimen:  Blood Updated:  04/04/20 1132     WBC 7.33 10*3/mm3      RBC 4.63 10*6/mm3      Hemoglobin 13.8 g/dL      Hematocrit 44.5 %      MCV 96.1 fL      MCH 29.8 pg      MCHC 31.0 g/dL      RDW 12.5 %      RDW-SD 43.1 fl      MPV 8.6 fL      Platelets 224 10*3/mm3      Neutrophil % 77.7 %      Lymphocyte % 12.8 %      Monocyte % 8.3 %      Eosinophil % 0.5 %      Basophil % 0.4 %      Immature Grans % 0.3 %      Neutrophils, Absolute 5.69 10*3/mm3      Lymphocytes, Absolute 0.94 10*3/mm3      Monocytes, Absolute 0.61 10*3/mm3      Eosinophils, Absolute 0.04 10*3/mm3      Basophils, Absolute 0.03 10*3/mm3      Immature  Grans, Absolute 0.02 10*3/mm3      nRBC 0.0 /100 WBC            Cultures:  No results found for: BLOODCX, URINECX, WOUNDCX, MRSACX, RESPCX, STOOLCX    Radiology Data:    Imaging Results (Last 24 Hours)     Procedure Component Value Units Date/Time    XR Chest 1 View [[id number]] Collected:  04/04/20 1215     Updated:  04/04/20 1222    Narrative:       EXAMINATION: XR CHEST 1 VW-. 4/4/2020 12:15 PM CDT     CHEST, ONE VIEW:     HISTORY: Shortness of breath     COMPARISON: 07/10/2018, 07/06/2018 and 17 2018     A single frontal chest radiograph was obtained.     FINDINGS:     Severe pulmonary emphysema observed.     There are chronic lung changes identified in the lung apices with  pleural thickening and fibrosis, greater on the right.     No definite acute infiltrates observed.     The heart is normal in size, pulmonary circulation appropriate, without  heart failure.     Calcified subcarinal lymph nodes appreciated.     No acute osseous abnormalities observed.                                     Impression:       1. Severe pulmonary emphysema and advanced upper lobe chronic lung  changes.  2. No acute cardiopulmonary process.     This report was finalized on 04/04/2020 12:19 by Dr. Ronak Souza MD.          Allergies   Allergen Reactions   • Meperidine Anaphylaxis       Scheduled meds:     aspirin 81 mg Oral Daily   atorvastatin 10 mg Oral Nightly   budesonide-formoterol 2 puff Inhalation BID - RT   doxycycline 100 mg Intravenous Q12H   escitalopram 20 mg Oral Daily   guaiFENesin 1,200 mg Oral Q12H   ipratropium-albuterol 3 mL Nebulization 4x Daily - RT   megestrol 40 mg Oral Daily   methylPREDNISolone sodium succinate 60 mg Intravenous Q6H   metoprolol tartrate 50 mg Oral Q12H   montelukast 10 mg Oral Nightly   pantoprazole 40 mg Oral Daily   sodium chloride 10 mL Intravenous Q12H       PRN meds:  ondansetron  •  sodium chloride    Assessment/Plan       Hypercarbia    COPD with acute exacerbation (CMS/HCC)    Acute  on chronic respiratory failure with hypoxia and hypercapnia (CMS/[facility])      Plan:  COPD exacerbation/acute on chronic respiratory failure with hypercapnia and hypoxia. Wean down Solu-Medrol.  DuoNeb 4 times daily.   Continue Symbicort.  Continue Mucinex.  Incentive spirometer.  Acapella.  Continue Singulair.   Continue doxycycline.  Chest x-ray-severe pulmonary emphysema and advanced upper lobe chronic lung changes, no acute cardiopulmonary process.  Patient will need to go home with trilogy.  Patient currently on Vapotherm .  Plan to shave patient's beard and mustache today.  Patient to have family member bring the trilogy machine here to be evaluated.     Anxiety/depression.  Start pt back on Xanax 1/2 dose.  Continue Lexapro.       Hypertension/hyperlipidemia.  Continue aspirin.  Continue Lopressor.  Continue Zocor.     Reflux.  Continue Protonix.  Zofran as needed.     Chronic smoker.  Discussed patient cutting back and stopping.  Patient refused nicotine patch.     Nutrition.  Regular diet.  Megace.  Boost.    Blood cultures pending.    Discharge Plannin-4 days.  Planning will need to go home with trilogy. Patient is on chronic 3 L oxygen at home.  Patient states he has a trilogy machine at home but not use it because it does not fit right.     Guillermo Boss MD   20   09:56

## 2020-04-05 NOTE — PLAN OF CARE
Problem: Patient Care Overview  Goal: Plan of Care Review  Outcome: Ongoing (interventions implemented as appropriate)  Flowsheets (Taken 4/5/2020 0515)  Progress: improving  Plan of Care Reviewed With: patient  Outcome Summary: VSS, no c/o pain, CO2  improving on Vapotherm heated hi-flow, IV steroids and abx continued, safety maintained, continue to monitor.     Problem: Fall Risk (Adult)  Goal: Absence of Fall  Outcome: Ongoing (interventions implemented as appropriate)  Flowsheets (Taken 4/5/2020 0515)  Absence of Fall: achieves outcome     Problem: Skin Injury Risk (Adult)  Goal: Skin Health and Integrity  Outcome: Ongoing (interventions implemented as appropriate)  Flowsheets (Taken 4/5/2020 0515)  Skin Health and Integrity: making progress toward outcome     Problem: Chronic Obstructive Pulmonary Disease (Adult)  Goal: Signs and Symptoms of Listed Potential Problems Will be Absent, Minimized or Managed (Chronic Obstructive Pulmonary Disease)  Outcome: Ongoing (interventions implemented as appropriate)  Flowsheets (Taken 4/5/2020 0515)  Problems Assessed (Chronic Obstructive Pulmonary Disease (COPD)): all  Problems Present (COPD, Bronch/Emphy): respiratory compromise; situational response; undernutrition

## 2020-04-05 NOTE — PROGRESS NOTES
Discharge Planning Assessment  [facility]     Patient Name: Kellie Arzate  MRN: [id number]  Today's Date: 4/5/2020    Admit Date: 4/4/2020    Discharge Needs Assessment     Row Name 04/05/20 0945       Living Environment    Lives With  alone  (Pended)     Name(s) of Who Lives With Patient  Niece and Nephew live with him- Ana and Ronak  (Pended)     Current Living Arrangements  home/apartment/condo  (Pended)     Primary Care Provided by  self  (Pended)     Provides Primary Care For  no one  (Pended)     Quality of Family Relationships  helpful;involved;supportive  (Pended)     Able to Return to Prior Arrangements  yes  (Pended)        Resource/Environmental Concerns    Transportation Concerns  car, none  (Pended)        Transition Planning    Patient/Family Anticipates Transition to  home  (Pended)     Transportation Anticipated  family or friend will provide  (Pended)        Discharge Needs Assessment    Concerns to be Addressed  no discharge needs identified;denies needs/concerns at this time  (Pended)     Equipment Currently Used at Home  walker, standard;cane, straight;oxygen  (Pended)  O2 from [facility]    Equipment Needed After Discharge  none  (Pended)     Discharge Coordination/Progress  Pt has RX coverage and a PCP. Pt lives at home and his niece and nephew live with him. Pt denies any needs at this time. SW will follow and assist with discharge needs as they may arise,   (Pended)         Discharge Plan    No documentation.       Destination      Coordination has not been started for this encounter.      Durable Medical Equipment      Coordination has not been started for this encounter.      Dialysis/Infusion      Coordination has not been started for this encounter.      Home Medical Care      Coordination has not been started for this encounter.      Therapy      Coordination has not been started for this encounter.      Community Resources      Coordination has not been started for this encounter.           Demographic Summary    No documentation.       Functional Status    No documentation.       Psychosocial    No documentation.       Abuse/Neglect    No documentation.       Legal    No documentation.       Substance Abuse    No documentation.       Patient Forms    No documentation.           Elizabeth Servin

## 2020-04-05 NOTE — PLAN OF CARE
Problem: Patient Care Overview  Goal: Plan of Care Review  Outcome: Ongoing (interventions implemented as appropriate)  Flowsheets (Taken 4/5/2020 1522)  Progress: no change  Plan of Care Reviewed With: patient   Pt AAO x3. S/SB  on tele. Voiding. No c/o pain this shift. Continues on Vapotherm heated, high-flow. Continues IV steroids. Patient has not slept any during the day shift. Started having visual hallucinations this afternoon, stated there was water dripping from the ceiling. PRN Xanax restarted. VSS. Safety maintained. Will continue to monitor.

## 2020-04-05 NOTE — NURSING NOTE
"Pt called out saying there was water dripping from the ceiling in his room. He also stated \"it started leaking when they started pouring that concrete\". Dr Boss notified of visual hallucinations. See mar for orders.  "

## 2020-04-06 LAB
ANION GAP SERPL CALCULATED.3IONS-SCNC: 10 MMOL/L (ref 5–15)
BASOPHILS # BLD AUTO: 0.02 10*3/MM3 (ref 0–0.2)
BASOPHILS NFR BLD AUTO: 0.1 % (ref 0–1.5)
BUN BLD-MCNC: 20 MG/DL (ref 8–23)
BUN/CREAT SERPL: 62.5 (ref 7–25)
CALCIUM SPEC-SCNC: 9.7 MG/DL (ref 8.6–10.5)
CHLORIDE SERPL-SCNC: 90 MMOL/L (ref 98–107)
CO2 SERPL-SCNC: 39 MMOL/L (ref 22–29)
CREAT BLD-MCNC: 0.32 MG/DL (ref 0.76–1.27)
DEPRECATED RDW RBC AUTO: 40.8 FL (ref 37–54)
EOSINOPHIL # BLD AUTO: 0 10*3/MM3 (ref 0–0.4)
EOSINOPHIL NFR BLD AUTO: 0 % (ref 0.3–6.2)
ERYTHROCYTE [DISTWIDTH] IN BLOOD BY AUTOMATED COUNT: 12.5 % (ref 12.3–15.4)
GFR SERPL CREATININE-BSD FRML MDRD: >150 ML/MIN/1.73
GLUCOSE BLD-MCNC: 149 MG/DL (ref 65–99)
HCT VFR BLD AUTO: 38.1 % (ref 37.5–51)
HGB BLD-MCNC: 12.1 G/DL (ref 13–17.7)
IMM GRANULOCYTES # BLD AUTO: 0.11 10*3/MM3 (ref 0–0.05)
IMM GRANULOCYTES NFR BLD AUTO: 0.7 % (ref 0–0.5)
LYMPHOCYTES # BLD AUTO: 0.72 10*3/MM3 (ref 0.7–3.1)
LYMPHOCYTES NFR BLD AUTO: 4.9 % (ref 19.6–45.3)
MCH RBC QN AUTO: 28.9 PG (ref 26.6–33)
MCHC RBC AUTO-ENTMCNC: 31.8 G/DL (ref 31.5–35.7)
MCV RBC AUTO: 90.9 FL (ref 79–97)
MONOCYTES # BLD AUTO: 0.26 10*3/MM3 (ref 0.1–0.9)
MONOCYTES NFR BLD AUTO: 1.8 % (ref 5–12)
NEUTROPHILS # BLD AUTO: 13.69 10*3/MM3 (ref 1.7–7)
NEUTROPHILS NFR BLD AUTO: 92.5 % (ref 42.7–76)
NRBC BLD AUTO-RTO: 0 /100 WBC (ref 0–0.2)
PLATELET # BLD AUTO: 241 10*3/MM3 (ref 140–450)
PMV BLD AUTO: 9.2 FL (ref 6–12)
POTASSIUM BLD-SCNC: 4.2 MMOL/L (ref 3.5–5.2)
RBC # BLD AUTO: 4.19 10*6/MM3 (ref 4.14–5.8)
SODIUM BLD-SCNC: 139 MMOL/L (ref 136–145)
WBC NRBC COR # BLD: 14.8 10*3/MM3 (ref 3.4–10.8)

## 2020-04-06 PROCEDURE — 85025 COMPLETE CBC W/AUTO DIFF WBC: CPT | Performed by: FAMILY MEDICINE

## 2020-04-06 PROCEDURE — 94799 UNLISTED PULMONARY SVC/PX: CPT

## 2020-04-06 PROCEDURE — 80048 BASIC METABOLIC PNL TOTAL CA: CPT | Performed by: FAMILY MEDICINE

## 2020-04-06 PROCEDURE — 94660 CPAP INITIATION&MGMT: CPT

## 2020-04-06 PROCEDURE — 25010000002 METHYLPREDNISOLONE PER 125 MG: Performed by: FAMILY MEDICINE

## 2020-04-06 PROCEDURE — 25010000002 METHYLPREDNISOLONE PER 40 MG: Performed by: FAMILY MEDICINE

## 2020-04-06 RX ORDER — METHYLPREDNISOLONE SODIUM SUCCINATE 125 MG/2ML
80 INJECTION, POWDER, LYOPHILIZED, FOR SOLUTION INTRAMUSCULAR; INTRAVENOUS EVERY 6 HOURS
Status: DISCONTINUED | OUTPATIENT
Start: 2020-04-06 | End: 2020-04-08

## 2020-04-06 RX ADMIN — SODIUM CHLORIDE, PRESERVATIVE FREE 10 ML: 5 INJECTION INTRAVENOUS at 08:10

## 2020-04-06 RX ADMIN — IPRATROPIUM BROMIDE AND ALBUTEROL SULFATE 3 ML: 2.5; .5 SOLUTION RESPIRATORY (INHALATION) at 11:11

## 2020-04-06 RX ADMIN — METOPROLOL TARTRATE 50 MG: 50 TABLET, FILM COATED ORAL at 20:11

## 2020-04-06 RX ADMIN — IPRATROPIUM BROMIDE AND ALBUTEROL SULFATE 3 ML: 2.5; .5 SOLUTION RESPIRATORY (INHALATION) at 06:37

## 2020-04-06 RX ADMIN — DOXYCYCLINE 100 MG: 100 INJECTION, POWDER, LYOPHILIZED, FOR SOLUTION INTRAVENOUS at 05:18

## 2020-04-06 RX ADMIN — ALPRAZOLAM 0.5 MG: 0.5 TABLET ORAL at 20:20

## 2020-04-06 RX ADMIN — ASPIRIN 81 MG: 81 TABLET ORAL at 08:09

## 2020-04-06 RX ADMIN — METHYLPREDNISOLONE SODIUM SUCCINATE 80 MG: 125 INJECTION, POWDER, FOR SOLUTION INTRAMUSCULAR; INTRAVENOUS at 20:25

## 2020-04-06 RX ADMIN — ESCITALOPRAM 20 MG: 10 TABLET, FILM COATED ORAL at 08:09

## 2020-04-06 RX ADMIN — BUDESONIDE AND FORMOTEROL FUMARATE DIHYDRATE 2 PUFF: 160; 4.5 AEROSOL RESPIRATORY (INHALATION) at 06:37

## 2020-04-06 RX ADMIN — IPRATROPIUM BROMIDE AND ALBUTEROL SULFATE 3 ML: 2.5; .5 SOLUTION RESPIRATORY (INHALATION) at 14:15

## 2020-04-06 RX ADMIN — MEGESTROL ACETATE 40 MG: 40 TABLET ORAL at 08:10

## 2020-04-06 RX ADMIN — DOXYCYCLINE 100 MG: 100 INJECTION, POWDER, LYOPHILIZED, FOR SOLUTION INTRAVENOUS at 18:04

## 2020-04-06 RX ADMIN — ATORVASTATIN CALCIUM 10 MG: 10 TABLET, FILM COATED ORAL at 20:12

## 2020-04-06 RX ADMIN — SODIUM CHLORIDE, PRESERVATIVE FREE 10 ML: 5 INJECTION INTRAVENOUS at 20:12

## 2020-04-06 RX ADMIN — ACETAMINOPHEN 650 MG: 325 TABLET, FILM COATED ORAL at 20:20

## 2020-04-06 RX ADMIN — ALPRAZOLAM 0.5 MG: 0.5 TABLET ORAL at 08:13

## 2020-04-06 RX ADMIN — PANTOPRAZOLE SODIUM 40 MG: 40 TABLET, DELAYED RELEASE ORAL at 08:09

## 2020-04-06 RX ADMIN — METHYLPREDNISOLONE SODIUM SUCCINATE 40 MG: 40 INJECTION, POWDER, FOR SOLUTION INTRAMUSCULAR; INTRAVENOUS at 08:09

## 2020-04-06 RX ADMIN — ACETAMINOPHEN 650 MG: 325 TABLET, FILM COATED ORAL at 00:43

## 2020-04-06 RX ADMIN — GUAIFENESIN 1200 MG: 600 TABLET, EXTENDED RELEASE ORAL at 20:12

## 2020-04-06 RX ADMIN — GUAIFENESIN 1200 MG: 600 TABLET, EXTENDED RELEASE ORAL at 08:09

## 2020-04-06 RX ADMIN — BUDESONIDE AND FORMOTEROL FUMARATE DIHYDRATE 2 PUFF: 160; 4.5 AEROSOL RESPIRATORY (INHALATION) at 20:00

## 2020-04-06 RX ADMIN — IPRATROPIUM BROMIDE AND ALBUTEROL SULFATE 3 ML: 2.5; .5 SOLUTION RESPIRATORY (INHALATION) at 20:00

## 2020-04-06 RX ADMIN — METHYLPREDNISOLONE SODIUM SUCCINATE 80 MG: 125 INJECTION, POWDER, FOR SOLUTION INTRAMUSCULAR; INTRAVENOUS at 16:38

## 2020-04-06 RX ADMIN — METOPROLOL TARTRATE 50 MG: 50 TABLET, FILM COATED ORAL at 08:09

## 2020-04-06 RX ADMIN — METHYLPREDNISOLONE SODIUM SUCCINATE 40 MG: 40 INJECTION, POWDER, FOR SOLUTION INTRAMUSCULAR; INTRAVENOUS at 01:40

## 2020-04-06 NOTE — PROGRESS NOTES
[facility] Medicine Services  INPATIENT PROGRESS NOTE    Patient Name: Kellie Arzate  Date of Admission: 4/4/2020  Today's Date: 04/06/20  Length of Stay: 2  Primary Care Physician: Jose Moon MD    Subjective   Chief Complaint: SOA  HPI   Doing ok.  Still coughing and wheezing a lot  Afebrile.            Review of Systems   Constitutional: Positive for fatigue. Negative for fever.   HENT: Negative for congestion and ear pain.    Eyes: Negative for redness and visual disturbance.   Respiratory: Positive for cough and shortness of breath. Negative for wheezing.    Cardiovascular: Negative for chest pain and palpitations.   Gastrointestinal: Negative for abdominal pain, diarrhea, nausea and vomiting.   Endocrine: Negative for cold intolerance and heat intolerance.   Genitourinary: Negative for dysuria and frequency.   Musculoskeletal: Negative for arthralgias and back pain.   Skin: Negative for rash and wound.   Neurological: Negative for dizziness and headaches.   Psychiatric/Behavioral: Negative for confusion. The patient is not nervous/anxious.           All pertinent negatives and positives are as above. All other systems have been reviewed and are negative unless otherwise stated.     Objective    Temp:  [98.1 °F (36.7 °C)-98.6 °F (37 °C)] 98.1 °F (36.7 °C)  Heart Rate:  [58-89] 66  Resp:  [16-20] 16  BP: (118-156)/(57-73) 128/57  Physical Exam   Constitutional: He is oriented to person, place, and time. He appears well-developed and well-nourished.   HENT:   Head: Normocephalic and atraumatic.   Right Ear: External ear normal.   Left Ear: External ear normal.   Nose: Nose normal.   Mouth/Throat: Oropharynx is clear and moist.   Eyes: Pupils are equal, round, and reactive to light. Conjunctivae and EOM are normal. Right eye exhibits no discharge. Left eye exhibits no discharge. No scleral icterus.   Neck: Normal range of motion. Neck supple. No tracheal  deviation present. No thyromegaly present.   Cardiovascular: Normal rate, regular rhythm, normal heart sounds and intact distal pulses. Exam reveals no gallop and no friction rub.   No murmur heard.  Pulmonary/Chest: Effort normal. No stridor. No respiratory distress. He has decreased breath sounds. He has wheezes. He has no rales. He exhibits no tenderness.   Abdominal: Soft. Bowel sounds are normal. He exhibits no distension and no mass. There is no tenderness. There is no rebound and no guarding. No hernia.   Musculoskeletal: Normal range of motion. He exhibits no edema or deformity.   Lymphadenopathy:     He has no cervical adenopathy.   Neurological: He is alert and oriented to person, place, and time. He has normal reflexes. He displays normal reflexes. No cranial nerve deficit. He exhibits normal muscle tone. Coordination normal.   Skin: Skin is warm and dry. No rash noted. No erythema. No pallor.   Psychiatric: He has a normal mood and affect. His behavior is normal. Judgment and thought content normal.   Vitals reviewed.        Results Review:  I have reviewed the labs, radiology results, and diagnostic studies.    Laboratory Data:   Results from last 7 days   Lab Units 04/06/20  0326 04/05/20  0324 04/04/20  1118   WBC 10*3/mm3 14.80* 5.74 7.33   HEMOGLOBIN g/dL 12.1* 12.6* 13.8   HEMATOCRIT % 38.1 39.9 44.5   PLATELETS 10*3/mm3 241 234 224        Results from last 7 days   Lab Units 04/06/20  0326 04/05/20  0324 04/04/20  1118   SODIUM mmol/L 139 140 141   POTASSIUM mmol/L 4.2 3.8 4.7   CHLORIDE mmol/L 90* 89* 89*   CO2 mmol/L 39.0* 42.0* 45.0*   BUN mg/dL 20 11 10   CREATININE mg/dL 0.32* 0.35* 0.36*   CALCIUM mg/dL 9.7 9.5 9.8   BILIRUBIN mg/dL  --  0.3 0.3   ALK PHOS U/L  --  70 76   ALT (SGPT) U/L  --  7 8   AST (SGOT) U/L  --  12 12   GLUCOSE mg/dL 149* 162* 123*       Culture Data:   Blood Culture   Date Value Ref Range Status   04/04/2020 No growth at 24 hours  Preliminary   04/04/2020 No growth at  24 hours  Preliminary       Radiology Data:   Imaging Results (Last 24 Hours)     ** No results found for the last 24 hours. **          I have reviewed the patient's current medications.     Assessment/Plan     Active Hospital Problems    Diagnosis   • Hypercarbia   • COPD with acute exacerbation (CMS/HCC)   • Acute on chronic respiratory failure with hypoxia and hypercapnia (CMS/HCC)     1.  COPD AE  -Steroids  -nebs  -mucinex  -flutter    2.  Acute on Chronic Respiratory failure with hypoxia and hypercapnia   -Steroids  -nebs  -mucinex  -flutter    3.  Tobacco Abuse  -cessation counseling    4.  HTN  -metoprolol    5.  HLD  -lipitor              Discharge Planning: I expect the patient to be discharged to home in ? days    Alon Bowens MD   04/06/20   10:44

## 2020-04-06 NOTE — PROGRESS NOTES
Malnutrition Severity Assessment    Patient Name:  Kellie Arzate  YOB: 1955  MRN: [id number]  Admit Date:  4/4/2020    Patient meets criteria for : Severe Malnutrition(secondary signs of malnutrition: pressure injury to coccyx)    Comments:  If in agreement with malnutrition assessment, please attest documentation. Thanks.     Malnutrition Severity Assessment  Malnutrition Type: Chronic Disease - Related Malnutrition     Malnutrition Type (last 8 hours)      Malnutrition Severity Assessment     Row Name 04/06/20 1421       Malnutrition Severity Assessment    Malnutrition Type  Chronic Disease - Related Malnutrition    Row Name 04/06/20 1421       Unintentional Weight Loss     Unintentional Weight Loss   Weight loss greater than 10% in six months 16 lbs (15%) in 3.5 months    Row Name 04/06/20 1421       Muscle Loss    Loss of Muscle Mass Findings  Severe unable to perform NFPE today, however physical findings Dec 2019 indicated moderate temporal loss, severe protruding clavicle bone, squared shoulders with prominteny acromion process protrution, and severe wasting of lower extremity muscles    Row Name 04/06/20 1421       Fat Loss    Subcutaneous Fat Loss Findings  Severe unable to perform NFPE today, however physical findings Dec 2019 indicate severe fat loss in upper arms and rib region    Row Name 04/06/20 1421       Criteria Met (Must meet criteria for severity in at least 2 of these categories: M Wasting, Fat Loss, Fluid, Secondary Signs, Wt. Status, Intake)    Patient meets criteria for   Severe Malnutrition secondary signs of malnutrition: pressure injury to coccyx          Electronically signed by:  Deyanira Abbasi RDN, LD  04/06/20 14:42

## 2020-04-06 NOTE — PROGRESS NOTES
Continued Stay Note  [facility]     Patient Name: Kellie Arzate  MRN: [id number]  Today's Date: 4/6/2020    Admit Date: 4/4/2020    Discharge Plan     Row Name 04/06/20 1349       Plan    Plan  HOME    Patient/Family in Agreement with Plan  yes    Plan Comments  CONTACTED NICOLAS AT [facility] TO ADVISE THAT PT. HAS TRILOGY HERE AT [facility] AND NEEDS TO EVAL'D FOR PROPER WORKING ORDER FOR ANTICIPATED D/C HOME TOMORROW.  NICOLAS STATES SHE WILL BE HERE AT APPROX. 5P TODAY.  WILL FOLLOW.         Discharge Codes    No documentation.             MATTHIEU Webb

## 2020-04-06 NOTE — PLAN OF CARE
Problem: Patient Care Overview  Goal: Plan of Care Review  Outcome: Ongoing (interventions implemented as appropriate)  Flowsheets  Taken 4/6/2020 0403  Progress: no change  Outcome Summary: C/o generalized pain. Tylenol ordered PRN. 30/35 vapotherm. IV steroids and IV abx continued. No hallucinations this shift. Did not sleep very well. NSR. Safety maintained. Will continue to monitor.  Taken 4/5/2020 2000  Plan of Care Reviewed With: patient

## 2020-04-06 NOTE — PLAN OF CARE
Problem: Patient Care Overview  Goal: Plan of Care Review  Outcome: Ongoing (interventions implemented as appropriate)  Flowsheets (Taken 4/6/2020 5171)  Progress: no change  Plan of Care Reviewed With: patient  Outcome Summary: A&Ox4. No c/o pain. Wheezy with cough present. Vapotherm. WBC 14.8 this morning. Tele running NSR. PI to coccyx pt refuses to turn at times. Last BM 4/3; pushing fluids. Voiding; uses urinal when needed. VSS.

## 2020-04-06 NOTE — PLAN OF CARE
Problem: Patient Care Overview  Goal: Plan of Care Review  Outcome: Ongoing (interventions implemented as appropriate)  Flowsheets (Taken 4/6/2020 4479)  Progress: no change  Plan of Care Reviewed With: other (see comments) (RN, Jaci)  Outcome Summary: Initial RDN eval. Pt PO intake 50%/4meals on regular diet with Boost Plus TID. Unable to perform Nutrition Focused Physical Exam at this time, however Pt was physically assessed with indications of severe malnutrition in Dec 2019, he has continued to lose wt since that time.  Wt loss of 16 lbs (15%) in the last 3.5 months.  He also has pressure injury.  Do not feel his malnutrition has resolved, likely has worsened.  RDN will continue to follow.

## 2020-04-07 PROCEDURE — 94660 CPAP INITIATION&MGMT: CPT

## 2020-04-07 PROCEDURE — 94799 UNLISTED PULMONARY SVC/PX: CPT

## 2020-04-07 PROCEDURE — 97162 PT EVAL MOD COMPLEX 30 MIN: CPT

## 2020-04-07 PROCEDURE — 25010000002 METHYLPREDNISOLONE PER 125 MG: Performed by: FAMILY MEDICINE

## 2020-04-07 RX ORDER — AMOXICILLIN 250 MG
1 CAPSULE ORAL 2 TIMES DAILY PRN
Status: DISCONTINUED | OUTPATIENT
Start: 2020-04-07 | End: 2020-04-12 | Stop reason: HOSPADM

## 2020-04-07 RX ADMIN — DOXYCYCLINE 100 MG: 100 INJECTION, POWDER, LYOPHILIZED, FOR SOLUTION INTRAVENOUS at 04:37

## 2020-04-07 RX ADMIN — BUDESONIDE AND FORMOTEROL FUMARATE DIHYDRATE 2 PUFF: 160; 4.5 AEROSOL RESPIRATORY (INHALATION) at 06:32

## 2020-04-07 RX ADMIN — ACETAMINOPHEN 650 MG: 325 TABLET, FILM COATED ORAL at 16:34

## 2020-04-07 RX ADMIN — GUAIFENESIN 1200 MG: 600 TABLET, EXTENDED RELEASE ORAL at 20:19

## 2020-04-07 RX ADMIN — ASPIRIN 81 MG: 81 TABLET ORAL at 08:39

## 2020-04-07 RX ADMIN — SODIUM CHLORIDE, PRESERVATIVE FREE 10 ML: 5 INJECTION INTRAVENOUS at 20:19

## 2020-04-07 RX ADMIN — DOXYCYCLINE 100 MG: 100 INJECTION, POWDER, LYOPHILIZED, FOR SOLUTION INTRAVENOUS at 16:27

## 2020-04-07 RX ADMIN — METHYLPREDNISOLONE SODIUM SUCCINATE 80 MG: 125 INJECTION, POWDER, FOR SOLUTION INTRAMUSCULAR; INTRAVENOUS at 08:45

## 2020-04-07 RX ADMIN — ALPRAZOLAM 0.5 MG: 0.5 TABLET ORAL at 08:44

## 2020-04-07 RX ADMIN — METOPROLOL TARTRATE 50 MG: 50 TABLET, FILM COATED ORAL at 20:19

## 2020-04-07 RX ADMIN — POLYETHYLENE GLYCOL 3350 17 G: 17 POWDER, FOR SOLUTION ORAL at 13:32

## 2020-04-07 RX ADMIN — METHYLPREDNISOLONE SODIUM SUCCINATE 80 MG: 125 INJECTION, POWDER, FOR SOLUTION INTRAMUSCULAR; INTRAVENOUS at 20:19

## 2020-04-07 RX ADMIN — GUAIFENESIN 1200 MG: 600 TABLET, EXTENDED RELEASE ORAL at 08:39

## 2020-04-07 RX ADMIN — IPRATROPIUM BROMIDE AND ALBUTEROL SULFATE 3 ML: 2.5; .5 SOLUTION RESPIRATORY (INHALATION) at 10:32

## 2020-04-07 RX ADMIN — ATORVASTATIN CALCIUM 10 MG: 10 TABLET, FILM COATED ORAL at 20:19

## 2020-04-07 RX ADMIN — IPRATROPIUM BROMIDE AND ALBUTEROL SULFATE 3 ML: 2.5; .5 SOLUTION RESPIRATORY (INHALATION) at 06:32

## 2020-04-07 RX ADMIN — ACETAMINOPHEN 650 MG: 325 TABLET, FILM COATED ORAL at 02:00

## 2020-04-07 RX ADMIN — METOPROLOL TARTRATE 50 MG: 50 TABLET, FILM COATED ORAL at 08:39

## 2020-04-07 RX ADMIN — IPRATROPIUM BROMIDE AND ALBUTEROL SULFATE 3 ML: 2.5; .5 SOLUTION RESPIRATORY (INHALATION) at 15:16

## 2020-04-07 RX ADMIN — METHYLPREDNISOLONE SODIUM SUCCINATE 80 MG: 125 INJECTION, POWDER, FOR SOLUTION INTRAMUSCULAR; INTRAVENOUS at 02:35

## 2020-04-07 RX ADMIN — ALPRAZOLAM 0.5 MG: 0.5 TABLET ORAL at 02:00

## 2020-04-07 RX ADMIN — SODIUM CHLORIDE, PRESERVATIVE FREE 10 ML: 5 INJECTION INTRAVENOUS at 08:47

## 2020-04-07 RX ADMIN — ALPRAZOLAM 0.5 MG: 0.5 TABLET ORAL at 16:34

## 2020-04-07 RX ADMIN — PANTOPRAZOLE SODIUM 40 MG: 40 TABLET, DELAYED RELEASE ORAL at 08:39

## 2020-04-07 RX ADMIN — ESCITALOPRAM 20 MG: 10 TABLET, FILM COATED ORAL at 08:39

## 2020-04-07 RX ADMIN — MEGESTROL ACETATE 40 MG: 40 TABLET ORAL at 08:39

## 2020-04-07 RX ADMIN — BUDESONIDE AND FORMOTEROL FUMARATE DIHYDRATE 2 PUFF: 160; 4.5 AEROSOL RESPIRATORY (INHALATION) at 19:41

## 2020-04-07 RX ADMIN — ACETAMINOPHEN 650 MG: 325 TABLET, FILM COATED ORAL at 08:44

## 2020-04-07 RX ADMIN — METHYLPREDNISOLONE SODIUM SUCCINATE 80 MG: 125 INJECTION, POWDER, FOR SOLUTION INTRAMUSCULAR; INTRAVENOUS at 13:32

## 2020-04-07 RX ADMIN — IPRATROPIUM BROMIDE AND ALBUTEROL SULFATE 3 ML: 2.5; .5 SOLUTION RESPIRATORY (INHALATION) at 19:41

## 2020-04-07 NOTE — THERAPY EVALUATION
Patient Name: Kellie Arzate  : 1955    MRN: [id number]                              Today's Date: 2020       Admit Date: 2020    Visit Dx:     ICD-10-CM ICD-9-CM   1. Hypercarbia R06.89 786.09   2. Impaired mobility Z74.09 799.89     Patient Active Problem List   Diagnosis   • Aspiration pneumonia of left lung (CMS/HCC)   • Abnormal chest x-ray, findings apex right lung suspect TB   • Acute on chronic respiratory failure with hypercapnia (CMS/HCC)   • Tobacco dependence   • Anemia   • Protein-calorie malnutrition (CMS/HCC)   • Hypercarbia   • COPD with acute exacerbation (CMS/HCC)   • Acute on chronic respiratory failure with hypoxia and hypercapnia (CMS/HCC)     Past Medical History:   Diagnosis Date   • Anxiety    • Arthritis    • Chronic respiratory failure with hypoxia, on home O2 therapy (CMS/[facility]), 3 L    • COPD (chronic obstructive pulmonary disease) (CMS/[facility])    • Depression    • Hypertension    • Mixed hyperlipidemia    • Nephrolithiasis    • Pneumonia, pseudomonal 2016 with negative AFB, aspiration 2018      Past Surgical History:   Procedure Laterality Date   • COLONOSCOPY  2007    Within Normal Limits.   • ENDOSCOPY  2007    urea neg, sbbx benign, mild gastritis, small hiatal hernia   • ENDOSCOPY N/A 10/5/2016    Procedure: ESOPHAGOGASTRODUODENOSCOPY WITH ANESTHESIA;  Surgeon: Kevyn Damon MD;  Location: [facility] ENDOSCOPY;  Service:    • HEMORRHOIDECTOMY     • PEG TUBE INSERTION  2016    Dr Mccarty   • TRACHEOSTOMY       General Information     Row Name 20 1430          PT Evaluation Time/Intention    Document Type  evaluation CC: SOB, weakness. Dx: AE COPD, hypercarbia, acute on chronic resp failure w hypoxia & hypercapnia.   -ALEJANDRO     Mode of Treatment  physical therapy  -ALEJANDRO     Row Name 20 1432          General Information    Patient Profile Reviewed?  yes  -ALEJANDRO     Prior Level of Function  independent:;all household mobility  -ALEJANDRO     Existing  "Precautions/Restrictions  fall;oxygen therapy device and L/min  -ALEJANDRO     Barriers to Rehab  medically complex;previous functional deficit  -ALEJANDRO     Row Name 04/07/20 1430          Relationship/Environment    Lives With  sibling(s);other relative(s)  -ALEJANDRO     Row Name 04/07/20 1430          Resource/Environmental Concerns    Current Living Arrangements  home/apartment/condo  -ALEJANDRO     Row Name 04/07/20 1430          Home Main Entrance    Number of Stairs, Main Entrance  three  -ALEJANDRO     Stair Railings, Main Entrance  railing on right side (ascending)  -ALEJANDRO     Row Name 04/07/20 1430          Stairs Within Home, Primary    Number of Stairs, Within Home, Primary  none  -ALEJANDRO     Row Name 04/07/20 1430          Cognitive Assessment/Intervention- PT/OT    Orientation Status (Cognition)  oriented to;person;place;situation;time  -ALEJANDRO     Row Name 04/07/20 1430          Safety Issues, Functional Mobility    Impairments Affecting Function (Mobility)  endurance/activity tolerance;strength;shortness of breath  -ALEJANDRO       User Key  (r) = Recorded By, (t) = Taken By, (c) = Cosigned By    Initials Name Provider Type    Sameer Powell, PT DPT Physical Therapist        Mobility     Row Name 04/07/20 1430          Bed Mobility Assessment/Treatment    Comment (Bed Mobility)  on BSC  -ALEJANDRO     Row Name 04/07/20 1430          Transfer Assessment/Treatment    Comment (Transfers)  Requested to remain on BSC at conclusion of eval, states he may need \" an hour to have BM\" Rn was notified.   -ALEJANDRO     Row Name 04/07/20 1430          Sit-Stand Transfer    Sit-Stand [place] (Transfers)  minimum assist (75% patient effort)  -ALEJANDRO       User Key  (r) = Recorded By, (t) = Taken By, (c) = Cosigned By    Initials Name Provider Type    Sameer Powell, PT DPT Physical Therapist        Obj/Interventions     Row Name 04/07/20 1430          General ROM    GENERAL ROM COMMENTS  B LE AROM WFL  -ALEJANDRO     Row Name 04/07/20 1430          MMT (Manual Muscle " Testing)    General MMT Comments  B LE 4-/5  -ALEJANDRO     Row Name 04/07/20 1430          Static Sitting Balance    Level of [place] (Unsupported Sitting, Static Balance)  independent  -ALEJANDRO     Sitting Position (Unsupported Sitting, Static Balance)  other (see comments) BSC  -ALEJANDRO     Row Name 04/07/20 1430          Static Standing Balance    Level of [place] (Supported Standing, Static Balance)  minimal assist, 75% patient effort  -ALEJANDRO     Row Name 04/07/20 1430          Sensory Assessment/Intervention    Sensory General Assessment  no sensation deficits identified B LE intact to light touch  -ALEJANDRO       User Key  (r) = Recorded By, (t) = Taken By, (c) = Cosigned By    Initials Name Provider Type    ALEJANDRO Sameer Ho, PT DPT Physical Therapist        Goals/Plan     Row Name 04/07/20 1430          Bed Mobility Goal 1 (PT)    Activity/Assistive Device (Bed Mobility Goal 1, PT)  sit to supine/supine to sit  -ALEJANDRO     [place] Level/Cues Needed (Bed Mobility Goal 1, PT)  supervision required  -ALEJANDRO     Time Frame (Bed Mobility Goal 1, PT)  long term goal (LTG);10 days  -ALEJANDRO     Progress/Outcomes (Bed Mobility Goal 1, PT)  goal ongoing  -ALEJANDRO     Row Name 04/07/20 1430          Transfer Goal 1 (PT)    Activity/Assistive Device (Transfer Goal 1, PT)  sit-to-stand/stand-to-sit;bed-to-chair/chair-to-bed  -ALEJANDRO     [place] Level/Cues Needed (Transfer Goal 1, PT)  supervision required  -ALEJANDRO     Time Frame (Transfer Goal 1, PT)  long term goal (LTG);10 days  -ALEJANDRO     Progress/Outcome (Transfer Goal 1, PT)  goal ongoing  -ALEJANDRO     Row Name 04/07/20 1430          Gait Training Goal 1 (PT)    Activity/Assistive Device (Gait Training Goal 1, PT)  gait (walking locomotion);decrease fall risk;improve balance and speed;increase endurance/gait distance  -ALEJANDRO     [place] Level (Gait Training Goal 1, PT)  supervision required  -ALEJANDRO     Distance (Gait Goal 1, PT)  60  -ALEJANDRO     Time Frame (Gait Training Goal 1, PT)  long term goal  (LTG);10 days  -ALEJANDRO     Progress/Outcome (Gait Training Goal 1, PT)  goal ongoing  -ALEJANDRO     Row Name 04/07/20 1430          Stairs Goal 1 (PT)    Activity/Assistive Device (Stairs Goal 1, PT)  ascending stairs;descending stairs;using handrail, right  -ALEJANDRO     [place] Level/Cues Needed (Stairs Goal 1, PT)  supervision required  -ALEJANDRO     Number of Stairs (Stairs Goal 1, PT)  3  -ALEJANDRO     Time Frame (Stairs Goal 1, PT)  long term goal (LTG);10 days  -ALEJANDRO     Progress/Outcome (Stairs Goal 1, PT)  goal ongoing  -ALEJANDRO       User Key  (r) = Recorded By, (t) = Taken By, (c) = Cosigned By    Initials Name Provider Type    Sameer Powell, PT DPT Physical Therapist        Clinical Impression     Row Name 04/07/20 1430          Pain Assessment    Additional Documentation  Pain Scale: Numbers Pre/Post-Treatment (Group)  -ALEJANDRO     Row Name 04/07/20 1430          Pain Scale: Numbers Pre/Post-Treatment    Pain Scale: Numbers, Pretreatment  0/10 - no pain  -ALEJANDRO     Row Name 04/07/20 1430          Plan of Care Review    Plan of Care Reviewed With  patient  -ALEJANDRO     Row Name 04/07/20 1430          Physical Therapy Clinical Impression    Patient/Family Goals Statement (PT Clinical Impression)  go home  -ALEJANDRO     Criteria for Skilled Interventions Met (PT Clinical Impression)  yes;treatment indicated  -ALEJANDRO     Rehab Potential (PT Clinical Summary)  good, to achieve stated therapy goals  -ALEJANDRO     Predicted Duration of Therapy (PT)  until d/c  -ALEJANDRO     Row Name 04/07/20 1430          Vital Signs    Pre SpO2 (%)  96  -ALEJANDRO     O2 Delivery Pre Treatment  other (see comments) vapotherm  -ALEJANDRO     Intra SpO2 (%)  89  -ALEJANDRO     O2 Delivery Intra Treatment  other (see comments) vapotherm  -ALEJANDRO     Post SpO2 (%)  93  -ALEJANDRO     O2 Delivery Post Treatment  other (see comments) vapotherm  -ALEJANDRO     Pre Patient Position  Sitting  -ALEJANDRO     Intra Patient Position  Standing  -ALEJANDRO     Post Patient Position  Sitting  -ALEJANDRO     Row Name 04/07/20 1430          Positioning and  Restraints    Pre-Treatment Position  bedside commode  -ALEJANDRO     Post Treatment Position  other BSC  -ALEJANDRO     On BS commode  notified nsg;sitting;call light within reach;encouraged to call for assist  -ALEJANDRO       User Key  (r) = Recorded By, (t) = Taken By, (c) = Cosigned By    Initials Name Provider Type    Sameer Powell, PT DPT Physical Therapist        Outcome Measures     Row Name 04/07/20 1430          How much help from another person do you currently need...    Turning from your back to your side while in flat bed without using bedrails?  3  -ALEJANDRO     Moving from lying on back to sitting on the side of a flat bed without bedrails?  3  -ALEJANDRO     Moving to and from a bed to a chair (including a wheelchair)?  3  -ALEJANDRO     Standing up from a chair using your arms (e.g., wheelchair, bedside chair)?  3  -ALEJANDRO     Climbing 3-5 steps with a railing?  2  -ALEJANDRO     To walk in hospital room?  3  -ALEJANDRO     AM-PAC 6 Clicks Score (PT)  17  -ALEJANDRO     Row Name 04/07/20 1430          Functional Assessment    Outcome Measure Options  AM-PAC 6 Clicks Basic Mobility (PT)  -ALEJANDRO       User Key  (r) = Recorded By, (t) = Taken By, (c) = Cosigned By    Initials Name Provider Type    Sameer Powell, PT DPT Physical Therapist          PT Recommendation and Plan  Planned Therapy Interventions (PT Eval): bed mobility training, transfer training, gait training, balance training, home exercise program, patient/family education, postural re-education, stair training, strengthening  Outcome Summary/Treatment Plan (PT)  Anticipated Discharge Disposition (PT): home with 24/7 care, home with home health, skilled nursing facility  Plan of Care Reviewed With: patient  Outcome Summary: PT eval completed. He presents alert and oriented. He reports having worked with this therapist previously however no record of such. He needed min assist to stand from a low surface on the BSC. He desat to 89% on the vapotherm. he demos weakness and decreased activity  tolerance. PT will continue to progess gait, balance, strength and endurance. I anticipate d.c home with family 24/7 care and HH vs SNF placement for short term rehab, pending progress.     Time Calculation:   PT Charges     Row Name 04/07/20 1532             Time Calculation    Start Time  1430  -ALEJANDRO      Stop Time  1524  -ALEJANDRO      Time Calculation (min)  54 min  -ALEJANDRO      PT Received On  04/07/20  -ALEJANDRO      PT Goal Re-Cert Due Date  04/17/20  -ALEJANDRO        User Key  (r) = Recorded By, (t) = Taken By, (c) = Cosigned By    Initials Name Provider Type    Sameer Powell, PT DPT Physical Therapist        Therapy Charges for Today     Code Description Service Date Service Provider Modifiers Qty    [id number] HC PT EVAL MOD COMPLEXITY 4 4/7/2020 Sameer Ho PT DPT GP 1          PT G-Codes  Outcome Measure Options: AM-PAC 6 Clicks Basic Mobility (PT)  AM-PAC 6 Clicks Score (PT): 17    Sameer Ho PT DPT  4/7/2020

## 2020-04-07 NOTE — PROGRESS NOTES
[facility] Medicine Services  INPATIENT PROGRESS NOTE    Patient Name: Kellie Arzate  Date of Admission: 4/4/2020  Today's Date: 04/07/20  Length of Stay: 3  Primary Care Physician: Jose Moon MD    Subjective   Chief Complaint: SOA    Doing ok.  Still coughing and wheezing a lot  Afebrile.    Still SOA, requiring 30 L          Review of Systems   Constitutional: Positive for fatigue. Negative for fever.   HENT: Negative for congestion and ear pain.    Eyes: Negative for redness and visual disturbance.   Respiratory: Positive for cough and shortness of breath. Negative for wheezing.    Cardiovascular: Negative for chest pain and palpitations.   Gastrointestinal: Negative for abdominal pain, diarrhea, nausea and vomiting.   Endocrine: Negative for cold intolerance and heat intolerance.   Genitourinary: Negative for dysuria and frequency.   Musculoskeletal: Negative for arthralgias and back pain.   Skin: Negative for rash and wound.   Neurological: Negative for dizziness and headaches.   Psychiatric/Behavioral: Negative for confusion. The patient is not nervous/anxious.           All pertinent negatives and positives are as above. All other systems have been reviewed and are negative unless otherwise stated.     Objective    Temp:  [97.7 °F (36.5 °C)-100.5 °F (38.1 °C)] 98.1 °F (36.7 °C)  Heart Rate:  [58-85] 71  Resp:  [16-22] 20  BP: (115-140)/(52-77) 140/52  Physical Exam   Constitutional: He is oriented to person, place, and time. He appears well-developed and well-nourished.   HENT:   Head: Normocephalic and atraumatic.   Right Ear: External ear normal.   Left Ear: External ear normal.   Nose: Nose normal.   Mouth/Throat: Oropharynx is clear and moist.   Eyes: Pupils are equal, round, and reactive to light. Conjunctivae and EOM are normal. Right eye exhibits no discharge. Left eye exhibits no discharge. No scleral icterus.   Neck: Normal range of motion.  Neck supple. No tracheal deviation present. No thyromegaly present.   Cardiovascular: Normal rate, regular rhythm, normal heart sounds and intact distal pulses. Exam reveals no gallop and no friction rub.   No murmur heard.  Pulmonary/Chest: Effort normal. No stridor. No respiratory distress. He has decreased breath sounds. He has wheezes. He has no rales. He exhibits no tenderness.   Abdominal: Soft. Bowel sounds are normal. He exhibits no distension and no mass. There is no tenderness. There is no rebound and no guarding. No hernia.   Musculoskeletal: Normal range of motion. He exhibits no edema or deformity.   Lymphadenopathy:     He has no cervical adenopathy.   Neurological: He is alert and oriented to person, place, and time. He has normal reflexes. He displays normal reflexes. No cranial nerve deficit. He exhibits normal muscle tone. Coordination normal.   Skin: Skin is warm and dry. No rash noted. No erythema. No pallor.   Psychiatric: He has a normal mood and affect. His behavior is normal. Judgment and thought content normal.   Vitals reviewed.        Results Review:  I have reviewed the labs, radiology results, and diagnostic studies.    Laboratory Data:   Results from last 7 days   Lab Units 04/06/20  0326 04/05/20  0324 04/04/20  1118   WBC 10*3/mm3 14.80* 5.74 7.33   HEMOGLOBIN g/dL 12.1* 12.6* 13.8   HEMATOCRIT % 38.1 39.9 44.5   PLATELETS 10*3/mm3 241 234 224        Results from last 7 days   Lab Units 04/06/20  0326 04/05/20  0324 04/04/20  1118   SODIUM mmol/L 139 140 141   POTASSIUM mmol/L 4.2 3.8 4.7   CHLORIDE mmol/L 90* 89* 89*   CO2 mmol/L 39.0* 42.0* 45.0*   BUN mg/dL 20 11 10   CREATININE mg/dL 0.32* 0.35* 0.36*   CALCIUM mg/dL 9.7 9.5 9.8   BILIRUBIN mg/dL  --  0.3 0.3   ALK PHOS U/L  --  70 76   ALT (SGPT) U/L  --  7 8   AST (SGOT) U/L  --  12 12   GLUCOSE mg/dL 149* 162* 123*       Culture Data:   Blood Culture   Date Value Ref Range Status   04/04/2020 No growth at 24 hours  Preliminary     04/04/2020 No growth at 24 hours  Preliminary       Radiology Data:   Imaging Results (Last 24 Hours)     ** No results found for the last 24 hours. **          I have reviewed the patient's current medications.     Assessment/Plan     Active Hospital Problems    Diagnosis   • Hypercarbia   • COPD with acute exacerbation (CMS/HCC)   • Acute on chronic respiratory failure with hypoxia and hypercapnia (CMS/HCC)     1.  COPD AE  -Steroids  -nebs  -mucinex  -flutter    2.  Acute on Chronic Respiratory failure with hypoxia and hypercapnia   -Steroids  -nebs  -mucinex  -flutter    3.  Tobacco Abuse  -cessation counseling    4.  HTN  -metoprolol    5.  HLD  -lipitor              Discharge Planning: I expect the patient to be discharged to home in ? days    Alon Bowens MD   04/07/20   12:49

## 2020-04-07 NOTE — PLAN OF CARE
Problem: Patient Care Overview  Goal: Plan of Care Review  Outcome: Ongoing (interventions implemented as appropriate)  Flowsheets (Taken 4/7/2020 5527)  Progress: no change  Plan of Care Reviewed With: patient  Outcome Summary: pt co pain, tylenol given with relief. stated on mirlax. pt on vapotherm. cont IV ABX. cont to monitor.

## 2020-04-07 NOTE — PLAN OF CARE
Problem: Patient Care Overview  Goal: Plan of Care Review  Flowsheets (Taken 4/7/2020 1430)  Outcome Summary: PT eval completed. He presents alert and oriented. He reports having worked with this therapist previously however no record of such. He needed min assist to stand from a low surface on the BSC. He desat to 89% on the vapotherm. he demos weakness and decreased activity tolerance. PT will continue to progess gait, balance, strength and endurance. I anticipate d.c home with family 24/7 care and  vs SNF placement for short term rehab, pending progress.

## 2020-04-07 NOTE — PLAN OF CARE
Problem: Patient Care Overview  Goal: Plan of Care Review  Outcome: Ongoing (interventions implemented as appropriate)  Flowsheets (Taken 4/7/2020 0812)  Progress: no change  Plan of Care Reviewed With: patient  Outcome Summary: Pt c/o coccyx and buttocks pain., Tylenol prn X2.  He has been repositioning himself through the night.  Respiratory placed trilogy on pt last night but he was desatting so he was placed back on the vapotherm.  Respiratory this morning discovered that the neel tree was not functioning. Neel tree replaced.

## 2020-04-08 ENCOUNTER — APPOINTMENT (OUTPATIENT)
Dept: GENERAL RADIOLOGY | Facility: HOSPITAL | Age: 65
End: 2020-04-08

## 2020-04-08 PROCEDURE — 94799 UNLISTED PULMONARY SVC/PX: CPT

## 2020-04-08 PROCEDURE — 94669 MECHANICAL CHEST WALL OSCILL: CPT

## 2020-04-08 PROCEDURE — 71046 X-RAY EXAM CHEST 2 VIEWS: CPT

## 2020-04-08 PROCEDURE — 97110 THERAPEUTIC EXERCISES: CPT

## 2020-04-08 PROCEDURE — 25010000002 METHYLPREDNISOLONE PER 125 MG: Performed by: FAMILY MEDICINE

## 2020-04-08 PROCEDURE — 25010000002 METHYLPREDNISOLONE PER 40 MG: Performed by: FAMILY MEDICINE

## 2020-04-08 PROCEDURE — 97116 GAIT TRAINING THERAPY: CPT

## 2020-04-08 RX ORDER — METHYLPREDNISOLONE SODIUM SUCCINATE 40 MG/ML
40 INJECTION, POWDER, LYOPHILIZED, FOR SOLUTION INTRAMUSCULAR; INTRAVENOUS EVERY 6 HOURS
Status: DISCONTINUED | OUTPATIENT
Start: 2020-04-08 | End: 2020-04-09

## 2020-04-08 RX ADMIN — SODIUM CHLORIDE, PRESERVATIVE FREE 10 ML: 5 INJECTION INTRAVENOUS at 08:18

## 2020-04-08 RX ADMIN — MEGESTROL ACETATE 40 MG: 40 TABLET ORAL at 08:17

## 2020-04-08 RX ADMIN — IPRATROPIUM BROMIDE AND ALBUTEROL SULFATE 3 ML: 2.5; .5 SOLUTION RESPIRATORY (INHALATION) at 20:29

## 2020-04-08 RX ADMIN — METHYLPREDNISOLONE SODIUM SUCCINATE 80 MG: 125 INJECTION, POWDER, FOR SOLUTION INTRAMUSCULAR; INTRAVENOUS at 01:41

## 2020-04-08 RX ADMIN — GUAIFENESIN 1200 MG: 600 TABLET, EXTENDED RELEASE ORAL at 21:09

## 2020-04-08 RX ADMIN — POLYETHYLENE GLYCOL 3350 17 G: 17 POWDER, FOR SOLUTION ORAL at 08:18

## 2020-04-08 RX ADMIN — METHYLPREDNISOLONE SODIUM SUCCINATE 80 MG: 125 INJECTION, POWDER, FOR SOLUTION INTRAMUSCULAR; INTRAVENOUS at 08:17

## 2020-04-08 RX ADMIN — BUDESONIDE AND FORMOTEROL FUMARATE DIHYDRATE 2 PUFF: 160; 4.5 AEROSOL RESPIRATORY (INHALATION) at 20:30

## 2020-04-08 RX ADMIN — METHYLPREDNISOLONE SODIUM SUCCINATE 40 MG: 40 INJECTION, POWDER, FOR SOLUTION INTRAMUSCULAR; INTRAVENOUS at 21:12

## 2020-04-08 RX ADMIN — ACETAMINOPHEN 650 MG: 325 TABLET, FILM COATED ORAL at 08:29

## 2020-04-08 RX ADMIN — ALPRAZOLAM 0.5 MG: 0.5 TABLET ORAL at 00:14

## 2020-04-08 RX ADMIN — DOXYCYCLINE 100 MG: 100 INJECTION, POWDER, LYOPHILIZED, FOR SOLUTION INTRAVENOUS at 16:18

## 2020-04-08 RX ADMIN — ASPIRIN 81 MG: 81 TABLET ORAL at 08:17

## 2020-04-08 RX ADMIN — BUDESONIDE AND FORMOTEROL FUMARATE DIHYDRATE 2 PUFF: 160; 4.5 AEROSOL RESPIRATORY (INHALATION) at 06:34

## 2020-04-08 RX ADMIN — METOPROLOL TARTRATE 50 MG: 50 TABLET, FILM COATED ORAL at 21:09

## 2020-04-08 RX ADMIN — ALPRAZOLAM 0.5 MG: 0.5 TABLET ORAL at 08:29

## 2020-04-08 RX ADMIN — METOPROLOL TARTRATE 50 MG: 50 TABLET, FILM COATED ORAL at 08:17

## 2020-04-08 RX ADMIN — GUAIFENESIN 1200 MG: 600 TABLET, EXTENDED RELEASE ORAL at 08:17

## 2020-04-08 RX ADMIN — SODIUM CHLORIDE, PRESERVATIVE FREE 10 ML: 5 INJECTION INTRAVENOUS at 21:10

## 2020-04-08 RX ADMIN — PANTOPRAZOLE SODIUM 40 MG: 40 TABLET, DELAYED RELEASE ORAL at 08:17

## 2020-04-08 RX ADMIN — ACETAMINOPHEN 650 MG: 325 TABLET, FILM COATED ORAL at 00:14

## 2020-04-08 RX ADMIN — DOXYCYCLINE 100 MG: 100 INJECTION, POWDER, LYOPHILIZED, FOR SOLUTION INTRAVENOUS at 04:49

## 2020-04-08 RX ADMIN — ALPRAZOLAM 0.5 MG: 0.5 TABLET ORAL at 19:17

## 2020-04-08 RX ADMIN — IPRATROPIUM BROMIDE AND ALBUTEROL SULFATE 3 ML: 2.5; .5 SOLUTION RESPIRATORY (INHALATION) at 10:35

## 2020-04-08 RX ADMIN — METHYLPREDNISOLONE SODIUM SUCCINATE 40 MG: 40 INJECTION, POWDER, FOR SOLUTION INTRAMUSCULAR; INTRAVENOUS at 16:19

## 2020-04-08 RX ADMIN — IPRATROPIUM BROMIDE AND ALBUTEROL SULFATE 3 ML: 2.5; .5 SOLUTION RESPIRATORY (INHALATION) at 14:19

## 2020-04-08 RX ADMIN — IPRATROPIUM BROMIDE AND ALBUTEROL SULFATE 3 ML: 2.5; .5 SOLUTION RESPIRATORY (INHALATION) at 06:34

## 2020-04-08 RX ADMIN — ATORVASTATIN CALCIUM 10 MG: 10 TABLET, FILM COATED ORAL at 21:09

## 2020-04-08 RX ADMIN — ESCITALOPRAM 20 MG: 10 TABLET, FILM COATED ORAL at 08:17

## 2020-04-08 NOTE — PLAN OF CARE
Problem: Patient Care Overview  Goal: Plan of Care Review  Flowsheets (Taken 4/8/2020 6841)  Progress: improving  Plan of Care Reviewed With: patient  Outcome Summary: PT tx completed. Cont to be on vapotherm. Performed BLE AROM x15 in all available planes. Amb 30' x3 trials with CGA and seated rest breaks to recover. Sats drop down to 87% with activity. Performed sit to stands 5 reps x3 sets. Worked on standing dynamic balance with CGA, no LOB noted. Will cont to progress with PT to improve endurance and strength.

## 2020-04-08 NOTE — PLAN OF CARE
Problem: Patient Care Overview  Goal: Plan of Care Review  Outcome: Ongoing (interventions implemented as appropriate)  Flowsheets  Taken 4/8/2020 0954 by Masha Diallo PTA  Progress: improving  Plan of Care Reviewed With: patient  Taken 4/8/2020 1805 by Madisyn Conley RN  Outcome Summary: Treated with PRN tylenol for complaints of pain this AM, with relief. No further complaints. Pt answers questions appropriately, but at times seems to mix up information. Pt remains on vapotherm with O2 sats WNL. Pulmonary to consult tomorrow. NSR/SA 68-88 on tele. VSS. Will continue to monitor.

## 2020-04-08 NOTE — THERAPY TREATMENT NOTE
Acute Care - Physical Therapy Treatment Note  [facility]     Patient Name: Kellie Arzate  : 1955  MRN: [id number]  Today's Date: 2020             Admit Date: 2020    Visit Dx:    ICD-10-CM ICD-9-CM   1. Hypercarbia R06.89 786.09   2. Impaired mobility Z74.09 799.89     Patient Active Problem List   Diagnosis   • Aspiration pneumonia of left lung (CMS/HCC)   • Abnormal chest x-ray, findings apex right lung suspect TB   • Acute on chronic respiratory failure with hypercapnia (CMS/HCC)   • Tobacco dependence   • Anemia   • Protein-calorie malnutrition (CMS/HCC)   • Hypercarbia   • COPD with acute exacerbation (CMS/HCC)   • Acute on chronic respiratory failure with hypoxia and hypercapnia (CMS/HCC)       Therapy Treatment    Rehabilitation Treatment Summary     Row Name 20 0954 20 0852 20 0816       Treatment Time/Intention    Discipline  physical therapy assistant  -  physical therapy assistant  -  physical therapy assistant  -    Document Type  therapy note (daily note)  -  therapy note (daily note)  -LC  --    Subjective Information  complains of;dyspnea  -LC2  --  --    Mode of Treatment  physical therapy  -LC  physical therapy  -  --    Comment  --  pt eating  -LC2  with nsg will check back  -LC2    Existing Precautions/Restrictions  fall;oxygen therapy device and L/min vapotherm  -LC3  --  --    Recorded by [LC] Masha Diallo, PTA 20 0955  [LC2] Masha Diallo , PTA 20 1009  [LC3] Masha Diallo, PTA 20 1031 [LC] Masha Diallo , PTA 20 0852  [LC2] Masha Diallo , PTA 20 0855 [LC] Masha Diallo , PTA 20 0817  [LC2] Masha Diallo , PTA 20 0822    Row Name 20 0954             Vital Signs    Pre SpO2 (%)  91  -LC      O2 Delivery Pre Treatment  other (see comments) vapotherm  -LC      Intra SpO2 (%)  87  -LC      O2 Delivery Intra Treatment  other (see comments) vapotherm  -LC      Post SpO2 (%)  95  -LC2      O2 Delivery Post  Treatment  other (see comments) vapotherm  -LC3      Pre Patient Position  Sitting  -LC      Intra Patient Position  Standing  -LC      Post Patient Position  Sitting  -LC2      Recorded by [LC] Masha Diallo, \[facility] [place] [facility]\"" 04/08/20 1008  [LC2] Masha Diallo, \[facility]\"" 04/08/20 1028  [LC3] Masha Diallo, \[facility]\"" 04/08/20 1031      Row Name 04/08/20 0954             Bed Mobility Assessment/Treatment    Comment (Bed Mobility)  sitting EOB  -LC      Recorded by [LC] Masha Diallo, \[facility]\"" 04/08/20 1008      Row Name 04/08/20 0954             Sit-Stand Transfer    Sit-Stand [place] (Transfers)  stand by assist 5reps x3 sets  -LC      Recorded by [LC] Masha Diallo, \[facility]\"" 04/08/20 1024      Row Name 04/08/20 0954             Gait/Stairs Assessment/Training    Gait/Stairs Assessment/Training  gait/ambulation independence  -LC      [place] Level (Gait)  verbal cues;contact guard  -LC      Distance in Feet (Gait)  30' x3  -LC      Pattern (Gait)  step-through  -LC      Deviations/Abnormal Patterns (Gait)  base of support, narrow;gait speed decreased;stride length decreased  -LC      Recorded by [LC] Masha Diallo, \[facility]\"" 04/08/20 1024      Row Name 04/08/20 0954             Motor Skills Assessment/Interventions    Additional Documentation  Therapeutic Exercise (Group);Therapeutic Exercise Interventions (Group);Balance (Group);Balance Interventions (Group)  -LC      Recorded by [LC] Masha Diallo, \[facility]\"" 04/08/20 1024      Row Name 04/08/20 0954             Therapeutic Exercise    Lower Extremity (Therapeutic Exercise)  LAQ (long arc quad), bilateral;marching while seated  -LC      Lower Extremity Range of Motion (Therapeutic Exercise)  hip internal/external rotation, bilateral;ankle dorsiflexion/plantar flexion, bilateral  -      Exercise Type (Therapeutic Exercise)  AROM (active range of motion)  -LC      Position (Therapeutic Exercise)  seated  -LC      Sets/Reps (Therapeutic Exercise)  15  -LC      Recorded by [LC] Masha Diallo, \[facility]\""  04/08/20 1008      Row Name 04/08/20 0954             Balance    Balance  dynamic standing balance  -LC      Recorded by [] Masha Diallo, PTA 04/08/20 1024      Row Name 04/08/20 0954             Dynamic Standing Balance    Level of [place], Reaches Outside Midline (Standing, Dynamic Balance)  contact guard assist  -LC      Time Able to Maintain Position, Reaches Outside Midline (Standing, Dynamic Balance)  less than 15 seconds  -LC      Recorded by [] Masha Diallo, PTA 04/08/20 1024      Row Name 04/08/20 0954             Pain Scale: Numbers Pre/Post-Treatment    Pain Scale: Numbers, Pretreatment  0/10 - no pain  -LC      Recorded by [] Masha Diallo, PTA 04/08/20 1024      Row Name                Wound 04/04/20 1637 Bilateral medial coccyx Pressure Injury    Wound - Properties Group Date first assessed: 04/04/20 [MC] Time first assessed: 1637 [] Present on Hospital Admission: Y [MC] Side: Bilateral [] Orientation: medial [] Location: coccyx [] Primary Wound Type: Pressure inj [] Stage, Pressure Injury: Stage 1 [] Recorded by:  [] Madisyn Conley RN 04/04/20 1637    Row Name 04/08/20 0954             Plan of Care Review    Plan of Care Reviewed With  patient  -      Progress  improving  -      Outcome Summary  PT tx completed. Cont to be on vapotherm. Performed BLE AROM x15 in all available planes. Amb 30' x3 trials with CGA and seated rest breaks to recover. Sats drop down to 87% with activity. Performed sit to stands 5 reps x3 sets. Will cont to progress with PT to improve endurance and strength.   -LC      Recorded by [] Masha Diallo, PTA 04/08/20 1036        User Key  (r) = Recorded By, (t) = Taken By, (c) = Cosigned By    Initials Name Effective Dates Discipline     Masha Diallo, PTA 10/18/19 -  PT    Madisyn Vidal, RN 02/03/20 -  Nurse          Wound 04/04/20 1637 Bilateral medial coccyx Pressure Injury (Active)   Dressing Appearance no drainage;open to air  4/8/2020  8:29 AM   Closure Open to air 4/8/2020  8:29 AM   Base non-blanchable;red 4/8/2020  8:29 AM   Periwound blanchable;pink 4/8/2020  8:29 AM   Periwound Temperature warm 4/8/2020  8:29 AM   Periwound Skin Turgor soft 4/8/2020  8:29 AM   Drainage Amount none 4/8/2020  8:29 AM               PT Recommendation and Plan     Plan of Care Reviewed With: patient  Progress: improving  Outcome Summary: PT tx completed. Cont to be on vapotherm. Performed BLE AROM x15 in all available planes. Amb 30' x3 trials with CGA and seated rest breaks to recover. Sats drop down to 87% with activity. Performed sit to stands 5 reps x3 sets. Will cont to progress with PT to improve endurance and strength.      Time Calculation:   PT Charges     Row Name 04/08/20 1037             Time Calculation    Start Time  0954  -      Stop Time  1033  -      Time Calculation (min)  39 min  -      PT Received On  04/08/20  -         Time Calculation- PT    Total Timed Code Minutes- PT  39 minute(s)  -        User Key  (r) = Recorded By, (t) = Taken By, (c) = Cosigned By    Initials Name Provider Type     Masha Diallo PTA Physical Therapy Assistant        Therapy Charges for Today     Code Description Service Date Service Provider Modifiers Qty    75679696752 HC GAIT TRAINING EA 15 MIN 4/8/2020 Masha Diallo PTA GP 2    [id number] HC PT THER PROC EA 15 MIN 4/8/2020 Masha Diallo PTA GP 1          PT G-Codes  Outcome Measure Options: AM-PAC 6 Clicks Basic Mobility (PT)  AM-PAC 6 Clicks Score (PT): 17    Masha Diallo PTA  4/8/2020

## 2020-04-08 NOTE — THERAPY TREATMENT NOTE
Acute Care - Physical Therapy Treatment Note  [facility]     Patient Name: Kellie Arzate  : 1955  MRN: [id number]  Today's Date: 2020             Admit Date: 2020    Visit Dx:    ICD-10-CM ICD-9-CM   1. Hypercarbia R06.89 786.09   2. Impaired mobility Z74.09 799.89     Patient Active Problem List   Diagnosis   • Aspiration pneumonia of left lung (CMS/HCC)   • Abnormal chest x-ray, findings apex right lung suspect TB   • Acute on chronic respiratory failure with hypercapnia (CMS/HCC)   • Tobacco dependence   • Anemia   • Protein-calorie malnutrition (CMS/HCC)   • Hypercarbia   • COPD with acute exacerbation (CMS/HCC)   • Acute on chronic respiratory failure with hypoxia and hypercapnia (CMS/HCC)       Therapy Treatment    Rehabilitation Treatment Summary     Row Name 20 1510 20 0954 20 0852       Treatment Time/Intention    Discipline  physical therapy assistant  -LC  physical therapy assistant  -LC  physical therapy assistant  -LC    Document Type  therapy note (daily note)  -LC  therapy note (daily note)  -LC  therapy note (daily note)  -LC    Subjective Information  complains of;dyspnea  -LC2  complains of;dyspnea  -LC2  --    Mode of Treatment  physical therapy  -LC  physical therapy  -LC  physical therapy  -LC    Comment  --  --  pt eating  -LC2    Existing Precautions/Restrictions  fall;oxygen therapy device and L/min vapotherm  -LC  fall;oxygen therapy device and L/min vapotherm  -LC3  --    Recorded by [LC] Masha Diallo, PTA 20 1511  [LC2] Masha Diallo, PTA 20 1530 [LC] Masha Diallo, PTA 20 0955  [LC2] Masha Diallo, PTA 20 1009  [LC3] Masha Diallo, PTA 20 1031 [LC] Masha Diallo, PTA 20 0852  [LC2] Masha Diallo, PTA 20 0855    Row Name 20 0816             Treatment Time/Intention    Discipline  physical therapy assistant  -      Comment  with nsg will check back  -LC2      Recorded by [LC] Masha Diallo, PTA 20  0817  [LC2] Masha Diallo, PTA 04/08/20 0822      Row Name 04/08/20 1510 04/08/20 0954          Vital Signs    Pre SpO2 (%)  92  -LC  91  -LC     O2 Delivery Pre Treatment  other (see comments) vapotherm  -LC  other (see comments) vapotherm  -LC     Intra SpO2 (%)  85  -LC  87  -LC     O2 Delivery Intra Treatment  other (see comments) vapotherm  -LC  other (see comments) vapotherm  -LC     Post SpO2 (%)  92  -LC  95  -LC2     O2 Delivery Post Treatment  other (see comments) vapotherm  -LC  other (see comments) vapotherm  -LC3     Pre Patient Position  Sitting  -LC  Sitting  -LC     Intra Patient Position  Standing  -LC  Standing  -LC     Post Patient Position  Sitting  -LC  Sitting  -LC2     Recovery Time  3-5min   -LC  --     Recorded by [LC] Masha Diallo, [facility] 04/08/20 1530 [LC] Masha Diallo, [facility] 04/08/20 1008  [LC2] Masha Diallo, [facility] 04/08/20 1028  [LC3] Masha Diallo, [facility] 04/08/20 1031     Row Name 04/08/20 1510 04/08/20 0954          Bed Mobility Assessment/Treatment    Comment (Bed Mobility)  sitting EOB  -LC  sitting EOB  -LC     Recorded by [LC] Masha Diallo, [facility] 04/08/20 1530 [LC] Masha Diallo, [facility] 04/08/20 1008     Row Name 04/08/20 1510 04/08/20 0954          Sit-Stand Transfer    Sit-Stand [place] (Transfers)  stand by assist  -  stand by assist 5reps x3 sets  -LC     Recorded by [LC] Masha Diallo, PTA 04/08/20 1530 [LC] Masha Diallo, PTA 04/08/20 1024     Row Name 04/08/20 1510 04/08/20 0954          Gait/Stairs Assessment/Training    Gait/Stairs Assessment/Training  gait/ambulation independence  -LC  gait/ambulation independence  -LC     [place] Level (Gait)  verbal cues;contact guard  -LC  verbal cues;contact guard  -LC     Assistive Device (Gait)  other (see comments) HHA  -LC  --     Distance in Feet (Gait)  45', 20'  -LC  30' x3  -LC     Pattern (Gait)  step-through  -LC  step-through  -LC     Deviations/Abnormal Patterns (Gait)  base of support, narrow;gait speed decreased;stride  length decreased  -  base of support, narrow;gait speed decreased;stride length decreased  -     Comment (Gait/Stairs)  required Min A to recover from LOB with turns  -LC  --     Recorded by [] Masha Diallo, [facility] 04/08/20 1530 [] Masha Diallo, [facility] 04/08/20 1024     Row Name 04/08/20 0954             Motor Skills Assessment/Interventions    Additional Documentation  Therapeutic Exercise (Group);Therapeutic Exercise Interventions (Group);Balance (Group);Balance Interventions (Group)  -LC      Recorded by [] Masha Diallo, [facility] 04/08/20 1024      Row Name 04/08/20 0954             Therapeutic Exercise    Lower Extremity (Therapeutic Exercise)  LAQ (long arc quad), bilateral;marching while seated  -      Lower Extremity Range of Motion (Therapeutic Exercise)  hip internal/external rotation, bilateral;ankle dorsiflexion/plantar flexion, bilateral  -      Exercise Type (Therapeutic Exercise)  AROM (active range of motion)  -      Position (Therapeutic Exercise)  seated  -      Sets/Reps (Therapeutic Exercise)  15  -LC      Recorded by [] Masha Diallo, [facility] 04/08/20 1008      Row Name 04/08/20 0954             Balance    Balance  dynamic standing balance  -LC      Recorded by [] Masha Diallo, [facility] 04/08/20 1024      Row Name 04/08/20 0954             Dynamic Standing Balance    Level of [place], Reaches Outside Midline (Standing, Dynamic Balance)  contact guard assist  -LC      Time Able to Maintain Position, Reaches Outside Midline (Standing, Dynamic Balance)  less than 15 seconds  -LC      Recorded by [] Masha Diallo, [facility] 04/08/20 1024      Row Name 04/08/20 1510             Positioning and Restraints    Pre-Treatment Position  in bed  -      Post Treatment Position  bed  -LC      In Bed  sitting EOB;call light within reach;encouraged to call for assist  -LC      Recorded by [] Masha Diallo, [facility] 04/08/20 1530      Row Name 04/08/20 1510 04/08/20 0954          Pain Scale: Numbers  Pre/Post-Treatment    Pain Scale: Numbers, Pretreatment  0/10 - no pain  -LC  0/10 - no pain  -LC     Recorded by [] Masha Diallo PTA 04/08/20 1530 [] Masha Diallo PTA 04/08/20 1024     Row Name                Wound 04/04/20 1637 Bilateral medial coccyx Pressure Injury    Wound - Properties Group Date first assessed: 04/04/20 [] Time first assessed: 1637 [] Present on Hospital Admission: Y [] Side: Bilateral [] Orientation: medial [] Location: coccyx [] Primary Wound Type: Pressure inj [] Stage, Pressure Injury: Stage 1 [] Recorded by:  [] Madisyn Conley RN 04/04/20 1637    Row Name 04/08/20 0954             Plan of Care Review    Plan of Care Reviewed With  patient  -      Progress  improving  -      Outcome Summary  PT tx completed. Cont to be on vapotherm. Performed BLE AROM x15 in all available planes. Amb 30' x3 trials with CGA and seated rest breaks to recover. Sats drop down to 87% with activity. Performed sit to stands 5 reps x3 sets. Will cont to progress with PT to improve endurance and strength.   -LC      Recorded by [] Masha Diallo PTA 04/08/20 1036        User Key  (r) = Recorded By, (t) = Taken By, (c) = Cosigned By    Initials Name Effective Dates Discipline     Masha Diallo, PTA 10/18/19 -  PT    Madisyn Vidal RN 02/03/20 -  Nurse          Wound 04/04/20 1637 Bilateral medial coccyx Pressure Injury (Active)   Dressing Appearance no drainage;open to air 4/8/2020  8:29 AM   Closure Open to air 4/8/2020  8:29 AM   Base non-blanchable;red 4/8/2020  8:29 AM   Periwound blanchable;pink 4/8/2020  8:29 AM   Periwound Temperature warm 4/8/2020  8:29 AM   Periwound Skin Turgor soft 4/8/2020  8:29 AM   Drainage Amount none 4/8/2020  8:29 AM               PT Recommendation and Plan     Plan of Care Reviewed With: patient  Progress: improving  Outcome Summary: PT tx completed. Cont to be on vapotherm. Performed BLE AROM x15 in all available planes. Amb 30' x3 trials  with CGA and seated rest breaks to recover. Sats drop down to 87% with activity. Performed sit to stands 5 reps x3 sets. Will cont to progress with PT to improve endurance and strength.      Time Calculation:   PT Charges     Row Name 04/08/20 1530 04/08/20 1037          Time Calculation    Start Time  1510  -  0954  -     Stop Time  1527  -  1033  -     Time Calculation (min)  17 min  -LC  39 min  -     PT Received On  04/08/20  -  04/08/20  -        Time Calculation- PT    Total Timed Code Minutes- PT  17 minute(s)  -LC  39 minute(s)  -LC       User Key  (r) = Recorded By, (t) = Taken By, (c) = Cosigned By    Initials Name Provider Type     Masha Diallo PTA Physical Therapy Assistant        Therapy Charges for Today     Code Description Service Date Service Provider Modifiers Qty    12959206718 HC GAIT TRAINING EA 15 MIN 4/8/2020 Masha Diallo PTA GP 2    70214612507 HC PT THER PROC EA 15 MIN 4/8/2020 Masha Diallo PTA GP 1    08546752416 HC GAIT TRAINING EA 15 MIN 4/8/2020 Masha Diallo, JONY GP 1          PT G-Codes  Outcome Measure Options: AM-PAC 6 Clicks Basic Mobility (PT)  AM-PAC 6 Clicks Score (PT): Mitzi Diallo PTA  4/8/2020

## 2020-04-08 NOTE — PROGRESS NOTES
[facility] Medicine Services  INPATIENT PROGRESS NOTE    Patient Name: Kellie Arzate  Date of Admission: 4/4/2020  Today's Date: 04/08/20  Length of Stay: 4  Primary Care Physician: Jose Moon MD    Subjective   Chief Complaint: SOA    Doing ok.  Still coughing.  Not coughing anything up.  No wheezing.  Afebrile.   Mad he hasn't been put on his trilogy at nights        Review of Systems   Constitutional: Positive for fatigue. Negative for fever.   HENT: Negative for congestion and ear pain.    Eyes: Negative for redness and visual disturbance.   Respiratory: Positive for cough and shortness of breath. Negative for wheezing.    Cardiovascular: Negative for chest pain and palpitations.   Gastrointestinal: Negative for abdominal pain, diarrhea, nausea and vomiting.   Endocrine: Negative for cold intolerance and heat intolerance.   Genitourinary: Negative for dysuria and frequency.   Musculoskeletal: Negative for arthralgias and back pain.   Skin: Negative for rash and wound.   Neurological: Negative for dizziness and headaches.   Psychiatric/Behavioral: Negative for confusion. The patient is not nervous/anxious.           All pertinent negatives and positives are as above. All other systems have been reviewed and are negative unless otherwise stated.     Objective    Temp:  [97.8 °F (36.6 °C)-98.4 °F (36.9 °C)] 98.2 °F (36.8 °C)  Heart Rate:  [55-92] 90  Resp:  [18-20] 18  BP: (130-161)/(56-89) 130/89  Physical Exam   Constitutional: He is oriented to person, place, and time. He appears well-developed and well-nourished.   HENT:   Head: Normocephalic and atraumatic.   Right Ear: External ear normal.   Left Ear: External ear normal.   Nose: Nose normal.   Mouth/Throat: Oropharynx is clear and moist.   Eyes: Pupils are equal, round, and reactive to light. Conjunctivae and EOM are normal. Right eye exhibits no discharge. Left eye exhibits no discharge. No scleral  icterus.   Neck: Normal range of motion. Neck supple. No tracheal deviation present. No thyromegaly present.   Cardiovascular: Normal rate, regular rhythm, normal heart sounds and intact distal pulses. Exam reveals no gallop and no friction rub.   No murmur heard.  Pulmonary/Chest: Effort normal. No stridor. No respiratory distress. He has decreased breath sounds. He has wheezes. He has no rales. He exhibits no tenderness.   Abdominal: Soft. Bowel sounds are normal. He exhibits no distension and no mass. There is no tenderness. There is no rebound and no guarding. No hernia.   Musculoskeletal: Normal range of motion. He exhibits no edema or deformity.   Lymphadenopathy:     He has no cervical adenopathy.   Neurological: He is alert and oriented to person, place, and time. He has normal reflexes. He displays normal reflexes. No cranial nerve deficit. He exhibits normal muscle tone. Coordination normal.   Skin: Skin is warm and dry. No rash noted. No erythema. No pallor.   Psychiatric: He has a normal mood and affect. His behavior is normal. Judgment and thought content normal.   Vitals reviewed.        Results Review:  I have reviewed the labs, radiology results, and diagnostic studies.    Laboratory Data:   Results from last 7 days   Lab Units 04/06/20  0326 04/05/20  0324 04/04/20  1118   WBC 10*3/mm3 14.80* 5.74 7.33   HEMOGLOBIN g/dL 12.1* 12.6* 13.8   HEMATOCRIT % 38.1 39.9 44.5   PLATELETS 10*3/mm3 241 234 224        Results from last 7 days   Lab Units 04/06/20  0326 04/05/20  0324 04/04/20  1118   SODIUM mmol/L 139 140 141   POTASSIUM mmol/L 4.2 3.8 4.7   CHLORIDE mmol/L 90* 89* 89*   CO2 mmol/L 39.0* 42.0* 45.0*   BUN mg/dL 20 11 10   CREATININE mg/dL 0.32* 0.35* 0.36*   CALCIUM mg/dL 9.7 9.5 9.8   BILIRUBIN mg/dL  --  0.3 0.3   ALK PHOS U/L  --  70 76   ALT (SGPT) U/L  --  7 8   AST (SGOT) U/L  --  12 12   GLUCOSE mg/dL 149* 162* 123*       Culture Data:   Blood Culture   Date Value Ref Range Status      04/04/2020 No growth at 24 hours  Preliminary   04/04/2020 No growth at 24 hours  Preliminary       Radiology Data:   Imaging Results (Last 24 Hours)     ** No results found for the last 24 hours. **          I have reviewed the patient's current medications.     Assessment/Plan     Active Hospital Problems    Diagnosis   • Hypercarbia   • COPD with acute exacerbation (CMS/HCC)   • Acute on chronic respiratory failure with hypoxia and hypercapnia (CMS/HCC)     1.  COPD AE  -Steroids weaned  -nebs  -mucinex  -flutter  -consult pulm  -Start CPT    2.  Acute on Chronic Respiratory failure with hypoxia and hypercapnia   -Steroids  -nebs  -mucinex  -flutter    3.  Tobacco Abuse  -cessation counseling    4.  HTN  -metoprolol    5.  HLD  -lipitor              Discharge Planning: I expect the patient to be discharged to home in ? days    Alon Bowens MD   04/08/20   15:14

## 2020-04-08 NOTE — PLAN OF CARE
Problem: Patient Care Overview  Goal: Plan of Care Review  Outcome: Ongoing (interventions implemented as appropriate)  Flowsheets (Taken 4/8/2020 0302)  Progress: no change  Plan of Care Reviewed With: patient  Outcome Summary: Patient c/o pain, prn tylenol given with relief. Continues to receive IV antibiotic. On vapotherm, O2 saturation is 92%. VSS. Safety maintained. Will continue to monitor.

## 2020-04-09 LAB
ALBUMIN SERPL-MCNC: 4.3 G/DL (ref 3.5–5.2)
ALBUMIN/GLOB SERPL: 1.5 G/DL
ALP SERPL-CCNC: 62 U/L (ref 39–117)
ALT SERPL W P-5'-P-CCNC: 20 U/L (ref 1–41)
ANION GAP SERPL CALCULATED.3IONS-SCNC: 11 MMOL/L (ref 5–15)
AST SERPL-CCNC: 19 U/L (ref 1–40)
BACTERIA SPEC AEROBE CULT: NORMAL
BACTERIA SPEC AEROBE CULT: NORMAL
BASOPHILS # BLD AUTO: 0.01 10*3/MM3 (ref 0–0.2)
BASOPHILS NFR BLD AUTO: 0.1 % (ref 0–1.5)
BILIRUB SERPL-MCNC: 0.2 MG/DL (ref 0.2–1.2)
BUN BLD-MCNC: 22 MG/DL (ref 8–23)
BUN/CREAT SERPL: 51.2 (ref 7–25)
CALCIUM SPEC-SCNC: 9.8 MG/DL (ref 8.6–10.5)
CHLORIDE SERPL-SCNC: 95 MMOL/L (ref 98–107)
CO2 SERPL-SCNC: 33 MMOL/L (ref 22–29)
CREAT BLD-MCNC: 0.43 MG/DL (ref 0.76–1.27)
DEPRECATED RDW RBC AUTO: 41 FL (ref 37–54)
EOSINOPHIL # BLD AUTO: 0 10*3/MM3 (ref 0–0.4)
EOSINOPHIL NFR BLD AUTO: 0 % (ref 0.3–6.2)
ERYTHROCYTE [DISTWIDTH] IN BLOOD BY AUTOMATED COUNT: 12.4 % (ref 12.3–15.4)
GFR SERPL CREATININE-BSD FRML MDRD: >150 ML/MIN/1.73
GLOBULIN UR ELPH-MCNC: 2.8 GM/DL
GLUCOSE BLD-MCNC: 142 MG/DL (ref 65–99)
HCT VFR BLD AUTO: 40.9 % (ref 37.5–51)
HGB BLD-MCNC: 13.1 G/DL (ref 13–17.7)
IMM GRANULOCYTES # BLD AUTO: 0.07 10*3/MM3 (ref 0–0.05)
IMM GRANULOCYTES NFR BLD AUTO: 0.6 % (ref 0–0.5)
LYMPHOCYTES # BLD AUTO: 0.42 10*3/MM3 (ref 0.7–3.1)
LYMPHOCYTES NFR BLD AUTO: 3.5 % (ref 19.6–45.3)
MCH RBC QN AUTO: 29.2 PG (ref 26.6–33)
MCHC RBC AUTO-ENTMCNC: 32 G/DL (ref 31.5–35.7)
MCV RBC AUTO: 91.1 FL (ref 79–97)
MONOCYTES # BLD AUTO: 0.4 10*3/MM3 (ref 0.1–0.9)
MONOCYTES NFR BLD AUTO: 3.3 % (ref 5–12)
NEUTROPHILS # BLD AUTO: 11.12 10*3/MM3 (ref 1.7–7)
NEUTROPHILS NFR BLD AUTO: 92.5 % (ref 42.7–76)
NRBC BLD AUTO-RTO: 0 /100 WBC (ref 0–0.2)
PLATELET # BLD AUTO: 234 10*3/MM3 (ref 140–450)
PMV BLD AUTO: 8.9 FL (ref 6–12)
POTASSIUM BLD-SCNC: 4.3 MMOL/L (ref 3.5–5.2)
PROT SERPL-MCNC: 7.1 G/DL (ref 6–8.5)
RBC # BLD AUTO: 4.49 10*6/MM3 (ref 4.14–5.8)
SODIUM BLD-SCNC: 139 MMOL/L (ref 136–145)
WBC NRBC COR # BLD: 12.02 10*3/MM3 (ref 3.4–10.8)

## 2020-04-09 PROCEDURE — 97110 THERAPEUTIC EXERCISES: CPT

## 2020-04-09 PROCEDURE — 94669 MECHANICAL CHEST WALL OSCILL: CPT

## 2020-04-09 PROCEDURE — 94799 UNLISTED PULMONARY SVC/PX: CPT

## 2020-04-09 PROCEDURE — 25010000002 METHYLPREDNISOLONE PER 40 MG: Performed by: FAMILY MEDICINE

## 2020-04-09 PROCEDURE — 99223 1ST HOSP IP/OBS HIGH 75: CPT | Performed by: INTERNAL MEDICINE

## 2020-04-09 PROCEDURE — 25010000002 METHYLPREDNISOLONE PER 40 MG: Performed by: INTERNAL MEDICINE

## 2020-04-09 PROCEDURE — 97116 GAIT TRAINING THERAPY: CPT

## 2020-04-09 PROCEDURE — 99497 ADVNCD CARE PLAN 30 MIN: CPT | Performed by: CLINICAL NURSE SPECIALIST

## 2020-04-09 PROCEDURE — 80053 COMPREHEN METABOLIC PANEL: CPT | Performed by: FAMILY MEDICINE

## 2020-04-09 PROCEDURE — 85025 COMPLETE CBC W/AUTO DIFF WBC: CPT | Performed by: FAMILY MEDICINE

## 2020-04-09 PROCEDURE — 99498 ADVNCD CARE PLAN ADDL 30 MIN: CPT | Performed by: CLINICAL NURSE SPECIALIST

## 2020-04-09 PROCEDURE — 99223 1ST HOSP IP/OBS HIGH 75: CPT | Performed by: CLINICAL NURSE SPECIALIST

## 2020-04-09 RX ORDER — METHYLPREDNISOLONE SODIUM SUCCINATE 40 MG/ML
40 INJECTION, POWDER, LYOPHILIZED, FOR SOLUTION INTRAMUSCULAR; INTRAVENOUS EVERY 8 HOURS
Status: DISCONTINUED | OUTPATIENT
Start: 2020-04-09 | End: 2020-04-10

## 2020-04-09 RX ADMIN — METHYLPREDNISOLONE SODIUM SUCCINATE 40 MG: 40 INJECTION, POWDER, FOR SOLUTION INTRAMUSCULAR; INTRAVENOUS at 04:18

## 2020-04-09 RX ADMIN — DOXYCYCLINE 100 MG: 100 INJECTION, POWDER, LYOPHILIZED, FOR SOLUTION INTRAVENOUS at 04:18

## 2020-04-09 RX ADMIN — IPRATROPIUM BROMIDE AND ALBUTEROL SULFATE 3 ML: 2.5; .5 SOLUTION RESPIRATORY (INHALATION) at 20:22

## 2020-04-09 RX ADMIN — SODIUM CHLORIDE, PRESERVATIVE FREE 10 ML: 5 INJECTION INTRAVENOUS at 20:41

## 2020-04-09 RX ADMIN — DOXYCYCLINE 100 MG: 100 INJECTION, POWDER, LYOPHILIZED, FOR SOLUTION INTRAVENOUS at 16:42

## 2020-04-09 RX ADMIN — ESCITALOPRAM 20 MG: 10 TABLET, FILM COATED ORAL at 07:41

## 2020-04-09 RX ADMIN — METOPROLOL TARTRATE 50 MG: 50 TABLET, FILM COATED ORAL at 20:41

## 2020-04-09 RX ADMIN — GUAIFENESIN 1200 MG: 600 TABLET, EXTENDED RELEASE ORAL at 07:42

## 2020-04-09 RX ADMIN — IPRATROPIUM BROMIDE AND ALBUTEROL SULFATE 3 ML: 2.5; .5 SOLUTION RESPIRATORY (INHALATION) at 10:33

## 2020-04-09 RX ADMIN — METOPROLOL TARTRATE 50 MG: 50 TABLET, FILM COATED ORAL at 08:30

## 2020-04-09 RX ADMIN — MEGESTROL ACETATE 40 MG: 40 TABLET ORAL at 07:42

## 2020-04-09 RX ADMIN — SODIUM CHLORIDE, PRESERVATIVE FREE 10 ML: 5 INJECTION INTRAVENOUS at 08:35

## 2020-04-09 RX ADMIN — GUAIFENESIN 1200 MG: 600 TABLET, EXTENDED RELEASE ORAL at 20:41

## 2020-04-09 RX ADMIN — BUDESONIDE AND FORMOTEROL FUMARATE DIHYDRATE 2 PUFF: 160; 4.5 AEROSOL RESPIRATORY (INHALATION) at 06:52

## 2020-04-09 RX ADMIN — METHYLPREDNISOLONE SODIUM SUCCINATE 40 MG: 40 INJECTION, POWDER, FOR SOLUTION INTRAMUSCULAR; INTRAVENOUS at 16:42

## 2020-04-09 RX ADMIN — BUDESONIDE AND FORMOTEROL FUMARATE DIHYDRATE 2 PUFF: 160; 4.5 AEROSOL RESPIRATORY (INHALATION) at 20:30

## 2020-04-09 RX ADMIN — ALPRAZOLAM 0.5 MG: 0.5 TABLET ORAL at 07:40

## 2020-04-09 RX ADMIN — ASPIRIN 81 MG: 81 TABLET ORAL at 07:42

## 2020-04-09 RX ADMIN — ATORVASTATIN CALCIUM 10 MG: 10 TABLET, FILM COATED ORAL at 20:40

## 2020-04-09 RX ADMIN — IPRATROPIUM BROMIDE AND ALBUTEROL SULFATE 3 ML: 2.5; .5 SOLUTION RESPIRATORY (INHALATION) at 06:52

## 2020-04-09 RX ADMIN — METHYLPREDNISOLONE SODIUM SUCCINATE 40 MG: 40 INJECTION, POWDER, FOR SOLUTION INTRAMUSCULAR; INTRAVENOUS at 09:23

## 2020-04-09 RX ADMIN — IPRATROPIUM BROMIDE AND ALBUTEROL SULFATE 3 ML: 2.5; .5 SOLUTION RESPIRATORY (INHALATION) at 14:33

## 2020-04-09 RX ADMIN — PANTOPRAZOLE SODIUM 40 MG: 40 TABLET, DELAYED RELEASE ORAL at 07:41

## 2020-04-09 RX ADMIN — ALPRAZOLAM 0.5 MG: 0.5 TABLET ORAL at 16:42

## 2020-04-09 RX ADMIN — POLYETHYLENE GLYCOL 3350 17 G: 17 POWDER, FOR SOLUTION ORAL at 07:42

## 2020-04-09 NOTE — PLAN OF CARE
Problem: Patient Care Overview  Goal: Plan of Care Review  Outcome: Ongoing (interventions implemented as appropriate)  Flowsheets  Taken 4/9/2020 0959 by Kari Harrison PTA  Progress: no change  Plan of Care Reviewed With: patient  Taken 4/9/2020 1501 by Yee Hilliard RN  Outcome Summary: Vapotherm continues; pt up to bsc and using urinal; no c/o pain; safety maintained; continue to monitor pt  Goal: Individualization and Mutuality  Outcome: Ongoing (interventions implemented as appropriate)  Flowsheets (Taken 4/5/2020 1522 by Karla Tinajero, GWENDOLYN)  Patient Specific Interventions: Encourage rest, calm environment promoted.  Goal: Discharge Needs Assessment  Outcome: Ongoing (interventions implemented as appropriate)  Goal: Interprofessional Rounds/Family Conf  Outcome: Ongoing (interventions implemented as appropriate)     Problem: Fall Risk (Adult)  Goal: Absence of Fall  Outcome: Ongoing (interventions implemented as appropriate)  Flowsheets (Taken 4/8/2020 1805 by Madisyn Conley, RN)  Absence of Fall: achieves outcome     Problem: Skin Injury Risk (Adult)  Goal: Skin Health and Integrity  Outcome: Ongoing (interventions implemented as appropriate)  Flowsheets (Taken 4/8/2020 1805 by Madisyn Conley RN)  Skin Health and Integrity: making progress toward outcome     Problem: Chronic Obstructive Pulmonary Disease (Adult)  Goal: Signs and Symptoms of Listed Potential Problems Will be Absent, Minimized or Managed (Chronic Obstructive Pulmonary Disease)  Outcome: Ongoing (interventions implemented as appropriate)  Flowsheets (Taken 4/8/2020 0302 by Celestina Landeros, RN)  Problems Assessed (Chronic Obstructive Pulmonary Disease (COPD)): all  Problems Present (COPD, Bronch/Emphy): respiratory compromise;situational response     Problem: Nutrition, Imbalanced: Inadequate Oral Intake (Adult)  Goal: Improved Oral Intake  Outcome: Ongoing (interventions implemented as appropriate)  Flowsheets (Taken 4/8/2020 1805  by Madisyn Conley RN)  Improved Oral Intake: making progress toward outcome  Goal: Prevent Further Weight Loss  Outcome: Ongoing (interventions implemented as appropriate)  Flowsheets (Taken 4/8/2020 1805 by Madisyn Conley, RN)  Prevent Further Weight Loss: making progress toward outcome

## 2020-04-09 NOTE — CONSULTS
Palliative Care Initial Consult   Attending Physician: Alon Bowens MD  Referring Provider: Alon Bowens MD    Reason for Referral: assistance with clarification of goals of care  Family/Support: no family at bedside  Goals of Care: TBD.  Code Status and Medical Interventions:   Ordered at: 04/04/20 1537     Code Status:    CPR     Medical Interventions (Level of Support Prior to Arrest):    Full         HPI:   65 y.o. male with past medical history significant for COPD-severe pulmonary emphysema, chronic respiratory failure requiring tracheostomy placement that has since been removed, chronic oxygen and BiPAP use at home, tobacco abuse, anxiety, depression, GERD, hypertension, and hyperlipidemia. Patient presented to [facility] on 4/4/2020 related to COPD exacerbation and respiratory failure.  He has required Vapotherm. Palliative Care Spoke With: patient poor quality of life and functional status.  Apparently there are multiple family members that reside in his home. Due to the Palliative Care Topics Discussed: palliative care, goals of care, care options, resuscitation status and [facility] we will establish an advance care plan.   Advance Care Planning   Advance Care Planning Discussion: Care conference in the presence of patient.  Assessed overall understanding of patient's current health status, decline in functional status, and multiple comorbidities including severe COPD.  We explored CODE STATUS and care interventions at length.  Patient has elected no CPR and no intubation.  I have recommended further completion of medical orders for scope of treatment (MOST) form to further delineate his care goals after discharge.  I have provided patient with the MOST decision aid and form to further review.  He verbalizes his brother, Ronak is his designated healthcare surrogate.  We discussed choices for care moving forward in the form of a document would provide additional support for his family  if he were not able to speak for himself.  He verbalizes his current goals of care would to be able to return to a functional status that he would be able to socialize with family and friends.  Patient is mostly housebound at present.  We discussed current high oxygenation needs with Vapotherm and goals to continue weaning versus transition and de-escalation of care including hospice.  He is aggravated that he is virtually tethered to his room due to Vapotherm use.  We discussed setting goals to determine progress.  Patient has agreed to set a goal of Saturday at this juncture to reevaluate his needs.  We discussed his addiction to both benzodiazepines and cigarettes at length.  He verbalizes his #1 goal would be to stop smoking, but he has been unsuccessful at multiple attempts.  Multiple questions answered and support given.    Review of Systems   Constitution: Positive for decreased appetite, malaise/fatigue and weight loss.   Cardiovascular: Negative for leg swelling.   Respiratory: Positive for shortness of breath.    Hematologic/Lymphatic: Negative for bleeding problem.   Skin: Negative for rash.   Musculoskeletal: Positive for muscle weakness.   Gastrointestinal: Negative for nausea.   Neurological: Positive for weakness.   Psychiatric/Behavioral: The patient is nervous/anxious.          Past Medical History:   Diagnosis Date   • Anxiety    • Arthritis    • Chronic respiratory failure with hypoxia, on home O2 therapy (CMS/[facility]), 3 L    • COPD (chronic obstructive pulmonary disease) (CMS/[facility])    • Depression    • Hypertension    • Mixed hyperlipidemia    • Nephrolithiasis    • Pneumonia, pseudomonal 7/2016 with negative AFB, aspiration 7/2018      Past Surgical History:   Procedure Laterality Date   • COLONOSCOPY  04/20/2007    Within Normal Limits.   • ENDOSCOPY  04/20/2007    urea neg, sbbx benign, mild gastritis, small hiatal hernia   • ENDOSCOPY N/A 10/5/2016    Procedure: ESOPHAGOGASTRODUODENOSCOPY WITH  "ANESTHESIA;  Surgeon: Kevyn Damon MD;  Location: [facility] ENDOSCOPY;  Service:    • HEMORRHOIDECTOMY     • PEG TUBE INSERTION  07/22/2016    Dr Mccarty   • TRACHEOSTOMY       Social History     Socioeconomic History   • Marital status:      Spouse name: Not on file   • Number of children: Not on file   • Years of education: Not on file   • Highest education level: Not on file   Tobacco Use   • Smoking status: Current Every Day Smoker     Packs/day: 0.50     Types: Cigarettes   • Smokeless tobacco: Never Used   Substance and Sexual Activity   • Alcohol use: No   • Drug use: Defer   • Sexual activity: Defer       Allergies   Allergen Reactions   • Meperidine Anaphylaxis       Current medication reviewed for route, type, dose and frequency and are current per MAR at time of dictation.      Diagnostics: Reviewed    Patient Active Problem List   Diagnosis   • Aspiration pneumonia of left lung (CMS/HCC)   • Abnormal chest x-ray, findings apex right lung suspect TB   • Acute on chronic respiratory failure with hypercapnia (CMS/HCC)   • Tobacco dependence   • Anemia   • Protein-calorie malnutrition (CMS/HCC)   • Hypercarbia   • COPD with acute exacerbation (CMS/HCC)   • Acute on chronic respiratory failure with hypoxia and hypercapnia (CMS/HCC)       Physical Exam:    /57 (BP Location: Left arm, Patient Position: Sitting)   Pulse 79   Temp 98.6 °F (37 °C) (Oral)   Resp 18   Ht 165.1 cm (65\")   Wt 42.3 kg (93 lb 4.1 oz)   SpO2 92%   BMI 15.52 kg/m²     Physical Exam   Constitutional: He is oriented to person, place, and time. He appears well-developed. He appears cachectic. He has a sickly appearance. He appears ill. No distress.   Currently on Vapotherm.   HENT:   Head: Normocephalic and atraumatic.   Eyes: Pupils are equal, round, and reactive to light.   Neck: Normal range of motion.   Cardiovascular: Normal rate, regular rhythm and normal heart sounds.   Pulmonary/Chest: Effort normal. " "  Musculoskeletal: Normal range of motion.   Neurological: He is alert and oriented to person, place, and time.   Skin: Skin is warm and dry.   Psychiatric: His mood appears anxious. Cognition and memory are normal.       Patient status: Disease state: Controlled with current treatments.  Functional status: Palliative Performance Scale Score: Performance 50% based on the following measures: Ambulation: Mainly sit or lie down, Activity and Evidence of Disease: Unable to do any work, extensive evidence of disease, Self-Care: Considerable assistance required,  Intake: Normal or reduced, LOC: Full or confusion   Nutritional status: Albumin 3.90. Body mass index is 15.52 kg/m².         Family support: The patient receives support from his extended family..  POA/Healthcare surrogate-advance directive/living well document on file.  His brother, Ronak will act as healthcare surrogate.    Impression/Problem List:    1.  Severe COPD with exacerbation, emphysema  2.  Acute on chronic respiratory failure with hypoxia and hypercapnia  3.  Hypercarbia  4.  Tobacco abuse  5.  Hypertension  6.  Hyperlipidemia  7.  Aspiration pneumonia of left lung  8.  Protein calorie malnutrition  9.  Impaired mobility  10.  Pressure related skin injury, coccyx    Recommendations/Plan:  1. plan: Goals of care include CODE STATUS no CPR/limited interventions.      2.  Palliative care encounter  -CODE STATUS changes as delineated above. \"I am tired of living like this\". Discussed de-escalations and transition of care including hospice. Wishes a trial of therapy and reassess goals this weekend. \"I know I am horrible to the nurses, but I am having a hard time adjusting to not having my Xanax\".  -Recommend completion of the MOST form to further delineate care goals.  The MOST form decision aid and document has been provided.  - to provide spiritual support.   -We will follow as needed.      Thank you for this consult and allowing us to " participate in patient's plan of care. Palliative Care Team will continue to follow patient.     Time spent: 90 minutes spent reviewing medical and medication records, assessing and examining patient, discussing with family, answering questions, providing some guidance about a plan and documentation of care, and coordinating care with other healthcare members, with > 50% time spent face to face.   60 minutes spent on advance care planning.    Laine Weaver, APRN  4/9/2020

## 2020-04-09 NOTE — CONSULTS
"      PULMONARY & CRITICAL CARE CONSULT - [facility]    20, 09:26  Patient Care Team:  Jose Moon MD as PCP - General  Jose Moon MD as PCP - Family Medicine  Name: Kellie Arzate  : 1955  MRN: [id number]  Contact Serial Number [id number]    Chief complaint: Shortness of breath    HPI:  We have been consulted by Alon Bowens MD to see this 65 y.o. male born on 1955.  Patient complains of dyspnea in the chest for several weeks. Severity: moderate.  Aggravating factors: deep inspiration, large meals and walking.   Alleviating factors: medication(s) (oxygen) Associated symptoms: weakness. Sputum is mucoid.  Patient currently is on oxygen at 3 L/min per nasal cannula.. Respiratory history: COPD.  He is well-known to us for a prolonged hospitalization in the LTAC for his COPD, respiratory failure requiring tracheostomy placement.  That has since been removed.  He has also been prescribed a trilogy device for hypercapnia however he is not been utilizing it.  He indicates he is been \"doctoring myself for several weeks and it ain't been working\".  He has not been febrile.  He has had a productive cough.  He also reports he is had a \"infected left ear\" and unhooked on Xanax and smoking and I need to stop\".  He reports he is done over-the-counter nicotine replacement and Chantix without success.  He has not trialed anything else for anxiety.  Overall he feels he is doing much better now than he was upon admission.  He is on Vapotherm at 30 L and FiO2 0.30 with a saturation of 90%.  Heart rate in the 90s.  He does not appear distressed and is able to speak in full sentences.  No family present.  No other issues.    Past Medical History:   has a past medical history of Anxiety, Arthritis, Chronic respiratory failure with hypoxia, on home O2 therapy (CMS/[facility]), 3 L, COPD (chronic obstructive pulmonary disease) (CMS/[facility]), Depression, Hypertension, Mixed " hyperlipidemia, Nephrolithiasis, and Pneumonia, pseudomonal 7/2016 with negative AFB, aspiration 7/2018.   has a past surgical history that includes Esophagogastroduodenoscopy (04/20/2007); Colonoscopy (04/20/2007); Hemorrhoid surgery; Tracheostomy tube placement; Peg Tube Insertion (07/22/2016); and Esophagogastroduodenoscopy (N/A, 10/5/2016).  Allergies   Allergen Reactions   • Meperidine Anaphylaxis     Medications:    aspirin 81 mg Oral Daily   atorvastatin 10 mg Oral Nightly   budesonide-formoterol 2 puff Inhalation BID - RT   doxycycline 100 mg Intravenous Q12H   escitalopram 20 mg Oral Daily   guaiFENesin 1,200 mg Oral Q12H   ipratropium-albuterol 3 mL Nebulization 4x Daily - RT   megestrol 40 mg Oral Daily   methylPREDNISolone sodium succinate 40 mg Intravenous Q6H   metoprolol tartrate 50 mg Oral Q12H   pantoprazole 40 mg Oral Daily   polyethylene glycol 17 g Oral Daily   sodium chloride 10 mL Intravenous Q12H        Family History:  Family History   Problem Relation Age of Onset   • Dementia Mother    • No Known Problems Father      Social History:   reports that he has been smoking cigarettes. He has been smoking about 0.50 packs per day. He has never used smokeless tobacco. Drug use questions deferred to the physician. He reports that he does not drink alcohol.  Review of Systems:  Review of Systems :    Constitutional: Positive for activity change, appetite change and fatigue. Negative for chills and fever.   HENT: Negative for hearing loss, nosebleeds, tinnitus and trouble swallowing.    Eyes: Negative for visual disturbance.   Respiratory: Positive for cough, shortness of breath and wheezing. Negative for chest tightness.    Cardiovascular: Negative for chest pain, palpitations and leg swelling.   Gastrointestinal: Negative for abdominal distention, abdominal pain, blood in stool, constipation, diarrhea, nausea and vomiting.   Endocrine: Negative for cold intolerance, heat intolerance, polydipsia,  polyphagia and polyuria.   Genitourinary: Negative for decreased urine volume, difficulty urinating, dysuria, flank pain, frequency and hematuria.   Musculoskeletal: Positive for arthralgias, gait problem and myalgias. Negative for joint swelling.   Skin: Negative for rash.   Allergic/Immunologic: Negative for immunocompromised state.   Neurological: Positive for weakness. Negative for dizziness, syncope, light-headedness and headaches.   Hematological: Negative for adenopathy. Does not bruise/bleed easily.   Psychiatric/Behavioral: Positive for confusion. Negative for sleep disturbance. The patient is not nervous/anxious.     Physical Exam:  Temp:  [98 °F (36.7 °C)-99.1 °F (37.3 °C)] 98.8 °F (37.1 °C)  Heart Rate:  [56-91] 91  Resp:  [16-18] 18  BP: (130-155)/(63-89) 138/63    Intake/Output Summary (Last 24 hours) at 4/9/2020 0926  Last data filed at 4/9/2020 0900  Gross per 24 hour   Intake 1080 ml   Output 700 ml   Net 380 ml         04/05/20 2024 04/07/20 1946 04/08/20  1950   Weight: 42 kg (92 lb 9.6 oz) 41.9 kg (92 lb 6 oz) 42.3 kg (93 lb 3 oz)     SpO2 Percentage    04/09/20 0400 04/09/20 0652 04/09/20 0825   SpO2: 97% 95% 91%     Physical Exam:    Constitutional: He is oriented to person, place, and time. He appears well-developed.   Cachectic.   HENT:   Head: Normocephalic.  Nasal cannula  Eyes: Pupils are equal, round, and reactive to light. Conjunctivae are normal.   Neck: Neck supple. No JVD present.   Cardiovascular: Normal rate, regular rhythm, normal heart sounds and intact distal pulses. Exam reveals no gallop and no friction rub.   No murmur heard.  Pulmonary/Chest: No respiratory distress. He has wheezes. He has no rales. He exhibits no tenderness.   Diminish breath sound bilateral, wheezing.   Abdominal: Soft. Bowel sounds are normal. He exhibits no distension. There is no tenderness. There is no rebound and no guarding.   Musculoskeletal: He exhibits no edema, tenderness or deformity.      Neurological: He is alert and oriented to person, place, and time. He displays normal reflexes. No cranial nerve deficit. He exhibits abnormal muscle tone. Coordination abnormal.   Skin: Skin is warm and dry. No rash noted.   Psychiatric: He has a normal mood and affect. His behavior is normal.   Nursing note and vitals reviewed.    Results from last 7 days   Lab Units 04/06/20  0326 04/05/20  0324 04/04/20  1118   WBC 10*3/mm3 14.80* 5.74 7.33   HEMOGLOBIN g/dL 12.1* 12.6* 13.8   PLATELETS 10*3/mm3 241 234 224     Results from last 7 days   Lab Units 04/06/20  0326 04/05/20  0324 04/04/20  1118   SODIUM mmol/L 139 140 141   POTASSIUM mmol/L 4.2 3.8 4.7   CO2 mmol/L 39.0* 42.0* 45.0*   BUN mg/dL 20 11 10   CREATININE mg/dL 0.32* 0.35* 0.36*   GLUCOSE mg/dL 149* 162* 123*     Results from last 7 days   Lab Units 04/04/20  1932 04/04/20  1340   PH, ARTERIAL pH units 7.452* 7.331*   PCO2, ARTERIAL mm Hg 69.7* 92.5*   PO2 ART mm Hg 53.3* 69.7*   FIO2 % 38  --      Lab Results   Component Value Date    PROBNP 242.9 04/04/2020     Blood Culture   Date Value Ref Range Status   04/04/2020 No growth at 4 days  Preliminary   04/04/2020 No growth at 4 days  Preliminary     Recent radiology:   Imaging Results (Last 72 Hours)     Procedure Component Value Units Date/Time    XR Chest PA & Lateral [[id number]] Collected:  04/08/20 1752     Updated:  04/08/20 1756    Narrative:       XR CHEST PA AND LATERAL- 4/8/2020 5:37 PM CDT     HISTORY: SOA; R06.89-Other abnormalities of breathing; Z74.09-Other  reduced mobility      COMPARISON: 04/04/2020     FINDINGS:   Upright frontal and lateral radiographs of the chest were obtained.     Emphysematous changes and biapical scarring are again noted. A more  consolidative appearance is again noted in the RIGHT lung apex. The  cardiomediastinal silhouette and pulmonary vascularity are unchanged.  The osseous structures and surrounding soft tissues demonstrate no acute  abnormality.        Impression:       1. No significant interval change since 2020..  2. Marked emphysematous changes.  This report was finalized on 2020 17:53 by Dr. Kel Purcell MD.        My radiograph interpretation/independent review of imaging: Hyperinflation and emphysematous changes otherwise nothing acute    Other test results (not lab or imaging): Results for orders placed during the hospital encounter of 18   Adult Transthoracic Echo Limited W/ Cont if Necessary Per Protocol    Narrative · Left ventricular systolic function is normal. Estimated EF = 60%.  · Right ventricular cavity is mildly dilated.  · Normal RV function  · Mild pulmonary hypertension is present.  · There is a trivial pericardial effusion. There is no evidence of cardiac   tamponade.      Not applicable    Independent review of ekg: Normal sinus rhythm, right axis deviation, QT interval 399    Problem List as identified by Epic (may contain historical, inactive problems)  Patient Active Problem List   Diagnosis   • Aspiration pneumonia of left lung (CMS/HCC)   • Abnormal chest x-ray, findings apex right lung suspect TB   • Acute on chronic respiratory failure with hypercapnia (CMS/HCC)   • Tobacco dependence   • Anemia   • Protein-calorie malnutrition (CMS/HCC)   • Hypercarbia   • COPD with acute exacerbation (CMS/HCC)   • Acute on chronic respiratory failure with hypoxia and hypercapnia (CMS/HCC)     Pulmonary Assessment:  New problem (to me), with additional workup planned: Acute on chronic hypoxemic and hypercapnic respiratory failure    New problem (to me), no additional workup planned: Reported addiction to benzodiazepines, defer to attending    Other problems either stable, failing to improve or worsenin. Chronic tobacco use  2. COPD exacerbation  3. Cachexia    Recommend/plan:     · Supplemental oxygen for saturation between 88 and 92% to avoid CO2 retention  · Should be able to wean off of Vapotherm today  · Patient no  longer wishes to utilize the trilogy (home ventilator) device.  Suspect this will result in continued hospitalizations and recurrent admissions  · Continue bronchodilators, incentive, mucolytic's and flutter device  · Mobilize as much is feasible to do so to prevent deconditioning  · Nicotine replacement/smoking cessation  · Defer any counseling/assistance with weaning off of benzodiazepines/treating underlying anxiety to attending.  Patient expresses wishes to receive help with this during this admission  · Smoking cessation and discontinuation of benzodiazepines would certainly help his underlying chronic respiratory status    Thank you for this consult.  We will follow along.  Electronically signed by CRISPIN Villegas, 04/09/20, 9:26 AM.    Physician substantive portion:  Patient voices he does not want even to talk about his trilogy machine.  He reports 2 problems one is addiction to Xanax and the other being addiction to nicotine and he is working on those problems.  He is on oxygen with exacerbation of lung disease.  His chest x-ray does show chronic changes mostly in the right upper lobe.  There is concern about aspiration, but the most prominent finding on imaging is the COPD and some chronic change in the right apex.  He has had a tracheostomy in the past and is generally in better shape than he was during the time when he was on the LTAC.  Exam now shows diminished breath sounds, some muscular wasting consistent with pulmonary cachexia, prolonged expiratory phase.  He is on high flow nasal cannula via Vapotherm brand device.  I think we can wean him down to regular cannula from that today.  I sounds as if he does not want to use his trilogy machine anymore and I advise he discuss any issues with that with his supplier.continue RT, antibiotics and taper steroids today.    I have seen and examined patient personally, performing a face-to-face diagnostic evaluation with plan of care reviewed and  developed with APRN and nursing staff. I have addended and/or modified the above history of present illness, physical examination, and assessment and plan to reflect my findings and impressions. Essential elements of the care plan were discussed with APRN above.  Agree with findings and assessment/plan as documented above.    Electronically signed by Matti Young MD, on 4/9/2020, 10:16

## 2020-04-09 NOTE — PLAN OF CARE
Problem: Patient Care Overview  Goal: Plan of Care Review  Outcome: Ongoing (interventions implemented as appropriate)  Flowsheets (Taken 4/9/2020 6129)  Plan of Care Reviewed With: patient  Outcome Summary: VSS. NO C/O PAIN THIS SHIFT. IV SOLUMEDROL & DOXYCYCLINE GIVEN PER ORDER. PT REFUSED TRILOGY & IS CURRENTLY ON VAPOTHERM. BARRIER CREAM APPLIED TO COCCYX. SAFETY MAINTAINED. CONTINUE TO MONITOR. WILL NOTIFY MD OF ANY CHANGES.    Problem: Skin Injury Risk (Adult)  Goal: Skin Health and Integrity  Description  Patient will demonstrate the desired outcomes by discharge/transition of care.  Outcome: Ongoing (interventions implemented as appropriate)     Problem: Chronic Obstructive Pulmonary Disease (Adult)  Goal: Signs and Symptoms of Listed Potential Problems Will be Absent, Minimized or Managed (Chronic Obstructive Pulmonary Disease)  Description  Signs and symptoms of listed potential problems will be absent, minimized or managed by discharge/transition of care (reference Chronic Obstructive Pulmonary Disease (Adult) CPG).  Outcome: Ongoing (interventions implemented as appropriate)

## 2020-04-09 NOTE — PLAN OF CARE
Problem: Patient Care Overview  Goal: Plan of Care Review  Outcome: Ongoing (interventions implemented as appropriate)  Flowsheets (Taken 4/9/2020 8597)  Progress: no change  Plan of Care Reviewed With: patient  Outcome Summary: Pt. agreeable to therapy. He is independent with transfers. He was CGA for walking in room with occasional LOB. He walked 40' before his O2 sat dropped to 83% and he needed to rest. Pt. frustrated that he can't walk in hallway. Will continue to work on strengthening and endurance.

## 2020-04-09 NOTE — PROGRESS NOTES
[facility] Medicine Services  INPATIENT PROGRESS NOTE    Patient Name: Kellie Arzate  Date of Admission: 4/4/2020  Today's Date: 04/09/20  Length of Stay: 5  Primary Care Physician: Jose Moon MD    Subjective   Chief Complaint: SOA    Doing ok.  Still coughing.  No wheezing  Afebrile  Now does not want to use Trilogy after throwing a fit about not being put on it at bed-time.    Still requiring 30L vapotherm, feels SOA.  Desats while talking to me      Review of Systems   Constitutional: Positive for fatigue. Negative for fever.   HENT: Negative for congestion and ear pain.    Eyes: Negative for redness and visual disturbance.   Respiratory: Positive for cough and shortness of breath. Negative for wheezing.    Cardiovascular: Negative for chest pain and palpitations.   Gastrointestinal: Negative for abdominal pain, diarrhea, nausea and vomiting.   Endocrine: Negative for cold intolerance and heat intolerance.   Genitourinary: Negative for dysuria and frequency.   Musculoskeletal: Negative for arthralgias and back pain.   Skin: Negative for rash and wound.   Neurological: Negative for dizziness and headaches.   Psychiatric/Behavioral: Negative for confusion. The patient is not nervous/anxious.           All pertinent negatives and positives are as above. All other systems have been reviewed and are negative unless otherwise stated.     Objective    Temp:  [98 °F (36.7 °C)-99.1 °F (37.3 °C)] 98.6 °F (37 °C)  Heart Rate:  [56-91] 79  Resp:  [16-18] 18  BP: (129-155)/(57-77) 129/57  Physical Exam   Constitutional: He is oriented to person, place, and time. He appears well-developed and well-nourished.   HENT:   Head: Normocephalic and atraumatic.   Right Ear: External ear normal.   Left Ear: External ear normal.   Nose: Nose normal.   Mouth/Throat: Oropharynx is clear and moist.   Eyes: Pupils are equal, round, and reactive to light. Conjunctivae and EOM are  normal. Right eye exhibits no discharge. Left eye exhibits no discharge. No scleral icterus.   Neck: Normal range of motion. Neck supple. No tracheal deviation present. No thyromegaly present.   Cardiovascular: Normal rate, regular rhythm, normal heart sounds and intact distal pulses. Exam reveals no gallop and no friction rub.   No murmur heard.  Pulmonary/Chest: Effort normal. No stridor. No respiratory distress. He has decreased breath sounds. He has no wheezes. He has no rales. He exhibits no tenderness.   Abdominal: Soft. Bowel sounds are normal. He exhibits no distension and no mass. There is no tenderness. There is no rebound and no guarding. No hernia.   Musculoskeletal: Normal range of motion. He exhibits no edema or deformity.   Lymphadenopathy:     He has no cervical adenopathy.   Neurological: He is alert and oriented to person, place, and time. He has normal reflexes. He displays normal reflexes. No cranial nerve deficit. He exhibits normal muscle tone. Coordination normal.   Skin: Skin is warm and dry. No rash noted. No erythema. No pallor.   Psychiatric: He has a normal mood and affect. His behavior is normal. Judgment and thought content normal.   Vitals reviewed.        Results Review:  I have reviewed the labs, radiology results, and diagnostic studies.    Laboratory Data:   Results from last 7 days   Lab Units 04/06/20  0326 04/05/20  0324 04/04/20  1118   WBC 10*3/mm3 14.80* 5.74 7.33   HEMOGLOBIN g/dL 12.1* 12.6* 13.8   HEMATOCRIT % 38.1 39.9 44.5   PLATELETS 10*3/mm3 241 234 224        Results from last 7 days   Lab Units 04/06/20  0326 04/05/20  0324 04/04/20  1118   SODIUM mmol/L 139 140 141   POTASSIUM mmol/L 4.2 3.8 4.7   CHLORIDE mmol/L 90* 89* 89*   CO2 mmol/L 39.0* 42.0* 45.0*   BUN mg/dL 20 11 10   CREATININE mg/dL 0.32* 0.35* 0.36*   CALCIUM mg/dL 9.7 9.5 9.8   BILIRUBIN mg/dL  --  0.3 0.3   ALK PHOS U/L  --  70 76   ALT (SGPT) U/L  --  7 8   AST (SGOT) U/L  --  12 12   GLUCOSE mg/dL  149* 162* 123*       Culture Data:   Blood Culture   Date Value Ref Range Status   04/04/2020 No growth at 24 hours  Preliminary   04/04/2020 No growth at 24 hours  Preliminary       Radiology Data:   Imaging Results (Last 24 Hours)     Procedure Component Value Units Date/Time    XR Chest PA & Lateral [[id number]] Collected:  04/08/20 1752     Updated:  04/08/20 1756    Narrative:       XR CHEST PA AND LATERAL- 4/8/2020 5:37 PM CDT     HISTORY: SOA; R06.89-Other abnormalities of breathing; Z74.09-Other  reduced mobility      COMPARISON: 04/04/2020     FINDINGS:   Upright frontal and lateral radiographs of the chest were obtained.     Emphysematous changes and biapical scarring are again noted. A more  consolidative appearance is again noted in the RIGHT lung apex. The  cardiomediastinal silhouette and pulmonary vascularity are unchanged.  The osseous structures and surrounding soft tissues demonstrate no acute  abnormality.       Impression:       1. No significant interval change since 04/04/2020..  2. Marked emphysematous changes.  This report was finalized on 04/08/2020 17:53 by Dr. Kel Purcell MD.          I have reviewed the patient's current medications.     Assessment/Plan     Active Hospital Problems    Diagnosis   • Hypercarbia   • COPD with acute exacerbation (CMS/HCC)   • Acute on chronic respiratory failure with hypoxia and hypercapnia (CMS/HCC)     1.  COPD AE  -Steroids weaned  -nebs  -mucinex  -flutter  -consult pulm  -Start CPT    2.  Acute on Chronic Respiratory failure with hypoxia and hypercapnia   -Steroids  -nebs  -mucinex  -flutter    3.  Tobacco Abuse  -cessation counseling    4.  HTN  -metoprolol    5.  HLD  -lipitor              Discharge Planning: I expect the patient to be discharged to home in ? days    Alon Bowens MD   04/09/20   13:36

## 2020-04-09 NOTE — THERAPY TREATMENT NOTE
Acute Care - Physical Therapy Treatment Note  [facility]     Patient Name: Kellie Arzate  : 1955  MRN: [id number]  Today's Date: 2020             Admit Date: 2020    Visit Dx:    ICD-10-CM ICD-9-CM   1. Hypercarbia R06.89 786.09   2. Impaired mobility Z74.09 799.89     Patient Active Problem List   Diagnosis   • Aspiration pneumonia of left lung (CMS/HCC)   • Abnormal chest x-ray, findings apex right lung suspect TB   • Acute on chronic respiratory failure with hypercapnia (CMS/HCC)   • Tobacco dependence   • Anemia   • Protein-calorie malnutrition (CMS/HCC)   • Hypercarbia   • COPD with acute exacerbation (CMS/HCC)   • Acute on chronic respiratory failure with hypoxia and hypercapnia (CMS/HCC)       Therapy Treatment    Rehabilitation Treatment Summary     Row Name 20             Treatment Time/Intention    Discipline  physical therapy assistant  -MF      Document Type  therapy note (daily note)  -MF2      Subjective Information  complains of;dyspnea;weakness  -MF2      Mode of Treatment  physical therapy  -MF2      Existing Precautions/Restrictions  fall;oxygen therapy device and L/min vapotherm  -MF2      Recorded by [MF] Kari Harrison, PTA 20  [MF2] Kari Harrison, PTA 20      Row Name 20             Vital Signs    Pre SpO2 (%)  91  -MF      O2 Delivery Pre Treatment  other (see comments) vapotherm  -MF      Intra SpO2 (%)  83  -MF      O2 Delivery Intra Treatment  other (see comments) vapotherm  -MF      Post SpO2 (%)  93  -MF      O2 Delivery Post Treatment  other (see comments) vapotherm  -MF      Pre Patient Position  Sitting  -MF      Intra Patient Position  Standing  -MF      Post Patient Position  Sitting  -MF      Recovery Time  2-3 min  -MF      Recorded by [MF] Kari Harrison, PTA 20      Row Name 20             Bed Mobility Assessment/Treatment    Comment (Bed Mobility)  sitting EOB  -MF      Recorded  by [MF] Kari Harrison, PTA 04/09/20 0959      Row Name 04/09/20 0933             Sit-Stand Transfer    Sit-Stand [place] (Transfers)  independent  -MF      Recorded by [MF] Kari Harrison, PTA 04/09/20 0959      Row Name 04/09/20 0933             Stand-Sit Transfer    Stand-Sit [place] (Transfers)  independent  -MF      Recorded by [MF] Kari Harrison, PTA 04/09/20 0959      Row Name 04/09/20 0933             Gait/Stairs Assessment/Training    [place] Level (Gait)  contact guard  -MF      Distance in Feet (Gait)  40  -MF      Deviations/Abnormal Patterns (Gait)  stride length decreased;josé miguel decreased;base of support, narrow  -MF      Comment (Gait/Stairs)  occasional LOB while turning, but having to do so in a small area.   -MF      Recorded by [MF] Kari Harrison, \[facility] [place] [facility]\"" 04/09/20 0959      Row Name 04/09/20 0933             Therapeutic Exercise    Lower Extremity (Therapeutic Exercise)  LAQ (long arc quad), bilateral  -MF      Lower Extremity Range of Motion (Therapeutic Exercise)  hip flexion/extension, bilateral  -MF      Exercise Type (Therapeutic Exercise)  AROM (active range of motion)  -MF      Position (Therapeutic Exercise)  seated  -MF      Sets/Reps (Therapeutic Exercise)  15  -MF      Recorded by [MF] Kari Harrison, PTA 04/09/20 0959      Row Name 04/09/20 0933             Positioning and Restraints    Pre-Treatment Position  in bed  -MF      Post Treatment Position  bed  -MF      In Bed  notified nsg;sitting EOB;call light within reach  -MF      Recorded by [MF] Kari Harrison, PTA 04/09/20 0959      Row Name 04/09/20 0933             Pain Scale: Numbers Pre/Post-Treatment    Pain Scale: Numbers, Pretreatment  0/10 - no pain  -MF      Recorded by [MF] Kari Harrison, PTA 04/09/20 0959      Row Name                Wound 04/04/20 1637 Bilateral medial coccyx Pressure Injury    Wound - Properties Group Date first assessed: 04/04/20 [MC] Time first  assessed: 1637 [MC] Present on Hospital Admission: Y [MC] Side: Bilateral [MC] Orientation: medial [MC] Location: coccyx [MC] Primary Wound Type: Pressure inj [MC] Stage, Pressure Injury: Stage 1 [] Recorded by:  [MELIDA] Madisyn Conley RN 04/04/20 1637      User Key  (r) = Recorded By, (t) = Taken By, (c) = Cosigned By    Initials Name Effective Dates Discipline    Kari Carrero PTA 08/02/16 -  PT    Madisyn Vidal RN 02/03/20 -  Nurse          Wound 04/04/20 1637 Bilateral medial coccyx Pressure Injury (Active)   Dressing Appearance open to air 4/9/2020  8:00 AM   Closure Open to air 4/9/2020  8:00 AM   Base red;non-blanchable 4/9/2020  8:00 AM   Periwound pink 4/9/2020  8:00 AM   Periwound Temperature warm 4/9/2020  8:00 AM   Periwound Skin Turgor soft 4/9/2020  8:00 AM   Drainage Amount none 4/9/2020  8:00 AM   Periwound Care, Wound barrier ointment applied 4/8/2020  7:40 PM               PT Recommendation and Plan     Plan of Care Reviewed With: patient  Progress: no change  Outcome Summary: Pt. agreeable to therapy. He is independent with transfers. He was CGA for walking in room with occasional LOB. He walked 40' before his O2 sat dropped to 83% and he needed to rest. Pt. frustrated that he can't walk in hallway. Will continue to work on strengthening and endurance.     Time Calculation:   PT Charges     Row Name 04/09/20 1001             Time Calculation    Start Time  0933  -      Stop Time  1000  -      Time Calculation (min)  27 min  -      PT Received On  04/09/20  -      PT Goal Re-Cert Due Date  04/17/20  -         Time Calculation- PT    Total Timed Code Minutes- PT  27 minute(s)  -         Timed Charges    15754 - PT Therapeutic Exercise Minutes  10  -MF      81431 - Gait Training Minutes   17  -MF        User Key  (r) = Recorded By, (t) = Taken By, (c) = Cosigned By    Initials Name Provider Type    Kari Carrero PTA Physical Therapy Assistant        Therapy  Charges for Today     Code Description Service Date Service Provider Modifiers Qty    96665563254 HC PT THER PROC EA 15 MIN 4/9/2020 Kari Harrison, JONY GP 1    02660369041 HC GAIT TRAINING EA 15 MIN 4/9/2020 Kari Harrison, JONY GP 1          PT G-Codes  Outcome Measure Options: AM-PAC 6 Clicks Basic Mobility (PT)  AM-PAC 6 Clicks Score (PT): 17    Kari Harrison PTA  4/9/2020

## 2020-04-10 PROCEDURE — 94799 UNLISTED PULMONARY SVC/PX: CPT

## 2020-04-10 PROCEDURE — 97116 GAIT TRAINING THERAPY: CPT

## 2020-04-10 PROCEDURE — 94669 MECHANICAL CHEST WALL OSCILL: CPT

## 2020-04-10 PROCEDURE — 25010000002 METHYLPREDNISOLONE PER 40 MG: Performed by: NURSE PRACTITIONER

## 2020-04-10 PROCEDURE — 25010000002 METHYLPREDNISOLONE PER 40 MG: Performed by: INTERNAL MEDICINE

## 2020-04-10 PROCEDURE — 99232 SBSQ HOSP IP/OBS MODERATE 35: CPT | Performed by: INTERNAL MEDICINE

## 2020-04-10 RX ORDER — METHYLPREDNISOLONE SODIUM SUCCINATE 40 MG/ML
40 INJECTION, POWDER, LYOPHILIZED, FOR SOLUTION INTRAMUSCULAR; INTRAVENOUS EVERY 12 HOURS
Status: DISCONTINUED | OUTPATIENT
Start: 2020-04-10 | End: 2020-04-11

## 2020-04-10 RX ORDER — NICOTINE 21 MG/24HR
1 PATCH, TRANSDERMAL 24 HOURS TRANSDERMAL
Status: DISCONTINUED | OUTPATIENT
Start: 2020-04-10 | End: 2020-04-12 | Stop reason: HOSPADM

## 2020-04-10 RX ADMIN — METHYLPREDNISOLONE SODIUM SUCCINATE 40 MG: 40 INJECTION, POWDER, FOR SOLUTION INTRAMUSCULAR; INTRAVENOUS at 21:59

## 2020-04-10 RX ADMIN — BUDESONIDE AND FORMOTEROL FUMARATE DIHYDRATE 2 PUFF: 160; 4.5 AEROSOL RESPIRATORY (INHALATION) at 19:45

## 2020-04-10 RX ADMIN — IPRATROPIUM BROMIDE AND ALBUTEROL SULFATE 3 ML: 2.5; .5 SOLUTION RESPIRATORY (INHALATION) at 11:05

## 2020-04-10 RX ADMIN — ESCITALOPRAM 20 MG: 10 TABLET, FILM COATED ORAL at 08:06

## 2020-04-10 RX ADMIN — IPRATROPIUM BROMIDE AND ALBUTEROL SULFATE 3 ML: 2.5; .5 SOLUTION RESPIRATORY (INHALATION) at 19:45

## 2020-04-10 RX ADMIN — METHYLPREDNISOLONE SODIUM SUCCINATE 40 MG: 40 INJECTION, POWDER, FOR SOLUTION INTRAMUSCULAR; INTRAVENOUS at 08:06

## 2020-04-10 RX ADMIN — ASPIRIN 81 MG: 81 TABLET ORAL at 08:06

## 2020-04-10 RX ADMIN — GUAIFENESIN 1200 MG: 600 TABLET, EXTENDED RELEASE ORAL at 08:06

## 2020-04-10 RX ADMIN — POLYETHYLENE GLYCOL 3350 17 G: 17 POWDER, FOR SOLUTION ORAL at 08:08

## 2020-04-10 RX ADMIN — GUAIFENESIN 1200 MG: 600 TABLET, EXTENDED RELEASE ORAL at 22:00

## 2020-04-10 RX ADMIN — IPRATROPIUM BROMIDE AND ALBUTEROL SULFATE 3 ML: 2.5; .5 SOLUTION RESPIRATORY (INHALATION) at 14:39

## 2020-04-10 RX ADMIN — PANTOPRAZOLE SODIUM 40 MG: 40 TABLET, DELAYED RELEASE ORAL at 08:06

## 2020-04-10 RX ADMIN — ALPRAZOLAM 0.5 MG: 0.5 TABLET ORAL at 08:06

## 2020-04-10 RX ADMIN — DOXYCYCLINE 100 MG: 100 INJECTION, POWDER, LYOPHILIZED, FOR SOLUTION INTRAVENOUS at 21:59

## 2020-04-10 RX ADMIN — METHYLPREDNISOLONE SODIUM SUCCINATE 40 MG: 40 INJECTION, POWDER, FOR SOLUTION INTRAMUSCULAR; INTRAVENOUS at 00:41

## 2020-04-10 RX ADMIN — METOPROLOL TARTRATE 50 MG: 50 TABLET, FILM COATED ORAL at 22:00

## 2020-04-10 RX ADMIN — IPRATROPIUM BROMIDE AND ALBUTEROL SULFATE 3 ML: 2.5; .5 SOLUTION RESPIRATORY (INHALATION) at 07:08

## 2020-04-10 RX ADMIN — DOXYCYCLINE 100 MG: 100 INJECTION, POWDER, LYOPHILIZED, FOR SOLUTION INTRAVENOUS at 04:26

## 2020-04-10 RX ADMIN — SODIUM CHLORIDE, PRESERVATIVE FREE 10 ML: 5 INJECTION INTRAVENOUS at 08:06

## 2020-04-10 RX ADMIN — MEGESTROL ACETATE 40 MG: 40 TABLET ORAL at 08:06

## 2020-04-10 RX ADMIN — SODIUM CHLORIDE, PRESERVATIVE FREE 10 ML: 5 INJECTION INTRAVENOUS at 22:01

## 2020-04-10 RX ADMIN — ACETAMINOPHEN 650 MG: 325 TABLET, FILM COATED ORAL at 08:12

## 2020-04-10 RX ADMIN — BUDESONIDE AND FORMOTEROL FUMARATE DIHYDRATE 2 PUFF: 160; 4.5 AEROSOL RESPIRATORY (INHALATION) at 07:08

## 2020-04-10 RX ADMIN — ATORVASTATIN CALCIUM 10 MG: 10 TABLET, FILM COATED ORAL at 22:00

## 2020-04-10 RX ADMIN — METOPROLOL TARTRATE 50 MG: 50 TABLET, FILM COATED ORAL at 08:06

## 2020-04-10 NOTE — PROGRESS NOTES
Continued Stay Note   [facility]     Patient Name: Kellie Arzate  MRN: [id number]  Today's Date: 4/10/2020    Admit Date: 4/4/2020    Discharge Plan     Row Name 04/10/20 0940       Plan    Plan  Home    Patient/Family in Agreement with Plan  yes    Plan Comments  Chart reviewed; events noted.  Pt continues to plan to dc home.  SW will follow.  ELVIRA Whittington.         Discharge Codes    No documentation.             ELVIRA Go

## 2020-04-10 NOTE — PROGRESS NOTES
PULMONARY AND CRITICAL CARE PROGRESS NOTE - [facility]    Patient: Kellie Arzate  1955   MR# [id number]   Acct# [id number]  04/10/20   08:20  Referring Provider: Alon Bowens MD    Chief Complaint: Hypoxia  Interval history: He remains on Vapotherm this morning, 30 L, 30% with a resting O2 sat of 91%.  He is upset this morning saying that he has heard voices in the hallway conspiring against him.  He is upset because the staff has told him that he cannot ambulate any further than his room with his current oxygen set up.  RT can try and get him on regular high flow today so that ambulation may be possible.  He has a home trilogy device for chronic hypercapnia however he is not utilizing that.  Nursing is getting ready to give the patient a dose of his scheduled Xanax.    Meds:    aspirin 81 mg Oral Daily   atorvastatin 10 mg Oral Nightly   budesonide-formoterol 2 puff Inhalation BID - RT   doxycycline 100 mg Intravenous Q12H   escitalopram 20 mg Oral Daily   guaiFENesin 1,200 mg Oral Q12H   ipratropium-albuterol 3 mL Nebulization 4x Daily - RT   megestrol 40 mg Oral Daily   methylPREDNISolone sodium succinate 40 mg Intravenous Q8H   metoprolol tartrate 50 mg Oral Q12H   pantoprazole 40 mg Oral Daily   polyethylene glycol 17 g Oral Daily   sodium chloride 10 mL Intravenous Q12H        Review of Systems:   Review of Systems   Constitutional: Negative.    HENT: Negative.    Respiratory: Positive for shortness of breath. Negative for cough.    Cardiovascular: Negative.    Gastrointestinal: Negative.    Skin: Negative.    Neurological: Negative.    Psychiatric/Behavioral: Positive for agitation.        Physical Exam:  SpO2 Percentage    04/10/20 0301 04/10/20 0700 04/10/20 0716   SpO2: 95% 92% 92%     Temp:  [98 °F (36.7 °C)-99 °F (37.2 °C)] 98 °F (36.7 °C)  Heart Rate:  [] 88  Resp:  [18-20] 20  BP: (122-151)/(57-90) 151/90    Intake/Output Summary (Last 24 hours) at 4/10/2020  0820  Last data filed at 4/10/2020 0109  Gross per 24 hour   Intake 600 ml   Output 950 ml   Net -350 ml     Physical Exam   Constitutional: He is oriented to person, place, and time. He appears well-developed. He appears cachectic. Nasal cannula in place.   Currently on Vapotherm, 30 L, 30%   HENT:   Head: Normocephalic and atraumatic.   Eyes: Pupils are equal, round, and reactive to light.   Neck: Normal range of motion. Neck supple.   Cardiovascular: Normal rate and regular rhythm.   Pulmonary/Chest: He has decreased breath sounds (Throughout).   Abdominal: Soft. Bowel sounds are normal. He exhibits no distension.   Musculoskeletal: Normal range of motion.   Neurological: He is alert and oriented to person, place, and time.   Skin: Skin is warm and dry.   Psychiatric: His speech is normal. His affect is inappropriate. He is agitated.   He seems somewhat paranoid   Nursing note and vitals reviewed.       Laboratory Data:  Results from last 7 days   Lab Units 04/09/20  1502 04/06/20  0326 04/05/20  0324   WBC 10*3/mm3 12.02* 14.80* 5.74   HEMOGLOBIN g/dL 13.1 12.1* 12.6*   PLATELETS 10*3/mm3 234 241 234     Results from last 7 days   Lab Units 04/09/20  1523 04/06/20  0326 04/05/20  0324   SODIUM mmol/L 139 139 140   POTASSIUM mmol/L 4.3 4.2 3.8   BUN mg/dL 22 20 11   CREATININE mg/dL 0.43* 0.32* 0.35*     Results from last 7 days   Lab Units 04/04/20  1932 04/04/20  1340   PH, ARTERIAL pH units 7.452* 7.331*   PCO2, ARTERIAL mm Hg 69.7* 92.5*   PO2 ART mm Hg 53.3* 69.7*   FIO2 % 38  --      Blood Culture   Date Value Ref Range Status   04/04/2020 No growth at 5 days  Final   04/04/2020 No growth at 5 days  Final     Recent films:  Xr Chest Pa & Lateral    Result Date: 4/8/2020  1. No significant interval change since 04/04/2020.. 2. Marked emphysematous changes. This report was finalized on 04/08/2020 17:53 by Dr. Kel Purcell MD.    Films reviewed personally by me.  My interpretation: No new today    Pulmonary  Assessment:  1. Acute on chronic hypercapnic and hypoxemic respiratory failure  2. Chronic tobacco use, likely having nicotine withdrawal  3. COPD exacerbation  4. Cachexia  5. Reported dependence to benzodiazepines  6. Anxiety and agitation    Recommend:   · Try and transition off of Vapotherm to regular high flow.Keep sats 88%-92% to avoid CO2 retention.  Sent communication to RT.   · Start nicotine replacement patch  · Okay to increase mobilization once he is off of Vapotherm.  · Continue bronchodilator treatment with Symbicort and DuoNebs.  · Decrease IV Solu-Medrol to 40 mg twice daily  · Recommend smoking cessation and discontinuation of benzodiazepines as this would certainly help his underlying chronic hypercapnia.    Electronically signed by CRISPIN Almanza, 04/10/20, 08:20     Physician substantive portion:  Patient is without new complaints today.  He however has been having some auditory hallucinations.  He remains on a fairly high dose of intravenous steroids.  He also has been trying to get rid of benzodiazepines and that may be part of the trouble.  From respiratory standpoint he is improved.  Breath sounds are diminished.  He does not have overt wheezes today.  We will cut his steroids back today and if he has a better day today than he could probably switch over to oral prednisone tomorrow and home soon after that.  Ambulate as tolerated.  Monitor mental status for any other indications for escalating psychosis, likely related to steroids and his addiction issues.    I have seen and examined patient personally, performing a face-to-face diagnostic evaluation with plan of care reviewed and developed with APRN and nursing staff. I have addended and/or modified the above history of present illness, physical examination, and assessment and plan to reflect my findings and impressions. Essential elements of the care plan were discussed with APRN above.  Agree with findings and  assessment/plan as documented above.    Electronically signed by Matti Young MD, on 4/10/2020, 12:24

## 2020-04-10 NOTE — PLAN OF CARE
Problem: Patient Care Overview  Goal: Plan of Care Review  Outcome: Ongoing (interventions implemented as appropriate)  Flowsheets (Taken 4/10/2020 0353)  Plan of Care Reviewed With: patient  Outcome Summary: VSS. NO C/O PAIN THIS SHIFT. VAPOTHERM MAINTAINED. IV DOXYCYCLINE & SOLUMEDROL GIVEN PER ORDER. S/SA 62-97 ON TELE. SAFETY MAINTAINED. CONTINUE TO MONITOR. WILL NOTIFY MD OF ANY CHANGES.     Problem: Chronic Obstructive Pulmonary Disease (Adult)  Goal: Signs and Symptoms of Listed Potential Problems Will be Absent, Minimized or Managed (Chronic Obstructive Pulmonary Disease)  Description  Signs and symptoms of listed potential problems will be absent, minimized or managed by discharge/transition of care (reference Chronic Obstructive Pulmonary Disease (Adult) CPG).  Outcome: Ongoing (interventions implemented as appropriate)

## 2020-04-10 NOTE — NURSING NOTE
"Pt is very anxious & aggravated. States he \"is tired of visitors coming in and out of his room and he's taking his heart monitor and vapotherm off.\" Pt was informed of the importance of the equipment. Family called and said the only advice we have is to give him a Xanax when he is acting out like this.   "

## 2020-04-10 NOTE — PROGRESS NOTES
[facility] Medicine Services  INPATIENT PROGRESS NOTE    Patient Name: Kellie Arzate  Date of Admission: 4/4/2020  Today's Date: 04/10/20  Length of Stay: 6  Primary Care Physician: Jose Moon MD    Subjective   Chief Complaint: SOA    Doing ok.  Still coughing.  No wheezing  Afebrile  Still SOA on 30L of Vapotherm, Sats improved to 98% today though      Review of Systems   Constitutional: Positive for fatigue. Negative for fever.   HENT: Negative for congestion and ear pain.    Eyes: Negative for redness and visual disturbance.   Respiratory: Positive for cough and shortness of breath. Negative for wheezing.    Cardiovascular: Negative for chest pain and palpitations.   Gastrointestinal: Negative for abdominal pain, diarrhea, nausea and vomiting.   Endocrine: Negative for cold intolerance and heat intolerance.   Genitourinary: Negative for dysuria and frequency.   Musculoskeletal: Negative for arthralgias and back pain.   Skin: Negative for rash and wound.   Neurological: Negative for dizziness and headaches.   Psychiatric/Behavioral: Negative for confusion. The patient is not nervous/anxious.           All pertinent negatives and positives are as above. All other systems have been reviewed and are negative unless otherwise stated.     Objective    Temp:  [98 °F (36.7 °C)-99 °F (37.2 °C)] 98 °F (36.7 °C)  Heart Rate:  [] 88  Resp:  [18-20] 20  BP: (122-151)/(57-90) 151/90  Physical Exam   Constitutional: He is oriented to person, place, and time. He appears well-developed and well-nourished.   HENT:   Head: Normocephalic and atraumatic.   Right Ear: External ear normal.   Left Ear: External ear normal.   Nose: Nose normal.   Mouth/Throat: Oropharynx is clear and moist.   Eyes: Pupils are equal, round, and reactive to light. Conjunctivae and EOM are normal. Right eye exhibits no discharge. Left eye exhibits no discharge. No scleral icterus.   Neck:  Normal range of motion. Neck supple. No tracheal deviation present. No thyromegaly present.   Cardiovascular: Normal rate, regular rhythm, normal heart sounds and intact distal pulses. Exam reveals no gallop and no friction rub.   No murmur heard.  Pulmonary/Chest: Effort normal. No stridor. No respiratory distress. He has decreased breath sounds. He has no wheezes. He has no rales. He exhibits no tenderness.   Abdominal: Soft. Bowel sounds are normal. He exhibits no distension and no mass. There is no tenderness. There is no rebound and no guarding. No hernia.   Musculoskeletal: Normal range of motion. He exhibits no edema or deformity.   Lymphadenopathy:     He has no cervical adenopathy.   Neurological: He is alert and oriented to person, place, and time. He has normal reflexes. He displays normal reflexes. No cranial nerve deficit. He exhibits normal muscle tone. Coordination normal.   Skin: Skin is warm and dry. No rash noted. No erythema. No pallor.   Psychiatric: He has a normal mood and affect. His behavior is normal. Judgment and thought content normal.   Vitals reviewed.        Results Review:  I have reviewed the labs, radiology results, and diagnostic studies.    Laboratory Data:   Results from last 7 days   Lab Units 04/09/20  1502 04/06/20  0326 04/05/20  0324   WBC 10*3/mm3 12.02* 14.80* 5.74   HEMOGLOBIN g/dL 13.1 12.1* 12.6*   HEMATOCRIT % 40.9 38.1 39.9   PLATELETS 10*3/mm3 234 241 234        Results from last 7 days   Lab Units 04/09/20  1523 04/06/20  0326 04/05/20  0324 04/04/20  1118   SODIUM mmol/L 139 139 140 141   POTASSIUM mmol/L 4.3 4.2 3.8 4.7   CHLORIDE mmol/L 95* 90* 89* 89*   CO2 mmol/L 33.0* 39.0* 42.0* 45.0*   BUN mg/dL 22 20 11 10   CREATININE mg/dL 0.43* 0.32* 0.35* 0.36*   CALCIUM mg/dL 9.8 9.7 9.5 9.8   BILIRUBIN mg/dL 0.2  --  0.3 0.3   ALK PHOS U/L 62  --  70 76   ALT (SGPT) U/L 20  --  7 8   AST (SGOT) U/L 19  --  12 12   GLUCOSE mg/dL 142* 149* 162* 123*       Culture Data:      Blood Culture   Date Value Ref Range Status   04/04/2020 No growth at 24 hours  Preliminary   04/04/2020 No growth at 24 hours  Preliminary       Radiology Data:   Imaging Results (Last 24 Hours)     ** No results found for the last 24 hours. **          I have reviewed the patient's current medications.     Assessment/Plan     Active Hospital Problems    Diagnosis   • Hypercarbia   • COPD with acute exacerbation (CMS/HCC)   • Acute on chronic respiratory failure with hypoxia and hypercapnia (CMS/HCC)     1.  COPD AE  -Steroids weaned  -nebs  -mucinex  -flutter  -consult pulm  -Start CPT    2.  Acute on Chronic Respiratory failure with hypoxia and hypercapnia   -Steroids  -nebs  -mucinex  -flutter    3.  Tobacco Abuse  -cessation counseling    4.  HTN  -metoprolol    5.  HLD  -lipitor              Discharge Planning: I expect the patient to be discharged to home in ? days    Alon Bowens MD   04/10/20   10:45

## 2020-04-10 NOTE — THERAPY TREATMENT NOTE
Acute Care - Physical Therapy Treatment Note  [facility]     Patient Name: Kellie Arzate  : 1955  MRN: [id number]  Today's Date: 4/10/2020             Admit Date: 2020    Visit Dx:    ICD-10-CM ICD-9-CM   1. Hypercarbia R06.89 786.09   2. Impaired mobility Z74.09 799.89     Patient Active Problem List   Diagnosis   • Aspiration pneumonia of left lung (CMS/HCC)   • Abnormal chest x-ray, findings apex right lung suspect TB   • Acute on chronic respiratory failure with hypercapnia (CMS/HCC)   • Tobacco dependence   • Anemia   • Protein-calorie malnutrition (CMS/HCC)   • Hypercarbia   • COPD with acute exacerbation (CMS/HCC)   • Acute on chronic respiratory failure with hypoxia and hypercapnia (CMS/HCC)       Therapy Treatment    Rehabilitation Treatment Summary     Row Name 04/10/20 0920             Treatment Time/Intention    Discipline  physical therapy assistant  -MF      Document Type  therapy note (daily note)  -MF2      Subjective Information  complains of;fatigue  -MF2      Mode of Treatment  physical therapy  -MF2      Existing Precautions/Restrictions  fall;oxygen therapy device and L/min vapotherm  -MF2      Patient Response to Treatment  Pt does not always say things that make sense and appears slightly confused at times.   -MF2      Recorded by [MF] Kari Harrison, JONY 04/10/20 0920  [MF2] Kari Harrison, PTA 04/10/20 0945      Row Name 04/10/20 0920             Vital Signs    Pre SpO2 (%)  97  -MF      O2 Delivery Pre Treatment  other (see comments) vapotherm  -MF      Intra SpO2 (%)  98  -MF      O2 Delivery Intra Treatment  non-rebreather 10L  -MF      Post SpO2 (%)  97  -MF      O2 Delivery Post Treatment  other (see comments) vapotherm  -MF      Pre Patient Position  Sitting  -MF      Intra Patient Position  Standing  -MF      Post Patient Position  Sitting  -MF      Recorded by [MF] Kari Harrison PTA 04/10/20 0945      Row Name 04/10/20 0920             Bed Mobility  Assessment/Treatment    Supine-Sit [place] (Bed Mobility)  independent  -MF      Sit-Supine [place] (Bed Mobility)  independent  -MF      Recorded by [MF] Kari Harrison, PTA 04/10/20 0945      Row Name 04/10/20 0920             Sit-Stand Transfer    Sit-Stand [place] (Transfers)  independent  -MF      Recorded by [MF] Kari Harrison, PTA 04/10/20 0945      Row Name 04/10/20 0920             Stand-Sit Transfer    Stand-Sit [place] (Transfers)  independent  -MF      Recorded by [MF] Kari Harrison, PTA 04/10/20 0945      Row Name 04/10/20 0920             Gait/Stairs Assessment/Training    [place] Level (Gait)  contact guard  -MF      Assistive Device (Gait)  -- pt insisted on pushing O2 tank  -MF      Distance in Feet (Gait)  50  -MF      Deviations/Abnormal Patterns (Gait)  stride length decreased;josé miguel decreased;base of support, narrow  -MF      Bilateral Gait Deviations  heel strike decreased  -MF      Comment (Gait/Stairs)  pt did scissor once and have LOB. But could be due to walking in small room.   -MF      Recorded by [MF] Kari Harrison, PTA 04/10/20 0945      Row Name 04/10/20 0920             Therapeutic Exercise    Comment (Therapeutic Exercise)  pt declined to do any more activity after walking.   -MF      Recorded by [MF] Kari Harrison, PTA 04/10/20 0945      Row Name 04/10/20 0920             Positioning and Restraints    Pre-Treatment Position  in bed  -MF      Post Treatment Position  bed  -MF      In Bed  fowlers;call light within reach;encouraged to call for assist;side rails up x2  -MF      Recorded by [MF] Kari Harrison, PTA 04/10/20 0945      Row Name 04/10/20 0920             Pain Scale: Numbers Pre/Post-Treatment    Pain Scale: Numbers, Pretreatment  0/10 - no pain  -MF      Recorded by [MF] Kari Harrison, PTA 04/10/20 0945      Row Name                Wound 04/04/20 1637 Bilateral medial coccyx Pressure Injury    Wound -  Properties Group Date first assessed: 04/04/20 [MC] Time first assessed: 1637 [MC] Present on Hospital Admission: Y [MC] Side: Bilateral [MC] Orientation: medial [MC] Location: coccyx [MC] Primary Wound Type: Pressure inj [MC] Stage, Pressure Injury: Stage 1 [] Recorded by:  [] Madisyn Conley RN 04/04/20 1637      User Key  (r) = Recorded By, (t) = Taken By, (c) = Cosigned By    Initials Name Effective Dates Discipline     Kari Harrison, PTA 08/02/16 -  PT    Madisyn Vidal RN 02/03/20 -  Nurse          Wound 04/04/20 1637 Bilateral medial coccyx Pressure Injury (Active)   Dressing Appearance open to air 4/9/2020  7:18 PM   Closure Open to air 4/9/2020  7:18 PM   Base red;non-blanchable 4/9/2020  7:18 PM   Periwound pink 4/9/2020  7:18 PM   Periwound Temperature warm 4/9/2020  7:18 PM   Periwound Skin Turgor soft 4/9/2020  7:18 PM   Drainage Amount none 4/9/2020  7:18 PM   Periwound Care, Wound barrier ointment applied 4/9/2020  7:18 PM               PT Recommendation and Plan     Plan of Care Reviewed With: patient  Progress: no change  Outcome Summary: Pt. agreeable to therapy. Pt. again wanting to walk in hallway, but due to infection risks at this time we are not allowed. Pt. was independent for bed mobility and transfers. He walked 50' in room, but c/o dizziness from walking in room. He did have a LOB x 1 with min assist to correct. Pt walked on non-rebreather mask at 10L and O2 sat was 98%. Will continue to work on mobility as able.     Time Calculation:   PT Charges     Row Name 04/10/20 0948             Time Calculation    Start Time  0920  -      Stop Time  0943  -      Time Calculation (min)  23 min  -      PT Received On  04/10/20  -      PT Goal Re-Cert Due Date  04/17/20  -         Time Calculation- PT    Total Timed Code Minutes- PT  23 minute(s)  -         Timed Charges    62566 - Gait Training Minutes   23  -        User Key  (r) = Recorded By, (t) = Taken By, (c) =  Cosigned By    Initials Name Provider Type    Kari Carrero PTA Physical Therapy Assistant        Therapy Charges for Today     Code Description Service Date Service Provider Modifiers Qty    72131590819 HC PT THER PROC EA 15 MIN 4/9/2020 Kari Harrison PTA GP 1    05336586380 HC GAIT TRAINING EA 15 MIN 4/9/2020 Kari Harrison, JONY GP 1    28549848492 HC GAIT TRAINING EA 15 MIN 4/10/2020 Kari Harrison PTA GP 2          PT G-Codes  Outcome Measure Options: AM-PAC 6 Clicks Basic Mobility (PT)  AM-PAC 6 Clicks Score (PT): 17    Kari Harrison PTA  4/10/2020

## 2020-04-10 NOTE — PLAN OF CARE
Problem: Patient Care Overview  Goal: Plan of Care Review  Outcome: Ongoing (interventions implemented as appropriate)  Flowsheets (Taken 4/10/2020 2846)  Progress: no change  Plan of Care Reviewed With: patient  Outcome Summary: Pt. agreeable to therapy. Pt. again wanting to walk in hallway, but due to infection risks at this time we are not allowed. Pt. was independent for bed mobility and transfers. He walked 50' in room, but c/o dizziness from walking in room. He did have a LOB x 1 with min assist to correct. Pt walked on non-rebreather mask at 10L and O2 sat was 98%. Will continue to work on mobility as able.

## 2020-04-11 PROCEDURE — 94799 UNLISTED PULMONARY SVC/PX: CPT

## 2020-04-11 PROCEDURE — 97110 THERAPEUTIC EXERCISES: CPT

## 2020-04-11 PROCEDURE — 97116 GAIT TRAINING THERAPY: CPT

## 2020-04-11 PROCEDURE — 63710000001 PREDNISONE PER 1 MG: Performed by: INTERNAL MEDICINE

## 2020-04-11 PROCEDURE — 94669 MECHANICAL CHEST WALL OSCILL: CPT

## 2020-04-11 PROCEDURE — 25010000002 METHYLPREDNISOLONE PER 40 MG: Performed by: NURSE PRACTITIONER

## 2020-04-11 PROCEDURE — 99232 SBSQ HOSP IP/OBS MODERATE 35: CPT | Performed by: INTERNAL MEDICINE

## 2020-04-11 RX ORDER — PREDNISONE 20 MG/1
20 TABLET ORAL 2 TIMES DAILY WITH MEALS
Status: DISCONTINUED | OUTPATIENT
Start: 2020-04-11 | End: 2020-04-12 | Stop reason: HOSPADM

## 2020-04-11 RX ADMIN — PANTOPRAZOLE SODIUM 40 MG: 40 TABLET, DELAYED RELEASE ORAL at 08:20

## 2020-04-11 RX ADMIN — PREDNISONE 20 MG: 20 TABLET ORAL at 18:09

## 2020-04-11 RX ADMIN — IPRATROPIUM BROMIDE AND ALBUTEROL SULFATE 3 ML: 2.5; .5 SOLUTION RESPIRATORY (INHALATION) at 15:03

## 2020-04-11 RX ADMIN — IPRATROPIUM BROMIDE AND ALBUTEROL SULFATE 3 ML: 2.5; .5 SOLUTION RESPIRATORY (INHALATION) at 06:31

## 2020-04-11 RX ADMIN — ACETAMINOPHEN 650 MG: 325 TABLET, FILM COATED ORAL at 08:20

## 2020-04-11 RX ADMIN — ESCITALOPRAM 20 MG: 10 TABLET, FILM COATED ORAL at 08:20

## 2020-04-11 RX ADMIN — ALPRAZOLAM 0.5 MG: 0.5 TABLET ORAL at 00:27

## 2020-04-11 RX ADMIN — IPRATROPIUM BROMIDE AND ALBUTEROL SULFATE 3 ML: 2.5; .5 SOLUTION RESPIRATORY (INHALATION) at 11:31

## 2020-04-11 RX ADMIN — DOXYCYCLINE 100 MG: 100 INJECTION, POWDER, LYOPHILIZED, FOR SOLUTION INTRAVENOUS at 05:25

## 2020-04-11 RX ADMIN — METOPROLOL TARTRATE 50 MG: 50 TABLET, FILM COATED ORAL at 08:20

## 2020-04-11 RX ADMIN — METOPROLOL TARTRATE 50 MG: 50 TABLET, FILM COATED ORAL at 20:06

## 2020-04-11 RX ADMIN — GUAIFENESIN 1200 MG: 600 TABLET, EXTENDED RELEASE ORAL at 20:06

## 2020-04-11 RX ADMIN — BUDESONIDE AND FORMOTEROL FUMARATE DIHYDRATE 2 PUFF: 160; 4.5 AEROSOL RESPIRATORY (INHALATION) at 19:39

## 2020-04-11 RX ADMIN — GUAIFENESIN 1200 MG: 600 TABLET, EXTENDED RELEASE ORAL at 08:20

## 2020-04-11 RX ADMIN — ACETAMINOPHEN 650 MG: 325 TABLET, FILM COATED ORAL at 00:27

## 2020-04-11 RX ADMIN — ALPRAZOLAM 0.5 MG: 0.5 TABLET ORAL at 17:25

## 2020-04-11 RX ADMIN — IPRATROPIUM BROMIDE AND ALBUTEROL SULFATE 3 ML: 2.5; .5 SOLUTION RESPIRATORY (INHALATION) at 19:36

## 2020-04-11 RX ADMIN — SODIUM CHLORIDE, PRESERVATIVE FREE 10 ML: 5 INJECTION INTRAVENOUS at 08:20

## 2020-04-11 RX ADMIN — BUDESONIDE AND FORMOTEROL FUMARATE DIHYDRATE 2 PUFF: 160; 4.5 AEROSOL RESPIRATORY (INHALATION) at 06:41

## 2020-04-11 RX ADMIN — SODIUM CHLORIDE, PRESERVATIVE FREE 10 ML: 5 INJECTION INTRAVENOUS at 20:06

## 2020-04-11 RX ADMIN — ATORVASTATIN CALCIUM 10 MG: 10 TABLET, FILM COATED ORAL at 20:06

## 2020-04-11 RX ADMIN — ALPRAZOLAM 0.5 MG: 0.5 TABLET ORAL at 08:21

## 2020-04-11 RX ADMIN — MEGESTROL ACETATE 40 MG: 40 TABLET ORAL at 08:20

## 2020-04-11 RX ADMIN — METHYLPREDNISOLONE SODIUM SUCCINATE 40 MG: 40 INJECTION, POWDER, FOR SOLUTION INTRAMUSCULAR; INTRAVENOUS at 08:21

## 2020-04-11 RX ADMIN — POLYETHYLENE GLYCOL 3350 17 G: 17 POWDER, FOR SOLUTION ORAL at 08:20

## 2020-04-11 RX ADMIN — ASPIRIN 81 MG: 81 TABLET ORAL at 08:20

## 2020-04-11 NOTE — PROGRESS NOTES
"    [facility] Medicine Services  INPATIENT PROGRESS NOTE    Patient Name: Kellie Arzate  Date of Admission: 4/4/2020  Today's Date: 04/11/20  Length of Stay: 7  Primary Care Physician: Jose Moon MD    Subjective   Chief Complaint: SOA    Doing ok.  Afebrile.  Less SOA, now bulmaro 3L NC which is what he wears at home.  He is very anxious and seems a bit delusional this AM.  He ways \"Dr. Young and his helper accused him of something.\"  When I ask what he's been accused of he says he doesn't know.      Review of Systems   Constitutional: Positive for fatigue. Negative for fever.   HENT: Negative for congestion and ear pain.    Eyes: Negative for redness and visual disturbance.   Respiratory: Positive for cough and shortness of breath. Negative for wheezing.    Cardiovascular: Negative for chest pain and palpitations.   Gastrointestinal: Negative for abdominal pain, diarrhea, nausea and vomiting.   Endocrine: Negative for cold intolerance and heat intolerance.   Genitourinary: Negative for dysuria and frequency.   Musculoskeletal: Negative for arthralgias and back pain.   Skin: Negative for rash and wound.   Neurological: Negative for dizziness and headaches.   Psychiatric/Behavioral: Negative for confusion. The patient is not nervous/anxious.           All pertinent negatives and positives are as above. All other systems have been reviewed and are negative unless otherwise stated.     Objective    Temp:  [97.2 °F (36.2 °C)-98.8 °F (37.1 °C)] 97.2 °F (36.2 °C)  Heart Rate:  [] 83  Resp:  [16-20] 18  BP: (114-154)/(65-87) 133/72  Physical Exam   Constitutional: He is oriented to person, place, and time. He appears well-developed and well-nourished.   HENT:   Head: Normocephalic and atraumatic.   Right Ear: External ear normal.   Left Ear: External ear normal.   Nose: Nose normal.   Mouth/Throat: Oropharynx is clear and moist.   Eyes: Pupils are equal, round, and " reactive to light. Conjunctivae and EOM are normal. Right eye exhibits no discharge. Left eye exhibits no discharge. No scleral icterus.   Neck: Normal range of motion. Neck supple. No tracheal deviation present. No thyromegaly present.   Cardiovascular: Normal rate, regular rhythm, normal heart sounds and intact distal pulses. Exam reveals no gallop and no friction rub.   No murmur heard.  Pulmonary/Chest: Effort normal. No stridor. No respiratory distress. He has decreased breath sounds. He has no wheezes. He has no rales. He exhibits no tenderness.   Abdominal: Soft. Bowel sounds are normal. He exhibits no distension and no mass. There is no tenderness. There is no rebound and no guarding. No hernia.   Musculoskeletal: Normal range of motion. He exhibits no edema or deformity.   Lymphadenopathy:     He has no cervical adenopathy.   Neurological: He is alert and oriented to person, place, and time. He has normal reflexes. He displays normal reflexes. No cranial nerve deficit. He exhibits normal muscle tone. Coordination normal.   Skin: Skin is warm and dry. No rash noted. No erythema. No pallor.   Psychiatric: He has a normal mood and affect. His behavior is normal. Judgment and thought content normal.   Vitals reviewed.        Results Review:  I have reviewed the labs, radiology results, and diagnostic studies.    Laboratory Data:   Results from last 7 days   Lab Units 04/09/20  1502 04/06/20  0326 04/05/20  0324   WBC 10*3/mm3 12.02* 14.80* 5.74   HEMOGLOBIN g/dL 13.1 12.1* 12.6*   HEMATOCRIT % 40.9 38.1 39.9   PLATELETS 10*3/mm3 234 241 234        Results from last 7 days   Lab Units 04/09/20  1523 04/06/20  0326 04/05/20  0324   SODIUM mmol/L 139 139 140   POTASSIUM mmol/L 4.3 4.2 3.8   CHLORIDE mmol/L 95* 90* 89*   CO2 mmol/L 33.0* 39.0* 42.0*   BUN mg/dL 22 20 11   CREATININE mg/dL 0.43* 0.32* 0.35*   CALCIUM mg/dL 9.8 9.7 9.5   BILIRUBIN mg/dL 0.2  --  0.3   ALK PHOS U/L 62  --  70   ALT (SGPT) U/L 20  --   7   AST (SGOT) U/L 19  --  12   GLUCOSE mg/dL 142* 149* 162*       Culture Data:   Blood Culture   Date Value Ref Range Status   04/04/2020 No growth at 24 hours  Preliminary   04/04/2020 No growth at 24 hours  Preliminary       Radiology Data:   Imaging Results (Last 24 Hours)     ** No results found for the last 24 hours. **          I have reviewed the patient's current medications.     Assessment/Plan     Active Hospital Problems    Diagnosis   • Hypercarbia   • COPD with acute exacerbation (CMS/HCC)   • Acute on chronic respiratory failure with hypoxia and hypercapnia (CMS/HCC)     1.  COPD AE  -Steroids weaned to oral  -nebs  -mucinex  -flutter  -consult pulm  -Start CPT    2.  Acute on Chronic Respiratory failure with hypoxia and hypercapnia   -Steroids  -nebs  -mucinex  -flutter    3.  Tobacco Abuse  -cessation counseling    4.  HTN  -metoprolol    5.  HLD  -lipitor    Continue PT/OT.  Wean steroids to PO Prednisone.  Will d/c to home with home health tomrrow          Discharge Planning: I expect the patient to be discharged to home with home health tomorrow    Alon Bowens MD   04/11/20   11:45

## 2020-04-11 NOTE — PROGRESS NOTES
PROGRESS NOTE  Patient Name: Kellie Arzate  Age/Sex: 65 y.o. male  : 1955  MRN: [id number]    Date of Admission: 2020  Date of Encounter Visit: 20   LOS: 7 days   Patient Care Team:  Jose Moon MD as PCP - General  Jose Moon MD as PCP - Family Medicine    Chief Complaint: Feeling better    Hospital course: COPD with acute on chronic respiratory failure, usually is on 3 L/min nasal cannula oxygen at home, was on the Vapotherm and was gradually weaned and he was down to 3 L today with sats in the high 90s.  He is afebrile, he is alert but anxious, he is trying to advance his p.o. diet, he has no abdominal pain nausea or vomiting no chest pain    Interval History: Slightly anxious but overall no distress, talking in slightly short sentences    REVIEW OF SYSTEMS:   CONSTITUTIONAL: no fever or chills  CARDIOVASCULAR: No chest pain, chest pressure or chest discomfort. No palpitations or edema.   RESPIRATORY: Improved shortness of breath, minimal cough.   GASTROINTESTINAL: No anorexia, nausea, vomiting or diarrhea. No abdominal pain or blood.   HEMATOLOGIC: No bleeding or bruising.     Ventilator/Non-Invasive Ventilation Settings (From admission, onward)     Start     Ordered    20 1612  BIPAP  Until Discontinued,   Status:  Canceled     Question Answer Comment   Type: BIPAP    NIPPV Mask Interface: Full Face Mask        20 1611    20 1447  BIPAP  Until Discontinued,   Status:  Canceled     Question Answer Comment   Type: BIPAP    NIPPV Mask Interface: Full Face Mask        20 1447                  Vital Signs  Temp:  [97.2 °F (36.2 °C)-98.8 °F (37.1 °C)] 97.2 °F (36.2 °C)  Heart Rate:  [] 76  Resp:  [16-20] 16  BP: (114-154)/(65-87) 133/72  SpO2:  [92 %-100 %] 94 %  on  Flow (L/min):  [3-7] 3 Device (Oxygen Therapy): nasal cannula    Intake/Output Summary (Last 24 hours) at 2020 1117  Last data filed at 2020 0903  Gross per  "24 hour   Intake 240 ml   Output 1425 ml   Net -1185 ml     Flowsheet Rows      First Filed Value   Admission Height  165.1 cm (65\") Documented at 04/04/2020 1115   Admission Weight  41.3 kg (91 lb) Documented at 04/04/2020 1115        Body mass index is 15.31 kg/m².      04/09/20  1129 04/09/20  2146 04/10/20  2019   Weight: 42.3 kg (93 lb 4.1 oz) 42.9 kg (94 lb 8 oz) 41.7 kg (92 lb)       Physical Exam:  GEN:  No acute distress, alert, cooperative, well developed, on nasal cannula oxygen, cachectic, anxious  EYES:   Sclerae clear. No icterus. PERRL. Normal EOM  ENT:   External ears/nose normal, no oral lesions, no thrush, mucous membranes moist  NECK:  Supple, midline trachea, no JVD  LUNGS: Normal chest on inspection, patient has diminished breath sounds bilaterally but there is no wheezing or prolongation of the expiratory phase, no wheezes. No rhonchi. No crackles. Respirations regular, even and unlabored.   CV:  Regular rhythm and rate. Normal S1/S2. No murmurs, gallops, or rubs noted.  ABD:  Soft, nontender and nondistended. Normal bowel sounds. No guarding  EXT:  Moves all extremities well. No cyanosis. No redness. No edema.   Skin: Dry, intact, no bleeding    Results Review:      Results from last 7 days   Lab Units 04/09/20  1523 04/06/20  0326 04/05/20  0324 04/04/20  1118   SODIUM mmol/L 139 139 140 141   POTASSIUM mmol/L 4.3 4.2 3.8 4.7   CHLORIDE mmol/L 95* 90* 89* 89*   CO2 mmol/L 33.0* 39.0* 42.0* 45.0*   BUN mg/dL 22 20 11 10   CREATININE mg/dL 0.43* 0.32* 0.35* 0.36*   CALCIUM mg/dL 9.8 9.7 9.5 9.8   AST (SGOT) U/L 19  --  12 12   ALT (SGPT) U/L 20  --  7 8   ANION GAP mmol/L 11.0 10.0 9.0 7.0   ALBUMIN g/dL 4.30  --  3.90 3.80     Results from last 7 days   Lab Units 04/04/20  1118   TROPONIN T ng/mL <0.010     Results from last 7 days   Lab Units 04/05/20  0324   TSH uIU/mL 0.284     Results from last 7 days   Lab Units 04/04/20  1118   PROBNP pg/mL 242.9     Results from last 7 days   Lab Units " 04/09/20  1502 04/06/20  0326 04/05/20  0324 04/04/20  1118   WBC 10*3/mm3 12.02* 14.80* 5.74 7.33   HEMOGLOBIN g/dL 13.1 12.1* 12.6* 13.8   HEMATOCRIT % 40.9 38.1 39.9 44.5   PLATELETS 10*3/mm3 234 241 234 224   MCV fL 91.1 90.9 93.2 96.1   NEUTROPHIL % % 92.5* 92.5* 90.1* 77.7*   LYMPHOCYTE % % 3.5* 4.9* 8.7* 12.8*   MONOCYTES % % 3.3* 1.8* 0.7* 8.3   EOSINOPHIL % % 0.0* 0.0* 0.0* 0.5   BASOPHIL % % 0.1 0.1 0.0 0.4   IMM GRAN % % 0.6* 0.7* 0.5 0.3             Results from last 7 days   Lab Units 04/05/20  0324   CHOLESTEROL mg/dL 148   TRIGLYCERIDES mg/dL 88   HDL CHOL mg/dL 50     Results from last 7 days   Lab Units 04/04/20  1932 04/04/20  1340   PH, ARTERIAL pH units 7.452* 7.331*   PCO2, ARTERIAL mm Hg 69.7* 92.5*   PO2 ART mm Hg 53.3* 69.7*   HCO3 ART mmol/L 48.6* 48.9*     Results from last 7 days   Lab Units 04/05/20  0324   HEMOGLOBIN A1C % 5.00     No results found for: POCGLU  Results from last 7 days   Lab Units 04/04/20  1118   LACTATE mmol/L 0.9     Results from last 7 days   Lab Units 04/04/20  1517 04/04/20  1118   BLOODCX  No growth at 5 days No growth at 5 days                       Imaging:       I reviewed the patient's new clinical results.  I personally viewed and interpreted the patient's imaging results: No new films for me to review        Medication Review:     aspirin 81 mg Oral Daily   atorvastatin 10 mg Oral Nightly   budesonide-formoterol 2 puff Inhalation BID - RT   escitalopram 20 mg Oral Daily   guaiFENesin 1,200 mg Oral Q12H   ipratropium-albuterol 3 mL Nebulization 4x Daily - RT   megestrol 40 mg Oral Daily   methylPREDNISolone sodium succinate 40 mg Intravenous Q12H   metoprolol tartrate 50 mg Oral Q12H   nicotine 1 patch Transdermal Q24H   pantoprazole 40 mg Oral Daily   polyethylene glycol 17 g Oral Daily   sodium chloride 10 mL Intravenous Q12H            ASSESSMENT:   1. Acute on chronic hypercapnic and hypoxemic respiratory failure  2. Chronic tobacco use, likely having  nicotine withdrawal  3. COPD exacerbation  4. Cachexia  5. Reported dependence to benzodiazepines  6. Anxiety and agitation    PLAN:  Patient is looking better, we will transition from the IV steroids to p.o. prednisone  Agree to continue to hold on the Xanax  Bronchodilators to be continued as ordered  Discussed with the patient details, will follow  Records including prior notes were reviewed  Disposition: Per primary    Rabia Blount MD  04/11/20  11:17             Dictated utilizing Dragon dictation

## 2020-04-11 NOTE — PLAN OF CARE
Problem: Patient Care Overview  Goal: Plan of Care Review  Outcome: Ongoing (interventions implemented as appropriate)  Flowsheets (Taken 4/11/2020 1101)  Outcome Summary: Pt. is independent in bed mobility and transfers. Ambulated 90' in room with CGA. Narrow base of support and decreased step length. Actively worked thru LE ex's. Will benefit from strengthening activities.

## 2020-04-11 NOTE — PLAN OF CARE
Problem: Patient Care Overview  Goal: Plan of Care Review  Outcome: Ongoing (interventions implemented as appropriate)  Flowsheets (Taken 4/11/2020 4742)  Progress: no change  Plan of Care Reviewed With: patient  Goal: Individualization and Mutuality  Outcome: Ongoing (interventions implemented as appropriate)  Goal: Discharge Needs Assessment  Outcome: Ongoing (interventions implemented as appropriate)  Goal: Interprofessional Rounds/Family Conf  Outcome: Ongoing (interventions implemented as appropriate)     Problem: Fall Risk (Adult)  Goal: Absence of Fall  Outcome: Ongoing (interventions implemented as appropriate)     Problem: Skin Injury Risk (Adult)  Goal: Skin Health and Integrity  Outcome: Ongoing (interventions implemented as appropriate)     Problem: Chronic Obstructive Pulmonary Disease (Adult)  Goal: Signs and Symptoms of Listed Potential Problems Will be Absent, Minimized or Managed (Chronic Obstructive Pulmonary Disease)  Outcome: Ongoing (interventions implemented as appropriate)     Problem: Nutrition, Imbalanced: Inadequate Oral Intake (Adult)  Goal: Improved Oral Intake  Outcome: Ongoing (interventions implemented as appropriate)  Goal: Prevent Further Weight Loss  Outcome: Ongoing (interventions implemented as appropriate)

## 2020-04-11 NOTE — THERAPY TREATMENT NOTE
Acute Care - Physical Therapy Treatment Note  [facility]     Patient Name: Kellie Arzate  : 1955  MRN: [id number]  Today's Date: 2020             Admit Date: 2020    Visit Dx:    ICD-10-CM ICD-9-CM   1. Hypercarbia R06.89 786.09   2. Impaired mobility Z74.09 799.89     Patient Active Problem List   Diagnosis   • Aspiration pneumonia of left lung (CMS/HCC)   • Abnormal chest x-ray, findings apex right lung suspect TB   • Acute on chronic respiratory failure with hypercapnia (CMS/HCC)   • Tobacco dependence   • Anemia   • Protein-calorie malnutrition (CMS/HCC)   • Hypercarbia   • COPD with acute exacerbation (CMS/HCC)   • Acute on chronic respiratory failure with hypoxia and hypercapnia (CMS/HCC)       Therapy Treatment    Rehabilitation Treatment Summary     Row Name 20 1036             Treatment Time/Intention    Discipline  physical therapy assistant  -GOPAL      Document Type  therapy note (daily note)  -GOPAL      Subjective Information  no complaints  -JP2      Comment  confused at times  -JP2      Existing Precautions/Restrictions  fall;oxygen therapy device and L/min  -GOPAL      Recorded by [GOPAL] Mer Aly, PTA 20 1039  [JP2] Mer Aly, [facility] 20 1059      Row Name 20 1036             Vital Signs    Pre SpO2 (%)  94  -GOPAL      O2 Delivery Pre Treatment  supplemental O2 3lpm  -GOPAL      Intra SpO2 (%)  91  -GOPAL      O2 Delivery Intra Treatment  supplemental O2  -GOPAL      Post SpO2 (%)  92  -GOPAL      O2 Delivery Post Treatment  supplemental O2  -GOPAL      Recorded by [GOPAL] Mer Aly, PTA 20 1059      Row Name 20 1036             Bed Mobility Assessment/Treatment    Comment (Bed Mobility)  sitting EOB  -GOPAL      Recorded by [GOPAL] Mer Aly, [facility] 20 1059      Row Name 20 1036             Sit-Stand Transfer    Sit-Stand [place] (Transfers)  independent  -GOPAL      Recorded by [GOPAL] Mer Aly, PTA 20 1059      Row Name  04/11/20 1036             Stand-Sit Transfer    Stand-Sit [place] (Transfers)  independent  -GOPAL      Recorded by [GOPAL] Mer Aly, [facility] 04/11/20 1059      Row Name 04/11/20 1036             Gait/Stairs Assessment/Training    [place] Level (Gait)  contact guard  -GOPAL      Distance in Feet (Gait)  90  -JP2      Deviations/Abnormal Patterns (Gait)  base of support, narrow;gait speed decreased;stride length decreased  -JP2      Recorded by [GOPAL] Mer Aly, [facility] 04/11/20 1059  [JP2] Mer Aly, [facility] 04/11/20 1101      Row Name 04/11/20 1036             Therapeutic Exercise    Lower Extremity Range of Motion (Therapeutic Exercise)  hip flexion/extension, bilateral;hip abduction/adduction, bilateral;knee flexion/extension, bilateral;ankle dorsiflexion/plantar flexion, bilateral  -GOPAL      Exercise Type (Therapeutic Exercise)  AROM (active range of motion)  -GOPAL      Position (Therapeutic Exercise)  seated  -GOPAL      Sets/Reps (Therapeutic Exercise)  20  -GOPAL      Recorded by [GOPAL] Mer Aly, [facility] 04/11/20 1101      Row Name 04/11/20 1036             Positioning and Restraints    Pre-Treatment Position  in bed  -GOPAL      Post Treatment Position  bed  -GOPAL      In Bed  sitting EOB;call light within reach;encouraged to call for assist  -GOPAL      Recorded by [GOPAL] Mer Aly, [facility] 04/11/20 1101      Row Name 04/11/20 1036             Pain Scale: Numbers Pre/Post-Treatment    Pain Scale: Numbers, Pretreatment  0/10 - no pain  -GOPAL      Recorded by [GOPAL] Mer Aly, [facility] 04/11/20 1101      Row Name                Wound 04/04/20 1637 Bilateral medial coccyx Pressure Injury    Wound - Properties Group Date first assessed: 04/04/20 [MC] Time first assessed: 1637 [MC] Present on Hospital Admission: Y [MC] Side: Bilateral [MC] Orientation: medial [MC] Location: coccyx [MC] Primary Wound Type: Pressure inj [MC] Stage, Pressure Injury: Stage 1 [MC] Recorded by:  [MC] Madisyn Conley RN 04/04/20 1637       User Key  (r) = Recorded By, (t) = Taken By, (c) = Cosigned By    Initials Name Effective Dates Discipline    Mer Will PTA 08/02/16 -  PT    Madisyn Vidal RN 02/03/20 -  Nurse          Wound 04/04/20 1637 Bilateral medial coccyx Pressure Injury (Active)   Dressing Appearance open to air 4/10/2020  8:19 PM   Closure Open to air 4/10/2020  8:19 PM   Base red;non-blanchable 4/10/2020  8:19 PM   Drainage Amount none 4/10/2020  8:19 PM               PT Recommendation and Plan     Outcome Summary: Pt. is independent in bed mobility and transfers. Ambulated 90' in room with CGA. Narrow base of support and decreased step length. Actively worked thru LE ex's. Will benefit from strengthening activities.     Time Calculation:   PT Charges     Row Name 04/11/20 1103             Time Calculation    Start Time  1036  -GOPAL      Stop Time  1059  -GOPAL      Time Calculation (min)  23 min  -GOPAL      PT Received On  04/11/20  -GOPAL         Timed Charges    38483 - PT Therapeutic Exercise Minutes  12  -GOPAL      58285 - Gait Training Minutes   11  -GOPAL        User Key  (r) = Recorded By, (t) = Taken By, (c) = Cosigned By    Initials Name Provider Type    Mer Will PTA Physical Therapy Assistant        Therapy Charges for Today     Code Description Service Date Service Provider Modifiers Qty    94795729672 HC GAIT TRAINING EA 15 MIN 4/11/2020 Mer Aly PTA GP 1    84290763972 HC PT THER PROC EA 15 MIN 4/11/2020 Mer Aly PTA GP 1          PT G-Codes  Outcome Measure Options: AM-PAC 6 Clicks Basic Mobility (PT)  AM-PAC 6 Clicks Score (PT): 17    Mer Aly PTA  4/11/2020

## 2020-04-11 NOTE — PROGRESS NOTES
Continued Stay Note   [place]     Patient Name: Kellie Arzate  MRN: [id number]  Today's Date: 4/11/2020    Admit Date: 4/4/2020    Discharge Plan     Row Name 04/11/20 1054       Plan    Plan Comments  SW spoke with Dr. Bowens this AM in regards to mentioning rehab. Pt has denied rehab at this time due to COVID-19 and wants to return home. Pt states that he has plenty of caretakers at home and is requesting [facility] at discharge. Sw will follow and assist with discharge needs.         Discharge Codes    No documentation.             Masha Ramirez

## 2020-04-12 VITALS
BODY MASS INDEX: 15.33 KG/M2 | TEMPERATURE: 98.2 F | WEIGHT: 92 LBS | RESPIRATION RATE: 16 BRPM | HEART RATE: 78 BPM | SYSTOLIC BLOOD PRESSURE: 102 MMHG | DIASTOLIC BLOOD PRESSURE: 67 MMHG | OXYGEN SATURATION: 94 % | HEIGHT: 65 IN

## 2020-04-12 PROCEDURE — 94669 MECHANICAL CHEST WALL OSCILL: CPT

## 2020-04-12 PROCEDURE — 94799 UNLISTED PULMONARY SVC/PX: CPT

## 2020-04-12 PROCEDURE — 97110 THERAPEUTIC EXERCISES: CPT

## 2020-04-12 PROCEDURE — 99232 SBSQ HOSP IP/OBS MODERATE 35: CPT | Performed by: INTERNAL MEDICINE

## 2020-04-12 PROCEDURE — 63710000001 PREDNISONE PER 1 MG: Performed by: INTERNAL MEDICINE

## 2020-04-12 PROCEDURE — 97116 GAIT TRAINING THERAPY: CPT

## 2020-04-12 RX ORDER — PREDNISONE 10 MG/1
TABLET ORAL
Qty: 30 TABLET | Refills: 0 | Status: ON HOLD | OUTPATIENT
Start: 2020-04-12 | End: 2020-07-10

## 2020-04-12 RX ADMIN — POLYETHYLENE GLYCOL 3350 17 G: 17 POWDER, FOR SOLUTION ORAL at 08:31

## 2020-04-12 RX ADMIN — METOPROLOL TARTRATE 50 MG: 50 TABLET, FILM COATED ORAL at 08:30

## 2020-04-12 RX ADMIN — MEGESTROL ACETATE 40 MG: 40 TABLET ORAL at 08:31

## 2020-04-12 RX ADMIN — ESCITALOPRAM 20 MG: 10 TABLET, FILM COATED ORAL at 08:30

## 2020-04-12 RX ADMIN — IPRATROPIUM BROMIDE AND ALBUTEROL SULFATE 3 ML: 2.5; .5 SOLUTION RESPIRATORY (INHALATION) at 06:34

## 2020-04-12 RX ADMIN — BUDESONIDE AND FORMOTEROL FUMARATE DIHYDRATE 2 PUFF: 160; 4.5 AEROSOL RESPIRATORY (INHALATION) at 06:34

## 2020-04-12 RX ADMIN — GUAIFENESIN 1200 MG: 600 TABLET, EXTENDED RELEASE ORAL at 08:31

## 2020-04-12 RX ADMIN — ALPRAZOLAM 0.5 MG: 0.5 TABLET ORAL at 01:33

## 2020-04-12 RX ADMIN — ASPIRIN 81 MG: 81 TABLET ORAL at 08:30

## 2020-04-12 RX ADMIN — IPRATROPIUM BROMIDE AND ALBUTEROL SULFATE 3 ML: 2.5; .5 SOLUTION RESPIRATORY (INHALATION) at 10:40

## 2020-04-12 RX ADMIN — ALPRAZOLAM 0.5 MG: 0.5 TABLET ORAL at 09:11

## 2020-04-12 RX ADMIN — PREDNISONE 20 MG: 20 TABLET ORAL at 08:30

## 2020-04-12 RX ADMIN — PANTOPRAZOLE SODIUM 40 MG: 40 TABLET, DELAYED RELEASE ORAL at 08:30

## 2020-04-12 NOTE — PROGRESS NOTES
PROGRESS NOTE  Patient Name: Kellie Arzate  Age/Sex: 65 y.o. male  : 1955  MRN: [id number]    Date of Admission: 2020  Date of Encounter Visit: 20   LOS: 8 days   Patient Care Team:  Jose Moon MD as PCP - General  Jose Moon MD as PCP - Family Medicine    Chief Complaint: Feeling better    Hospital course: COPD with acute on chronic respiratory failure, usually is on 3 L/min nasal cannula oxygen at home, was on the Vapotherm and was gradually weaned and he was down to 3 L today with sats in the high 90s.  He is afebrile, he is alert but anxious, he is tolerating p.o. diet, not much productive secretion, afebrile, and requesting to go home    Interval History: Calm and responsive, pleasant, in no distress    REVIEW OF SYSTEMS:   CONSTITUTIONAL: no fever or chills  CARDIOVASCULAR: No chest pain, chest pressure or chest discomfort. No palpitations or edema.   RESPIRATORY: Improved shortness of breath, minimal cough.   GASTROINTESTINAL: No anorexia, nausea, vomiting or diarrhea. No abdominal pain or blood.   HEMATOLOGIC: No bleeding or bruising.     Ventilator/Non-Invasive Ventilation Settings (From admission, onward)     Start     Ordered    20 1612  BIPAP  Until Discontinued,   Status:  Canceled     Question Answer Comment   Type: BIPAP    NIPPV Mask Interface: Full Face Mask        20 1611    20 1447  BIPAP  Until Discontinued,   Status:  Canceled     Question Answer Comment   Type: BIPAP    NIPPV Mask Interface: Full Face Mask        20 1447                  Vital Signs  Temp:  [97.6 °F (36.4 °C)-98.3 °F (36.8 °C)] 97.9 °F (36.6 °C)  Heart Rate:  [61-94] 80  Resp:  [14-18] 16  BP: (109-131)/(56-81) 125/75  SpO2:  [92 %-99 %] 95 %  on  Flow (L/min):  [3] 3 Device (Oxygen Therapy): humidified;nasal cannula    Intake/Output Summary (Last 24 hours) at 2020 0926  Last data filed at 2020 0931  Gross per 24 hour   Intake 840 ml    "  Output 750 ml   Net 90 ml     Flowsheet Rows      First Filed Value   Admission Height  165.1 cm (65\") Documented at 04/04/2020 1115   Admission Weight  41.3 kg (91 lb) Documented at 04/04/2020 1115        Body mass index is 15.31 kg/m².      04/09/20  1129 04/09/20  2146 04/10/20  2019   Weight: 42.3 kg (93 lb 4.1 oz) 42.9 kg (94 lb 8 oz) 41.7 kg (92 lb)       Physical Exam:  GEN:  No acute distress, alert, cooperative, well developed, on nasal cannula oxygen, cachectic, anxious  EYES:   Sclerae clear. No icterus. PERRL. Normal EOM  ENT:   External ears/nose normal, no oral lesions, no thrush, mucous membranes moist  NECK:  Supple, midline trachea, no JVD  LUNGS: Normal chest on inspection, patient has diminished breath sounds bilaterally without any wheezing. No rhonchi. No crackles. Respirations regular, even and unlabored.   CV:  Regular rhythm and rate. Normal S1/S2. No murmurs, gallops, or rubs noted.  ABD:  Soft, nontender and nondistended. Normal bowel sounds. No guarding  EXT:  Moves all extremities well. No cyanosis. No redness. No edema.   Skin: Dry, intact, no bleeding    Results Review:      Results from last 7 days   Lab Units 04/09/20  1523 04/06/20  0326   SODIUM mmol/L 139 139   POTASSIUM mmol/L 4.3 4.2   CHLORIDE mmol/L 95* 90*   CO2 mmol/L 33.0* 39.0*   BUN mg/dL 22 20   CREATININE mg/dL 0.43* 0.32*   CALCIUM mg/dL 9.8 9.7   AST (SGOT) U/L 19  --    ALT (SGPT) U/L 20  --    ANION GAP mmol/L 11.0 10.0   ALBUMIN g/dL 4.30  --                  Results from last 7 days   Lab Units 04/09/20  1502 04/06/20  0326   WBC 10*3/mm3 12.02* 14.80*   HEMOGLOBIN g/dL 13.1 12.1*   HEMATOCRIT % 40.9 38.1   PLATELETS 10*3/mm3 234 241   MCV fL 91.1 90.9   NEUTROPHIL % % 92.5* 92.5*   LYMPHOCYTE % % 3.5* 4.9*   MONOCYTES % % 3.3* 1.8*   EOSINOPHIL % % 0.0* 0.0*   BASOPHIL % % 0.1 0.1   IMM GRAN % % 0.6* 0.7*                   Invalid input(s): LDLCALC          No results found for: POCGLU                    "         Imaging:       I reviewed the patient's new clinical results.  I personally viewed and interpreted the patient's imaging results: No new films for me to review        Medication Review:     aspirin 81 mg Oral Daily   atorvastatin 10 mg Oral Nightly   budesonide-formoterol 2 puff Inhalation BID - RT   escitalopram 20 mg Oral Daily   guaiFENesin 1,200 mg Oral Q12H   ipratropium-albuterol 3 mL Nebulization 4x Daily - RT   megestrol 40 mg Oral Daily   metoprolol tartrate 50 mg Oral Q12H   nicotine 1 patch Transdermal Q24H   pantoprazole 40 mg Oral Daily   polyethylene glycol 17 g Oral Daily   predniSONE 20 mg Oral BID With Meals   sodium chloride 10 mL Intravenous Q12H            ASSESSMENT:   1. Acute on chronic hypercapnic and hypoxemic respiratory failure  2. Chronic tobacco use, likely having nicotine withdrawal  3. COPD exacerbation  4. Cachexia  5. Reported dependence to benzodiazepines  6. Anxiety and agitation    PLAN:  Doing better, on p.o. prednisone  Down to TO 3 LPM  nasal cannula oxygen  He already has a trilogy machine at home  He is cleared for discharge from the pulmonary standpoint  Recommend to slowly taper the prednisone down to 10 mg over the next 2 weeks and arrange for telemedicine appointment with Dr. MELARA in 2 weeks to decide on further steroid taper    Cleared for discharge, will follow up as an outpatient    Disposition: Per primary    Rabia Blount MD  04/12/20  09:49             Dictated utilizing Dragon dictation

## 2020-04-12 NOTE — DISCHARGE SUMMARY
"    [facility] Medicine Services  DISCHARGE SUMMARY       Date of Admission: 4/4/2020  Date of Discharge:  4/12/2020  Primary Care Physician: Jose Moon MD    Presenting Problem/History of Present Illness:  Hypercarbia [R06.89]     Final Discharge Diagnoses:  Active Hospital Problems    Diagnosis   • Hypercarbia   • COPD with acute exacerbation (CMS/HCC)   • Acute on chronic respiratory failure with hypoxia and hypercapnia (CMS/HCC)   1.  COPD AE  -Steroids weaned to oral  -nebs  -mucinex  -flutter  -consult pulm  -Start CPT     2.  Acute on Chronic Respiratory failure with hypoxia and hypercapnia   -Steroids  -nebs  -mucinex  -flutter     3.  Tobacco Abuse  -cessation counseling     4.  HTN  -metoprolol     5.  HLD  -lipitor       Consults: Pulmonology    Procedures Performed: N/A    Pertinent Test Results: N/A    Chief Complaint on Day of Discharge: \"I want to go home\"    History of Present Illness on Day of Discharge: Doing ok.  No SOA.    Wants to go home  [place] 3L [place] which is his home oxygen    Hospital Course:  The patient is a 65 y.o. male who presented to [facility] with SOA.  He was having a COPD exacerbation.  He was treated with IV steroids and nebulizer treatments.  He was treated with Mucinex and flutter.  He was slow to improve.  Pulmonology was consulted and assisted in the management of this.  He is now tolerating his home oxygen.  He has walked well with therapy.      He is comfortable with being discharged and with the discharge plan.  He was given the chance to ask questions and all questions were answered to his satisfaction.        Condition on Discharge:  Stable    Physical Exam on Discharge:  /75 (BP Location: Right arm, Patient Position: Lying)   Pulse 76   Temp 97.9 °F (36.6 °C) (Oral)   Resp 16   Ht 165.1 cm (65\")   Wt 41.7 kg (92 lb)   SpO2 100%   BMI 15.31 kg/m²   Physical Exam   Constitutional: He is oriented to " person, place, and time. He appears well-developed. He appears cachectic.   HENT:   Head: Normocephalic and atraumatic.   Right Ear: External ear normal.   Left Ear: External ear normal.   Nose: Nose normal.   Mouth/Throat: Oropharynx is clear and moist.   Eyes: Pupils are equal, round, and reactive to light. Conjunctivae and EOM are normal. Right eye exhibits no discharge. Left eye exhibits no discharge. No scleral icterus.   Neck: Normal range of motion. Neck supple. No tracheal deviation present. No thyromegaly present.   Cardiovascular: Normal rate, regular rhythm, normal heart sounds and intact distal pulses. Exam reveals no gallop and no friction rub.   No murmur heard.  Pulmonary/Chest: Effort normal. No stridor. No respiratory distress. He has decreased breath sounds. He has no wheezes. He has no rales. He exhibits no tenderness.   Abdominal: Soft. Bowel sounds are normal. He exhibits no distension and no mass. There is no tenderness. There is no rebound and no guarding. No hernia.   Musculoskeletal: Normal range of motion. He exhibits no edema or deformity.   Lymphadenopathy:     He has no cervical adenopathy.   Neurological: He is alert and oriented to person, place, and time. He has normal reflexes. He displays normal reflexes. No cranial nerve deficit. He exhibits normal muscle tone. Coordination normal.   Skin: Skin is warm and dry. No rash noted. No erythema. No pallor.   Psychiatric: He has a normal mood and affect. His behavior is normal. Judgment and thought content normal.   Vitals reviewed.        Discharge Disposition:  Home or Self Care    Discharge Medications:     Discharge Medications      New Medications      Instructions Start Date   predniSONE 10 MG tablet  Commonly known as:  DELTASONE   4 tabs daily x 3 days, then 3 tabs daily x 3 days, then 2 tabs daily x 3 days, then 1 tab daily x  3 days         Continue These Medications      Instructions Start Date   albuterol sulfate  (90  Base) MCG/ACT inhaler  Commonly known as:  PROVENTIL HFA;VENTOLIN HFA;PROAIR HFA   2 puffs, Inhalation, Every 4 Hours PRN      ALPRAZolam 1 MG tablet  Commonly known as:  XANAX   1 mg, Oral, 3 Times Daily PRN      aspirin 81 MG EC tablet   81 mg, Oral, Daily      citalopram 40 MG tablet  Commonly known as:  CeleXA   40 mg, Oral, Daily      ferrous sulfate 325 (65 FE) MG tablet   325 mg, Oral, Daily With Breakfast      guaiFENesin 600 MG 12 hr tablet  Commonly known as:  MUCINEX   1,200 mg, Oral, Every 12 Hours Scheduled      Incruse Ellipta 62.5 MCG/INH aerosol powder   Generic drug:  Umeclidinium Bromide   1 puff, Inhalation, Daily      ipratropium-albuterol 0.5-2.5 mg/3 ml nebulizer  Commonly known as:  DUO-NEB   3 mL, Nebulization, Every 6 Hours PRN      metoprolol tartrate 50 MG tablet  Commonly known as:  LOPRESSOR   50 mg, Oral, 2 Times Daily      pantoprazole 40 MG EC tablet  Commonly known as:  PROTONIX   40 mg, Oral, Daily      simvastatin 20 MG tablet  Commonly known as:  ZOCOR   20 mg, Oral, Nightly             Discharge Diet: regular    Activity at Discharge: as tolerated    Discharge Care Plan/Instructions: go to ER for SOA    Follow-up Appointments:   No future appointments.    Test Results Pending at Discharge: N/A    Alon Bowens MD  04/12/20  11:22    Time: 35 minutes

## 2020-04-12 NOTE — PLAN OF CARE
Problem: Patient Care Overview  Goal: Plan of Care Review  Outcome: Ongoing (interventions implemented as appropriate)  Flowsheets (Taken 4/12/2020 7036)  Progress: no change  Plan of Care Reviewed With: patient  Note:   Has had no c/o, expects DC today, plan is with HH, pt reports he has adequate equipment and assistance at home, on home dose 02, saving his 9 (nine - thus far) boost drinks to take home, encouraged smoking cessation and high caloric/protein diet, no distress no acute issues will cont to monitor

## 2020-04-12 NOTE — THERAPY DISCHARGE NOTE
Acute Care - Physical Therapy Progress Note/Discharge  [facility]     Patient Name: Kellie Arzate  : 1955  MRN: [id number]  Today's Date: 2020             Admit Date: 2020    Visit Dx:    ICD-10-CM ICD-9-CM   1. Hypercarbia R06.89 786.09   2. Impaired mobility Z74.09 799.89     Patient Active Problem List   Diagnosis   • Aspiration pneumonia of left lung (CMS/HCC)   • Abnormal chest x-ray, findings apex right lung suspect TB   • Acute on chronic respiratory failure with hypercapnia (CMS/HCC)   • Tobacco dependence   • Anemia   • Protein-calorie malnutrition (CMS/HCC)   • Hypercarbia   • COPD with acute exacerbation (CMS/HCC)   • Acute on chronic respiratory failure with hypoxia and hypercapnia (CMS/HCC)         Rehab Goal Summary     Row Name 20 1400             Bed Mobility Goal 1 (PT)    Worth Level/Cues Needed (Bed Mobility Goal 1, PT)  independent Goal:supervision  -GOPAL      Progress/Outcomes (Bed Mobility Goal 1, PT)  goal met  -GOPAL         Transfer Goal 1 (PT)    Worth Level/Cues Needed (Transfer Goal 1, PT)  independent Goal:supervision  -GOPAL      Progress/Outcome (Transfer Goal 1, PT)  goal met  -GOPAL         Gait Training Goal 1 (PT)    Worth Level (Gait Training Goal 1, PT)  contact guard assist Goal:supervision  -GOPAL      Distance (Gait Goal 1, PT)  60 Goal:60  -GOPAL      Progress/Outcome (Gait Training Goal 1, PT)  goal met  -GOPAL         Stairs Goal 1 (PT)    Worth Level/Cues Needed (Stairs Goal 1, PT)  -- Goal:supervision. Not attempted  -GOPAL      Number of Stairs (Stairs Goal 1, PT)  0 Goal;3  -GOPAL      Progress/Outcome (Stairs Goal 1, PT)  goal not met  -GOPAL        User Key  (r) = Recorded By, (t) = Taken By, (c) = Cosigned By    Initials Name Provider Type Discipline    Mer Will PTA Physical Therapy Assistant PT        Therapy Treatment  Rehabilitation Treatment Summary     Row Name 20 1109 20 0927 20 0827       Treatment  Time/Intention    Discipline  physical therapy assistant  -GOPAL  physical therapy assistant  -GOPAL  physical therapy assistant  -GOPAL    Document Type  therapy note (daily note)  -GOPAL  therapy note (daily note)  -GOPAL  therapy note (daily note)  -GOPAL    Subjective Information  complains of;weakness;fatigue  -JP2  no complaints  -JP2  --    Comment  --  -JP2  --  eating breakfast  -JP2    Existing Precautions/Restrictions  fall;oxygen therapy device and L/min  -GOPAL  fall;oxygen therapy device and L/min  -GOPAL  fall;oxygen therapy device and L/min  -GOPAL    Recorded by [GOPAL] Mer Aly, PTA 04/12/20 1111  [JP2] Mer Aly, PTA 04/12/20 1137 [GOPAL] Mer Aly, PTA 04/12/20 0928  [JP2] Mer Aly, PTA 04/12/20 0940 [GOPAL] Mer Aly, PTA 04/12/20 0828  [JP2] Mer Aly, PTA 04/12/20 0829    Row Name 04/12/20 1109 04/12/20 0827          Vital Signs    Pre SpO2 (%)  95  -GOPAL  --     O2 Delivery Pre Treatment  supplemental O2  -GOPAL  supplemental O2  -GOPAL     Intra SpO2 (%)  90  -GOPAL  --     O2 Delivery Intra Treatment  supplemental O2  -GOPAL  --     Post SpO2 (%)  91  -GOPAL  --     O2 Delivery Post Treatment  supplemental O2  -GOPAL  --     Recorded by [GOPAL] Mer Aly, PTA 04/12/20 1137 [GOPAL] Mer Aly, PTA 04/12/20 0828     Row Name 04/12/20 1109             Bed Mobility Assessment/Treatment    Supine-Sit [place] (Bed Mobility)  independent  -GOPAL      Sit-Supine [place] (Bed Mobility)  independent  -GOPAL      Assistive Device (Bed Mobility)  head of bed elevated  -GOPAL      Recorded by [GOPAL] Mer Aly, PTA 04/12/20 1137      Row Name 04/12/20 1109             Sit-Stand Transfer    Sit-Stand [place] (Transfers)  independent  -GOPAL      Recorded by [GOPAL] Mer Aly, PTA 04/12/20 1137      Row Name 04/12/20 1109             Stand-Sit Transfer    Stand-Sit [place] (Transfers)  independent  -GOPAL      Recorded by [GOPAL] Mer Aly, PTA 04/12/20 1137      Row Name 04/12/20  1109             Gait/Stairs Assessment/Training    [place] Level (Gait)  contact guard  -GOPAL      Distance in Feet (Gait)  60  -GOPAL      Deviations/Abnormal Patterns (Gait)  base of support, narrow;gait speed decreased;stride length decreased  -GOPAL      Bilateral Gait Deviations  forward flexed posture;heel strike decreased  -GOPAL      Recorded by [GOPAL] Mer Aly, PTA 04/12/20 1137      Row Name 04/12/20 0927             Therapeutic Exercise    Lower Extremity Range of Motion (Therapeutic Exercise)  hip flexion/extension, bilateral;hip abduction/adduction, bilateral;knee flexion/extension, bilateral;ankle dorsiflexion/plantar flexion, bilateral  -GOPAL      Exercise Type (Therapeutic Exercise)  AROM (active range of motion)  -GOPAL      Position (Therapeutic Exercise)  seated  -GOPAL      Sets/Reps (Therapeutic Exercise)  20  -GOPAL      Comment (Therapeutic Exercise)  asks to come back to walk.  -JP2      Recorded by [GOPAL] Mer Aly, PTA 04/12/20 0940  [JP2] Mer Aly, [facility] 04/12/20 0942      Row Name 04/12/20 1109 04/12/20 0927          Positioning and Restraints    Pre-Treatment Position  in bed  -GOPAL  in bed  -GOPAL     Post Treatment Position  bed  -GOPAL  bed  -GOAPL     In Bed  fowlers;call light within reach  -GOPAL  sitting EOB;call light within reach  -GOPAL     Recorded by [GOPAL] Mer Aly, PTA 04/12/20 1137 [GOPAL] Mer Aly, [facility] 04/12/20 0942     Row Name 04/12/20 0927             Pain Scale: Numbers Pre/Post-Treatment    Pain Scale: Numbers, Pretreatment  0/10 - no pain  -GOPAL      Recorded by [GOPAL] Mer Aly, PTA 04/12/20 0942      Row Name                Wound 04/04/20 1637 Bilateral medial coccyx Pressure Injury    Wound - Properties Group Date first assessed: 04/04/20 [MC] Time first assessed: 1637 [] Present on Hospital Admission: Y [MC] Side: Bilateral [] Orientation: medial [MC] Location: coccyx [] Primary Wound Type: Pressure inj [] Stage, Pressure Injury: Stage 1 []  Recorded by:  [MELIDA] Madisyn Conley RN 04/04/20 1637      User Key  (r) = Recorded By, (t) = Taken By, (c) = Cosigned By    Initials Name Effective Dates Discipline    Mer Will PTA 08/02/16 -  PT    Madisyn Vidal RN 02/03/20 -  Nurse        Wound 04/04/20 1637 Bilateral medial coccyx Pressure Injury (Active)   Dressing Appearance open to air 4/12/2020  8:00 AM   Closure Open to air 4/12/2020  8:00 AM   Base red;non-blanchable 4/12/2020  8:00 AM   Drainage Amount none 4/12/2020  8:00 AM       PT Recommendation and Plan  Anticipated Discharge Disposition (PT): home with home health  Outcome Summary/Treatment Plan (PT)  Anticipated Discharge Disposition (PT): home with home health  Outcome Summary: Pt. is independent in bed mobility and transfers. Ambulated 90' in room with CGA. Narrow base of support and decreased step length. Actively worked thru LE ex's. Will benefit from strengthening activities.         Time Calculation:   PT Charges     Row Name 04/12/20 1137 04/12/20 0942          Time Calculation    Start Time  1109  -GOPAL  0927  -GOPAL     Stop Time  1119  -GOPAL  0937  -GOPAL     Time Calculation (min)  10 min  -GOPAL  10 min  -GOPAL     PT Received On  04/12/20  -GOPAL  --        Timed Charges    01792 - PT Therapeutic Exercise Minutes  --  10  -GOPAL     16776 - Gait Training Minutes   10  -GOPAL  --       User Key  (r) = Recorded By, (t) = Taken By, (c) = Cosigned By    Initials Name Provider Type    Mer Will PTA Physical Therapy Assistant        Therapy Charges for Today     Code Description Service Date Service Provider Modifiers Qty    38257296822 HC GAIT TRAINING EA 15 MIN 4/11/2020 Mer Aly, PTA GP 1    15055339555 HC PT THER PROC EA 15 MIN 4/11/2020 Mer Aly, JONY GP 1    55044180396 HC PT THER PROC EA 15 MIN 4/12/2020 Mer Aly, PTA GP 1    94335511753 HC GAIT TRAINING EA 15 MIN 4/12/2020 Mer Aly, PTA GP 1          PT G-Codes  Outcome Measure Options:  AM-PAC 6 Clicks Basic Mobility (PT)  AM-PAC 6 Clicks Score (PT): 17    PT Discharge Summary  Anticipated Discharge Disposition (PT): home with home health  Reason for Discharge: Discharge from facility  Outcomes Achieved: Patient able to partially acheive established goals  Discharge Destination: Home with home health    Mer Aly, PTA  4/12/2020

## 2020-04-13 ENCOUNTER — TELEPHONE (OUTPATIENT)
Dept: PULMONOLOGY | Facility: CLINIC | Age: 65
End: 2020-04-13

## 2020-04-13 ENCOUNTER — READMISSION MANAGEMENT (OUTPATIENT)
Dept: CALL CENTER | Facility: HOSPITAL | Age: 65
End: 2020-04-13

## 2020-04-13 NOTE — OUTREACH NOTE
Prep Survey      Responses   [facility] facility patient discharged from?  [facility]   Is LACE score < 7 ?  No   Eligibility  Readm Mgmt   Discharge diagnosis  hypercarbia, COPD with AE, a/c resp. failure with hypoxia and hypercapnia,    COVID-19 Test Status  Not tested   Does the patient have one of the following disease processes/diagnoses(primary or secondary)?  COPD/Pneumonia   Does the patient have Home health ordered?  No   Is there a DME ordered?  No   Comments regarding appointments  Office will call   Prep survey completed?  Yes          Yudith Carlin RN

## 2020-04-13 NOTE — TELEPHONE ENCOUNTER
" left a message that the patient was discharged yesterday. Please call the patient to schedule a 2 week follow up with Dr. Young.     Have attempted to call the patient on his home number and it goes to a \"fax tone\".  Left message on his emergency contact's number for her to call back.  "

## 2020-04-14 ENCOUNTER — READMISSION MANAGEMENT (OUTPATIENT)
Dept: CALL CENTER | Facility: HOSPITAL | Age: 65
End: 2020-04-14

## 2020-04-14 NOTE — OUTREACH NOTE
COPD/PN Week 1 Survey      Responses   [facility] patient discharged from?  [place]   COVID-19 Test Status  Not tested   Does the patient have one of the following disease processes/diagnoses(primary or secondary)?  COPD/Pneumonia   Is there a successful TCM telephone encounter documented?  No   Was the primary reason for admission:  COPD exacerbation   Week 1 attempt successful?  No   Unsuccessful attempts  Attempt 1          Keyla Mike, RN

## 2020-04-15 ENCOUNTER — READMISSION MANAGEMENT (OUTPATIENT)
Dept: CALL CENTER | Facility: HOSPITAL | Age: 65
End: 2020-04-15

## 2020-04-15 NOTE — OUTREACH NOTE
COPD/PN Week 1 Survey      Responses   [facility] patient discharged from?  [place]   COVID-19 Test Status  Not tested   Does the patient have one of the following disease processes/diagnoses(primary or secondary)?  COPD/Pneumonia   Is there a successful TCM telephone encounter documented?  No   Was the primary reason for admission:  COPD exacerbation   Week 1 attempt successful?  No   Unsuccessful attempts  Attempt 2          Hannah Aquino RN

## 2020-04-15 NOTE — TELEPHONE ENCOUNTER
"Unable to reach patient. Phone number still has \"fax tone\". Left message for emergency contact to call back.  "

## 2020-04-16 ENCOUNTER — READMISSION MANAGEMENT (OUTPATIENT)
Dept: CALL CENTER | Facility: HOSPITAL | Age: 65
End: 2020-04-16

## 2020-04-16 NOTE — OUTREACH NOTE
COPD/PN Week 1 Survey      Responses   [facility] patient discharged from?  [place]   COVID-19 Test Status  Not tested   Does the patient have one of the following disease processes/diagnoses(primary or secondary)?  COPD/Pneumonia   Is there a successful TCM telephone encounter documented?  No   Was the primary reason for admission:  COPD exacerbation   Week 1 attempt successful?  No   Unsuccessful attempts  Attempt 3          Chris Bond RN

## 2020-04-21 ENCOUNTER — READMISSION MANAGEMENT (OUTPATIENT)
Dept: CALL CENTER | Facility: HOSPITAL | Age: 65
End: 2020-04-21

## 2020-04-21 NOTE — OUTREACH NOTE
COPD/PN Week 2 Survey      Responses   [facility] patient discharged from?  [place]   COVID-19 Test Status  Not tested   Does the patient have one of the following disease processes/diagnoses(primary or secondary)?  COPD/Pneumonia   Was the primary reason for admission:  COPD exacerbation   Week 2 attempt successful?  No   Unsuccessful attempts  Attempt 1   Call end time  1405   Week 2 call completed?  Yes          Nelly Oliveira RN

## 2020-04-27 ENCOUNTER — READMISSION MANAGEMENT (OUTPATIENT)
Dept: CALL CENTER | Facility: HOSPITAL | Age: 65
End: 2020-04-27

## 2020-04-27 NOTE — OUTREACH NOTE
COPD/PN Week 3 Survey      Responses   [facility] patient discharged from?  [place]   COVID-19 Test Status  Not tested   Does the patient have one of the following disease processes/diagnoses(primary or secondary)?  COPD/Pneumonia   Was the primary reason for admission:  COPD exacerbation   Week 3 attempt successful?  No   Unsuccessful attempts  Attempt 1          Daisy Rordiguez RN

## 2020-04-30 ENCOUNTER — APPOINTMENT (OUTPATIENT)
Dept: GENERAL RADIOLOGY | Facility: HOSPITAL | Age: 65
End: 2020-04-30

## 2020-04-30 ENCOUNTER — TELEPHONE (OUTPATIENT)
Dept: PULMONOLOGY | Facility: CLINIC | Age: 65
End: 2020-04-30

## 2020-04-30 ENCOUNTER — HOSPITAL ENCOUNTER (EMERGENCY)
Facility: HOSPITAL | Age: 65
Discharge: HOME | End: 2020-04-30
Attending: EMERGENCY MEDICINE | Admitting: EMERGENCY MEDICINE

## 2020-04-30 ENCOUNTER — READMISSION MANAGEMENT (OUTPATIENT)
Dept: CALL CENTER | Facility: HOSPITAL | Age: 65
End: 2020-04-30

## 2020-04-30 VITALS
HEART RATE: 85 BPM | HEIGHT: 65 IN | RESPIRATION RATE: 23 BRPM | DIASTOLIC BLOOD PRESSURE: 63 MMHG | WEIGHT: 92 LBS | BODY MASS INDEX: 15.33 KG/M2 | SYSTOLIC BLOOD PRESSURE: 108 MMHG | OXYGEN SATURATION: 97 % | TEMPERATURE: 98.2 F

## 2020-04-30 DIAGNOSIS — J44.9 CHRONIC OBSTRUCTIVE PULMONARY DISEASE, UNSPECIFIED COPD TYPE (HCC): Primary | ICD-10-CM

## 2020-04-30 LAB
ALBUMIN SERPL-MCNC: 3.6 G/DL (ref 3.5–5.2)
ALBUMIN/GLOB SERPL: 1 G/DL
ALP SERPL-CCNC: 92 U/L (ref 39–117)
ALT SERPL W P-5'-P-CCNC: 17 U/L (ref 1–41)
ANION GAP SERPL CALCULATED.3IONS-SCNC: 10 MMOL/L (ref 5–15)
APTT PPP: 36.2 SECONDS (ref 24.1–35)
ARTERIAL PATENCY WRIST A: ABNORMAL
AST SERPL-CCNC: 11 U/L (ref 1–40)
ATMOSPHERIC PRESS: 748 MMHG
BASE EXCESS BLDA CALC-SCNC: 16.8 MMOL/L (ref 0–2)
BASOPHILS # BLD AUTO: 0.02 10*3/MM3 (ref 0–0.2)
BASOPHILS NFR BLD AUTO: 0.2 % (ref 0–1.5)
BDY SITE: ABNORMAL
BILIRUB SERPL-MCNC: 0.3 MG/DL (ref 0.2–1.2)
BODY TEMPERATURE: 37 C
BUN BLD-MCNC: 9 MG/DL (ref 8–23)
BUN/CREAT SERPL: 30 (ref 7–25)
CALCIUM SPEC-SCNC: 9.6 MG/DL (ref 8.6–10.5)
CHLORIDE SERPL-SCNC: 89 MMOL/L (ref 98–107)
CO2 SERPL-SCNC: 38 MMOL/L (ref 22–29)
CREAT BLD-MCNC: 0.3 MG/DL (ref 0.76–1.27)
DEPRECATED RDW RBC AUTO: 41.6 FL (ref 37–54)
EOSINOPHIL # BLD AUTO: 0.17 10*3/MM3 (ref 0–0.4)
EOSINOPHIL NFR BLD AUTO: 2.1 % (ref 0.3–6.2)
ERYTHROCYTE [DISTWIDTH] IN BLOOD BY AUTOMATED COUNT: 12.2 % (ref 12.3–15.4)
GAS FLOW AIRWAY: 4 LPM
GFR SERPL CREATININE-BSD FRML MDRD: >150 ML/MIN/1.73
GLOBULIN UR ELPH-MCNC: 3.6 GM/DL
GLUCOSE BLD-MCNC: 101 MG/DL (ref 65–99)
HCO3 BLDA-SCNC: 45.6 MMOL/L (ref 20–26)
HCT VFR BLD AUTO: 39.5 % (ref 37.5–51)
HGB BLD-MCNC: 12.6 G/DL (ref 13–17.7)
HOLD SPECIMEN: NORMAL
IMM GRANULOCYTES # BLD AUTO: 0.03 10*3/MM3 (ref 0–0.05)
IMM GRANULOCYTES NFR BLD AUTO: 0.4 % (ref 0–0.5)
INR PPP: 1.02 (ref 0.91–1.09)
LYMPHOCYTES # BLD AUTO: 1.47 10*3/MM3 (ref 0.7–3.1)
LYMPHOCYTES NFR BLD AUTO: 18.2 % (ref 19.6–45.3)
Lab: ABNORMAL
Lab: ABNORMAL
MAGNESIUM SERPL-MCNC: 2 MG/DL (ref 1.6–2.4)
MCH RBC QN AUTO: 29.6 PG (ref 26.6–33)
MCHC RBC AUTO-ENTMCNC: 31.9 G/DL (ref 31.5–35.7)
MCV RBC AUTO: 92.7 FL (ref 79–97)
MODALITY: ABNORMAL
MONOCYTES # BLD AUTO: 0.99 10*3/MM3 (ref 0.1–0.9)
MONOCYTES NFR BLD AUTO: 12.3 % (ref 5–12)
NEUTROPHILS # BLD AUTO: 5.4 10*3/MM3 (ref 1.7–7)
NEUTROPHILS NFR BLD AUTO: 66.8 % (ref 42.7–76)
NOTIFIED BY: ABNORMAL
NOTIFIED WHO: ABNORMAL
NRBC BLD AUTO-RTO: 0 /100 WBC (ref 0–0.2)
NT-PROBNP SERPL-MCNC: 324.2 PG/ML (ref 5–900)
PCO2 BLDA: 77.3 MM HG (ref 35–45)
PH BLDA: 7.38 PH UNITS (ref 7.35–7.45)
PLATELET # BLD AUTO: 280 10*3/MM3 (ref 140–450)
PMV BLD AUTO: 8.3 FL (ref 6–12)
PO2 BLDA: 103 MM HG (ref 83–108)
POTASSIUM BLD-SCNC: 4.5 MMOL/L (ref 3.5–5.2)
PROT SERPL-MCNC: 7.2 G/DL (ref 6–8.5)
PROTHROMBIN TIME: 13 SECONDS (ref 11.9–14.6)
RBC # BLD AUTO: 4.26 10*6/MM3 (ref 4.14–5.8)
SAO2 % BLDCOA: 98.5 % (ref 94–99)
SODIUM BLD-SCNC: 137 MMOL/L (ref 136–145)
TROPONIN T SERPL-MCNC: <0.01 NG/ML (ref 0–0.03)
VENTILATOR MODE: ABNORMAL
WBC NRBC COR # BLD: 8.08 10*3/MM3 (ref 3.4–10.8)
WHOLE BLOOD HOLD SPECIMEN: NORMAL
WHOLE BLOOD HOLD SPECIMEN: NORMAL

## 2020-04-30 PROCEDURE — 83735 ASSAY OF MAGNESIUM: CPT | Performed by: EMERGENCY MEDICINE

## 2020-04-30 PROCEDURE — 36600 WITHDRAWAL OF ARTERIAL BLOOD: CPT

## 2020-04-30 PROCEDURE — 96374 THER/PROPH/DIAG INJ IV PUSH: CPT

## 2020-04-30 PROCEDURE — 85610 PROTHROMBIN TIME: CPT | Performed by: EMERGENCY MEDICINE

## 2020-04-30 PROCEDURE — 71045 X-RAY EXAM CHEST 1 VIEW: CPT

## 2020-04-30 PROCEDURE — 82803 BLOOD GASES ANY COMBINATION: CPT

## 2020-04-30 PROCEDURE — 83880 ASSAY OF NATRIURETIC PEPTIDE: CPT | Performed by: EMERGENCY MEDICINE

## 2020-04-30 PROCEDURE — 93010 ELECTROCARDIOGRAM REPORT: CPT | Performed by: INTERNAL MEDICINE

## 2020-04-30 PROCEDURE — 93005 ELECTROCARDIOGRAM TRACING: CPT | Performed by: EMERGENCY MEDICINE

## 2020-04-30 PROCEDURE — 80053 COMPREHEN METABOLIC PANEL: CPT | Performed by: EMERGENCY MEDICINE

## 2020-04-30 PROCEDURE — 99284 EMERGENCY DEPT VISIT MOD MDM: CPT

## 2020-04-30 PROCEDURE — 85025 COMPLETE CBC W/AUTO DIFF WBC: CPT | Performed by: EMERGENCY MEDICINE

## 2020-04-30 PROCEDURE — 84484 ASSAY OF TROPONIN QUANT: CPT | Performed by: EMERGENCY MEDICINE

## 2020-04-30 PROCEDURE — 85730 THROMBOPLASTIN TIME PARTIAL: CPT | Performed by: EMERGENCY MEDICINE

## 2020-04-30 PROCEDURE — 25010000002 METHYLPREDNISOLONE PER 125 MG: Performed by: EMERGENCY MEDICINE

## 2020-04-30 RX ORDER — METHYLPREDNISOLONE SODIUM SUCCINATE 125 MG/2ML
80 INJECTION, POWDER, LYOPHILIZED, FOR SOLUTION INTRAMUSCULAR; INTRAVENOUS ONCE
Status: COMPLETED | OUTPATIENT
Start: 2020-04-30 | End: 2020-04-30

## 2020-04-30 RX ORDER — METHYLPREDNISOLONE 4 MG/1
TABLET ORAL
Qty: 21 TABLET | Refills: 0 | Status: SHIPPED | OUTPATIENT
Start: 2020-04-30 | End: 2020-05-11

## 2020-04-30 RX ADMIN — METHYLPREDNISOLONE SODIUM SUCCINATE 80 MG: 125 INJECTION, POWDER, FOR SOLUTION INTRAMUSCULAR; INTRAVENOUS at 17:36

## 2020-04-30 NOTE — OUTREACH NOTE
COPD/PN Week 3 Survey      Responses   [facility] patient discharged from?  [place]   COVID-19 Test Status  Not tested   Does the patient have one of the following disease processes/diagnoses(primary or secondary)?  COPD/Pneumonia   Was the primary reason for admission:  COPD exacerbation   Week 3 attempt successful?  No   Unsuccessful attempts  Attempt 2          Chris Bond RN

## 2020-04-30 NOTE — TELEPHONE ENCOUNTER
I spoke to pt's niece, Eva, she is very concerned about her uncle.  He just was discharged from the hospital recently.  He is becoming more confused and lethargic.  He is sometimes having sats in the 60s.  I encouraged her to take him back to the ER.  She is agreeable, but will discuss with her uncle, who is reluctant.

## 2020-05-01 NOTE — PROGRESS NOTES
Kellie Arzate is a 65 y.o. male.   CC:   Chief Complaint   Patient presents with   • COPD        Telemedicine [employer] video visit.  Due to the pandemic of Covid- 19 and social distancing restrictions, the patient has agreed to perform their visit via video visit through [employer] on [employer].      HPI:  Mr. Arzate is a pleasant 65 year old male with known end-stage COPD and likely has pulmonary hypertension, group 3. His appointment was to be a video visit however there were technical difficulties and this was changed to a telephone visit with his wife on the line as well. Hew as discharged from  on 4/12/20 with a taper prednisone dose. He returned to the ER on 4/30 and received a steroid shot and medrol dose julian. She states he has not been taking the medrol dose julian and his PCP gave him prednisone which he should finish this week. He continues to utilize his supplemental oxygen at 3-4 lpm. He has not used his albuterol HFA and states he has been using his wife's combivent. He continues to use the mucinex twice daily and duonebs up to 4 times daily. He has started to be compliant with his home Trilogy machine. He continues to have cough and SOB that are improved while on the steroids. He has a hx of being in the [place] 2 years ago with a trach that has since been removed. His wife states he gets lethargic and does not eat without the steroids. He does not exert himself much and has been staying home. He denies any ill contacts. She does the grocery shopping. He denies fever, chills, or night sweats. His wife states his o2 has stayed around 97% with pulse around 63. He continues to smoke and states he has tried everything to quit.     The following portions of the patient's history were reviewed and updated as appropriate: allergies, current medications, past family history, past medical history, past social history, past surgical history and problem list.    Past Medical History:   Diagnosis Date   • Anxiety    •  Arthritis    • Chronic respiratory failure with hypoxia, on home O2 therapy (CMS/[facility]), 3 L    • COPD (chronic obstructive pulmonary disease) (CMS/[facility])    • Depression    • Hypertension    • Mixed hyperlipidemia    • Nephrolithiasis    • Pneumonia, pseudomonal 7/2016 with negative AFB, aspiration 7/2018        Family History   Problem Relation Age of Onset   • Dementia Mother    • No Known Problems Father        Social History     Socioeconomic History   • Marital status:      Spouse name: Not on file   • Number of children: Not on file   • Years of education: Not on file   • Highest education level: Not on file   Tobacco Use   • Smoking status: Current Every Day Smoker     Packs/day: 0.50     Types: Cigarettes   • Smokeless tobacco: Never Used   Substance and Sexual Activity   • Alcohol use: No   • Drug use: Defer   • Sexual activity: Defer       Review of Systems   Constitutional: Negative for chills, fatigue and fever.   HENT: Negative for congestion, postnasal drip and sore throat.    Eyes: Negative for blurred vision, double vision and visual disturbance.   Respiratory: Positive for cough and shortness of breath. Negative for wheezing.    Cardiovascular: Negative for chest pain, palpitations and leg swelling.   Gastrointestinal: Negative for diarrhea, nausea and vomiting.   Endocrine: Negative for cold intolerance, heat intolerance and polydipsia.   Musculoskeletal: Negative for back pain, gait problem and myalgias.        Sedentary    Skin: Negative for color change, pallor and rash.   Allergic/Immunologic: Negative for environmental allergies, food allergies and immunocompromised state.   Neurological: Positive for weakness. Negative for dizziness, syncope and confusion.   Hematological: Negative for adenopathy. Does not bruise/bleed easily.   Psychiatric/Behavioral: Negative for agitation, behavioral problems and sleep disturbance.       There were no vitals taken for this visit.    Physical Exam  No  PE due to telephone encounter     Pulmonary Functions Testing Results:      No results found for this or any previous visit.    My interpretation: no new    CXR: 4/30/20    FINDINGS:   Advanced lung emphysema with hyperexpanded lung volumes. Bilateral  apical juxtapleural consolidations appear very similar across several  prior exams. Additional nodular opacities in the left base just  overlying the left hemidiaphragm also appear chronic. No new  consolidation identified. No pleural effusion or pneumothorax.  Cardiomediastinal silhouette and pulmonary vasculature are unremarkable.  Incidental granulomatous calcification in the mediastinum. No acute bony  abnormality.     IMPRESSION:  1. Underlying advanced lung emphysema. Bilateral apical  opacities/consolidations appear chronic, having been present on several  prior radiographs. Similarly, there are a few additional nodular opacity  is in the left lung base which also appear chronic. No obvious acute  infiltrate.     This report was finalized on 04/30/2020 17:34 by Dr Antonio Clark, .      Problem List Items Addressed This Visit        Other    Tobacco dependence      Other Visit Diagnoses     Chronic obstructive pulmonary disease, unspecified COPD type (CMS/HCC)    -  Primary    Relevant Medications    Fluticasone Furoate-Vilanterol (Breo Ellipta) 200-25 MCG/INH inhaler    Fluticasone Furoate-Vilanterol (Breo Ellipta) 200-25 MCG/INH inhaler    Chronic respiratory failure with hypoxia and hypercapnia (CMS/HCC)        Pulmonary hypertension (CMS/HCC)        Likely Who Group 3            Assessment/Plan      He will continue his Trilogy. Continue Duonebs as needed. Script sent to pharmacy for albuterol rescue inhaler. Script and sample given for Breo Ellipta. He is concerned about cost and could not afford the Symbicort. He will continue his Mucinex. He will finish the Prednisone taper given by PCP. We discussed side effects of long term steroid use but did discuss  we may need to extend his use for a duration. Continue supplemental oxygen. Briefly discussed smoking cessation and he states he has tried everything and nothing has helped. He is encouraged to quit. He is encouraged to continue to stay home, clean his home, wash hands, and let wife do his grocery shopping. I have asked he return in one month and advised it may very well be a telephone/ video visit as well but will see how the next several weeks play out. They are in agreement to this plan.      Unable to complete visit using a video connection to the patient. A phone visit was used to complete this visits. Total time of discussion was 21:35 minutes.          Juani Claire, CRISPIN  5/11/2020  14:42    Return in about 4 weeks (around 6/8/2020).    This dictation was generated by voice recognition computer software. Although all attempts are made to edit dictation for accuracy, there may be errors in the transcription that are not intended.

## 2020-05-01 NOTE — ED NOTES
Walked pt with continuous pulse ox on 3 lpm and his sat stayed from 92-95%. Also, he complains of being 'winded'     Nancy Nelson  04/30/20 1909       Nancy Nelson  04/30/20 1910

## 2020-05-11 ENCOUNTER — TELEMEDICINE (OUTPATIENT)
Dept: PULMONOLOGY | Facility: CLINIC | Age: 65
End: 2020-05-11

## 2020-05-11 DIAGNOSIS — J96.11 CHRONIC RESPIRATORY FAILURE WITH HYPOXIA AND HYPERCAPNIA (HCC): ICD-10-CM

## 2020-05-11 DIAGNOSIS — F17.200 TOBACCO DEPENDENCE: ICD-10-CM

## 2020-05-11 DIAGNOSIS — I27.20 PULMONARY HYPERTENSION (HCC): ICD-10-CM

## 2020-05-11 DIAGNOSIS — J96.12 CHRONIC RESPIRATORY FAILURE WITH HYPOXIA AND HYPERCAPNIA (HCC): ICD-10-CM

## 2020-05-11 DIAGNOSIS — J44.9 CHRONIC OBSTRUCTIVE PULMONARY DISEASE, UNSPECIFIED COPD TYPE (HCC): Primary | ICD-10-CM

## 2020-05-11 PROCEDURE — 99214 OFFICE O/P EST MOD 30 MIN: CPT | Performed by: NURSE PRACTITIONER

## 2020-05-11 RX ORDER — ALBUTEROL SULFATE 90 UG/1
2 AEROSOL, METERED RESPIRATORY (INHALATION) EVERY 4 HOURS PRN
Qty: 1 INHALER | Refills: 2 | Status: SHIPPED | OUTPATIENT
Start: 2020-05-11

## 2020-07-09 ENCOUNTER — APPOINTMENT (OUTPATIENT)
Dept: GENERAL RADIOLOGY | Facility: HOSPITAL | Age: 65
End: 2020-07-09

## 2020-07-09 ENCOUNTER — HOSPITAL ENCOUNTER (INPATIENT)
Facility: HOSPITAL | Age: 65
LOS: 2 days | Discharge: HOME HEALTH | End: 2020-07-12
Attending: FAMILY MEDICINE | Admitting: FAMILY MEDICINE

## 2020-07-09 ENCOUNTER — APPOINTMENT (OUTPATIENT)
Dept: CT IMAGING | Facility: HOSPITAL | Age: 65
End: 2020-07-09

## 2020-07-09 DIAGNOSIS — Z78.9 DECREASED ACTIVITIES OF DAILY LIVING (ADL): ICD-10-CM

## 2020-07-09 DIAGNOSIS — J18.9 PNEUMONIA OF LEFT UPPER LOBE DUE TO INFECTIOUS ORGANISM: Primary | ICD-10-CM

## 2020-07-09 DIAGNOSIS — J96.21 ACUTE ON CHRONIC RESPIRATORY FAILURE WITH HYPOXIA AND HYPERCAPNIA (HCC): ICD-10-CM

## 2020-07-09 DIAGNOSIS — J44.1 COPD WITH ACUTE EXACERBATION (HCC): ICD-10-CM

## 2020-07-09 DIAGNOSIS — J96.22 ACUTE ON CHRONIC RESPIRATORY FAILURE WITH HYPOXIA AND HYPERCAPNIA (HCC): ICD-10-CM

## 2020-07-09 LAB
A-A DO2: ABNORMAL
ALBUMIN SERPL-MCNC: 3.7 G/DL (ref 3.5–5.2)
ALBUMIN/GLOB SERPL: 1.2 G/DL
ALP SERPL-CCNC: 66 U/L (ref 39–117)
ALT SERPL W P-5'-P-CCNC: 11 U/L (ref 1–41)
ANION GAP SERPL CALCULATED.3IONS-SCNC: 8 MMOL/L (ref 5–15)
APTT PPP: 35.9 SECONDS (ref 24.1–35)
ARTERIAL PATENCY WRIST A: ABNORMAL
ARTERIAL PATENCY WRIST A: ABNORMAL
AST SERPL-CCNC: 14 U/L (ref 1–40)
ATMOSPHERIC PRESS: 747 MMHG
ATMOSPHERIC PRESS: 747 MMHG
BASE EXCESS BLDA CALC-SCNC: 21.1 MMOL/L (ref 0–2)
BASE EXCESS BLDA CALC-SCNC: 21.1 MMOL/L (ref 0–2)
BASOPHILS # BLD AUTO: 0.02 10*3/MM3 (ref 0–0.2)
BASOPHILS NFR BLD AUTO: 0.2 % (ref 0–1.5)
BDY SITE: ABNORMAL
BDY SITE: ABNORMAL
BILIRUB SERPL-MCNC: 0.2 MG/DL (ref 0–1.2)
BODY TEMPERATURE: 37 C
BODY TEMPERATURE: 37 C
BUN SERPL-MCNC: 14 MG/DL (ref 8–23)
BUN/CREAT SERPL: 38.9 (ref 7–25)
CALCIUM SPEC-SCNC: 9.5 MG/DL (ref 8.6–10.5)
CHLORIDE SERPL-SCNC: 89 MMOL/L (ref 98–107)
CO2 SERPL-SCNC: 45 MMOL/L (ref 22–29)
COHGB MFR BLD: 3.3 % (ref 0–5)
CREAT SERPL-MCNC: 0.36 MG/DL (ref 0.76–1.27)
D DIMER PPP FEU-MCNC: 0.85 MG/L (FEU) (ref 0–0.5)
DEPRECATED RDW RBC AUTO: 43.6 FL (ref 37–54)
EOSINOPHIL # BLD AUTO: 0.01 10*3/MM3 (ref 0–0.4)
EOSINOPHIL NFR BLD AUTO: 0.1 % (ref 0.3–6.2)
EPAP: 6
ERYTHROCYTE [DISTWIDTH] IN BLOOD BY AUTOMATED COUNT: 12.6 % (ref 12.3–15.4)
GAS FLOW AIRWAY: 2 LPM
GFR SERPL CREATININE-BSD FRML MDRD: >150 ML/MIN/1.73
GLOBULIN UR ELPH-MCNC: 3.1 GM/DL
GLUCOSE SERPL-MCNC: 116 MG/DL (ref 65–99)
HCO3 BLDA-SCNC: 49.3 MMOL/L (ref 20–26)
HCO3 BLDA-SCNC: 50.3 MMOL/L (ref 20–26)
HCT VFR BLD AUTO: 40.8 % (ref 37.5–51)
HCT VFR BLD CALC: 36.1 % (ref 38–51)
HGB BLD-MCNC: 12.8 G/DL (ref 13–17.7)
HGB BLDA-MCNC: 11.8 G/DL (ref 14–18)
IMM GRANULOCYTES # BLD AUTO: 0.07 10*3/MM3 (ref 0–0.05)
IMM GRANULOCYTES NFR BLD AUTO: 0.6 % (ref 0–0.5)
INHALED O2 CONCENTRATION: 40 %
INR PPP: 1.06 (ref 0.91–1.09)
IPAP: 14
LYMPHOCYTES # BLD AUTO: 1 10*3/MM3 (ref 0.7–3.1)
LYMPHOCYTES NFR BLD AUTO: 8.6 % (ref 19.6–45.3)
Lab: ABNORMAL
MCH RBC QN AUTO: 29.6 PG (ref 26.6–33)
MCHC RBC AUTO-ENTMCNC: 31.4 G/DL (ref 31.5–35.7)
MCV RBC AUTO: 94.4 FL (ref 79–97)
METHGB BLD QL: 0.5 % (ref 0–3)
MODALITY: ABNORMAL
MODALITY: ABNORMAL
MONOCYTES # BLD AUTO: 0.87 10*3/MM3 (ref 0.1–0.9)
MONOCYTES NFR BLD AUTO: 7.5 % (ref 5–12)
NEUTROPHILS NFR BLD AUTO: 83 % (ref 42.7–76)
NEUTROPHILS NFR BLD AUTO: 9.69 10*3/MM3 (ref 1.7–7)
NOTE: ABNORMAL
NOTIFIED BY: ABNORMAL
NOTIFIED BY: ABNORMAL
NOTIFIED WHO: ABNORMAL
NOTIFIED WHO: ABNORMAL
NRBC BLD AUTO-RTO: 0 /100 WBC (ref 0–0.2)
NT-PROBNP SERPL-MCNC: 275.6 PG/ML (ref 0–900)
OXYHGB MFR BLDV: 95.4 % (ref 94–99)
PCO2 BLDA: 73.7 MM HG (ref 35–45)
PCO2 BLDA: 83.3 MM HG (ref 35–45)
PCO2 TEMP ADJ BLD: 73.7 MM HG (ref 35–45)
PCO2 TEMP ADJ BLD: 83.3 MM HG (ref 35–45)
PH BLDA: 7.39 PH UNITS (ref 7.35–7.45)
PH BLDA: 7.43 PH UNITS (ref 7.35–7.45)
PH, TEMP CORRECTED: 7.39 PH UNITS (ref 7.35–7.45)
PH, TEMP CORRECTED: 7.43 PH UNITS (ref 7.35–7.45)
PLATELET # BLD AUTO: 256 10*3/MM3 (ref 140–450)
PMV BLD AUTO: 8.2 FL (ref 6–12)
PO2 BLDA: 121 MM HG (ref 83–108)
PO2 BLDA: 86.6 MM HG (ref 83–108)
PO2 TEMP ADJ BLD: 121 MM HG (ref 83–108)
PO2 TEMP ADJ BLD: 86.6 MM HG (ref 83–108)
POTASSIUM BLDA-SCNC: 4.2 MMOL/L (ref 3.5–5.2)
POTASSIUM SERPL-SCNC: 4.6 MMOL/L (ref 3.5–5.2)
PROT SERPL-MCNC: 6.8 G/DL (ref 6–8.5)
PROTHROMBIN TIME: 13.4 SECONDS (ref 11.9–14.6)
RBC # BLD AUTO: 4.32 10*6/MM3 (ref 4.14–5.8)
SAO2 % BLDCOA: 97.3 % (ref 94–99)
SAO2 % BLDCOA: 99.2 % (ref 94–99)
SARS-COV-2 RNA PNL SPEC NAA+PROBE: NOT DETECTED
SET MECH RESP RATE: 12
SODIUM BLDA-SCNC: 140 MMOL/L (ref 136–145)
SODIUM SERPL-SCNC: 142 MMOL/L (ref 136–145)
TROPONIN T SERPL-MCNC: <0.01 NG/ML (ref 0–0.03)
VENTILATOR MODE: ABNORMAL
VENTILATOR MODE: ABNORMAL
WBC # BLD AUTO: 11.66 10*3/MM3 (ref 3.4–10.8)

## 2020-07-09 PROCEDURE — 87635 SARS-COV-2 COVID-19 AMP PRB: CPT | Performed by: NURSE PRACTITIONER

## 2020-07-09 PROCEDURE — 82375 ASSAY CARBOXYHB QUANT: CPT

## 2020-07-09 PROCEDURE — 71045 X-RAY EXAM CHEST 1 VIEW: CPT

## 2020-07-09 PROCEDURE — 0 IOPAMIDOL PER 1 ML: Performed by: NURSE PRACTITIONER

## 2020-07-09 PROCEDURE — 36600 WITHDRAWAL OF ARTERIAL BLOOD: CPT

## 2020-07-09 PROCEDURE — 93005 ELECTROCARDIOGRAM TRACING: CPT | Performed by: NURSE PRACTITIONER

## 2020-07-09 PROCEDURE — 25010000002 CEFTRIAXONE PER 250 MG: Performed by: NURSE PRACTITIONER

## 2020-07-09 PROCEDURE — 83050 HGB METHEMOGLOBIN QUAN: CPT

## 2020-07-09 PROCEDURE — 85610 PROTHROMBIN TIME: CPT | Performed by: NURSE PRACTITIONER

## 2020-07-09 PROCEDURE — 99284 EMERGENCY DEPT VISIT MOD MDM: CPT

## 2020-07-09 PROCEDURE — 82805 BLOOD GASES W/O2 SATURATION: CPT

## 2020-07-09 PROCEDURE — 36415 COLL VENOUS BLD VENIPUNCTURE: CPT

## 2020-07-09 PROCEDURE — 87150 DNA/RNA AMPLIFIED PROBE: CPT | Performed by: NURSE PRACTITIONER

## 2020-07-09 PROCEDURE — 82803 BLOOD GASES ANY COMBINATION: CPT

## 2020-07-09 PROCEDURE — 87147 CULTURE TYPE IMMUNOLOGIC: CPT | Performed by: NURSE PRACTITIONER

## 2020-07-09 PROCEDURE — 5A09357 ASSISTANCE WITH RESPIRATORY VENTILATION, LESS THAN 24 CONSECUTIVE HOURS, CONTINUOUS POSITIVE AIRWAY PRESSURE: ICD-10-PCS | Performed by: FAMILY MEDICINE

## 2020-07-09 PROCEDURE — 93010 ELECTROCARDIOGRAM REPORT: CPT | Performed by: INTERNAL MEDICINE

## 2020-07-09 PROCEDURE — 85379 FIBRIN DEGRADATION QUANT: CPT | Performed by: NURSE PRACTITIONER

## 2020-07-09 PROCEDURE — 87040 BLOOD CULTURE FOR BACTERIA: CPT | Performed by: NURSE PRACTITIONER

## 2020-07-09 PROCEDURE — 94660 CPAP INITIATION&MGMT: CPT

## 2020-07-09 PROCEDURE — 83880 ASSAY OF NATRIURETIC PEPTIDE: CPT | Performed by: NURSE PRACTITIONER

## 2020-07-09 PROCEDURE — 94799 UNLISTED PULMONARY SVC/PX: CPT

## 2020-07-09 PROCEDURE — 85025 COMPLETE CBC W/AUTO DIFF WBC: CPT | Performed by: NURSE PRACTITIONER

## 2020-07-09 PROCEDURE — 71275 CT ANGIOGRAPHY CHEST: CPT

## 2020-07-09 PROCEDURE — 80053 COMPREHEN METABOLIC PANEL: CPT | Performed by: NURSE PRACTITIONER

## 2020-07-09 PROCEDURE — 85730 THROMBOPLASTIN TIME PARTIAL: CPT | Performed by: NURSE PRACTITIONER

## 2020-07-09 PROCEDURE — 84484 ASSAY OF TROPONIN QUANT: CPT | Performed by: NURSE PRACTITIONER

## 2020-07-09 RX ORDER — ACETAMINOPHEN 325 MG/1
650 TABLET ORAL EVERY 6 HOURS PRN
COMMUNITY

## 2020-07-09 RX ADMIN — IOPAMIDOL 55 ML: 755 INJECTION, SOLUTION INTRAVENOUS at 23:20

## 2020-07-09 RX ADMIN — CEFTRIAXONE SODIUM 1 G: 1 INJECTION, POWDER, FOR SOLUTION INTRAMUSCULAR; INTRAVENOUS at 22:30

## 2020-07-10 PROBLEM — J96.21 ACUTE ON CHRONIC RESPIRATORY FAILURE WITH HYPOXIA AND HYPERCAPNIA (HCC): Status: ACTIVE | Noted: 2020-07-10

## 2020-07-10 PROBLEM — J96.22 ACUTE ON CHRONIC RESPIRATORY FAILURE WITH HYPOXIA AND HYPERCAPNIA (HCC): Status: ACTIVE | Noted: 2020-07-10

## 2020-07-10 PROBLEM — R63.6 UNDERWEIGHT: Status: ACTIVE | Noted: 2020-07-10

## 2020-07-10 PROBLEM — J44.1 COPD WITH ACUTE EXACERBATION (HCC): Status: ACTIVE | Noted: 2020-07-10

## 2020-07-10 PROBLEM — J96.20 ACUTE-ON-CHRONIC RESPIRATORY FAILURE (HCC): Status: ACTIVE | Noted: 2020-07-10

## 2020-07-10 PROBLEM — J18.9 PNEUMONIA OF LEFT UPPER LOBE DUE TO INFECTIOUS ORGANISM: Status: ACTIVE | Noted: 2020-07-10

## 2020-07-10 PROBLEM — E43 SEVERE MALNUTRITION (HCC): Status: ACTIVE | Noted: 2019-12-12

## 2020-07-10 LAB
ANION GAP SERPL CALCULATED.3IONS-SCNC: 13 MMOL/L (ref 5–15)
BASOPHILS # BLD AUTO: 0.02 10*3/MM3 (ref 0–0.2)
BASOPHILS NFR BLD AUTO: 0.2 % (ref 0–1.5)
BUN SERPL-MCNC: 14 MG/DL (ref 8–23)
BUN/CREAT SERPL: 45.2 (ref 7–25)
CALCIUM SPEC-SCNC: 9.3 MG/DL (ref 8.6–10.5)
CHLORIDE SERPL-SCNC: 88 MMOL/L (ref 98–107)
CO2 SERPL-SCNC: 39 MMOL/L (ref 22–29)
CREAT SERPL-MCNC: 0.31 MG/DL (ref 0.76–1.27)
DEPRECATED RDW RBC AUTO: 43.1 FL (ref 37–54)
EOSINOPHIL # BLD AUTO: 0 10*3/MM3 (ref 0–0.4)
EOSINOPHIL NFR BLD AUTO: 0 % (ref 0.3–6.2)
ERYTHROCYTE [DISTWIDTH] IN BLOOD BY AUTOMATED COUNT: 12.4 % (ref 12.3–15.4)
GFR SERPL CREATININE-BSD FRML MDRD: >150 ML/MIN/1.73
GLUCOSE SERPL-MCNC: 133 MG/DL (ref 65–99)
HCT VFR BLD AUTO: 37.2 % (ref 37.5–51)
HGB BLD-MCNC: 11.5 G/DL (ref 13–17.7)
IMM GRANULOCYTES # BLD AUTO: 0.06 10*3/MM3 (ref 0–0.05)
IMM GRANULOCYTES NFR BLD AUTO: 0.5 % (ref 0–0.5)
L PNEUMO1 AG UR QL IA: NEGATIVE
LYMPHOCYTES # BLD AUTO: 0.58 10*3/MM3 (ref 0.7–3.1)
LYMPHOCYTES NFR BLD AUTO: 5 % (ref 19.6–45.3)
MCH RBC QN AUTO: 29 PG (ref 26.6–33)
MCHC RBC AUTO-ENTMCNC: 30.9 G/DL (ref 31.5–35.7)
MCV RBC AUTO: 93.7 FL (ref 79–97)
MONOCYTES # BLD AUTO: 0.14 10*3/MM3 (ref 0.1–0.9)
MONOCYTES NFR BLD AUTO: 1.2 % (ref 5–12)
NEUTROPHILS NFR BLD AUTO: 10.84 10*3/MM3 (ref 1.7–7)
NEUTROPHILS NFR BLD AUTO: 93.1 % (ref 42.7–76)
NRBC BLD AUTO-RTO: 0 /100 WBC (ref 0–0.2)
PLATELET # BLD AUTO: 236 10*3/MM3 (ref 140–450)
PMV BLD AUTO: 8.6 FL (ref 6–12)
POTASSIUM SERPL-SCNC: 4.4 MMOL/L (ref 3.5–5.2)
RBC # BLD AUTO: 3.97 10*6/MM3 (ref 4.14–5.8)
S PNEUM AG SPEC QL LA: NEGATIVE
SODIUM SERPL-SCNC: 140 MMOL/L (ref 136–145)
WBC # BLD AUTO: 11.64 10*3/MM3 (ref 3.4–10.8)

## 2020-07-10 PROCEDURE — 87899 AGENT NOS ASSAY W/OPTIC: CPT | Performed by: NURSE PRACTITIONER

## 2020-07-10 PROCEDURE — 94799 UNLISTED PULMONARY SVC/PX: CPT

## 2020-07-10 PROCEDURE — 87205 SMEAR GRAM STAIN: CPT | Performed by: FAMILY MEDICINE

## 2020-07-10 PROCEDURE — 25010000002 METHYLPREDNISOLONE PER 125 MG: Performed by: NURSE PRACTITIONER

## 2020-07-10 PROCEDURE — 25010000002 AZITHROMYCIN PER 500 MG: Performed by: NURSE PRACTITIONER

## 2020-07-10 PROCEDURE — 25010000002 LEVOFLOXACIN PER 250 MG: Performed by: FAMILY MEDICINE

## 2020-07-10 PROCEDURE — 85025 COMPLETE CBC W/AUTO DIFF WBC: CPT | Performed by: FAMILY MEDICINE

## 2020-07-10 PROCEDURE — 80048 BASIC METABOLIC PNL TOTAL CA: CPT | Performed by: FAMILY MEDICINE

## 2020-07-10 PROCEDURE — 87070 CULTURE OTHR SPECIMN AEROBIC: CPT | Performed by: FAMILY MEDICINE

## 2020-07-10 PROCEDURE — 94664 DEMO&/EVAL PT USE INHALER: CPT

## 2020-07-10 PROCEDURE — 94640 AIRWAY INHALATION TREATMENT: CPT

## 2020-07-10 PROCEDURE — 99406 BEHAV CHNG SMOKING 3-10 MIN: CPT

## 2020-07-10 PROCEDURE — 25010000002 METHYLPREDNISOLONE PER 40 MG: Performed by: FAMILY MEDICINE

## 2020-07-10 PROCEDURE — 25010000002 ENOXAPARIN PER 10 MG: Performed by: FAMILY MEDICINE

## 2020-07-10 RX ORDER — ALBUTEROL SULFATE 2.5 MG/3ML
2.5 SOLUTION RESPIRATORY (INHALATION) EVERY 4 HOURS PRN
Status: DISCONTINUED | OUTPATIENT
Start: 2020-07-10 | End: 2020-07-12 | Stop reason: HOSPADM

## 2020-07-10 RX ORDER — MEGESTROL ACETATE 40 MG/ML
400 SUSPENSION ORAL DAILY
Status: DISCONTINUED | OUTPATIENT
Start: 2020-07-10 | End: 2020-07-12 | Stop reason: HOSPADM

## 2020-07-10 RX ORDER — SODIUM CHLORIDE 0.9 % (FLUSH) 0.9 %
10 SYRINGE (ML) INJECTION AS NEEDED
Status: DISCONTINUED | OUTPATIENT
Start: 2020-07-10 | End: 2020-07-12 | Stop reason: HOSPADM

## 2020-07-10 RX ORDER — ALPRAZOLAM 0.5 MG/1
1 TABLET ORAL 3 TIMES DAILY PRN
Status: DISCONTINUED | OUTPATIENT
Start: 2020-07-10 | End: 2020-07-12 | Stop reason: HOSPADM

## 2020-07-10 RX ORDER — METOPROLOL TARTRATE 50 MG/1
50 TABLET, FILM COATED ORAL 2 TIMES DAILY
Status: DISCONTINUED | OUTPATIENT
Start: 2020-07-10 | End: 2020-07-12 | Stop reason: HOSPADM

## 2020-07-10 RX ORDER — IPRATROPIUM BROMIDE AND ALBUTEROL SULFATE 2.5; .5 MG/3ML; MG/3ML
3 SOLUTION RESPIRATORY (INHALATION)
Status: DISCONTINUED | OUTPATIENT
Start: 2020-07-10 | End: 2020-07-12 | Stop reason: HOSPADM

## 2020-07-10 RX ORDER — SODIUM CHLORIDE 0.9 % (FLUSH) 0.9 %
10 SYRINGE (ML) INJECTION EVERY 12 HOURS SCHEDULED
Status: DISCONTINUED | OUTPATIENT
Start: 2020-07-10 | End: 2020-07-12 | Stop reason: HOSPADM

## 2020-07-10 RX ORDER — GUAIFENESIN 600 MG/1
1200 TABLET, EXTENDED RELEASE ORAL EVERY 12 HOURS SCHEDULED
Status: DISCONTINUED | OUTPATIENT
Start: 2020-07-10 | End: 2020-07-12 | Stop reason: HOSPADM

## 2020-07-10 RX ORDER — PANTOPRAZOLE SODIUM 40 MG/1
40 TABLET, DELAYED RELEASE ORAL DAILY
Status: DISCONTINUED | OUTPATIENT
Start: 2020-07-10 | End: 2020-07-12 | Stop reason: HOSPADM

## 2020-07-10 RX ORDER — LEVOFLOXACIN 5 MG/ML
250 INJECTION, SOLUTION INTRAVENOUS EVERY 24 HOURS
Status: DISCONTINUED | OUTPATIENT
Start: 2020-07-11 | End: 2020-07-11

## 2020-07-10 RX ORDER — ASPIRIN 81 MG/1
81 TABLET ORAL DAILY
Status: DISCONTINUED | OUTPATIENT
Start: 2020-07-10 | End: 2020-07-12 | Stop reason: HOSPADM

## 2020-07-10 RX ORDER — LEVOFLOXACIN 5 MG/ML
500 INJECTION, SOLUTION INTRAVENOUS ONCE
Status: COMPLETED | OUTPATIENT
Start: 2020-07-10 | End: 2020-07-10

## 2020-07-10 RX ORDER — ACETAMINOPHEN 325 MG/1
650 TABLET ORAL EVERY 4 HOURS PRN
Status: DISCONTINUED | OUTPATIENT
Start: 2020-07-10 | End: 2020-07-12 | Stop reason: HOSPADM

## 2020-07-10 RX ORDER — CITALOPRAM 20 MG/1
40 TABLET ORAL DAILY
Status: DISCONTINUED | OUTPATIENT
Start: 2020-07-10 | End: 2020-07-12 | Stop reason: HOSPADM

## 2020-07-10 RX ORDER — METHYLPREDNISOLONE SODIUM SUCCINATE 125 MG/2ML
125 INJECTION, POWDER, LYOPHILIZED, FOR SOLUTION INTRAMUSCULAR; INTRAVENOUS ONCE
Status: COMPLETED | OUTPATIENT
Start: 2020-07-10 | End: 2020-07-10

## 2020-07-10 RX ORDER — FERROUS SULFATE 325(65) MG
325 TABLET ORAL
Status: DISCONTINUED | OUTPATIENT
Start: 2020-07-11 | End: 2020-07-12 | Stop reason: HOSPADM

## 2020-07-10 RX ORDER — PREDNISONE 10 MG/1
10 TABLET ORAL DAILY
COMMUNITY

## 2020-07-10 RX ORDER — MONTELUKAST SODIUM 10 MG/1
10 TABLET ORAL NIGHTLY
COMMUNITY

## 2020-07-10 RX ORDER — MONTELUKAST SODIUM 10 MG/1
10 TABLET ORAL NIGHTLY
Status: DISCONTINUED | OUTPATIENT
Start: 2020-07-10 | End: 2020-07-12 | Stop reason: HOSPADM

## 2020-07-10 RX ORDER — METHYLPREDNISOLONE SODIUM SUCCINATE 40 MG/ML
40 INJECTION, POWDER, LYOPHILIZED, FOR SOLUTION INTRAMUSCULAR; INTRAVENOUS EVERY 12 HOURS
Status: DISCONTINUED | OUTPATIENT
Start: 2020-07-10 | End: 2020-07-11

## 2020-07-10 RX ADMIN — SODIUM CHLORIDE, PRESERVATIVE FREE 10 ML: 5 INJECTION INTRAVENOUS at 08:08

## 2020-07-10 RX ADMIN — GUAIFENESIN 1200 MG: 600 TABLET, EXTENDED RELEASE ORAL at 21:42

## 2020-07-10 RX ADMIN — ASPIRIN 81 MG: 81 TABLET ORAL at 16:18

## 2020-07-10 RX ADMIN — PANTOPRAZOLE SODIUM 40 MG: 40 TABLET, DELAYED RELEASE ORAL at 16:17

## 2020-07-10 RX ADMIN — MONTELUKAST SODIUM 10 MG: 10 TABLET, FILM COATED ORAL at 21:42

## 2020-07-10 RX ADMIN — METHYLPREDNISOLONE SODIUM SUCCINATE 40 MG: 40 INJECTION, POWDER, FOR SOLUTION INTRAMUSCULAR; INTRAVENOUS at 09:26

## 2020-07-10 RX ADMIN — IPRATROPIUM BROMIDE AND ALBUTEROL SULFATE 3 ML: 2.5; .5 SOLUTION RESPIRATORY (INHALATION) at 09:53

## 2020-07-10 RX ADMIN — METOPROLOL TARTRATE 50 MG: 50 TABLET, FILM COATED ORAL at 21:42

## 2020-07-10 RX ADMIN — IPRATROPIUM BROMIDE AND ALBUTEROL SULFATE 3 ML: 2.5; .5 SOLUTION RESPIRATORY (INHALATION) at 19:59

## 2020-07-10 RX ADMIN — IPRATROPIUM BROMIDE AND ALBUTEROL SULFATE 3 ML: 2.5; .5 SOLUTION RESPIRATORY (INHALATION) at 14:05

## 2020-07-10 RX ADMIN — ENOXAPARIN SODIUM 30 MG: 30 INJECTION SUBCUTANEOUS at 08:12

## 2020-07-10 RX ADMIN — METHYLPREDNISOLONE SODIUM SUCCINATE 125 MG: 125 INJECTION, POWDER, FOR SOLUTION INTRAMUSCULAR; INTRAVENOUS at 00:37

## 2020-07-10 RX ADMIN — IPRATROPIUM BROMIDE AND ALBUTEROL SULFATE 3 ML: 2.5; .5 SOLUTION RESPIRATORY (INHALATION) at 06:12

## 2020-07-10 RX ADMIN — SODIUM CHLORIDE, PRESERVATIVE FREE 10 ML: 5 INJECTION INTRAVENOUS at 21:43

## 2020-07-10 RX ADMIN — AZITHROMYCIN 500 MG: 500 INJECTION, POWDER, LYOPHILIZED, FOR SOLUTION INTRAVENOUS at 00:37

## 2020-07-10 RX ADMIN — MEGESTROL ACETATE 400 MG: 40 SUSPENSION ORAL at 16:25

## 2020-07-10 RX ADMIN — LEVOFLOXACIN 500 MG: 5 INJECTION, SOLUTION INTRAVENOUS at 08:08

## 2020-07-10 RX ADMIN — CITALOPRAM 40 MG: 20 TABLET, FILM COATED ORAL at 16:25

## 2020-07-10 RX ADMIN — METHYLPREDNISOLONE SODIUM SUCCINATE 40 MG: 40 INJECTION, POWDER, FOR SOLUTION INTRAMUSCULAR; INTRAVENOUS at 21:42

## 2020-07-10 RX ADMIN — ALPRAZOLAM 1 MG: 0.5 TABLET ORAL at 16:17

## 2020-07-10 NOTE — PROGRESS NOTES
Discharge Planning Assessment  [facility]     Patient Name: Kellie Arzate  MRN: [id number]  Today's Date: 7/10/2020    Admit Date: 7/9/2020    Discharge Needs Assessment     Row Name 07/10/20 3410       Living Environment    Lives With  other (see comments)    Name(s) of Who Lives With Patient  jared Perez    Current Living Arrangements  home/apartment/condo    Primary Care Provided by  self    Provides Primary Care For  no one    Family Caregiver Names  neice    Quality of Family Relationships  helpful;involved;supportive    Able to Return to Prior Arrangements  yes       Resource/Environmental Concerns    Resource/Environmental Concerns  none    Transportation Concerns  car, none       Transition Planning    Patient/Family Anticipates Transition to  home with family;home with help/services    Patient/Family Anticipated Services at Transition  none    Transportation Anticipated  family or friend will provide       Discharge Needs Assessment    Readmission Within the Last 30 Days  no previous admission in last 30 days    Concerns to be Addressed  no discharge needs identified;denies needs/concerns at this time    Equipment Currently Used at Home  walker, rolling;shower chair;oxygen;bath bench;commode    Anticipated Changes Related to Illness  none    Equipment Needed After Discharge  none    Discharge Facility/Level of Care Needs  home with home health    Provided Post Acute Provider List?  Yes    Patient's Choice of Community Agency(s)  [facility]     Discharge Coordination/Progress  Pt has RX coverage and a PCP. Pt lives at home with jared and plans on returning. Pt states that he is current with [facility] who are aware of admission. Pt states that he has everything needed at home. Pt will need resumption orders for [facility] at discharge. SW will follow and assist with setting HH up when ordered at discharge         Discharge Plan    No documentation.       Destination      Coordination has  not been started for this encounter.      Durable Medical Equipment      Coordination has not been started for this encounter.      Dialysis/Infusion      Coordination has not been started for this encounter.      Home Medical Care      Coordination has not been started for this encounter.      Therapy      Coordination has not been started for this encounter.      Community Resources      Coordination has not been started for this encounter.          Demographic Summary    No documentation.       Functional Status    No documentation.       Psychosocial    No documentation.       Abuse/Neglect    No documentation.       Legal    No documentation.       Substance Abuse    No documentation.       Patient Forms    No documentation.           Masha Ramirez

## 2020-07-10 NOTE — ED PROVIDER NOTES
"Subjective   Patient is a 65-year-old male presents emergency department via EMS with \"low oxygen saturation.\"  Patient tells this examiner he has history of COPD and emphysema requiring 3 L of oxygen at all times.  He states that his niece lives with him to help with his medical issues and he additionally has home health that comes to his home 1 day weekly to do well checks on him.  Patient states yesterday his niece noted that his oxygen level was low and she increased his oxygen to 4 L.  He states while receiving a nebulizer treatment his oxygen level was noted to be at 54%.  He states they eventually realized that 1 of the water canisters was clogged and he was not getting any oxygen and wants this was corrected his oxygen level improved.  Patient states today however when home health came to visit they noted that his oxygen level was still low and felt that he needed to come to the ER for further evaluation and treatment.  Patient reports a chronic cough and denies this being worse than his usual.  He reports coughing up clear phlegm.  He has had no recorded fevers.  He has had no nausea vomiting or diarrhea.  He denies any abdominal pain.  He does however report a change in his appetite and states currently he is 88 pounds.  He states that he is always been a small framed man and reports that his usual weight for many years was approximately 135 pounds.  He indicates after he developed pneumococcal approximately 5 years ago he began having weight loss and difficulty with keeping weight on.    Per records reviewed patient's last admission to this hospital was April 4, 2020 to April 12, 2020 with discharge diagnosis hypercarbia, COPD, acute on chronic respiratory failure with hypoxia and hypercapnia.  Patient presented with shortness of breath.  He was having a COPD exacerbation.  He was treated with IV steroids and nebulizer treatments.  He was treated with Mucinex.  He was slow to improve.  Pulmonology was " consulted and assisted in management.  He is tolerating his home oxygen and walked well with physical therapy.  He was comfortable at discharge.  Please see note for complete details.    As described above patient was sent to the ER per recommendations from home health nursing staff due to hypoxia.      Other   Chronicity:  Recurrent  Context:  Reports hypoxia noted on pulse ox machine at home  Associated symptoms: congestion, cough and shortness of breath    Associated symptoms: no abdominal pain, no chest pain, no fever, no nausea, no vomiting and no wheezing        Review of Systems   Constitutional: Negative.  Negative for fever.   HENT: Positive for congestion.    Respiratory: Positive for cough and shortness of breath. Negative for wheezing.    Cardiovascular: Negative.  Negative for chest pain.   Gastrointestinal: Negative.  Negative for abdominal pain, nausea and vomiting.   Musculoskeletal: Negative.    Skin: Negative.    All other systems reviewed and are negative.      Past Medical History:   Diagnosis Date   • Anxiety    • Arthritis    • Chronic respiratory failure with hypoxia, on home O2 therapy (CMS/[facility]), 3 L    • COPD (chronic obstructive pulmonary disease) (CMS/[facility])    • Depression    • Hypertension    • Mixed hyperlipidemia    • Nephrolithiasis    • Pneumonia, pseudomonal 7/2016 with negative AFB, aspiration 7/2018        Allergies   Allergen Reactions   • Meperidine Anaphylaxis   • Morphine Hallucinations       Past Surgical History:   Procedure Laterality Date   • COLONOSCOPY  04/20/2007    Within Normal Limits.   • ENDOSCOPY  04/20/2007    urea neg, sbbx benign, mild gastritis, small hiatal hernia   • ENDOSCOPY N/A 10/5/2016    Procedure: ESOPHAGOGASTRODUODENOSCOPY WITH ANESTHESIA;  Surgeon: Kevyn Damon MD;  Location: [facility] ENDOSCOPY;  Service:    • HEMORRHOIDECTOMY     • PEG TUBE INSERTION  07/22/2016    Dr Mccarty   • TRACHEOSTOMY         Family History   Problem Relation Age of Onset   •  Dementia Mother    • No Known Problems Father        Social History     Socioeconomic History   • Marital status:      Spouse name: Not on file   • Number of children: Not on file   • Years of education: Not on file   • Highest education level: Not on file   Tobacco Use   • Smoking status: Current Every Day Smoker     Packs/day: 0.50     Years: 50.00     Pack years: 25.00     Types: Cigarettes   • Smokeless tobacco: Never Used   Substance and Sexual Activity   • Alcohol use: No   • Drug use: Defer   • Sexual activity: Defer           Objective   Physical Exam   Constitutional: He is oriented to person, place, and time. He appears well-developed and well-nourished. No distress.   HENT:   Head: Normocephalic and atraumatic.   Right Ear: External ear normal.   Left Ear: External ear normal.   Nose: Nose normal.   Eyes: Pupils are equal, round, and reactive to light. Conjunctivae are normal. No scleral icterus.   Neck: Normal range of motion. Neck supple. No JVD present. No thyromegaly present.   Cardiovascular: Normal rate, regular rhythm, normal heart sounds and intact distal pulses.   No murmur heard.  Pulmonary/Chest: Effort normal. No respiratory distress. He has no wheezes. He has rales. He exhibits no tenderness.   Abdominal: Soft. Bowel sounds are normal. He exhibits no distension and no mass. There is no tenderness. There is no rebound and no guarding.   Musculoskeletal: Normal range of motion. He exhibits no edema.   Lymphadenopathy:     He has no cervical adenopathy.   Neurological: He is alert and oriented to person, place, and time. He has normal reflexes. No cranial nerve deficit. Coordination normal.   Skin: Skin is warm and dry. No rash noted. He is not diaphoretic. No erythema. No pallor.   Psychiatric: He has a normal mood and affect. His behavior is normal. Judgment and thought content normal.   Nursing note and vitals reviewed.      Procedures           ED Course reviewed labs and case with  hospitalist who is agreeable for admission at this time.                                            MDM  Number of Diagnoses or Management Options  Pneumonia of left upper lobe due to infectious organism: new and requires workup     Amount and/or Complexity of Data Reviewed  Clinical lab tests: ordered and reviewed  Tests in the radiology section of CPT®: ordered and reviewed  Decide to obtain previous medical records or to obtain history from someone other than the patient: yes  Discuss the patient with other providers: yes    Risk of Complications, Morbidity, and/or Mortality  Presenting problems: moderate  Diagnostic procedures: moderate  Management options: moderate    Patient Progress  Patient progress: improved      Final diagnoses:   Pneumonia of left upper lobe due to infectious organism            Hodan Fernandez, APRN  07/10/20 0027

## 2020-07-10 NOTE — H&P
[facility] Medicine Services  HISTORY AND PHYSICAL    Date of Admission: 7/9/2020  Primary Care Physician: Jose Moon MD    Subjective     Chief Complaint: low oxygen    History of Present Illness  65 year old male with PMH of CODP, CRF hypoxemic and hypercarbic, HTN, that presents due to hypoxemia and dyspnea referred by his home nurse. He had lower than 88% oxygen saturation despite wearing 3-4 lpm increased dose. The patient appears debilitated and tangential. Abg showed pCO2 83 pH 7.38.     CXR and CT Chest show changes concerning for new infiltrate or scar tissue left upper lobe. Patient does not have fever or pleuritic chest pain. States has lost weight recently, but can not really specify how much or since when.     Review of Systems   Otherwise complete ROS reviewed and negative except as mentioned in the HPI.    Past Medical History:   Past Medical History:   Diagnosis Date   • Anxiety    • Arthritis    • Chronic respiratory failure with hypoxia, on home O2 therapy (CMS/[facility]), 3 L    • COPD (chronic obstructive pulmonary disease) (CMS/[facility])    • Depression    • Hypertension    • Mixed hyperlipidemia    • Nephrolithiasis    • Pneumonia, pseudomonal 7/2016 with negative AFB, aspiration 7/2018      Past Surgical History:  Past Surgical History:   Procedure Laterality Date   • COLONOSCOPY  04/20/2007    Within Normal Limits.   • ENDOSCOPY  04/20/2007    urea neg, sbbx benign, mild gastritis, small hiatal hernia   • ENDOSCOPY N/A 10/5/2016    Procedure: ESOPHAGOGASTRODUODENOSCOPY WITH ANESTHESIA;  Surgeon: Kevyn Damon MD;  Location: [facility] ENDOSCOPY;  Service:    • HEMORRHOIDECTOMY     • PEG TUBE INSERTION  07/22/2016    Dr Mccarty   • TRACHEOSTOMY       Social History:  reports that he has been smoking cigarettes. He has a 25.00 pack-year smoking history. He has never used smokeless tobacco. Drug use questions deferred to the physician. He reports that he  does not drink alcohol.    Family History: family history includes Dementia in his mother; No Known Problems in his father.       Allergies:  Allergies   Allergen Reactions   • Meperidine Anaphylaxis   • Morphine Hallucinations     Medications:  Prior to Admission medications    Medication Sig Start Date End Date Taking? Authorizing Provider   acetaminophen (TYLENOL) 325 MG tablet Take 650 mg by mouth Every 6 (Six) Hours As Needed for Mild Pain .   Yes Christian Briseno MD   albuterol sulfate  (90 Base) MCG/ACT inhaler Inhale 2 puffs Every 4 (Four) Hours As Needed for Wheezing. 5/11/20  Yes Juani Claire APRN   aspirin 81 MG EC tablet Take 81 mg by mouth Daily.   Yes Christian Briseno MD   citalopram (CeleXA) 40 MG tablet Take 40 mg by mouth Daily.   Yes Christian Briseno MD   ferrous sulfate 325 (65 FE) MG tablet Take 325 mg by mouth Daily With Breakfast.   Yes Christina Briseno MD   Fluticasone-Umeclidin-Vilant (Trelegy Ellipta) 100-62.5-25 MCG/INH aerosol powder  Inhale 1 puff Daily.   Yes Christian Briseno MD   guaiFENesin (MUCINEX) 600 MG 12 hr tablet Take 2 tablets by mouth Every 12 (Twelve) Hours. 7/14/18  Yes Jameson Oliver DO   ipratropium-albuterol (DUO-NEB) 0.5-2.5 mg/mL nebulizer Take 3 mL by nebulization Every 6 (Six) Hours As Needed for wheezing.   Yes Christian Briseno MD   metoprolol tartrate (LOPRESSOR) 50 MG tablet Take 50 mg by mouth 2 (Two) Times a Day.   Yes Christian Briseno MD   pantoprazole (PROTONIX) 40 MG EC tablet Take 40 mg by mouth Daily.   Yes Christian Briseno MD   predniSONE (DELTASONE) 10 MG tablet 4 tabs daily x 3 days, then 3 tabs daily x 3 days, then 2 tabs daily x 3 days, then 1 tab daily x  3 days 4/12/20  Yes Alon Bowens MD   simvastatin (ZOCOR) 20 MG tablet Take 20 mg by mouth Every Night.   Yes Christian Briseno MD   ALPRAZolam (XANAX) 1 MG tablet Take 1 mg by mouth 3 (Three) Times a Day As Needed for  "Anxiety.    Provider, MD Christian   Fluticasone Furoate-Vilanterol (Breo Ellipta) 200-25 MCG/INH inhaler Inhale 1 puff Daily for 360 days. 5/11/20 5/6/21  Juani Claire APRN     Objective     Vital Signs: /82 (BP Location: Right arm, Patient Position: Sitting)   Pulse 104   Temp 98.4 °F (36.9 °C) (Oral)   Resp 18   Ht 165.1 cm (65\")   Wt 39.4 kg (86 lb 12.8 oz)   SpO2 (!) 89%   BMI 14.44 kg/m²   Physical Exam   Constitutional: He is oriented to person, place, and time.   Cachectic male appears chronically ill, dyspneic with speech   HENT:   Head: Normocephalic and atraumatic.   Right Ear: External ear normal.   Left Ear: External ear normal.   Eyes: Pupils are equal, round, and reactive to light. EOM are normal. Right eye exhibits no discharge. Left eye exhibits no discharge.   Neck: Normal range of motion. Neck supple. No thyromegaly present.   Cardiovascular: Regular rhythm and normal heart sounds.   No murmur heard.  Pulmonary/Chest: Effort normal. No stridor. No respiratory distress. He has wheezes. He has rales.   Supraclavicular retractions with breathing like a sail in changing winds   Abdominal: Soft. Bowel sounds are normal. He exhibits no distension and no mass. There is no tenderness. There is no guarding.   Musculoskeletal: Normal range of motion. He exhibits no edema, tenderness or deformity.   Neurological: He is alert and oriented to person, place, and time. He displays normal reflexes. No cranial nerve deficit. He exhibits normal muscle tone. Coordination normal.   Skin: Skin is warm. Capillary refill takes less than 2 seconds. No erythema.     Results Reviewed:  Lab Results (last 24 hours)     Procedure Component Value Units Date/Time    Blood Culture - Blood, Arm, Right [[id number]] Collected:  07/09/20 2211    Specimen:  Blood from Arm, Right Updated:  07/09/20 2217    Blood Gas, Arterial [[id number]]  (Abnormal) Collected:  07/09/20 2210    Specimen:  Arterial Blood " Updated:  07/09/20 2215     Site Right Brachial     Kervin's Test N/A     pH, Arterial 7.433 pH units      pCO2, Arterial 73.7 mm Hg      Comment: 86 Value above critical limit        pO2, Arterial 86.6 mm Hg      HCO3, Arterial 49.3 mmol/L      Comment: 83 Value above reference range        Base Excess, Arterial 21.1 mmol/L      Comment: 83 Value above reference range        O2 Saturation, Arterial 97.3 %      Temperature 37.0 C      Barometric Pressure for Blood Gas 747 mmHg      Modality BiPap     FIO2 40 %      Ventilator Mode BiPAP     Set [facility] Resp Rate 12.0     IPAP 14     Comment: Meter: [id number]     :  [id number]        EPAP 6     Notified Who KATELIN RN     Notified By [id number]     Notified Time 07/09/2020 22:21     Collected by [id number]     pCO2, Temperature Corrected 73.7 mm Hg      pH, Temp Corrected 7.433 pH Units      pO2, Temperature Corrected 86.6 mm Hg     Blood Culture - Blood, Wrist, Left [[id number]] Collected:  07/09/20 2206    Specimen:  Blood from Wrist, Left Updated:  07/09/20 2211    COVID PRE-OP / PRE-PROCEDURE SCREENING ORDER (NO ISOLATION) - Swab, Nasal Cavity [[id number]] Collected:  07/09/20 2008    Specimen:  Swab from Nasal Cavity Updated:  07/09/20 2050    Narrative:       The following orders were created for panel order COVID PRE-OP / PRE-PROCEDURE SCREENING ORDER (NO ISOLATION) - Swab, Nasal Cavity.  Procedure                               Abnormality         Status                     ---------                               -----------         ------                     COVID-19 [facility] IN-[facility]...[[id number]]  Normal              Final result                 Please view results for these tests on the individual orders.    COVID-19 [facility] IN-HOUSE, Nasal Swab No Transport Media - Swab, Nasal Cavity [[id number]]  (Normal) Collected:  07/09/20 2008    Specimen:  Swab from Nasal Cavity Updated:  07/09/20 2050     COVID19 Not Detected    Narrative:       Fact sheet for providers:  https://www.fda.gov/media/737475/download     Fact sheet for patients: https://www.fda.gov/media/312907/download    Blood Gas, Arterial With Co-Ox [[id number]]  (Abnormal) Collected:  07/09/20 2030    Specimen:  Arterial Blood Updated:  07/09/20 2037     Site Right Brachial     Kervin's Test N/A     pH, Arterial 7.389 pH units      pCO2, Arterial 83.3 mm Hg      Comment: 86 Value above critical limit        pO2, Arterial 121.0 mm Hg      Comment: 83 Value above reference range        HCO3, Arterial 50.3 mmol/L      Comment: 83 Value above reference range        Base Excess, Arterial 21.1 mmol/L      Comment: 83 Value above reference range        O2 Saturation, Arterial 99.2 %      Comment: 83 Value above reference range        Hemoglobin, Blood Gas 11.8 g/dL      Comment: 84 Value below reference range        Hematocrit, Blood Gas 36.1 %      Comment: 84 Value below reference range        Oxyhemoglobin 95.4 %      Methemoglobin 0.50 %      Carboxyhemoglobin 3.3 %      A-a Gradiant --     Comment: UNABLE TO CALCULATE        Temperature 37.0 C      Sodium, Arterial 140 mmol/L      Potassium, Arterial 4.2 mmol/L      Barometric Pressure for Blood Gas 747 mmHg      Modality Nasal Cannula     Flow Rate 2.0 lpm      Ventilator Mode NA     Note --     Notified Who VIVIANA APONTE     Notified By [id number]     Notified Time 07/09/2020 20:42     Collected by [id number]     Comment: Meter: [id number]     :  [id number]        pH, Temp Corrected 7.389 pH Units      pCO2, Temperature Corrected 83.3 mm Hg      pO2, Temperature Corrected 121 mm Hg     Comprehensive Metabolic Panel [[id number]]  (Abnormal) Collected:  07/09/20 2002    Specimen:  Blood Updated:  07/09/20 2028     Glucose 116 mg/dL      BUN 14 mg/dL      Creatinine 0.36 mg/dL      Sodium 142 mmol/L      Potassium 4.6 mmol/L      Chloride 89 mmol/L      CO2 45.0 mmol/L      Calcium 9.5 mg/dL      Total Protein 6.8 g/dL      Albumin 3.70 g/dL      ALT (SGPT) 11 U/L       AST (SGOT) 14 U/L      Alkaline Phosphatase 66 U/L      Total Bilirubin 0.2 mg/dL      eGFR Non African Amer >150 mL/min/1.73      Globulin 3.1 gm/dL      A/G Ratio 1.2 g/dL      BUN/Creatinine Ratio 38.9     Anion Gap 8.0 mmol/L     Narrative:       GFR Normal >60  Chronic Kidney Disease <60  Kidney Failure <15      Troponin [[id number]]  (Normal) Collected:  07/09/20 2002    Specimen:  Blood Updated:  07/09/20 2028     Troponin T <0.010 ng/mL     Narrative:       Troponin T Reference Range:  <= 0.03 ng/mL-   Negative for AMI  >0.03 ng/mL-     Abnormal for myocardial necrosis.  Clinicians would have to utilize clinical acumen, EKG, Troponin and serial changes to determine if it is an Acute Myocardial Infarction or myocardial injury due to an underlying chronic condition.       Results may be falsely decreased if patient taking Biotin.      BNP [[id number]]  (Normal) Collected:  07/09/20 2002    Specimen:  Blood Updated:  07/09/20 2028     proBNP 275.6 pg/mL     Narrative:       Among patients with dyspnea, NT-proBNP is highly sensitive for the detection of acute congestive heart failure. In addition NT-proBNP of <300 pg/ml effectively rules out acute congestive heart failure with 99% negative predictive value.    Results may be falsely decreased if patient taking Biotin.      aPTT [[id number]]  (Abnormal) Collected:  07/09/20 2002    Specimen:  Blood Updated:  07/09/20 2020     PTT 35.9 seconds     Protime-INR [[id number]]  (Normal) Collected:  07/09/20 2002    Specimen:  Blood Updated:  07/09/20 2020     Protime 13.4 Seconds      INR 1.06    D-dimer, Quantitative [[id number]]  (Abnormal) Collected:  07/09/20 2002    Specimen:  Blood Updated:  07/09/20 2020     D-Dimer, Quantitative 0.85 mg/L (FEU)     Narrative:       Reference Range is 0-0.50 mg/L FEU. However, results <0.50 mg/L FEU tends to rule out DVT or PE. Results >0.50 mg/L FEU are not useful in predicting absence or presence of DVT or PE.      CBC &  Differential [[id number]] Collected:  07/09/20 2002    Specimen:  Blood Updated:  07/09/20 2010    Narrative:       The following orders were created for panel order CBC & Differential.  Procedure                               Abnormality         Status                     ---------                               -----------         ------                     CBC Auto Differential[[id number]]        Abnormal            Final result                 Please view results for these tests on the individual orders.    CBC Auto Differential [[id number]]  (Abnormal) Collected:  07/09/20 2002    Specimen:  Blood Updated:  07/09/20 2010     WBC 11.66 10*3/mm3      RBC 4.32 10*6/mm3      Hemoglobin 12.8 g/dL      Hematocrit 40.8 %      MCV 94.4 fL      MCH 29.6 pg      MCHC 31.4 g/dL      RDW 12.6 %      RDW-SD 43.6 fl      MPV 8.2 fL      Platelets 256 10*3/mm3      Neutrophil % 83.0 %      Lymphocyte % 8.6 %      Monocyte % 7.5 %      Eosinophil % 0.1 %      Basophil % 0.2 %      Immature Grans % 0.6 %      Neutrophils, Absolute 9.69 10*3/mm3      Lymphocytes, Absolute 1.00 10*3/mm3      Monocytes, Absolute 0.87 10*3/mm3      Eosinophils, Absolute 0.01 10*3/mm3      Basophils, Absolute 0.02 10*3/mm3      Immature Grans, Absolute 0.07 10*3/mm3      nRBC 0.0 /100 WBC         Imaging Results (Last 24 Hours)     Procedure Component Value Units Date/Time    CT Angiogram Chest [[id number]] Resulted:  07/09/20 2238     Updated:  07/09/20 2325    XR Chest 1 View [[id number]] Collected:  07/09/20 2102     Updated:  07/09/20 2110    Narrative:       XR CHEST 1 VW-     Indication: Shortness of breath     Comparison: 04/03/2020     Findings:     Cardiac silhouette is normal in size and stable.  Redemonstrated severe emphysema with hyperexpanded lungs.  Similar-appearing RIGHT apical pleural and parenchymal scarring.  New patchy LEFT upper lobe airspace opacity.  No visible pneumothorax or pleural effusion.  No acute osseous finding.        Impression:       Impression:     1.  New LEFT upper lobe opacity, likely representing infectious  process/pneumonia.  2.  No change in RIGHT apical pleural/parenchymal scarring and severe  emphysema.  This report was finalized on 07/09/2020 21:07 by Dr. Jaden Cooney MD.        I have personally reviewed and interpreted the radiology studies and ECG obtained at time of admission.     Assessment / Plan     Assessment:   Active Hospital Problems    Diagnosis   • **COPD with acute exacerbation (CMS/HCC)   • Pneumonia of left upper lobe due to infectious organism   • Acute-on-chronic respiratory failure (CMS/HCC)   • Underweight     Plan:   Admit to medical floor. Vitals routine, telemetry.   IVF saline lock.   Solumedrol/Douneb/Albuterol.  Empiric Levaquin, infiltrates do not appear to correlate with acute inflammation. Concern would be for underlying lung cancer. Patient has a very poor functional level and doubt could be candidate for bronchoscopy due to risk or complications.  Home medications reconciled.    BiPAP at HS and prn    Code Status: Full     I discussed the patient's findings and my recommendations with the patient    Estimated length of stay over 2 midnights    Patient seen and examined by me on see below.    Adria Vasquez MD   07/10/20   00:59

## 2020-07-10 NOTE — PLAN OF CARE
Problem: Patient Care Overview  Goal: Plan of Care Review  Outcome: Ongoing (interventions implemented as appropriate)  Flowsheets (Taken 7/10/2020 3504)  Progress: no change  Plan of Care Reviewed With: patient  Outcome Summary: Pt admitted to Rm 403 from ED. Pt wears 3L NC at home. BiPAP worn throughout the night. A&O x4. Lives with niece. Bilateral coccyx pressure injury stage 1 present upon admission, pictures in chart. C/o mild pain in buttocks. Tylenol offered, but refused. Sinus-Sinus tach  on tele. Will cont to monitor.

## 2020-07-10 NOTE — PROGRESS NOTES
Malnutrition Severity Assessment    Patient Name:  Kellie Arzate  YOB: 1955  MRN: [id number]  Admit Date:  7/9/2020    Patient meets criteria for : Severe Malnutrition(secondary signs: PI)    Comments:  Please attest this note and add dx of severe malnutrition to dx list if you agree with this assessment. Thanks.    Malnutrition Severity Assessment  Malnutrition Type: Chronic Disease - Related Malnutrition     Malnutrition Type (last 8 hours)      Malnutrition Severity Assessment     Row Name 07/10/20 1400       Malnutrition Severity Assessment    Malnutrition Type  Chronic Disease - Related Malnutrition    Row Name 07/10/20 1400       Insufficient Energy Intake     Insufficient Energy Intake   <75% of est. energy requirement for > or equal to 1 month    Row Name 07/10/20 1400       Unintentional Weight Loss     Unintentional Weight Loss Findings  Severe BMI 14.44    Unintentional Weight Loss   Weight loss greater than 10% in six months    Row Name 07/10/20 1400       Muscle Loss    Loss of Muscle Mass Findings  Severe    [place]  Severe - deep hollowing/scooping, lack of muscle to touch, facial bones well defined    Clavicle Bone Region  Severe - protruding prominent bone    Acromion Bone Region  Severe - squared shoulders, bones, and acromion process protrusion prominent    Row Name 07/10/20 1400       Fat Loss    Subcutaneous Fat Loss Findings  Severe    Orbital Region   Severe - pronounced hollowness/depression, dark circles, loose saggy skin    Upper Arm Region  Severe - mostly skin, very little space between folds, fingers touch    Row Name 07/10/20 1400       Criteria Met (Must meet criteria for severity in at least 2 of these categories: M Wasting, Fat Loss, Fluid, Secondary Signs, Wt. Status, Intake)    Patient meets criteria for   Severe Malnutrition secondary signs: PI          Electronically signed by:  Annette Puente RDN, KAYLIE  07/10/20 15:11

## 2020-07-10 NOTE — SIGNIFICANT NOTE
"Pt states that his brother, Ronak Arzate, which is pt's POA, is \"not in the picture anymore\".    His niece Eva lives with him to help manage his health.   He prefers her to be the main contact.    Nan Hagan RN  07/10/20  03:49    "

## 2020-07-10 NOTE — PROGRESS NOTES
"    [facility] Medicine Services  INPATIENT PROGRESS NOTE    Length of Stay: 0  Date of Admission: 7/9/2020  Primary Care Physician: Jose Moon MD    Subjective   Chief Complaint: Follow-up shortness of breath  HPI   Patient resting in bed.  He states he is feeling better than he was this morning.  He feels his shortness of breath has improved.  He has been coughing, has not been able to produce much sputum, but feels as though he needs to get more out.  He denies any chest pain.  He denies abdominal pain, nausea, or vomiting.  He does complain of poor appetite.    Review of Systems   All pertinent negatives and positives are as above. All other systems have been reviewed and are negative unless otherwise stated.     Objective    Temp:  [97.4 °F (36.3 °C)-98.4 °F (36.9 °C)] 97.8 °F (36.6 °C)  Heart Rate:  [] 107  Resp:  [17-19] 18  BP: (107-152)/(57-82) 131/57  Physical Exam   Constitutional: He is oriented to person, place, and time. He appears well-developed. He appears cachectic. No distress.   HENT:   Head: Normocephalic and atraumatic.   Neck: Normal range of motion. Neck supple. No JVD present. No tracheal deviation present.   Cardiovascular: Normal rate, regular rhythm and intact distal pulses. Exam reveals no gallop and no friction rub.   Sinus-sinus tach    Pulmonary/Chest: Effort normal. He has no wheezes. He has no rales.   Grossly diminished throughout   Abdominal: Soft. Bowel sounds are normal. He exhibits no distension. There is no tenderness. There is no guarding.   Musculoskeletal: He exhibits edema (Trace bilateral ankle). He exhibits no tenderness.   Neurological: He is alert and oriented to person, place, and time. No cranial nerve deficit.   Skin: Skin is warm and dry. No rash noted. There is erythema (Coccyx- please see images under \"media\" tab- stage 1 without broken skin).   Psychiatric: He has a normal mood and affect. His " behavior is normal. Judgment and thought content normal.   Vitals reviewed.    Results Review:  I have reviewed the labs, radiology results, and diagnostic studies.    Laboratory Data:   Results from last 7 days   Lab Units 07/10/20  0452 07/09/20  2002   WBC 10*3/mm3 11.64* 11.66*   HEMOGLOBIN g/dL 11.5* 12.8*   HEMATOCRIT % 37.2* 40.8   PLATELETS 10*3/mm3 236 256      Results from last 7 days   Lab Units 07/10/20  0452 07/09/20  2030 07/09/20  2002   SODIUM mmol/L 140  --  142   SODIUM, ARTERIAL mmol/L  --  140  --    POTASSIUM mmol/L 4.4  --  4.6   CHLORIDE mmol/L 88*  --  89*   CO2 mmol/L 39.0*  --  45.0*   BUN mg/dL 14  --  14   CREATININE mg/dL 0.31*  --  0.36*   CALCIUM mg/dL 9.3  --  9.5   BILIRUBIN mg/dL  --   --  0.2   ALK PHOS U/L  --   --  66   ALT (SGPT) U/L  --   --  11   AST (SGOT) U/L  --   --  14   GLUCOSE mg/dL 133*  --  116*     Radiology Data:   Imaging Results (Last 24 Hours)     Procedure Component Value Units Date/Time    CT Angiogram Chest [[id number]] Collected:  07/10/20 0711     Updated:  07/10/20 0727    Narrative:       CT angiography with 3D MIP images of the chest with IV contrast     Indication: Shortness of breath, elevated d-dimer, chest pain     Comparison: 12/13/2019     DOSE LENGTH PRODUCT: 80 mGy cm. Automated exposure control was also  utilized to decrease patient radiation dose.     Findings:     No evidence of pulmonary embolus.   Main pulmonary trunk is nondilated.   No evidence of RIGHT heart strain. No pericardial effusion.   Nonaneurysmal thoracic aorta with atherosclerotic calcification.     Secretions layering in both mainstem bronchi.   Severe emphysematous change.   New consolidation in the LEFT upper lobe.   Chronic RIGHT upper lobe scarring with increased size of a RIGHT apical  cavity which may represent sequela of chronic indolent infection.   Diffuse bilateral upper lobe bronchiectasis.   No pleural effusion or pneumothorax.   New 4 mm LEFT lower lobe pulmonary  nodule.     No enlarged thoracic lymph nodes.   Punctate nonobstructing RIGHT renal calculi.   No acute osseous finding.       Impression:       Impression:  1. No evidence of pulmonary embolus.  2. New consolidation in the LEFT upper lobe and lingula, likely  representing infectious process/pneumonia.  3. Increased scarring and cavitation in the RIGHT upper lobe with  bronchiectasis.  4. Layering debris in the bilateral airways.  5. Severe emphysema.  6. Nonobstructing RIGHT renal calculi.     These findings are in agreement with the critical and emergent findings  from the [facility] preliminary report.  This report was finalized on 07/10/2020 07:24 by Dr. Jaden Cooney MD.    XR Chest 1 View [[id number]] Collected:  07/09/20 2102     Updated:  07/09/20 2110    Narrative:       XR CHEST 1 VW-     Indication: Shortness of breath     Comparison: 04/03/2020     Findings:     Cardiac silhouette is normal in size and stable.  Redemonstrated severe emphysema with hyperexpanded lungs.  Similar-appearing RIGHT apical pleural and parenchymal scarring.  New patchy LEFT upper lobe airspace opacity.  No visible pneumothorax or pleural effusion.  No acute osseous finding.       Impression:       Impression:     1.  New LEFT upper lobe opacity, likely representing infectious  process/pneumonia.  2.  No change in RIGHT apical pleural/parenchymal scarring and severe  emphysema.  This report was finalized on 07/09/2020 21:07 by Dr. Jaden Cooney MD.          I have reviewed the patient current medications.     Assessment/Plan     Active Hospital Problems    Diagnosis   • **COPD with acute exacerbation (CMS/HCC)   • Pneumonia of left upper lobe due to infectious organism   • Acute-on-chronic respiratory failure (CMS/HCC)   • Underweight     Plan:  1.  Patient admitted 7/9/2020 with complaints of shortness of breath.  CT angiogram of the chest was performed secondary to elevated d-dimer.  This did not show any pulmonary embolus but  did show evidence of a left upper lobe pneumonia.  There was layering debris in bilateral airways.  2.  Continue Levaquin, dosed at 250 mg secondary to creatinine clearance.  Check urinary antigens for strep and Legionella.  Respiratory culture if the patient is able to produce sputum.  Blood cultures pending.  3.  Continue duo nebs.  Add Mucinex and flutter valve.  Patient has been weaned back to 3 L nasal cannula, which is his home setting.  He does wear a trilogy at night, his machine is not available for anyone to bring presently.  He will need to use the hospital BiPAP at night.  4.  Continue IV Solu-Medrol at present dosage.  5.  Home medications reviewed and resumed as appropriate.  6.  PT/OT consults.  Activity as tolerated.  7.  Patient exhibits pulmonary cachexia.  Severe malnutrition per dietitian.  Boost supplementation added per dietician.  Will add Megace to try to help with appetite.  8.  Labs in AM  9.  Waffle or egg crate mattress.  Encourage patient to turn every 2 hours.  Can use Mepilex for extra padding if desired.    Discharge Planning: I expect the patient to be discharged to home with home health in 2-3 days.    Jacklyn Fernandez, APRN   07/10/20   15:55

## 2020-07-10 NOTE — PLAN OF CARE
Problem: Patient Care Overview  Goal: Plan of Care Review  Outcome: Ongoing (interventions implemented as appropriate)  Flowsheets (Taken 7/10/2020 1504)  Progress: no change  Plan of Care Reviewed With: patient  Outcome Summary: Pt reports that he does have an appetite, but has early satiety. He realizes it will take a while to get back to eating adequate nutrition intake. He uses Boost Plus at home 3x/day. Have ordered pt to get Boost Plus with all meals. He is underweight with a BMI of 14.44. He has lost 25# in the last ~6 months. Malnutrition assessment completed and sent to MD.  Provided pt with education for home use on tips to increase kcal and protein intake. Also provided pt with oral nutrition supplement coupons for home use. Will follow.

## 2020-07-10 NOTE — PLAN OF CARE
Problem: Patient Care Overview  Goal: Plan of Care Review  Outcome: Ongoing (interventions implemented as appropriate)  Flowsheets  Taken 7/10/2020 0349 by Nan Hagan RNA  Progress: no change  Taken 7/10/2020 0800 by Yee Hilliard RN  Plan of Care Reviewed With: patient  Goal: Individualization and Mutuality  Outcome: Ongoing (interventions implemented as appropriate)  Goal: Discharge Needs Assessment  Outcome: Ongoing (interventions implemented as appropriate)  Goal: Interprofessional Rounds/Family Conf  Outcome: Ongoing (interventions implemented as appropriate)     Problem: Chronic Obstructive Pulmonary Disease (Adult)  Goal: Signs and Symptoms of Listed Potential Problems Will be Absent, Minimized or Managed (Chronic Obstructive Pulmonary Disease)  Outcome: Ongoing (interventions implemented as appropriate)  Flowsheets (Taken 7/10/2020 0349 by Nan Hagan RNA)  Problems Assessed (Chronic Obstructive Pulmonary Disease (COPD)): all  Problems Present (COPD, Bronch/Emphy): functional deficit;undernutrition;respiratory compromise;infection     Problem: Pneumonia (Adult)  Goal: Signs and Symptoms of Listed Potential Problems Will be Absent, Minimized or Managed (Pneumonia)  Outcome: Ongoing (interventions implemented as appropriate)  Flowsheets (Taken 7/10/2020 0349 by Nan Hagan RNA)  Problems Assessed (Pneumonia): all  Problems Present (Pneumonia): infection progression;respiratory compromise     Problem: Skin Injury Risk (Adult)  Goal: Identify Related Risk Factors and Signs and Symptoms  Outcome: Ongoing (interventions implemented as appropriate)  Flowsheets (Taken 7/10/2020 0349 by Nan Hagan RNA)  Related Risk Factors (Skin Injury Risk): advanced age;mobility impaired;nutritional deficiencies  Goal: Skin Health and Integrity  Outcome: Ongoing (interventions implemented as appropriate)  Flowsheets (Taken 7/10/2020 0349 by Nan Hagan RNA)  Skin Health and Integrity: making  progress toward outcome

## 2020-07-11 LAB
ANION GAP SERPL CALCULATED.3IONS-SCNC: 9 MMOL/L (ref 5–15)
BACTERIA BLD CULT: ABNORMAL
BASOPHILS # BLD AUTO: 0.01 10*3/MM3 (ref 0–0.2)
BASOPHILS NFR BLD AUTO: 0.1 % (ref 0–1.5)
BUN SERPL-MCNC: 18 MG/DL (ref 8–23)
BUN/CREAT SERPL: 52.9 (ref 7–25)
CALCIUM SPEC-SCNC: 9.9 MG/DL (ref 8.6–10.5)
CHLORIDE SERPL-SCNC: 92 MMOL/L (ref 98–107)
CO2 SERPL-SCNC: 42 MMOL/L (ref 22–29)
CREAT SERPL-MCNC: 0.34 MG/DL (ref 0.76–1.27)
DEPRECATED RDW RBC AUTO: 43.5 FL (ref 37–54)
EOSINOPHIL # BLD AUTO: 0 10*3/MM3 (ref 0–0.4)
EOSINOPHIL NFR BLD AUTO: 0 % (ref 0.3–6.2)
ERYTHROCYTE [DISTWIDTH] IN BLOOD BY AUTOMATED COUNT: 12.5 % (ref 12.3–15.4)
GFR SERPL CREATININE-BSD FRML MDRD: >150 ML/MIN/1.73
GLUCOSE SERPL-MCNC: 162 MG/DL (ref 65–99)
HCT VFR BLD AUTO: 37.6 % (ref 37.5–51)
HGB BLD-MCNC: 11.7 G/DL (ref 13–17.7)
IMM GRANULOCYTES # BLD AUTO: 0.14 10*3/MM3 (ref 0–0.05)
IMM GRANULOCYTES NFR BLD AUTO: 0.9 % (ref 0–0.5)
LYMPHOCYTES # BLD AUTO: 0.66 10*3/MM3 (ref 0.7–3.1)
LYMPHOCYTES NFR BLD AUTO: 4.1 % (ref 19.6–45.3)
MCH RBC QN AUTO: 29.3 PG (ref 26.6–33)
MCHC RBC AUTO-ENTMCNC: 31.1 G/DL (ref 31.5–35.7)
MCV RBC AUTO: 94 FL (ref 79–97)
MONOCYTES # BLD AUTO: 0.4 10*3/MM3 (ref 0.1–0.9)
MONOCYTES NFR BLD AUTO: 2.5 % (ref 5–12)
NEUTROPHILS NFR BLD AUTO: 14.72 10*3/MM3 (ref 1.7–7)
NEUTROPHILS NFR BLD AUTO: 92.4 % (ref 42.7–76)
NRBC BLD AUTO-RTO: 0 /100 WBC (ref 0–0.2)
PLATELET # BLD AUTO: 265 10*3/MM3 (ref 140–450)
PMV BLD AUTO: 8.7 FL (ref 6–12)
POTASSIUM SERPL-SCNC: 4.8 MMOL/L (ref 3.5–5.2)
RBC # BLD AUTO: 4 10*6/MM3 (ref 4.14–5.8)
SODIUM SERPL-SCNC: 143 MMOL/L (ref 136–145)
WBC # BLD AUTO: 15.93 10*3/MM3 (ref 3.4–10.8)

## 2020-07-11 PROCEDURE — 25010000002 ENOXAPARIN PER 10 MG: Performed by: FAMILY MEDICINE

## 2020-07-11 PROCEDURE — 80048 BASIC METABOLIC PNL TOTAL CA: CPT | Performed by: FAMILY MEDICINE

## 2020-07-11 PROCEDURE — 25010000002 METHYLPREDNISOLONE PER 40 MG: Performed by: FAMILY MEDICINE

## 2020-07-11 PROCEDURE — 94660 CPAP INITIATION&MGMT: CPT

## 2020-07-11 PROCEDURE — 94799 UNLISTED PULMONARY SVC/PX: CPT

## 2020-07-11 PROCEDURE — 63710000001 PREDNISONE PER 1 MG: Performed by: NURSE PRACTITIONER

## 2020-07-11 PROCEDURE — 25010000002 LEVOFLOXACIN PER 250 MG: Performed by: FAMILY MEDICINE

## 2020-07-11 PROCEDURE — 85025 COMPLETE CBC W/AUTO DIFF WBC: CPT | Performed by: FAMILY MEDICINE

## 2020-07-11 RX ORDER — LEVOFLOXACIN 500 MG/1
500 TABLET, FILM COATED ORAL EVERY 24 HOURS
Status: DISCONTINUED | OUTPATIENT
Start: 2020-07-12 | End: 2020-07-12 | Stop reason: HOSPADM

## 2020-07-11 RX ORDER — PREDNISONE 20 MG/1
20 TABLET ORAL 2 TIMES DAILY WITH MEALS
Status: DISCONTINUED | OUTPATIENT
Start: 2020-07-11 | End: 2020-07-12 | Stop reason: HOSPADM

## 2020-07-11 RX ADMIN — PREDNISONE 20 MG: 20 TABLET ORAL at 17:50

## 2020-07-11 RX ADMIN — CITALOPRAM 40 MG: 20 TABLET, FILM COATED ORAL at 08:40

## 2020-07-11 RX ADMIN — PANTOPRAZOLE SODIUM 40 MG: 40 TABLET, DELAYED RELEASE ORAL at 08:41

## 2020-07-11 RX ADMIN — MONTELUKAST SODIUM 10 MG: 10 TABLET, FILM COATED ORAL at 21:08

## 2020-07-11 RX ADMIN — IPRATROPIUM BROMIDE AND ALBUTEROL SULFATE 3 ML: 2.5; .5 SOLUTION RESPIRATORY (INHALATION) at 10:11

## 2020-07-11 RX ADMIN — IPRATROPIUM BROMIDE AND ALBUTEROL SULFATE 3 ML: 2.5; .5 SOLUTION RESPIRATORY (INHALATION) at 19:14

## 2020-07-11 RX ADMIN — ALPRAZOLAM 1 MG: 0.5 TABLET ORAL at 22:08

## 2020-07-11 RX ADMIN — IPRATROPIUM BROMIDE AND ALBUTEROL SULFATE 3 ML: 2.5; .5 SOLUTION RESPIRATORY (INHALATION) at 06:26

## 2020-07-11 RX ADMIN — ENOXAPARIN SODIUM 30 MG: 30 INJECTION SUBCUTANEOUS at 08:39

## 2020-07-11 RX ADMIN — LEVOFLOXACIN 250 MG: 250 INJECTION, SOLUTION INTRAVENOUS at 08:44

## 2020-07-11 RX ADMIN — GUAIFENESIN 1200 MG: 600 TABLET, EXTENDED RELEASE ORAL at 08:40

## 2020-07-11 RX ADMIN — FERROUS SULFATE TAB 325 MG (65 MG ELEMENTAL FE) 325 MG: 325 (65 FE) TAB at 09:42

## 2020-07-11 RX ADMIN — IPRATROPIUM BROMIDE AND ALBUTEROL SULFATE 3 ML: 2.5; .5 SOLUTION RESPIRATORY (INHALATION) at 14:06

## 2020-07-11 RX ADMIN — METOPROLOL TARTRATE 50 MG: 50 TABLET, FILM COATED ORAL at 08:41

## 2020-07-11 RX ADMIN — METHYLPREDNISOLONE SODIUM SUCCINATE 40 MG: 40 INJECTION, POWDER, FOR SOLUTION INTRAMUSCULAR; INTRAVENOUS at 09:40

## 2020-07-11 RX ADMIN — ASPIRIN 81 MG: 81 TABLET ORAL at 08:40

## 2020-07-11 RX ADMIN — MEGESTROL ACETATE 400 MG: 40 SUSPENSION ORAL at 08:41

## 2020-07-11 RX ADMIN — SODIUM CHLORIDE, PRESERVATIVE FREE 10 ML: 5 INJECTION INTRAVENOUS at 08:41

## 2020-07-11 RX ADMIN — SODIUM CHLORIDE, PRESERVATIVE FREE 10 ML: 5 INJECTION INTRAVENOUS at 21:09

## 2020-07-11 RX ADMIN — METOPROLOL TARTRATE 50 MG: 50 TABLET, FILM COATED ORAL at 21:08

## 2020-07-11 RX ADMIN — GUAIFENESIN 1200 MG: 600 TABLET, EXTENDED RELEASE ORAL at 21:08

## 2020-07-11 NOTE — PROGRESS NOTES
[facility] Medicine Services  INPATIENT PROGRESS NOTE    Length of Stay: 1  Date of Admission: 7/9/2020  Primary Care Physician: Jose Moon MD    Subjective   Chief Complaint: Follow-up shortness of breath  HPI   Patient sitting up in bed.  He reports feeling better overall.  He states that he notices when he has been trying to get out of bed and ambulate around the room that his pulse oximeter alarms with a low oxygen level.  Patient does not appreciate feeling any different or more short of breath when this happens.  He denies any chest pain.  He continues to have a cough, which is nonproductive.  It sounds congested, as though he needs to cough up some sputum.    Review of Systems   All pertinent negatives and positives are as above. All other systems have been reviewed and are negative unless otherwise stated.     Objective    Temp:  [97.8 °F (36.6 °C)-98.4 °F (36.9 °C)] 98.3 °F (36.8 °C)  Heart Rate:  [] 63  Resp:  [16-20] 20  BP: (113-145)/(50-66) 123/50  Physical Exam  Constitutional: He is oriented to person, place, and time. He appears well-developed. He appears cachectic. No distress.   HENT:   Head: Normocephalic and atraumatic.   Neck: Normal range of motion. Neck supple. No JVD present. No tracheal deviation present.   Cardiovascular: Normal rate, regular rhythm and intact distal pulses. Exam reveals no gallop and no friction rub.   Sinus bradycardia-sinus tach  overnight.  PVCs.  Pulmonary/Chest: Effort normal. He has no wheezes. He has no rales.   Grossly diminished throughout   Abdominal: Soft. Bowel sounds are normal. He exhibits no distension. There is no tenderness. There is no guarding.   Musculoskeletal: He exhibits no edema. He exhibits no tenderness.   Neurological: He is alert and oriented to person, place, and time. No cranial nerve deficit.   Skin: Skin is warm and dry. No rash noted. There is erythema (Coccyx- please see  "images under \"media\" tab- stage 1 without broken skin).   Psychiatric: He has a normal mood and affect. His behavior is normal. Judgment and thought content normal.   Vitals reviewed.    Results Review:  I have reviewed the labs, radiology results, and diagnostic studies.    Laboratory Data:   Results from last 7 days   Lab Units 07/11/20  0502 07/10/20  0452 07/09/20  2002   WBC 10*3/mm3 15.93* 11.64* 11.66*   HEMOGLOBIN g/dL 11.7* 11.5* 12.8*   HEMATOCRIT % 37.6 37.2* 40.8   PLATELETS 10*3/mm3 265 236 256     Results from last 7 days   Lab Units 07/11/20  0502 07/10/20  0452 07/09/20  2030 07/09/20  2002   SODIUM mmol/L 143 140  --  142   SODIUM, ARTERIAL mmol/L  --   --  140  --    POTASSIUM mmol/L 4.8 4.4  --  4.6   CHLORIDE mmol/L 92* 88*  --  89*   CO2 mmol/L 42.0* 39.0*  --  45.0*   BUN mg/dL 18 14  --  14   CREATININE mg/dL 0.34* 0.31*  --  0.36*   CALCIUM mg/dL 9.9 9.3  --  9.5   BILIRUBIN mg/dL  --   --   --  0.2   ALK PHOS U/L  --   --   --  66   ALT (SGPT) U/L  --   --   --  11   AST (SGOT) U/L  --   --   --  14   GLUCOSE mg/dL 162* 133*  --  116*     I have reviewed the patient current medications.     Assessment/Plan     Active Hospital Problems    Diagnosis   • **COPD with acute exacerbation (CMS/HCC)   • Pneumonia of left upper lobe due to infectious organism   • Acute on chronic respiratory failure with hypoxia and hypercapnia (CMS/HCC)   • Underweight   • Severe malnutrition (CMS/HCC)     Plan:  1.  Patient admitted 7/9/2020 with complaints of shortness of breath.  CT angiogram of the chest was performed secondary to elevated d-dimer.  This did not show any pulmonary embolus but did show evidence of a left upper lobe pneumonia.  There was layering debris in bilateral airways.  2.  Continue Levaquin, day 2.  We will switch to oral therapy to start tomorrow to complete a total of 7 days.  Urinary antigens for strep and Legionella negative.  Respiratory culture pending.  Blood cultures yesterday " showed 1 of 4 bottles with growth of a Staphylococcus, not aureus species.  Likely a contaminant.  Patient afebrile, continue to monitor.  Worsening leukocytosis today likely secondary to steroid therapy.  3.  Continue duo nebs, Mucinex, and flutter valve.  Could consider Mucomyst.    4.  Switch to oral prednisone  5.  Patient exhibits pulmonary cachexia.  Severe malnutrition per dietitian.  Boost supplementation added, and Megace added yesterday as well.  6.  Slowly increase activity, up to chair with meals today.  7.  Mild hyperglycemia noted, likely secondary to steroid therapy.  We will continue to monitor.  8.  Labs in a.m.  9.   following for discharge planning-will need resumption orders for home health.    Discharge Planning: I expect the patient to be discharged to home with home health in 1-2 days.    Jacklyn Fernandez, CRISPIN   07/11/20   11:54

## 2020-07-11 NOTE — PLAN OF CARE
Problem: Patient Care Overview  Goal: Plan of Care Review  Outcome: Ongoing (interventions implemented as appropriate)  Flowsheets (Taken 7/11/2020 9263)  Progress: improving  Plan of Care Reviewed With: patient  Outcome Summary: VSS. A&O x4. No c/o pain. BiPAP worn throughout night. Sinus-Sinus tach , PVCs and bigeminy on tele. Waffle mattress added before bedtime, pt educated on importance of changing position at least q 2 hours to prevent skin breakdown.  Safety maintained. Will cont to monitor.

## 2020-07-11 NOTE — PLAN OF CARE
Problem: Patient Care Overview  Goal: Plan of Care Review  Outcome: Ongoing (interventions implemented as appropriate)  Flowsheets (Taken 7/11/2020 1517)  Plan of Care Reviewed With: patient  Outcome Summary: VSS, conts on 3L/NC with sats in 90's, congested NP cough, positive sepsis screen, made APRN aware, self turns, mepilex intact to bilat buttocks, has had good appetite today, eating more than 50% of meals

## 2020-07-12 VITALS
TEMPERATURE: 98.3 F | WEIGHT: 87.19 LBS | SYSTOLIC BLOOD PRESSURE: 104 MMHG | HEART RATE: 90 BPM | HEIGHT: 65 IN | BODY MASS INDEX: 14.53 KG/M2 | OXYGEN SATURATION: 92 % | DIASTOLIC BLOOD PRESSURE: 58 MMHG | RESPIRATION RATE: 16 BRPM

## 2020-07-12 LAB
ANION GAP SERPL CALCULATED.3IONS-SCNC: 5 MMOL/L (ref 5–15)
BASOPHILS # BLD AUTO: 0.02 10*3/MM3 (ref 0–0.2)
BASOPHILS NFR BLD AUTO: 0.1 % (ref 0–1.5)
BUN SERPL-MCNC: 21 MG/DL (ref 8–23)
BUN/CREAT SERPL: 58.3 (ref 7–25)
CALCIUM SPEC-SCNC: 9.4 MG/DL (ref 8.6–10.5)
CHLORIDE SERPL-SCNC: 96 MMOL/L (ref 98–107)
CO2 SERPL-SCNC: 41 MMOL/L (ref 22–29)
CREAT SERPL-MCNC: 0.36 MG/DL (ref 0.76–1.27)
DEPRECATED RDW RBC AUTO: 43 FL (ref 37–54)
EOSINOPHIL # BLD AUTO: 0 10*3/MM3 (ref 0–0.4)
EOSINOPHIL NFR BLD AUTO: 0 % (ref 0.3–6.2)
ERYTHROCYTE [DISTWIDTH] IN BLOOD BY AUTOMATED COUNT: 12.7 % (ref 12.3–15.4)
GFR SERPL CREATININE-BSD FRML MDRD: >150 ML/MIN/1.73
GLUCOSE SERPL-MCNC: 242 MG/DL (ref 65–99)
HCT VFR BLD AUTO: 34.7 % (ref 37.5–51)
HGB BLD-MCNC: 10.9 G/DL (ref 13–17.7)
IMM GRANULOCYTES # BLD AUTO: 0.13 10*3/MM3 (ref 0–0.05)
IMM GRANULOCYTES NFR BLD AUTO: 0.7 % (ref 0–0.5)
LYMPHOCYTES # BLD AUTO: 0.7 10*3/MM3 (ref 0.7–3.1)
LYMPHOCYTES NFR BLD AUTO: 3.7 % (ref 19.6–45.3)
MCH RBC QN AUTO: 29.3 PG (ref 26.6–33)
MCHC RBC AUTO-ENTMCNC: 31.4 G/DL (ref 31.5–35.7)
MCV RBC AUTO: 93.3 FL (ref 79–97)
MONOCYTES # BLD AUTO: 0.93 10*3/MM3 (ref 0.1–0.9)
MONOCYTES NFR BLD AUTO: 4.9 % (ref 5–12)
NEUTROPHILS NFR BLD AUTO: 17.18 10*3/MM3 (ref 1.7–7)
NEUTROPHILS NFR BLD AUTO: 90.6 % (ref 42.7–76)
NRBC BLD AUTO-RTO: 0 /100 WBC (ref 0–0.2)
PLATELET # BLD AUTO: 271 10*3/MM3 (ref 140–450)
PMV BLD AUTO: 8.8 FL (ref 6–12)
POTASSIUM SERPL-SCNC: 4.7 MMOL/L (ref 3.5–5.2)
RBC # BLD AUTO: 3.72 10*6/MM3 (ref 4.14–5.8)
SODIUM SERPL-SCNC: 142 MMOL/L (ref 136–145)
WBC # BLD AUTO: 18.96 10*3/MM3 (ref 3.4–10.8)

## 2020-07-12 PROCEDURE — 94799 UNLISTED PULMONARY SVC/PX: CPT

## 2020-07-12 PROCEDURE — 85025 COMPLETE CBC W/AUTO DIFF WBC: CPT | Performed by: FAMILY MEDICINE

## 2020-07-12 PROCEDURE — 97165 OT EVAL LOW COMPLEX 30 MIN: CPT | Performed by: OCCUPATIONAL THERAPIST

## 2020-07-12 PROCEDURE — 80048 BASIC METABOLIC PNL TOTAL CA: CPT | Performed by: FAMILY MEDICINE

## 2020-07-12 PROCEDURE — 94660 CPAP INITIATION&MGMT: CPT

## 2020-07-12 PROCEDURE — 94640 AIRWAY INHALATION TREATMENT: CPT

## 2020-07-12 PROCEDURE — 25010000002 ENOXAPARIN PER 10 MG: Performed by: FAMILY MEDICINE

## 2020-07-12 PROCEDURE — 63710000001 PREDNISONE PER 1 MG: Performed by: NURSE PRACTITIONER

## 2020-07-12 RX ORDER — LEVOFLOXACIN 500 MG/1
500 TABLET, FILM COATED ORAL EVERY 24 HOURS
Qty: 4 TABLET | Refills: 0 | Status: SHIPPED | OUTPATIENT
Start: 2020-07-13 | End: 2020-07-17

## 2020-07-12 RX ORDER — MEGESTROL ACETATE 40 MG/ML
400 SUSPENSION ORAL DAILY
Qty: 480 ML | Refills: 1 | Status: SHIPPED | OUTPATIENT
Start: 2020-07-13

## 2020-07-12 RX ORDER — PREDNISONE 20 MG/1
TABLET ORAL
Qty: 9 TABLET | Refills: 0 | Status: SHIPPED | OUTPATIENT
Start: 2020-07-12 | End: 2020-07-18

## 2020-07-12 RX ADMIN — CITALOPRAM 40 MG: 20 TABLET, FILM COATED ORAL at 08:34

## 2020-07-12 RX ADMIN — PREDNISONE 20 MG: 20 TABLET ORAL at 08:34

## 2020-07-12 RX ADMIN — ASPIRIN 81 MG: 81 TABLET ORAL at 08:34

## 2020-07-12 RX ADMIN — PANTOPRAZOLE SODIUM 40 MG: 40 TABLET, DELAYED RELEASE ORAL at 08:34

## 2020-07-12 RX ADMIN — PREDNISONE 20 MG: 20 TABLET ORAL at 17:38

## 2020-07-12 RX ADMIN — GUAIFENESIN 1200 MG: 600 TABLET, EXTENDED RELEASE ORAL at 08:34

## 2020-07-12 RX ADMIN — MEGESTROL ACETATE 400 MG: 40 SUSPENSION ORAL at 08:41

## 2020-07-12 RX ADMIN — SODIUM CHLORIDE, PRESERVATIVE FREE 10 ML: 5 INJECTION INTRAVENOUS at 08:43

## 2020-07-12 RX ADMIN — FERROUS SULFATE TAB 325 MG (65 MG ELEMENTAL FE) 325 MG: 325 (65 FE) TAB at 08:34

## 2020-07-12 RX ADMIN — IPRATROPIUM BROMIDE AND ALBUTEROL SULFATE 3 ML: 2.5; .5 SOLUTION RESPIRATORY (INHALATION) at 10:15

## 2020-07-12 RX ADMIN — IPRATROPIUM BROMIDE AND ALBUTEROL SULFATE 3 ML: 2.5; .5 SOLUTION RESPIRATORY (INHALATION) at 06:09

## 2020-07-12 RX ADMIN — IPRATROPIUM BROMIDE AND ALBUTEROL SULFATE 3 ML: 2.5; .5 SOLUTION RESPIRATORY (INHALATION) at 13:55

## 2020-07-12 RX ADMIN — ENOXAPARIN SODIUM 30 MG: 30 INJECTION SUBCUTANEOUS at 08:33

## 2020-07-12 RX ADMIN — LEVOFLOXACIN 500 MG: 500 TABLET, FILM COATED ORAL at 08:34

## 2020-07-12 RX ADMIN — METOPROLOL TARTRATE 50 MG: 50 TABLET, FILM COATED ORAL at 08:34

## 2020-07-12 NOTE — DISCHARGE SUMMARY
[facility]  DISCHARGE SUMMARY       Date of Admission: 7/9/2020  Date of Discharge:  7/12/2020  Primary Care Physician: Jose Moon MD    Presenting Problem/History of Present Illness:  Shortness of breath    Final Discharge Diagnoses:  Active Hospital Problems    Diagnosis   • **COPD with acute exacerbation (CMS/HCC)   • Pneumonia of left upper lobe due to infectious organism   • Acute on chronic respiratory failure with hypoxia and hypercapnia (CMS/HCC)   • Underweight   • Severe malnutrition (CMS/HCC)     Consults: None    Procedures Performed: None    Pertinent Test Results:   Lab Results (all)     Procedure Component Value Units Date/Time    Respiratory Culture - Sputum, Cough [[id number]] Collected:  07/10/20 2209    Specimen:  Sputum from Cough Updated:  07/12/20 1122     Respiratory Culture Rare Normal Respiratory Tonya     Gram Stain Less than 25 WBCs per low power field      Less than 25 Epithelial cells per low power field      Rare (1+) Gram positive cocci    Blood Culture - Blood, Wrist, Left [[id number]]  (Abnormal) Collected:  07/09/20 2206    Specimen:  Blood from Wrist, Left Updated:  07/12/20 0601     Blood Culture Staphylococcus, coagulase negative     Comment: Probable contaminant requires clinical correlation, susceptibility not performed unless requested by physician.          Isolated from Aerobic Bottle     Gram Stain Aerobic Bottle Gram positive cocci    Basic Metabolic Panel [[id number]]  (Abnormal) Collected:  07/12/20 0306    Specimen:  Blood Updated:  07/12/20 0338     Glucose 242 mg/dL      BUN 21 mg/dL      Creatinine 0.36 mg/dL      Sodium 142 mmol/L      Potassium 4.7 mmol/L      Comment: Specimen hemolyzed.  Results may be affected.        Chloride 96 mmol/L      CO2 41.0 mmol/L      Calcium 9.4 mg/dL      eGFR Non African Amer >150 mL/min/1.73      BUN/Creatinine Ratio 58.3     Anion Gap 5.0 mmol/L     CBC Auto  Differential [[id number]]  (Abnormal) Collected:  07/12/20 0306    Specimen:  Blood Updated:  07/12/20 0326     WBC 18.96 10*3/mm3      RBC 3.72 10*6/mm3      Hemoglobin 10.9 g/dL      Hematocrit 34.7 %      MCV 93.3 fL      MCH 29.3 pg      MCHC 31.4 g/dL      RDW 12.7 %      RDW-SD 43.0 fl      MPV 8.8 fL      Platelets 271 10*3/mm3      Neutrophil % 90.6 %      Lymphocyte % 3.7 %      Monocyte % 4.9 %      Eosinophil % 0.0 %      Basophil % 0.1 %      Immature Grans % 0.7 %      Neutrophils, Absolute 17.18 10*3/mm3      Lymphocytes, Absolute 0.70 10*3/mm3      Monocytes, Absolute 0.93 10*3/mm3      Eosinophils, Absolute 0.00 10*3/mm3      Basophils, Absolute 0.02 10*3/mm3      Immature Grans, Absolute 0.13 10*3/mm3      nRBC 0.0 /100 WBC     Blood Culture - Blood, Arm, Right [[id number]] Collected:  07/09/20 2211    Specimen:  Blood from Arm, Right Updated:  07/11/20 2230     Blood Culture No growth at 2 days    Basic Metabolic Panel [[id number]]  (Abnormal) Collected:  07/11/20 0502    Specimen:  Blood Updated:  07/11/20 0605     Glucose 162 mg/dL      BUN 18 mg/dL      Creatinine 0.34 mg/dL      Sodium 143 mmol/L      Potassium 4.8 mmol/L      Chloride 92 mmol/L      CO2 42.0 mmol/L      Calcium 9.9 mg/dL      eGFR Non African Amer >150 mL/min/1.73      BUN/Creatinine Ratio 52.9     Anion Gap 9.0 mmol/L     CBC Auto Differential [[id number]]  (Abnormal) Collected:  07/11/20 0502    Specimen:  Blood Updated:  07/11/20 0541     WBC 15.93 10*3/mm3      RBC 4.00 10*6/mm3      Hemoglobin 11.7 g/dL      Hematocrit 37.6 %      MCV 94.0 fL      MCH 29.3 pg      MCHC 31.1 g/dL      RDW 12.5 %      RDW-SD 43.5 fl      MPV 8.7 fL      Platelets 265 10*3/mm3      Neutrophil % 92.4 %      Lymphocyte % 4.1 %      Monocyte % 2.5 %      Eosinophil % 0.0 %      Basophil % 0.1 %      Immature Grans % 0.9 %      Neutrophils, Absolute 14.72 10*3/mm3      Lymphocytes, Absolute 0.66 10*3/mm3      Monocytes, Absolute 0.40 10*3/mm3       Eosinophils, Absolute 0.00 10*3/mm3      Basophils, Absolute 0.01 10*3/mm3      Immature Grans, Absolute 0.14 10*3/mm3      nRBC 0.0 /100 WBC     Blood Culture ID, PCR - Blood, Wrist, Left [[id number]]  (Abnormal) Collected:  07/09/20 2206    Specimen:  Blood from Wrist, Left Updated:  07/11/20 0323     BCID, PCR Staphylococcus spp, not aureus. Identification by BCID PCR.    Legionella Antigen, Urine - Urine, Urine, Clean Catch [[id number]]  (Normal) Collected:  07/10/20 1624    Specimen:  Urine, Clean Catch Updated:  07/10/20 1706     LEGIONELLA ANTIGEN, URINE Negative    S. Pneumo Ag Urine or CSF - Urine, Urine, Clean Catch [[id number]]  (Normal) Collected:  07/10/20 1624    Specimen:  Urine, Clean Catch Updated:  07/10/20 1705     Strep Pneumo Ag Negative    Basic Metabolic Panel [[id number]]  (Abnormal) Collected:  07/10/20 0452    Specimen:  Blood Updated:  07/10/20 0549     Glucose 133 mg/dL      BUN 14 mg/dL      Creatinine 0.31 mg/dL      Sodium 140 mmol/L      Potassium 4.4 mmol/L      Chloride 88 mmol/L      CO2 39.0 mmol/L      Calcium 9.3 mg/dL      eGFR Non African Amer >150 mL/min/1.73      BUN/Creatinine Ratio 45.2     Anion Gap 13.0 mmol/L     CBC Auto Differential [[id number]]  (Abnormal) Collected:  07/10/20 0452    Specimen:  Blood Updated:  07/10/20 0536     WBC 11.64 10*3/mm3      RBC 3.97 10*6/mm3      Hemoglobin 11.5 g/dL      Hematocrit 37.2 %      MCV 93.7 fL      MCH 29.0 pg      MCHC 30.9 g/dL      RDW 12.4 %      RDW-SD 43.1 fl      MPV 8.6 fL      Platelets 236 10*3/mm3      Neutrophil % 93.1 %      Lymphocyte % 5.0 %      Monocyte % 1.2 %      Eosinophil % 0.0 %      Basophil % 0.2 %      Immature Grans % 0.5 %      Neutrophils, Absolute 10.84 10*3/mm3      Lymphocytes, Absolute 0.58 10*3/mm3      Monocytes, Absolute 0.14 10*3/mm3      Eosinophils, Absolute 0.00 10*3/mm3      Basophils, Absolute 0.02 10*3/mm3      Immature Grans, Absolute 0.06 10*3/mm3      nRBC 0.0 /100 WBC     Blood Gas,  Arterial [[id number]]  (Abnormal) Collected:  07/09/20 2210    Specimen:  Arterial Blood Updated:  07/09/20 2215     Site Right Brachial     Kervin's Test N/A     pH, Arterial 7.433 pH units      pCO2, Arterial 73.7 mm Hg      Comment: 86 Value above critical limit        pO2, Arterial 86.6 mm Hg      HCO3, Arterial 49.3 mmol/L      Comment: 83 Value above reference range        Base Excess, Arterial 21.1 mmol/L      Comment: 83 Value above reference range        O2 Saturation, Arterial 97.3 %      Temperature 37.0 C      Barometric Pressure for Blood Gas 747 mmHg      Modality BiPap     FIO2 40 %      Ventilator Mode BiPAP     Set Mech Resp Rate 12.0     IPAP 14     Comment: Meter: [id number]     :  [id number]        EPAP 6     Notified Who KATELIN RN     Notified By [id number]     Notified Time 07/09/2020 22:21     Collected by [id number]     pCO2, Temperature Corrected 73.7 mm Hg      pH, Temp Corrected 7.433 pH Units      pO2, Temperature Corrected 86.6 mm Hg     COVID-19 [facility] IN-HOUSE, Nasal Swab No Transport Media - Swab, Nasal Cavity [[id number]]  (Normal) Collected:  07/09/20 2008    Specimen:  Swab from Nasal Cavity Updated:  07/09/20 2050     COVID19 Not Detected    Blood Gas, Arterial With Co-Ox [[id number]]  (Abnormal) Collected:  07/09/20 2030    Specimen:  Arterial Blood Updated:  07/09/20 2037     Site Right Brachial     Kervin's Test N/A     pH, Arterial 7.389 pH units      pCO2, Arterial 83.3 mm Hg      Comment: 86 Value above critical limit        pO2, Arterial 121.0 mm Hg      Comment: 83 Value above reference range        HCO3, Arterial 50.3 mmol/L      Comment: 83 Value above reference range        Base Excess, Arterial 21.1 mmol/L      Comment: 83 Value above reference range        O2 Saturation, Arterial 99.2 %      Comment: 83 Value above reference range        Hemoglobin, Blood Gas 11.8 g/dL      Comment: 84 Value below reference range        Hematocrit, Blood Gas 36.1 %      Comment: 84  Value below reference range        Oxyhemoglobin 95.4 %      Methemoglobin 0.50 %      Carboxyhemoglobin 3.3 %      A-a Gradiant --     Comment: UNABLE TO CALCULATE        Temperature 37.0 C      Sodium, Arterial 140 mmol/L      Potassium, Arterial 4.2 mmol/L      Barometric Pressure for Blood Gas 747 mmHg      Modality Nasal Cannula     Flow Rate 2.0 lpm      Ventilator Mode NA     Note --     Notified Who VIVIANA APONTE     Notified By [id number]     Notified Time 07/09/2020 20:42     Collected by [id number]     Comment: Meter: [id number]     :  [id number]        pH, Temp Corrected 7.389 pH Units      pCO2, Temperature Corrected 83.3 mm Hg      pO2, Temperature Corrected 121 mm Hg     Comprehensive Metabolic Panel [[id number]]  (Abnormal) Collected:  07/09/20 2002    Specimen:  Blood Updated:  07/09/20 2028     Glucose 116 mg/dL      BUN 14 mg/dL      Creatinine 0.36 mg/dL      Sodium 142 mmol/L      Potassium 4.6 mmol/L      Chloride 89 mmol/L      CO2 45.0 mmol/L      Calcium 9.5 mg/dL      Total Protein 6.8 g/dL      Albumin 3.70 g/dL      ALT (SGPT) 11 U/L      AST (SGOT) 14 U/L      Alkaline Phosphatase 66 U/L      Total Bilirubin 0.2 mg/dL      eGFR Non African Amer >150 mL/min/1.73      Globulin 3.1 gm/dL      A/G Ratio 1.2 g/dL      BUN/Creatinine Ratio 38.9     Anion Gap 8.0 mmol/L     Troponin [[id number]]  (Normal) Collected:  07/09/20 2002    Specimen:  Blood Updated:  07/09/20 2028     Troponin T <0.010 ng/mL     Narrative:       BNP [[id number]]  (Normal) Collected:  07/09/20 2002    Specimen:  Blood Updated:  07/09/20 2028     proBNP 275.6 pg/mL     aPTT [[id number]]  (Abnormal) Collected:  07/09/20 2002    Specimen:  Blood Updated:  07/09/20 2020     PTT 35.9 seconds     Protime-INR [[id number]]  (Normal) Collected:  07/09/20 2002    Specimen:  Blood Updated:  07/09/20 2020     Protime 13.4 Seconds      INR 1.06    D-dimer, Quantitative [[id number]]  (Abnormal) Collected:  07/09/20 2002    Specimen:   Blood Updated:  07/09/20 2020     D-Dimer, Quantitative 0.85 mg/L (FEU)     CBC Auto Differential [[id number]]  (Abnormal) Collected:  07/09/20 2002    Specimen:  Blood Updated:  07/09/20 2010     WBC 11.66 10*3/mm3      RBC 4.32 10*6/mm3      Hemoglobin 12.8 g/dL      Hematocrit 40.8 %      MCV 94.4 fL      MCH 29.6 pg      MCHC 31.4 g/dL      RDW 12.6 %      RDW-SD 43.6 fl      MPV 8.2 fL      Platelets 256 10*3/mm3      Neutrophil % 83.0 %      Lymphocyte % 8.6 %      Monocyte % 7.5 %      Eosinophil % 0.1 %      Basophil % 0.2 %      Immature Grans % 0.6 %      Neutrophils, Absolute 9.69 10*3/mm3      Lymphocytes, Absolute 1.00 10*3/mm3      Monocytes, Absolute 0.87 10*3/mm3      Eosinophils, Absolute 0.01 10*3/mm3      Basophils, Absolute 0.02 10*3/mm3      Immature Grans, Absolute 0.07 10*3/mm3      nRBC 0.0 /100 WBC         Imaging Results (All)     Procedure Component Value Units Date/Time    CT Angiogram Chest [[id number]] Collected:  07/10/20 0711     Updated:  07/10/20 0727    Narrative:       CT angiography with 3D MIP images of the chest with IV contrast     Indication: Shortness of breath, elevated d-dimer, chest pain     Comparison: 12/13/2019     DOSE LENGTH PRODUCT: 80 mGy cm. Automated exposure control was also  utilized to decrease patient radiation dose.     Findings:     No evidence of pulmonary embolus.   Main pulmonary trunk is nondilated.   No evidence of RIGHT heart strain. No pericardial effusion.   Nonaneurysmal thoracic aorta with atherosclerotic calcification.     Secretions layering in both mainstem bronchi.   Severe emphysematous change.   New consolidation in the LEFT upper lobe.   Chronic RIGHT upper lobe scarring with increased size of a RIGHT apical  cavity which may represent sequela of chronic indolent infection.   Diffuse bilateral upper lobe bronchiectasis.   No pleural effusion or pneumothorax.   New 4 mm LEFT lower lobe pulmonary nodule.     No enlarged thoracic lymph nodes.      Punctate nonobstructing RIGHT renal calculi.   No acute osseous finding.       Impression:       Impression:  1. No evidence of pulmonary embolus.  2. New consolidation in the LEFT upper lobe and lingula, likely  representing infectious process/pneumonia.  3. Increased scarring and cavitation in the RIGHT upper lobe with  bronchiectasis.  4. Layering debris in the bilateral airways.  5. Severe emphysema.  6. Nonobstructing RIGHT renal calculi.     These findings are in agreement with the critical and emergent findings  from the [facility] preliminary report.  This report was finalized on 07/10/2020 07:24 by Dr. Jaden Cooney MD.    XR Chest 1 View [[id number]] Collected:  07/09/20 2102     Updated:  07/09/20 2110    Narrative:       XR CHEST 1 VW-     Indication: Shortness of breath     Comparison: 04/03/2020     Findings:     Cardiac silhouette is normal in size and stable.  Redemonstrated severe emphysema with hyperexpanded lungs.  Similar-appearing RIGHT apical pleural and parenchymal scarring.  New patchy LEFT upper lobe airspace opacity.  No visible pneumothorax or pleural effusion.  No acute osseous finding.       Impression:       Impression:     1.  New LEFT upper lobe opacity, likely representing infectious  process/pneumonia.  2.  No change in RIGHT apical pleural/parenchymal scarring and severe  emphysema.  This report was finalized on 07/09/2020 21:07 by Dr. Jaden Cooney MD.        History of Present Illness on Day of Discharge: Patient sitting up in the chair.  He relates that he is feeling better overall and essentially at his baseline.    Hospital Course:  Mr. Arzate is a 65-year-old  male who follows Dr. Jose Moon for primary care.  He has a medical history significant for chronic obstructive pulmonary disease, chronic hypoxic/hypercapnic respiratory failure requiring continuous oxygen and trilogy at night, pulmonary hypertension, anxiety, depression.  The patient presented to the  [facility] emergency department on 7/9/2020 with a chief complaint of shortness of breath.  He was referred by his home health nurse for oxygen saturations lower than 88%.  In the emergency department, ABG showed pH of 7.38, PCO2 of 83, and PO2 of 121.  D-dimer was elevated at 0.85, prompting a CT angiogram of the chest.  This showed no evidence of pulmonary embolus.  It did show consolidation in the left upper lobe and lingula, likely pneumonia.  This also showed increased scarring and cavitation in the right upper lobe with bronchiectasis.  The patient was admitted to the hospital service for further evaluation and management.    The patient was placed on BiPAP on admission.  His PCO2 level did come down to 73.  The patient does have chronic hypercapnia and does wear a trilogy machine at night, and this is likely his baseline.  He was able to be weaned back to his home setting of oxygen at 3 L per nasal cannula.  The patient was placed on Levaquin for treatment of pneumonia.  He was also treated for acute exacerbation of chronic obstructive pulmonary disease.  He was given IV Solu-Medrol, which was weaned to oral prednisone.  He has been given DuoNeb breathing treatments, Mucinex, and pulmonary toileting with bladder valve.  Urinary antigens for strep and Legionella were negative.  Respiratory culture shows growth of normal respiratory debra.  1 of his blood cultures was positive for a coagulase-negative staph, which is felt to be a contaminant.    The patient was evaluated by dietitian as he exhibits pulmonary cachexia.  We have added Megace to his regimen.  Boost supplementation has been added.    Overall, the patient is hemodynamically stable and appropriate for discharge home today.  He will be followed by home health services.  He will follow-up with his primary care provider in 1 week.    Condition on Discharge: Medically stable    Physical Exam on Discharge:  /58 (BP Location: Left arm,  "Patient Position: Sitting)   Pulse 90   Temp 98.3 °F (36.8 °C) (Oral)   Resp 16   Ht 165.1 cm (65\")   Wt 39.5 kg (87 lb 3 oz)   SpO2 92%   BMI 14.51 kg/m²   Physical Exam  Constitutional: He is oriented to person, place, and time. He appears well-developed. He appears cachectic. No distress.   HENT:   Head: Normocephalic and atraumatic.   Neck: Normal range of motion. Neck supple. No JVD present. No tracheal deviation present.   Cardiovascular: Normal rate, regular rhythm and intact distal pulses. Exam reveals no gallop and no friction rub.   Sinus bradycardia/sinus arrhythmia 58-87, heart rate down to 46 overnight.  Pulmonary/Chest: Effort normal. He has no wheezes. He has no rales.   Grossly diminished throughout   Abdominal: Soft. Bowel sounds are normal. He exhibits no distension. There is no tenderness. There is no guarding.   Musculoskeletal: He exhibits no edema. He exhibits no tenderness.   Neurological: He is alert and oriented to person, place, and time. No cranial nerve deficit.   Skin: Skin is warm and dry. No rash noted. There is erythema (Coccyx- please see images under \"media\" tab- stage 1 without broken skin).   Psychiatric: He has a normal mood and affect. His behavior is normal. Judgment and thought content normal.   Vitals reviewed.    Discharge Disposition:  Home-Health Care [facility]    Discharge Medications:     Discharge Medications      New Medications      Instructions Start Date   levoFLOXacin 500 MG tablet  Commonly known as:  LEVAQUIN   500 mg, Oral, Every 24 Hours   Start Date:  July 13, 2020     megestrol 40 MG/ML suspension  Commonly known as:  MEGACE   400 mg, Oral, Daily   Start Date:  July 13, 2020        Changes to Medications      Instructions Start Date   predniSONE 10 MG tablet  Commonly known as:  DELTASONE  What changed:  Another medication with the same name was added. Make sure you understand how and when to take each.   10 mg, Oral, Daily      predniSONE 20 MG " tablet  Commonly known as:  DELTASONE  What changed:  You were already taking a medication with the same name, and this prescription was added. Make sure you understand how and when to take each.   Take 1 tablet by mouth 2 (Two) Times a Day With Meals for 3 days, THEN 1 tablet Daily for 3 days. Then resume 10 mg daily   Start Date:  July 12, 2020        Continue These Medications      Instructions Start Date   acetaminophen 325 MG tablet  Commonly known as:  TYLENOL   650 mg, Oral, Every 6 Hours PRN      albuterol sulfate  (90 Base) MCG/ACT inhaler  Commonly known as:  PROVENTIL HFA;VENTOLIN HFA;PROAIR HFA   2 puffs, Inhalation, Every 4 Hours PRN      ALPRAZolam 1 MG tablet  Commonly known as:  XANAX   1 mg, Oral, 3 Times Daily PRN      aspirin 81 MG EC tablet   81 mg, Oral, Daily      citalopram 40 MG tablet  Commonly known as:  CeleXA   40 mg, Oral, Daily      ferrous sulfate 325 (65 FE) MG tablet   325 mg, Oral, Daily With Breakfast      guaiFENesin 600 MG 12 hr tablet  Commonly known as:  MUCINEX   1,200 mg, Oral, Every 12 Hours Scheduled      ipratropium-albuterol 0.5-2.5 mg/3 ml nebulizer  Commonly known as:  DUO-NEB   3 mL, Nebulization, Every 6 Hours PRN      metoprolol tartrate 50 MG tablet  Commonly known as:  LOPRESSOR   50 mg, Oral, 2 Times Daily      montelukast 10 MG tablet  Commonly known as:  SINGULAIR   10 mg, Oral, Nightly      pantoprazole 40 MG EC tablet  Commonly known as:  PROTONIX   40 mg, Oral, Daily      simvastatin 20 MG tablet  Commonly known as:  ZOCOR   20 mg, Oral, Nightly      Trelegy Ellipta 100-62.5-25 MCG/INH aerosol powder   Generic drug:  Fluticasone-Umeclidin-Vilant   1 puff, Inhalation, Daily           Discharge Diet:   Diet Instructions     Diet: Regular; Thin      Discharge Diet:  Regular    Fluid Consistency:  Thin        Activity at Discharge:   Activity Instructions     Activity as Tolerated          Discharge Care Plan/Instructions:   1.  Return for any acute or  worsening symptoms  2.  Levaquin for a total of 7 days of therapy.  Prednisone to be tapered back to home dosage of 10 mg.  3.  Continue use of flutter valve  4.  New medication Megace  5.  Resume home health services    Follow-up Appointments:   1.  Primary care provider in 1 week    Test Results Pending at Discharge: We will follow blood and respiratory cultures to completion.    CRISPIN Escobar  07/12/20  15:46    Time: 40 minutes

## 2020-07-12 NOTE — DISCHARGE PLACEMENT REQUEST
"Tracie Burgess (65 y.o. Male)     Date of Birth Social Security Number Address Home Phone MRN    1955  [id number] [facility] [address] [phone] [id number]    Restoration Marital Status          Other        Admission Date Admission Type Admitting Provider Attending Provider Department, Room/Bed    7/9/20 Emergency Jamal Servin DO Robinson, Maurice S, DO [facility] 4B, 403/1    Discharge Date Discharge Disposition Discharge Destination         Home-Health Care [facility]              Attending Provider:  Jamal Servin DO    Allergies:  Meperidine, Morphine    Isolation:  None   Infection:  None   Code Status:  CPR    Ht:  165.1 cm (65\")   Wt:  39.5 kg (87 lb 3 oz)    Admission Cmt:  None   Principal Problem:  COPD with acute exacerbation (CMS/[facility]) [J44.1]                 Active Insurance as of 7/9/2020     Primary Coverage     Payor Plan Insurance Group Employer/Plan Group    MEDICARE MEDICARE A & B      Payor Plan Address Payor Plan Phone Number Payor Plan Fax Number Effective Dates    [address] [phone]  4/1/2013 - None Entered    [facility] [id number]       Subscriber Name Subscriber Birth Date Member ID       TRACIE BURGESS 1955 [id number]           Secondary Coverage     Payor Plan Insurance Group Employer/Plan Group    HUMANA HUMANA [facility]              [id number]     Payor Plan Address Payor Plan Phone Number Payor Plan Fax Number Effective Dates    [address] [address]   1/1/2018 - None Entered    [facility] [id number]       Subscriber Name Subscriber Birth Date Member ID       TRACIE BURGESS 1955 [id number]                 Emergency Contacts      (Rel.) Home Phone Work Phone Mobile Phone    Ronak uBrgess (Power of ) [phone] -- --    Eva Denton (Care Giver) [phone] -- [phone]    Cory Zendejas (Relative) -- -- [phone]    Kervin Zendejas (Relative) -- -- [phone]               History & Physical      Polidori, " Adria Walters MD at 07/10/20 0059              [facility] Medicine Services  HISTORY AND PHYSICAL    Date of Admission: 7/9/2020  Primary Care Physician: Jose Moon MD    Subjective     Chief Complaint: low oxygen    History of Present Illness  65 year old male with PMH of CODP, CRF hypoxemic and hypercarbic, HTN, that presents due to hypoxemia and dyspnea referred by his home nurse. He had lower than 88% oxygen saturation despite wearing 3-4 lpm increased dose. The patient appears debilitated and tangential. Abg showed pCO2 83 pH 7.38.     CXR and CT Chest show changes concerning for new infiltrate or scar tissue left upper lobe. Patient does not have fever or pleuritic chest pain. States has lost weight recently, but can not really specify how much or since when.     Review of Systems   Otherwise complete ROS reviewed and negative except as mentioned in the HPI.    Past Medical History:   Past Medical History:   Diagnosis Date   • Anxiety    • Arthritis    • Chronic respiratory failure with hypoxia, on home O2 therapy (CMS/[facility]), 3 L    • COPD (chronic obstructive pulmonary disease) (CMS/[facility])    • Depression    • Hypertension    • Mixed hyperlipidemia    • Nephrolithiasis    • Pneumonia, pseudomonal 7/2016 with negative AFB, aspiration 7/2018      Past Surgical History:  Past Surgical History:   Procedure Laterality Date   • COLONOSCOPY  04/20/2007    Within Normal Limits.   • ENDOSCOPY  04/20/2007    urea neg, sbbx benign, mild gastritis, small hiatal hernia   • ENDOSCOPY N/A 10/5/2016    Procedure: ESOPHAGOGASTRODUODENOSCOPY WITH ANESTHESIA;  Surgeon: Kevyn Damon MD;  Location: [facility] ENDOSCOPY;  Service:    • HEMORRHOIDECTOMY     • PEG TUBE INSERTION  07/22/2016    Dr Mccarty   • TRACHEOSTOMY       Social History:  reports that he has been smoking cigarettes. He has a 25.00 pack-year smoking history. He has never used smokeless tobacco. Drug use questions  deferred to the physician. He reports that he does not drink alcohol.    Family History: family history includes Dementia in his mother; No Known Problems in his father.       Allergies:  Allergies   Allergen Reactions   • Meperidine Anaphylaxis   • Morphine Hallucinations     Medications:  Prior to Admission medications    Medication Sig Start Date End Date Taking? Authorizing Provider   acetaminophen (TYLENOL) 325 MG tablet Take 650 mg by mouth Every 6 (Six) Hours As Needed for Mild Pain .   Yes Christian Briseno MD   albuterol sulfate  (90 Base) MCG/ACT inhaler Inhale 2 puffs Every 4 (Four) Hours As Needed for Wheezing. 5/11/20  Yes Juani Claire APRN   aspirin 81 MG EC tablet Take 81 mg by mouth Daily.   Yes Christian Briseno MD   citalopram (CeleXA) 40 MG tablet Take 40 mg by mouth Daily.   Yes Christian Briseno MD   ferrous sulfate 325 (65 FE) MG tablet Take 325 mg by mouth Daily With Breakfast.   Yes Christian Briseno MD   Fluticasone-Umeclidin-Vilant (Trelegy Ellipta) 100-62.5-25 MCG/INH aerosol powder  Inhale 1 puff Daily.   Yes ProviderChristian MD   guaiFENesin (MUCINEX) 600 MG 12 hr tablet Take 2 tablets by mouth Every 12 (Twelve) Hours. 7/14/18  Yes Jameson Oliver,    ipratropium-albuterol (DUO-NEB) 0.5-2.5 mg/mL nebulizer Take 3 mL by nebulization Every 6 (Six) Hours As Needed for wheezing.   Yes Christian Briseno MD   metoprolol tartrate (LOPRESSOR) 50 MG tablet Take 50 mg by mouth 2 (Two) Times a Day.   Yes ProviderChristian MD   pantoprazole (PROTONIX) 40 MG EC tablet Take 40 mg by mouth Daily.   Yes Christian Briseno MD   predniSONE (DELTASONE) 10 MG tablet 4 tabs daily x 3 days, then 3 tabs daily x 3 days, then 2 tabs daily x 3 days, then 1 tab daily x  3 days 4/12/20  Yes Alon Bowens MD   simvastatin (ZOCOR) 20 MG tablet Take 20 mg by mouth Every Night.   Yes Christian Briseno MD   ALPRAZolam (XANAX) 1 MG tablet Take 1 mg by  "mouth 3 (Three) Times a Day As Needed for Anxiety.    Provider, MD Christian   Fluticasone Furoate-Vilanterol (Breo Ellipta) 200-25 MCG/INH inhaler Inhale 1 puff Daily for 360 days. 5/11/20 5/6/21  Claire JuaniCRISPIN Calderon     Objective     Vital Signs: /82 (BP Location: Right arm, Patient Position: Sitting)   Pulse 104   Temp 98.4 °F (36.9 °C) (Oral)   Resp 18   Ht 165.1 cm (65\")   Wt 39.4 kg (86 lb 12.8 oz)   SpO2 (!) 89%   BMI 14.44 kg/m²    Physical Exam   Constitutional: He is oriented to person, place, and time.   Cachectic male appears chronically ill, dyspneic with speech   HENT:   Head: Normocephalic and atraumatic.   Right Ear: External ear normal.   Left Ear: External ear normal.   Eyes: Pupils are equal, round, and reactive to light. EOM are normal. Right eye exhibits no discharge. Left eye exhibits no discharge.   Neck: Normal range of motion. Neck supple. No thyromegaly present.   Cardiovascular: Regular rhythm and normal heart sounds.   No murmur heard.  Pulmonary/Chest: Effort normal. No stridor. No respiratory distress. He has wheezes. He has rales.   Supraclavicular retractions with breathing like a sail in changing winds   Abdominal: Soft. Bowel sounds are normal. He exhibits no distension and no mass. There is no tenderness. There is no guarding.   Musculoskeletal: Normal range of motion. He exhibits no edema, tenderness or deformity.   Neurological: He is alert and oriented to person, place, and time. He displays normal reflexes. No cranial nerve deficit. He exhibits normal muscle tone. Coordination normal.   Skin: Skin is warm. Capillary refill takes less than 2 seconds. No erythema.     Results Reviewed:  Lab Results (last 24 hours)     Procedure Component Value Units Date/Time    Blood Culture - Blood, Arm, Right [[id number]] Collected:  07/09/20 2211    Specimen:  Blood from Arm, Right Updated:  07/09/20 2217    Blood Gas, Arterial [[id number]]  (Abnormal) Collected:  " 07/09/20 2210    Specimen:  Arterial Blood Updated:  07/09/20 2215     Site Right Brachial     Kervin's Test N/A     pH, Arterial 7.433 pH units      pCO2, Arterial 73.7 mm Hg      Comment: 86 Value above critical limit        pO2, Arterial 86.6 mm Hg      HCO3, Arterial 49.3 mmol/L      Comment: 83 Value above reference range        Base Excess, Arterial 21.1 mmol/L      Comment: 83 Value above reference range        O2 Saturation, Arterial 97.3 %      Temperature 37.0 C      Barometric Pressure for Blood Gas 747 mmHg      Modality BiPap     FIO2 40 %      Ventilator Mode BiPAP     Set Mech Resp Rate 12.0     IPAP 14     Comment: Meter: [id number]     :  [id number]        EPAP 6     Notified Who KATELIN RN     Notified By [id number]     Notified Time 07/09/2020 22:21     Collected by [id number]     pCO2, Temperature Corrected 73.7 mm Hg      pH, Temp Corrected 7.433 pH Units      pO2, Temperature Corrected 86.6 mm Hg     Blood Culture - Blood, Wrist, Left [[id number]] Collected:  07/09/20 2206    Specimen:  Blood from Wrist, Left Updated:  07/09/20 2211    COVID PRE-OP / PRE-PROCEDURE SCREENING ORDER (NO ISOLATION) - Swab, Nasal Cavity [[id number]] Collected:  07/09/20 2008    Specimen:  Swab from Nasal Cavity Updated:  07/09/20 2050    Narrative:       The following orders were created for panel order COVID PRE-OP / PRE-PROCEDURE SCREENING ORDER (NO ISOLATION) - Swab, Nasal Cavity.  Procedure                               Abnormality         Status                     ---------                               -----------         ------                     COVID-19 [facility] [employer] IN-[facility]ACIA...[[id number]]  Normal              Final result                 Please view results for these tests on the individual orders.    COVID-19 [employer] IN-HOUSE, Nasal Swab No Transport Media - Swab, Nasal Cavity [[id number]]  (Normal) Collected:  07/09/20 2008    Specimen:  Swab from Nasal Cavity Updated:  07/09/20 2050     COVID19 Not Detected     Narrative:       Fact sheet for providers: https://www.fda.gov/media/642478/download     Fact sheet for patients: https://www.fda.gov/media/147276/download    Blood Gas, Arterial With Co-Ox [[id number]]  (Abnormal) Collected:  07/09/20 2030    Specimen:  Arterial Blood Updated:  07/09/20 2037     Site Right Brachial     Kervin's Test N/A     pH, Arterial 7.389 pH units      pCO2, Arterial 83.3 mm Hg      Comment: 86 Value above critical limit        pO2, Arterial 121.0 mm Hg      Comment: 83 Value above reference range        HCO3, Arterial 50.3 mmol/L      Comment: 83 Value above reference range        Base Excess, Arterial 21.1 mmol/L      Comment: 83 Value above reference range        O2 Saturation, Arterial 99.2 %      Comment: 83 Value above reference range        Hemoglobin, Blood Gas 11.8 g/dL      Comment: 84 Value below reference range        Hematocrit, Blood Gas 36.1 %      Comment: 84 Value below reference range        Oxyhemoglobin 95.4 %      Methemoglobin 0.50 %      Carboxyhemoglobin 3.3 %      A-a Gradiant --     Comment: UNABLE TO CALCULATE        Temperature 37.0 C      Sodium, Arterial 140 mmol/L      Potassium, Arterial 4.2 mmol/L      Barometric Pressure for Blood Gas 747 mmHg      Modality Nasal Cannula     Flow Rate 2.0 lpm      Ventilator Mode NA     Note --     Notified Who VIVIANA APONTE     Notified By [id number]     Notified Time 07/09/2020 20:42     Collected by [id number]     Comment: Meter: [id number]     :  [id number]        pH, Temp Corrected 7.389 pH Units      pCO2, Temperature Corrected 83.3 mm Hg      pO2, Temperature Corrected 121 mm Hg     Comprehensive Metabolic Panel [[id number]]  (Abnormal) Collected:  07/09/20 2002    Specimen:  Blood Updated:  07/09/20 2028     Glucose 116 mg/dL      BUN 14 mg/dL      Creatinine 0.36 mg/dL      Sodium 142 mmol/L      Potassium 4.6 mmol/L      Chloride 89 mmol/L      CO2 45.0 mmol/L      Calcium 9.5 mg/dL      Total Protein 6.8 g/dL       Albumin 3.70 g/dL      ALT (SGPT) 11 U/L      AST (SGOT) 14 U/L      Alkaline Phosphatase 66 U/L      Total Bilirubin 0.2 mg/dL      eGFR Non African Amer >150 mL/min/1.73      Globulin 3.1 gm/dL      A/G Ratio 1.2 g/dL      BUN/Creatinine Ratio 38.9     Anion Gap 8.0 mmol/L     Narrative:       GFR Normal >60  Chronic Kidney Disease <60  Kidney Failure <15      Troponin [[id number]]  (Normal) Collected:  07/09/20 2002    Specimen:  Blood Updated:  07/09/20 2028     Troponin T <0.010 ng/mL     Narrative:       Troponin T Reference Range:  <= 0.03 ng/mL-   Negative for AMI  >0.03 ng/mL-     Abnormal for myocardial necrosis.  Clinicians would have to utilize clinical acumen, EKG, Troponin and serial changes to determine if it is an Acute Myocardial Infarction or myocardial injury due to an underlying chronic condition.       Results may be falsely decreased if patient taking Biotin.      BNP [[id number]]  (Normal) Collected:  07/09/20 2002    Specimen:  Blood Updated:  07/09/20 2028     proBNP 275.6 pg/mL     Narrative:       Among patients with dyspnea, NT-proBNP is highly sensitive for the detection of acute congestive heart failure. In addition NT-proBNP of <300 pg/ml effectively rules out acute congestive heart failure with 99% negative predictive value.    Results may be falsely decreased if patient taking Biotin.      aPTT [[id number]]  (Abnormal) Collected:  07/09/20 2002    Specimen:  Blood Updated:  07/09/20 2020     PTT 35.9 seconds     Protime-INR [[id number]]  (Normal) Collected:  07/09/20 2002    Specimen:  Blood Updated:  07/09/20 2020     Protime 13.4 Seconds      INR 1.06    D-dimer, Quantitative [[id number]]  (Abnormal) Collected:  07/09/20 2002    Specimen:  Blood Updated:  07/09/20 2020     D-Dimer, Quantitative 0.85 mg/L (FEU)     Narrative:       Reference Range is 0-0.50 mg/L FEU. However, results <0.50 mg/L FEU tends to rule out DVT or PE. Results >0.50 mg/L FEU are not useful in predicting absence  or presence of DVT or PE.      CBC & Differential [[id number]] Collected:  07/09/20 2002    Specimen:  Blood Updated:  07/09/20 2010    Narrative:       The following orders were created for panel order CBC & Differential.  Procedure                               Abnormality         Status                     ---------                               -----------         ------                     CBC Auto Differential[[id number]]        Abnormal            Final result                 Please view results for these tests on the individual orders.    CBC Auto Differential [[id number]]  (Abnormal) Collected:  07/09/20 2002    Specimen:  Blood Updated:  07/09/20 2010     WBC 11.66 10*3/mm3      RBC 4.32 10*6/mm3      Hemoglobin 12.8 g/dL      Hematocrit 40.8 %      MCV 94.4 fL      MCH 29.6 pg      MCHC 31.4 g/dL      RDW 12.6 %      RDW-SD 43.6 fl      MPV 8.2 fL      Platelets 256 10*3/mm3      Neutrophil % 83.0 %      Lymphocyte % 8.6 %      Monocyte % 7.5 %      Eosinophil % 0.1 %      Basophil % 0.2 %      Immature Grans % 0.6 %      Neutrophils, Absolute 9.69 10*3/mm3      Lymphocytes, Absolute 1.00 10*3/mm3      Monocytes, Absolute 0.87 10*3/mm3      Eosinophils, Absolute 0.01 10*3/mm3      Basophils, Absolute 0.02 10*3/mm3      Immature Grans, Absolute 0.07 10*3/mm3      nRBC 0.0 /100 WBC         Imaging Results (Last 24 Hours)     Procedure Component Value Units Date/Time    CT Angiogram Chest [[id number]] Resulted:  07/09/20 2238     Updated:  07/09/20 2325    XR Chest 1 View [[id number]] Collected:  07/09/20 2102     Updated:  07/09/20 2110    Narrative:       XR CHEST 1 VW-     Indication: Shortness of breath     Comparison: 04/03/2020     Findings:     Cardiac silhouette is normal in size and stable.  Redemonstrated severe emphysema with hyperexpanded lungs.  Similar-appearing RIGHT apical pleural and parenchymal scarring.  New patchy LEFT upper lobe airspace opacity.  No visible pneumothorax or pleural  effusion.  No acute osseous finding.       Impression:       Impression:     1.  New LEFT upper lobe opacity, likely representing infectious  process/pneumonia.  2.  No change in RIGHT apical pleural/parenchymal scarring and severe  emphysema.  This report was finalized on 07/09/2020 21:07 by Dr. Jaden Cooney MD.        I have personally reviewed and interpreted the radiology studies and ECG obtained at time of admission.     Assessment / Plan     Assessment:   Active Hospital Problems    Diagnosis   • **COPD with acute exacerbation (CMS/HCC)   • Pneumonia of left upper lobe due to infectious organism   • Acute-on-chronic respiratory failure (CMS/HCC)   • Underweight     Plan:   Admit to medical floor. Vitals routine, telemetry.   IVF saline lock.   Solumedrol/Douneb/Albuterol.  Empiric Levaquin, infiltrates do not appear to correlate with acute inflammation. Concern would be for underlying lung cancer. Patient has a very poor functional level and doubt could be candidate for bronchoscopy due to risk or complications.  Home medications reconciled.    BiPAP at HS and prn    Code Status: Full     I discussed the patient's findings and my recommendations with the patient    Estimated length of stay over 2 midnights    Patient seen and examined by me on see below.    Adria Vasquez MD   07/10/20   00:59            Electronically signed by Adria Vasquez MD at 07/10/20 0711          Physician Progress Notes (most recent note)      Jacklyn Fernandez APRN at 07/11/20 1154     Attestation signed by Jamal Servin DO at 07/11/20 2560    I personally evaluated and examined the patient in conjunction with CRISPIN Dean and agree with the assessment, treatment plan, and disposition of the patient as recorded by her. My history, exam, and further recommendations are:     Seen and evaluated.  Patient states that he feels symptomatically better and less short of breath.  Continues to desaturate with  exertion but is asymptomatic, likely secondary to the long-term and chronic nature of this condition and chronic CO2 retention.  IV antibiotics continue and agree with transition to oral therapy tomorrow.  It is hopeful that he can be discharged tomorrow.    Jamal HERNANDEZ DO Gareth  07/11/20  17:39                          [facility] Medicine Services  INPATIENT PROGRESS NOTE    Length of Stay: 1  Date of Admission: 7/9/2020  Primary Care Physician: Jose Moon MD    Subjective   Chief Complaint: Follow-up shortness of breath  HPI   Patient sitting up in bed.  He reports feeling better overall.  He states that he notices when he has been trying to get out of bed and ambulate around the room that his pulse oximeter alarms with a low oxygen level.  Patient does not appreciate feeling any different or more short of breath when this happens.  He denies any chest pain.  He continues to have a cough, which is nonproductive.  It sounds congested, as though he needs to cough up some sputum.    Review of Systems   All pertinent negatives and positives are as above. All other systems have been reviewed and are negative unless otherwise stated.     Objective    Temp:  [97.8 °F (36.6 °C)-98.4 °F (36.9 °C)] 98.3 °F (36.8 °C)  Heart Rate:  [] 63  Resp:  [16-20] 20  BP: (113-145)/(50-66) 123/50  Physical Exam  Constitutional: He is oriented to person, place, and time. He appears well-developed. He appears cachectic. No distress.   HENT:   Head: Normocephalic and atraumatic.   Neck: Normal range of motion. Neck supple. No JVD present. No tracheal deviation present.   Cardiovascular: Normal rate, regular rhythm and intact distal pulses. Exam reveals no gallop and no friction rub.   Sinus bradycardia-sinus tach  overnight.  PVCs.  Pulmonary/Chest: Effort normal. He has no wheezes. He has no rales.   Grossly diminished throughout   Abdominal: Soft. Bowel sounds are normal.  "He exhibits no distension. There is no tenderness. There is no guarding.   Musculoskeletal: He exhibits no edema. He exhibits no tenderness.   Neurological: He is alert and oriented to person, place, and time. No cranial nerve deficit.   Skin: Skin is warm and dry. No rash noted. There is erythema (Coccyx- please see images under \"media\" tab- stage 1 without broken skin).   Psychiatric: He has a normal mood and affect. His behavior is normal. Judgment and thought content normal.   Vitals reviewed.    Results Review:  I have reviewed the labs, radiology results, and diagnostic studies.    Laboratory Data:   Results from last 7 days   Lab Units 07/11/20  0502 07/10/20  0452 07/09/20  2002   WBC 10*3/mm3 15.93* 11.64* 11.66*   HEMOGLOBIN g/dL 11.7* 11.5* 12.8*   HEMATOCRIT % 37.6 37.2* 40.8   PLATELETS 10*3/mm3 265 236 256     Results from last 7 days   Lab Units 07/11/20  0502 07/10/20  0452 07/09/20  2030 07/09/20  2002   SODIUM mmol/L 143 140  --  142   SODIUM, ARTERIAL mmol/L  --   --  140  --    POTASSIUM mmol/L 4.8 4.4  --  4.6   CHLORIDE mmol/L 92* 88*  --  89*   CO2 mmol/L 42.0* 39.0*  --  45.0*   BUN mg/dL 18 14  --  14   CREATININE mg/dL 0.34* 0.31*  --  0.36*   CALCIUM mg/dL 9.9 9.3  --  9.5   BILIRUBIN mg/dL  --   --   --  0.2   ALK PHOS U/L  --   --   --  66   ALT (SGPT) U/L  --   --   --  11   AST (SGOT) U/L  --   --   --  14   GLUCOSE mg/dL 162* 133*  --  116*     I have reviewed the patient current medications.     Assessment/Plan     Active Hospital Problems    Diagnosis   • **COPD with acute exacerbation (CMS/HCC)   • Pneumonia of left upper lobe due to infectious organism   • Acute on chronic respiratory failure with hypoxia and hypercapnia (CMS/HCC)   • Underweight   • Severe malnutrition (CMS/HCC)     Plan:  1.  Patient admitted 7/9/2020 with complaints of shortness of breath.  CT angiogram of the chest was performed secondary to elevated d-dimer.  This did not show any pulmonary embolus but did " show evidence of a left upper lobe pneumonia.  There was layering debris in bilateral airways.  2.  Continue Levaquin, day 2.  We will switch to oral therapy to start tomorrow to complete a total of 7 days.  Urinary antigens for strep and Legionella negative.  Respiratory culture pending.  Blood cultures yesterday showed 1 of 4 bottles with growth of a Staphylococcus, not aureus species.  Likely a contaminant.  Patient afebrile, continue to monitor.  Worsening leukocytosis today likely secondary to steroid therapy.  3.  Continue duo nebs, Mucinex, and flutter valve.  Could consider Mucomyst.    4.  Switch to oral prednisone  5.  Patient exhibits pulmonary cachexia.  Severe malnutrition per dietitian.  Boost supplementation added, and Megace added yesterday as well.  6.  Slowly increase activity, up to chair with meals today.  7.  Mild hyperglycemia noted, likely secondary to steroid therapy.  We will continue to monitor.  8.  Labs in a.m.  9.   following for discharge planning-will need resumption orders for home health.    Discharge Planning: I expect the patient to be discharged to home with home health in 1-2 days.    Jacklyn Fernandez, CRISPIN   20   11:54      Electronically signed by Jamal Servin DO at 20 1740       Consult Notes (most recent note)    No notes of this type exist for this encounter.         Physical Therapy Notes (most recent note)    No notes exist for this encounter.            Occupational Therapy Notes (most recent note)      Linda Montana OTR/L at 20 1201          Acute Care - Occupational Therapy Initial Evaluation  [facility]     Patient Name: Kellie Arzate  : 1955  MRN: [id number]  Today's Date: 2020  Onset of Illness/Injury or Date of Surgery: 20  Date of Referral to OT: 07/10/20  Referring Physician: Dr. Servin    Admit Date: 2020       ICD-10-CM ICD-9-CM   1. Pneumonia of left upper lobe due to infectious organism  J18.9 486   2. Decreased activities of daily living (ADL) Z78.9 V49.89     Patient Active Problem List   Diagnosis   • Aspiration pneumonia of left lung (CMS/HCC)   • Abnormal chest x-ray, findings apex right lung suspect TB   • Acute on chronic respiratory failure with hypercapnia (CMS/HCC)   • Tobacco dependence   • Anemia   • Severe malnutrition (CMS/HCC)   • Hypercarbia   • COPD with acute exacerbation (CMS/[facility])   • Acute on chronic respiratory failure with hypoxia and hypercapnia (CMS/[facility])   • Pneumonia of left upper lobe due to infectious organism   • Acute on chronic respiratory failure with hypoxia and hypercapnia (CMS/HCC)   • Underweight   • COPD with acute exacerbation (CMS/HCC)     Past Medical History:   Diagnosis Date   • Anxiety    • Arthritis    • Chronic respiratory failure with hypoxia, on home O2 therapy (CMS/[facility]), 3 L    • COPD (chronic obstructive pulmonary disease) (CMS/[facility])    • Depression    • Hypertension    • Mixed hyperlipidemia    • Nephrolithiasis    • Pneumonia, pseudomonal 7/2016 with negative AFB, aspiration 7/2018      Past Surgical History:   Procedure Laterality Date   • COLONOSCOPY  04/20/2007    Within Normal Limits.   • ENDOSCOPY  04/20/2007    urea neg, sbbx benign, mild gastritis, small hiatal hernia   • ENDOSCOPY N/A 10/5/2016    Procedure: ESOPHAGOGASTRODUODENOSCOPY WITH ANESTHESIA;  Surgeon: Kevyn Damon MD;  Location: [facility] ENDOSCOPY;  Service:    • HEMORRHOIDECTOMY     • PEG TUBE INSERTION  07/22/2016    Dr Mccarty   • TRACHEOSTOMY            OT ASSESSMENT FLOWSHEET (last 12 hours)      Occupational Therapy Evaluation     Row Name 07/12/20 1122                   OT Evaluation Time/Intention    Subjective Information  complains of;dyspnea  -JJ        Document Type  evaluation  -JJ        Mode of Treatment  occupational therapy  -JJ        Patient Effort  good  -JJ           General Information    Patient Profile Reviewed?  yes  -JJ        Onset of Illness/Injury or Date  of Surgery  07/09/20  -JJ        Referring Physician  Dr. Servin  -JJ        Patient Observations  alert;cooperative;agree to therapy  -JJ        Patient/Family Observations  no family present in room   -JJ        General Observations of Patient  sitting up in chair, 3L O2/NC, alert and in no apparent distress  -JJ        Prior Level of Function  independent:;all household mobility;transfer;bed mobility;ADL's  -JJ        Equipment Currently Used at Home  oxygen;shower chair;commode, bedside  -JJ        Pertinent History of Current Functional Problem  Pt presented with decreased O2 level. Pt has a hx of COPD, TB, and emphysema on continuous 3L O2/NC. Dx: pneumonia of XI, COPD with acute exacerbation.   -JJ        Existing Precautions/Restrictions  fall;oxygen therapy device and L/min  -JJ        Risks Reviewed  patient:;nausea/vomiting;LOB;dizziness;increased discomfort;change in vital signs;increased drainage;lines disloged  -JJ        Benefits Reviewed  patient:;improve function;increase strength;increase independence;increase balance;decrease pain;decrease risk of DVT;improve skin integrity;increase knowledge  -JJ           Relationship/Environment    Lives With  other (see comments)  -JJ        Family Caregiver Names  niece  -           Resource/Environmental Concerns    Current Living Arrangements  home/apartment/condo  -JJ           Home Main Entrance    Number of Stairs, Main Entrance  three  -JJ        Stair Railings, Main Entrance  railing on right side (ascending)  -JJ           Cognitive Assessment/Interventions    Additional Documentation  Cognitive Assessment/Intervention (Group)  -J           Cognitive Assessment/Intervention- PT/OT    Affect/Mental Status (Cognitive)  WNL  -JJ        Orientation Status (Cognition)  oriented x 4  -JJ        Follows Commands (Cognition)  WNL  -JJ        Cognitive Function (Cognitive)  WNL  -JJ        Personal Safety Interventions  fall prevention program  maintained;gait belt;muscle strengthening facilitated;nonskid shoes/slippers when out of bed;supervised activity  -           Safety Issues, Functional Mobility    Impairments Affecting Function (Mobility)  endurance/activity tolerance;shortness of breath;strength  -           Bed Mobility Assessment/Treatment    Comment (Bed Mobility)  sitting up in chair upon entry to room, left as found   -           Functional Mobility    Functional Mobility- Ind. Level  supervision required  -        Functional Mobility- Safety Issues  supplemental O2  -        Functional Mobility- Comment  bed to doorway x2. O2 dropped to 82% on 3L with mobility, nsg notified.   -           Transfer Assessment/Treatment    Transfer Assessment/Treatment  sit-stand transfer;stand-sit transfer  -           Sit-Stand Transfer    Sit-Stand Jones (Transfers)  supervision  -           Stand-Sit Transfer    Stand-Sit Jones (Transfers)  supervision  -           ADL Assessment/Intervention    BADL Assessment/Intervention  lower body dressing  -           Lower Body Dressing Assessment/Training    Lower Body Dressing Jones Level  don;socks;independent  -        Lower Body Dressing Position  unsupported sitting in chair  -           BADL Safety/Performance    Impairments, BADL Safety/Performance  endurance/activity tolerance;shortness of breath  -        Skilled BADL Treatment/Intervention  BADL process/adaptation training  -           General ROM    GENERAL ROM COMMENTS  BUE AROM WFL   -           MMT (Manual Muscle Testing)    General MMT Comments  BUE strength grossly 4/5  -           Motor Assessment/Interventions    Additional Documentation  Balance (Group)  -           Balance    Balance  static sitting balance;static standing balance  -           Static Sitting Balance    Level of Jones (Unsupported Sitting, Static Balance)  independent  -        Sitting Position (Unsupported  Sitting, Static Balance)  sitting in chair  -JJ           Static Standing Balance    Level of Stephens (Unsupported Standing, Static Balance)  supervision  -J        Time Able to Maintain Position (Unsupported Standing, Static Balance)  45 to 60 seconds  -           Sensory Assessment/Intervention    Sensory General Assessment  no sensation deficits identified in BUEs  -JJ           Positioning and Restraints    Pre-Treatment Position  in bed  -JJ        Post Treatment Position  chair  -JJ        In Chair  sitting;notified nsg;call light within reach;encouraged to call for assist  -JJ           Pain Assessment    Additional Documentation  Pain Scale: Numbers Pre/Post-Treatment (Group)  -           Pain Scale: Numbers Pre/Post-Treatment    Pain Scale: Numbers, Pretreatment  0/10 - no pain  -JJ        Pain Scale: Numbers, Post-Treatment  0/10 - no pain  -J           Wound 04/04/20 1637 Bilateral medial coccyx Pressure Injury    Wound - Properties Group Date first assessed: 04/04/20  - Time first assessed: 1637  - Present on Hospital Admission: Y  - Side: Bilateral  - Orientation: medial  - Location: coccyx  -MC Primary Wound Type: Pressure inj  - Stage, Pressure Injury: Stage 1  -       Plan of Care Review    Plan of Care Reviewed With  patient  -JJ        Outcome Summary  OT eval completed. Pt is A&Ox4. Demo's decreased activity tolerance and increased SOB with activity. Pt is supervision for sit <> stand t/f from chair and all functional mobility in room on 3L O2/NC. Pt O2 sats dropped to 82% on 3L O2/NC with mobility, required recovery period for sats to return to 90% on 3L/NC. Pt would benefit from skilled OT services to address these deficits. Recommend d/c home with assist and HH.   -JJ           Clinical Impression (OT)    Date of Referral to OT  07/10/20  -J        OT Diagnosis  decreased adls.   -J        Prognosis (OT Eval)  good  -        Patient/Family Goals Statement (OT Eval)   return home  -JJ        Criteria for Skilled Therapeutic Interventions Met (OT Eval)  yes;treatment indicated  -JJ        Rehab Potential (OT Eval)  good, to achieve stated therapy goals  -JJ        Therapy Frequency (OT Eval)  3 times/wk  -JJ        Predicted Duration of Therapy Intervention (Therapy Eval)  10 days  -JJ        Care Plan Review (OT)  evaluation/treatment results reviewed;care plan/treatment goals reviewed;risks/benefits reviewed;current/potential barriers reviewed;patient/other agree to care plan  -JJ        Anticipated Discharge Disposition (OT)  home with assist;home with home health  -JJ           Vital Signs    Intra SpO2 (%)  (S) 82  -JJ        O2 Delivery Intra Treatment  supplemental O2 3L  -JJ        Post SpO2 (%)  91  -JJ        O2 Delivery Post Treatment  supplemental O2 3L  -JJ        Pre Patient Position  Sitting  -JJ        Intra Patient Position  Standing  -JJ        Post Patient Position  Sitting  -JJ           Planned OT Interventions    Planned Therapy Interventions (OT Eval)  activity tolerance training;BADL retraining;occupation/activity based interventions;patient/caregiver education/training;strengthening exercise;transfer/mobility retraining;functional balance retraining  -JJ           OT Goals    Bathing Goal Selection (OT)  bathing, OT goal 1  -JJ        Activity Tolerance Goal Selection (OT)  activity tolerance, OT goal 1  -JJ        Additional Documentation  Activity Tolerance Goal Selection (OT) (Row)  -JJ           Bathing Goal 1 (OT)    Activity/Assistive Device (Bathing Goal 1, OT)  bathing skills, all  -JJ        [place] Level/Cues Needed (Bathing Goal 1, OT)  supervision required  -JJ        Time Frame (Bathing Goal 1, OT)  long term goal (LTG);by discharge  -JJ        Progress/Outcomes (Bathing Goal 1, OT)  goal ongoing  -JJ            Activity Tolerance Goal 1 (OT)    Activity Tolerance Goal 1 (OT)  Pt will complete morning adl routine in standing with one or  less recovery periods to address decreased activity tolerance and adl independence.  -        Activity Level (Endurance Goal 1, OT)  10 min activity;O2 sat >/ equal to 88%  -        Time Frame (Activity Tolerance Goal 1, OT)  long term goal (LTG);by discharge  -        Progress/Outcome (Activity Tolerance Goal 1, OT)  goal ongoing  -           Living Environment    Home Accessibility  stairs to enter home;tub/shower is not walk in  -          User Key  (r) = Recorded By, (t) = Taken By, (c) = Cosigned By    Initials Name Effective Dates    TERESE Linda Montana, OTR/L 07/05/20 -     Madisyn Vidal RN 02/03/20 -          Occupational Therapy Education                 Title: PT OT SLP Therapies (Done)     Topic: Occupational Therapy (Done)     Point: ADL training (Done)     Description:   Instruct learner(s) on proper safety adaptation and remediation techniques during self care or transfers.   Instruct in proper use of assistive devices.              Learning Progress Summary           Patient Acceptance, E, VU by RICO at 7/12/2020 1200                   Point: Home exercise program (Done)     Description:   Instruct learner(s) on appropriate technique for monitoring, assisting and/or progressing therapeutic exercises/activities.              Learning Progress Summary           Patient Acceptance, E, VU by RICO at 7/12/2020 1200                   Point: Precautions (Done)     Description:   Instruct learner(s) on prescribed precautions during self-care and functional transfers.              Learning Progress Summary           Patient Acceptance, E, VU by RICO at 7/12/2020 1200                   Point: Body mechanics (Done)     Description:   Instruct learner(s) on proper positioning and spine alignment during self-care, functional mobility activities and/or exercises.              Learning Progress Summary           Patient Acceptance, E, VU by TERESE at 7/12/2020 1200                               User Key      Initials Effective Dates Name Provider Type Discipline     07/05/20 -  Linda Montana OTR/L Occupational Therapist OT                  OT Recommendation and Plan  Outcome Summary/Treatment Plan (OT)  Anticipated Discharge Disposition (OT): home with assist, home with home health  Planned Therapy Interventions (OT Eval): activity tolerance training, BADL retraining, occupation/activity based interventions, patient/caregiver education/training, strengthening exercise, transfer/mobility retraining, functional balance retraining  Therapy Frequency (OT Eval): 3 times/wk  Plan of Care Review  Plan of Care Reviewed With: patient  Plan of Care Reviewed With: patient  Outcome Summary: OT eval completed. Pt is A&Ox4. Demo's decreased activity tolerance and increased SOB with activity. Pt is supervision for sit <> stand t/f from chair and all functional mobility in room on 3L O2/NC. Pt O2 sats dropped to 82% on 3L O2/NC with mobility, required recovery period for sats to return to 90% on 3L/NC. Pt would benefit from skilled OT services to address these deficits. Recommend d/c home with assist and HH.     Outcome Measures     Row Name 07/12/20 1100             How much help from another is currently needed...    Putting on and taking off regular lower body clothing?  4  -JJ      Bathing (including washing, rinsing, and drying)  3  -JJ      Toileting (which includes using toilet bed pan or urinal)  4  -JJ      Putting on and taking off regular upper body clothing  3  -JJ      Taking care of personal grooming (such as brushing teeth)  3  -JJ      Eating meals  3  -JJ      AM-PAC 6 Clicks Score (OT)  20  -JJ         Functional Assessment    Outcome Measure Options  AM-PAC 6 Clicks Daily Activity (OT)  -JJ        User Key  (r) = Recorded By, (t) = Taken By, (c) = Cosigned By    Initials Name Provider Type    Linda Elizabeth OTR/L Occupational Therapist          Time Calculation:   Time Calculation- OT     Row Name  07/12/20 1159             Time Calculation- OT    OT Start Time  1122  -JJ      OT Stop Time  1200  -JJ      OT Time Calculation (min)  38 min  -JJ      OT Received On  07/12/20  -      OT Goal Re-Cert Due Date  07/22/20  -        User Key  (r) = Recorded By, (t) = Taken By, (c) = Cosigned By    Initials Name Provider Type    Linda Elizabeth OTR/L Occupational Therapist        Therapy Charges for Today     Code Description Service Date Service Provider Modifiers Qty    20767147088 HC OT EVAL LOW COMPLEXITY 3 7/12/2020 Linda Montana OTR/L GO 1               Linda Montana OTR/L  7/12/2020    Electronically signed by Linda Montana OTR/L at 07/12/20 1201          Discharge Summary      Jacklyn Fernandez APRN at 07/12/20 1546              [facility] Medicine Services  DISCHARGE SUMMARY       Date of Admission: 7/9/2020  Date of Discharge:  7/12/2020  Primary Care Physician: Jose Moon MD    Presenting Problem/History of Present Illness:  Shortness of breath    Final Discharge Diagnoses:  Active Hospital Problems    Diagnosis   • **COPD with acute exacerbation (CMS/HCC)   • Pneumonia of left upper lobe due to infectious organism   • Acute on chronic respiratory failure with hypoxia and hypercapnia (CMS/HCC)   • Underweight   • Severe malnutrition (CMS/HCC)     Consults: None    Procedures Performed: None    Pertinent Test Results:   Lab Results (all)     Procedure Component Value Units Date/Time    Respiratory Culture - Sputum, Cough [[id number]] Collected:  07/10/20 2209    Specimen:  Sputum from Cough Updated:  07/12/20 1122     Respiratory Culture Rare Normal Respiratory Tonya     Gram Stain Less than 25 WBCs per low power field      Less than 25 Epithelial cells per low power field      Rare (1+) Gram positive cocci    Blood Culture - Blood, Wrist, Left [[id number]]  (Abnormal) Collected:  07/09/20 2206    Specimen:  Blood from Wrist, Left  Updated:  07/12/20 0601     Blood Culture Staphylococcus, coagulase negative     Comment: Probable contaminant requires clinical correlation, susceptibility not performed unless requested by physician.          Isolated from Aerobic Bottle     Gram Stain Aerobic Bottle Gram positive cocci    Basic Metabolic Panel [[id number]]  (Abnormal) Collected:  07/12/20 0306    Specimen:  Blood Updated:  07/12/20 0338     Glucose 242 mg/dL      BUN 21 mg/dL      Creatinine 0.36 mg/dL      Sodium 142 mmol/L      Potassium 4.7 mmol/L      Comment: Specimen hemolyzed.  Results may be affected.        Chloride 96 mmol/L      CO2 41.0 mmol/L      Calcium 9.4 mg/dL      eGFR Non African Amer >150 mL/min/1.73      BUN/Creatinine Ratio 58.3     Anion Gap 5.0 mmol/L     CBC Auto Differential [[id number]]  (Abnormal) Collected:  07/12/20 0306    Specimen:  Blood Updated:  07/12/20 0326     WBC 18.96 10*3/mm3      RBC 3.72 10*6/mm3      Hemoglobin 10.9 g/dL      Hematocrit 34.7 %      MCV 93.3 fL      MCH 29.3 pg      MCHC 31.4 g/dL      RDW 12.7 %      RDW-SD 43.0 fl      MPV 8.8 fL      Platelets 271 10*3/mm3      Neutrophil % 90.6 %      Lymphocyte % 3.7 %      Monocyte % 4.9 %      Eosinophil % 0.0 %      Basophil % 0.1 %      Immature Grans % 0.7 %      Neutrophils, Absolute 17.18 10*3/mm3      Lymphocytes, Absolute 0.70 10*3/mm3      Monocytes, Absolute 0.93 10*3/mm3      Eosinophils, Absolute 0.00 10*3/mm3      Basophils, Absolute 0.02 10*3/mm3      Immature Grans, Absolute 0.13 10*3/mm3      nRBC 0.0 /100 WBC     Blood Culture - Blood, Arm, Right [[id number]] Collected:  07/09/20 2211    Specimen:  Blood from Arm, Right Updated:  07/11/20 2230     Blood Culture No growth at 2 days    Basic Metabolic Panel [[id number]]  (Abnormal) Collected:  07/11/20 0502    Specimen:  Blood Updated:  07/11/20 0605     Glucose 162 mg/dL      BUN 18 mg/dL      Creatinine 0.34 mg/dL      Sodium 143 mmol/L      Potassium 4.8 mmol/L      Chloride 92  mmol/L      CO2 42.0 mmol/L      Calcium 9.9 mg/dL      eGFR Non African Amer >150 mL/min/1.73      BUN/Creatinine Ratio 52.9     Anion Gap 9.0 mmol/L     CBC Auto Differential [[id number]]  (Abnormal) Collected:  07/11/20 0502    Specimen:  Blood Updated:  07/11/20 0541     WBC 15.93 10*3/mm3      RBC 4.00 10*6/mm3      Hemoglobin 11.7 g/dL      Hematocrit 37.6 %      MCV 94.0 fL      MCH 29.3 pg      MCHC 31.1 g/dL      RDW 12.5 %      RDW-SD 43.5 fl      MPV 8.7 fL      Platelets 265 10*3/mm3      Neutrophil % 92.4 %      Lymphocyte % 4.1 %      Monocyte % 2.5 %      Eosinophil % 0.0 %      Basophil % 0.1 %      Immature Grans % 0.9 %      Neutrophils, Absolute 14.72 10*3/mm3      Lymphocytes, Absolute 0.66 10*3/mm3      Monocytes, Absolute 0.40 10*3/mm3      Eosinophils, Absolute 0.00 10*3/mm3      Basophils, Absolute 0.01 10*3/mm3      Immature Grans, Absolute 0.14 10*3/mm3      nRBC 0.0 /100 WBC     Blood Culture ID, PCR - Blood, Wrist, Left [[id number]]  (Abnormal) Collected:  07/09/20 2206    Specimen:  Blood from Wrist, Left Updated:  07/11/20 0323     BCID, PCR Staphylococcus spp, not aureus. Identification by BCID PCR.    Legionella Antigen, Urine - Urine, Urine, Clean Catch [[id number]]  (Normal) Collected:  07/10/20 1624    Specimen:  Urine, Clean Catch Updated:  07/10/20 1706     LEGIONELLA ANTIGEN, URINE Negative    S. Pneumo Ag Urine or CSF - Urine, Urine, Clean Catch [[id number]]  (Normal) Collected:  07/10/20 1624    Specimen:  Urine, Clean Catch Updated:  07/10/20 1705     Strep Pneumo Ag Negative    Basic Metabolic Panel [[id number]]  (Abnormal) Collected:  07/10/20 0452    Specimen:  Blood Updated:  07/10/20 0549     Glucose 133 mg/dL      BUN 14 mg/dL      Creatinine 0.31 mg/dL      Sodium 140 mmol/L      Potassium 4.4 mmol/L      Chloride 88 mmol/L      CO2 39.0 mmol/L      Calcium 9.3 mg/dL      eGFR Non African Amer >150 mL/min/1.73      BUN/Creatinine Ratio 45.2     Anion Gap 13.0 mmol/L     CBC  Auto Differential [[id number]]  (Abnormal) Collected:  07/10/20 0452    Specimen:  Blood Updated:  07/10/20 0536     WBC 11.64 10*3/mm3      RBC 3.97 10*6/mm3      Hemoglobin 11.5 g/dL      Hematocrit 37.2 %      MCV 93.7 fL      MCH 29.0 pg      MCHC 30.9 g/dL      RDW 12.4 %      RDW-SD 43.1 fl      MPV 8.6 fL      Platelets 236 10*3/mm3      Neutrophil % 93.1 %      Lymphocyte % 5.0 %      Monocyte % 1.2 %      Eosinophil % 0.0 %      Basophil % 0.2 %      Immature Grans % 0.5 %      Neutrophils, Absolute 10.84 10*3/mm3      Lymphocytes, Absolute 0.58 10*3/mm3      Monocytes, Absolute 0.14 10*3/mm3      Eosinophils, Absolute 0.00 10*3/mm3      Basophils, Absolute 0.02 10*3/mm3      Immature Grans, Absolute 0.06 10*3/mm3      nRBC 0.0 /100 WBC     Blood Gas, Arterial [[id number]]  (Abnormal) Collected:  07/09/20 2210    Specimen:  Arterial Blood Updated:  07/09/20 2215     Site Right Brachial     Kervin's Test N/A     pH, Arterial 7.433 pH units      pCO2, Arterial 73.7 mm Hg      Comment: 86 Value above critical limit        pO2, Arterial 86.6 mm Hg      HCO3, Arterial 49.3 mmol/L      Comment: 83 Value above reference range        Base Excess, Arterial 21.1 mmol/L      Comment: 83 Value above reference range        O2 Saturation, Arterial 97.3 %      Temperature 37.0 C      Barometric Pressure for Blood Gas 747 mmHg      Modality BiPap     FIO2 40 %      Ventilator Mode BiPAP     Set [facility] Resp Rate 12.0     IPAP 14     Comment: Meter: [id number]     :  [id number]        EPAP 6     Notified Who KATELIN RN     Notified By [id number]     Notified Time 07/09/2020 22:21     Collected by [id number]     pCO2, Temperature Corrected 73.7 mm Hg      pH, Temp Corrected 7.433 pH Units      pO2, Temperature Corrected 86.6 mm Hg     COVID-19 [employer] IN-HOUSE, Nasal Swab No Transport Media - Swab, Nasal Cavity [[id number]]  (Normal) Collected:  07/09/20 2008    Specimen:  Swab from Nasal Cavity Updated:  07/09/20 2050      COVID19 Not Detected    Blood Gas, Arterial With Co-Ox [[id number]]  (Abnormal) Collected:  07/09/20 2030    Specimen:  Arterial Blood Updated:  07/09/20 2037     Site Right Brachial     Kervin's Test N/A     pH, Arterial 7.389 pH units      pCO2, Arterial 83.3 mm Hg      Comment: 86 Value above critical limit        pO2, Arterial 121.0 mm Hg      Comment: 83 Value above reference range        HCO3, Arterial 50.3 mmol/L      Comment: 83 Value above reference range        Base Excess, Arterial 21.1 mmol/L      Comment: 83 Value above reference range        O2 Saturation, Arterial 99.2 %      Comment: 83 Value above reference range        Hemoglobin, Blood Gas 11.8 g/dL      Comment: 84 Value below reference range        Hematocrit, Blood Gas 36.1 %      Comment: 84 Value below reference range        Oxyhemoglobin 95.4 %      Methemoglobin 0.50 %      Carboxyhemoglobin 3.3 %      A-a Gradiant --     Comment: UNABLE TO CALCULATE        Temperature 37.0 C      Sodium, Arterial 140 mmol/L      Potassium, Arterial 4.2 mmol/L      Barometric Pressure for Blood Gas 747 mmHg      Modality Nasal Cannula     Flow Rate 2.0 lpm      Ventilator Mode NA     Note --     Notified [facility] VIVIANA APONTE     Notified By [id number]     Notified Time 07/09/2020 20:42     Collected by [id number]     Comment: Meter: [id number]     :  [id number]        pH, Temp Corrected 7.389 pH Units      pCO2, Temperature Corrected 83.3 mm Hg      pO2, Temperature Corrected 121 mm Hg     Comprehensive Metabolic Panel [[id number]]  (Abnormal) Collected:  07/09/20 2002    Specimen:  Blood Updated:  07/09/20 2028     Glucose 116 mg/dL      BUN 14 mg/dL      Creatinine 0.36 mg/dL      Sodium 142 mmol/L      Potassium 4.6 mmol/L      Chloride 89 mmol/L      CO2 45.0 mmol/L      Calcium 9.5 mg/dL      Total Protein 6.8 g/dL      Albumin 3.70 g/dL      ALT (SGPT) 11 U/L      AST (SGOT) 14 U/L      Alkaline Phosphatase 66 U/L      Total Bilirubin 0.2 mg/dL      eGFR  Non  Amer >150 mL/min/1.73      Globulin 3.1 gm/dL      A/G Ratio 1.2 g/dL      BUN/Creatinine Ratio 38.9     Anion Gap 8.0 mmol/L     Troponin [[id number]]  (Normal) Collected:  07/09/20 2002    Specimen:  Blood Updated:  07/09/20 2028     Troponin T <0.010 ng/mL     Narrative:       BNP [[id number]]  (Normal) Collected:  07/09/20 2002    Specimen:  Blood Updated:  07/09/20 2028     proBNP 275.6 pg/mL     aPTT [[id number]]  (Abnormal) Collected:  07/09/20 2002    Specimen:  Blood Updated:  07/09/20 2020     PTT 35.9 seconds     Protime-INR [[id number]]  (Normal) Collected:  07/09/20 2002    Specimen:  Blood Updated:  07/09/20 2020     Protime 13.4 Seconds      INR 1.06    D-dimer, Quantitative [[id number]]  (Abnormal) Collected:  07/09/20 2002    Specimen:  Blood Updated:  07/09/20 2020     D-Dimer, Quantitative 0.85 mg/L (FEU)     CBC Auto Differential [[id number]]  (Abnormal) Collected:  07/09/20 2002    Specimen:  Blood Updated:  07/09/20 2010     WBC 11.66 10*3/mm3      RBC 4.32 10*6/mm3      Hemoglobin 12.8 g/dL      Hematocrit 40.8 %      MCV 94.4 fL      MCH 29.6 pg      MCHC 31.4 g/dL      RDW 12.6 %      RDW-SD 43.6 fl      MPV 8.2 fL      Platelets 256 10*3/mm3      Neutrophil % 83.0 %      Lymphocyte % 8.6 %      Monocyte % 7.5 %      Eosinophil % 0.1 %      Basophil % 0.2 %      Immature Grans % 0.6 %      Neutrophils, Absolute 9.69 10*3/mm3      Lymphocytes, Absolute 1.00 10*3/mm3      Monocytes, Absolute 0.87 10*3/mm3      Eosinophils, Absolute 0.01 10*3/mm3      Basophils, Absolute 0.02 10*3/mm3      Immature Grans, Absolute 0.07 10*3/mm3      nRBC 0.0 /100 WBC         Imaging Results (All)     Procedure Component Value Units Date/Time    CT Angiogram Chest [[id number]] Collected:  07/10/20 0711     Updated:  07/10/20 0727    Narrative:       CT angiography with 3D MIP images of the chest with IV contrast     Indication: Shortness of breath, elevated d-dimer, chest pain     Comparison: 12/13/2019      DOSE LENGTH PRODUCT: 80 mGy cm. Automated exposure control was also  utilized to decrease patient radiation dose.     Findings:     No evidence of pulmonary embolus.   Main pulmonary trunk is nondilated.   No evidence of RIGHT heart strain. No pericardial effusion.   Nonaneurysmal thoracic aorta with atherosclerotic calcification.     Secretions layering in both mainstem bronchi.   Severe emphysematous change.   New consolidation in the LEFT upper lobe.   Chronic RIGHT upper lobe scarring with increased size of a RIGHT apical  cavity which may represent sequela of chronic indolent infection.   Diffuse bilateral upper lobe bronchiectasis.   No pleural effusion or pneumothorax.   New 4 mm LEFT lower lobe pulmonary nodule.     No enlarged thoracic lymph nodes.   Punctate nonobstructing RIGHT renal calculi.   No acute osseous finding.       Impression:       Impression:  1. No evidence of pulmonary embolus.  2. New consolidation in the LEFT upper lobe and lingula, likely  representing infectious process/pneumonia.  3. Increased scarring and cavitation in the RIGHT upper lobe with  bronchiectasis.  4. Layering debris in the bilateral airways.  5. Severe emphysema.  6. Nonobstructing RIGHT renal calculi.     These findings are in agreement with the critical and emergent findings  from the [facility] preliminary report.  This report was finalized on 07/10/2020 07:24 by Dr. Jaden Cooney MD.    XR Chest 1 View [[id number]] Collected:  07/09/20 2102     Updated:  07/09/20 2110    Narrative:       XR CHEST 1 VW-     Indication: Shortness of breath     Comparison: 04/03/2020     Findings:     Cardiac silhouette is normal in size and stable.  Redemonstrated severe emphysema with hyperexpanded lungs.  Similar-appearing RIGHT apical pleural and parenchymal scarring.  New patchy LEFT upper lobe airspace opacity.  No visible pneumothorax or pleural effusion.  No acute osseous finding.       Impression:       Impression:     1.  New  LEFT upper lobe opacity, likely representing infectious  process/pneumonia.  2.  No change in RIGHT apical pleural/parenchymal scarring and severe  emphysema.  This report was finalized on 07/09/2020 21:07 by Dr. Jaden Cooney MD.        History of Present Illness on Day of Discharge: Patient sitting up in the chair.  He relates that he is feeling better overall and essentially at his baseline.    Hospital Course:  Mr. Arzate is a 65-year-old  male who follows Dr. Jose Moon for primary care.  He has a medical history significant for chronic obstructive pulmonary disease, chronic hypoxic/hypercapnic respiratory failure requiring continuous oxygen and trilogy at night, pulmonary hypertension, anxiety, depression.  The patient presented to the [facility] emergency department on 7/9/2020 with a chief complaint of shortness of breath.  He was referred by his home health nurse for oxygen saturations lower than 88%.  In the emergency department, ABG showed pH of 7.38, PCO2 of 83, and PO2 of 121.  D-dimer was elevated at 0.85, prompting a CT angiogram of the chest.  This showed no evidence of pulmonary embolus.  It did show consolidation in the left upper lobe and lingula, likely pneumonia.  This also showed increased scarring and cavitation in the right upper lobe with bronchiectasis.  The patient was admitted to the hospital service for further evaluation and management.    The patient was placed on BiPAP on admission.  His PCO2 level did come down to 73.  The patient does have chronic hypercapnia and does wear a trilogy machine at night, and this is likely his baseline.  He was able to be weaned back to his home setting of oxygen at 3 L per nasal cannula.  The patient was placed on Levaquin for treatment of pneumonia.  He was also treated for acute exacerbation of chronic obstructive pulmonary disease.  He was given IV Solu-Medrol, which was weaned to oral prednisone.  He has been given  "DuoNeb breathing treatments, Mucinex, and pulmonary toileting with bladder valve.  Urinary antigens for strep and Legionella were negative.  Respiratory culture shows growth of normal respiratory debra.  1 of his blood cultures was positive for a coagulase-negative staph, which is felt to be a contaminant.    The patient was evaluated by dietitian as he exhibits pulmonary cachexia.  We have added Megace to his regimen.  Boost supplementation has been added.    Overall, the patient is hemodynamically stable and appropriate for discharge home today.  He will be followed by home health services.  He will follow-up with his primary care provider in 1 week.    Condition on Discharge: Medically stable    Physical Exam on Discharge:  /58 (BP Location: Left arm, Patient Position: Sitting)   Pulse 90   Temp 98.3 °F (36.8 °C) (Oral)   Resp 16   Ht 165.1 cm (65\")   Wt 39.5 kg (87 lb 3 oz)   SpO2 92%   BMI 14.51 kg/m²    Physical Exam  Constitutional: He is oriented to person, place, and time. He appears well-developed. He appears cachectic. No distress.   HENT:   Head: Normocephalic and atraumatic.   Neck: Normal range of motion. Neck supple. No JVD present. No tracheal deviation present.   Cardiovascular: Normal rate, regular rhythm and intact distal pulses. Exam reveals no gallop and no friction rub.   Sinus bradycardi a/sinus arrhythmia 58-87, heart rate down to 46 overnight.  Pulmonary/Chest: Effort normal. He has no wheezes. He has no rales.   Grossly diminished throughout   Abdominal: Soft. Bowel sounds are normal. He exhibits no distension. There is no tenderness. There is no guarding.   Musculoskeletal: He exhibits no edema. He exhibits no tenderness.   Neurological: He is alert and oriented to person, place, and time. No cranial nerve deficit.   Skin: Skin is warm and dry. No rash noted. There is erythema (Coccyx- please see images under \"media\" tab- stage 1 without broken skin).   Psychiatric: He has " a normal mood and affect. His behavior is normal. Judgment and thought content normal.   Vitals reviewed.    Discharge Disposition:  Home-Health Care [facility]    Discharge Medications:     Discharge Medications      New Medications      Instructions Start Date   levoFLOXacin 500 MG tablet  Commonly known as:  LEVAQUIN   500 mg, Oral, Every 24 Hours   Start Date:  July 13, 2020     megestrol 40 MG/ML suspension  Commonly known as:  MEGACE   400 mg, Oral, Daily   Start Date:  July 13, 2020        Changes to Medications      Instructions Start Date   predniSONE 10 MG tablet  Commonly known as:  DELTASONE  What changed:  Another medication with the same name was added. Make sure you understand how and when to take each.   10 mg, Oral, Daily      predniSONE 20 MG tablet  Commonly known as:  DELTASONE  What changed:  You were already taking a medication with the same name, and this prescription was added. Make sure you understand how and when to take each.   Take 1 tablet by mouth 2 (Two) Times a Day With Meals for 3 days, THEN 1 tablet Daily for 3 days. Then resume 10 mg daily   Start Date:  July 12, 2020        Continue These Medications      Instructions Start Date   acetaminophen 325 MG tablet  Commonly known as:  TYLENOL   650 mg, Oral, Every 6 Hours PRN      albuterol sulfate  (90 Base) MCG/ACT inhaler  Commonly known as:  PROVENTIL HFA;VENTOLIN HFA;PROAIR HFA   2 puffs, Inhalation, Every 4 Hours PRN      ALPRAZolam 1 MG tablet  Commonly known as:  XANAX   1 mg, Oral, 3 Times Daily PRN      aspirin 81 MG EC tablet   81 mg, Oral, Daily      citalopram 40 MG tablet  Commonly known as:  CeleXA   40 mg, Oral, Daily      ferrous sulfate 325 (65 FE) MG tablet   325 mg, Oral, Daily With Breakfast      guaiFENesin 600 MG 12 hr tablet  Commonly known as:  MUCINEX   1,200 mg, Oral, Every 12 Hours Scheduled      ipratropium-albuterol 0.5-2.5 mg/3 ml nebulizer  Commonly known as:  DUO-NEB   3 mL, Nebulization, Every 6 Hours  PRN      metoprolol tartrate 50 MG tablet  Commonly known as:  LOPRESSOR   50 mg, Oral, 2 Times Daily      montelukast 10 MG tablet  Commonly known as:  SINGULAIR   10 mg, Oral, Nightly      pantoprazole 40 MG EC tablet  Commonly known as:  PROTONIX   40 mg, Oral, Daily      simvastatin 20 MG tablet  Commonly known as:  ZOCOR   20 mg, Oral, Nightly      Trelegy Ellipta 100-62.5-25 MCG/INH aerosol powder   Generic drug:  Fluticasone-Umeclidin-Vilant   1 puff, Inhalation, Daily           Discharge Diet:   Diet Instructions     Diet: Regular; Thin      Discharge Diet:  Regular    Fluid Consistency:  Thin        Activity at Discharge:   Activity Instructions     Activity as Tolerated          Discharge Care Plan/Instructions:   1.  Return for any acute or worsening symptoms  2.  Levaquin for a total of 7 days of therapy.  Prednisone to be tapered back to home dosage of 10 mg.  3.  Continue use of flutter valve  4.  New medication Megace  5.  Resume home health services    Follow-up Appointments:   1.  Primary care provider in 1 week    Test Results Pending at Discharge: We will follow blood and respiratory cultures to completion.    CRISPIN Escobar  20  15:46    Time: 40 minutes          Electronically signed by Jacklyn Fernandez APRN at [address] [place] [id number]  Phone:  [phone]  Fax:   Date: 2020      Referral to Home Health     Patient:  Kellie Arzate MRN:  [id number]   Mauri DOWLING [facility] [address] :  1955  SSN:    Phone: [phone] Sex:  M      INSURANCE PAYOR PLAN GROUP # SUBSCRIBER ID   Primary:  Secondary:    MEDICARE  [employer] [id number]  [id number]    [id number]  [id number]      Referring Provider Information:  JACKLYN FERNANDEZ Phone: [phone] Fax:       Referral Information:   # Visits:  1 Referral Type: Home Health [42]   Urgency:  Routine Referral Reason: Specialty Services  Required   Start Date: Jul 12, 2020 End Date:  To be determined by Insurer   Diagnosis: Decreased activities of daily living (ADL) (Z78.9 [ICD-10-CM] V49.89 [ICD-9-CM])  COPD with acute exacerbation (CMS/HCC) (J44.1 [ICD-10-CM] 491.21 [ICD-9-CM])  Acute on chronic respiratory failure with hypoxia and hypercapnia (CMS/HCC) (J96.21,J96.22 [ICD-10-CM] 518.84,786.09,799.02 [ICD-9-CM])      Refer to Dept:   Refer to Provider:   Refer to Facility:       Face to Face Visit Date: 7/12/2020  Follow-up provider for Plan of Care? I treated the patient in an acute care facility and will not continue treatment after discharge.  Follow-up provider: HUSSEIN CERVANTES [[id number]]  Reason/Clinical Findings: COPD exacerbation, Pneumonia  Describe mobility limitations that make leaving home difficult: Decreased activity tolerance, continuous oxygen requirement  Nursing/Therapeutic Services Requested: Skilled Nursing  Nursing/Therapeutic Services Requested: Physical Therapy  Nursing/Therapeutic Services Requested: Occupational Therapy  Skilled nursing orders: COPD management  Skilled nursing orders: Cardiopulmonary assessments  PT orders: Therapeutic exercise  PT orders: Strengthening  Occupational orders: Activities of daily living  Occupational orders: Energy conservation  Occupational orders: Strengthening  Frequency: 1 Week 1     This document serves as a request of services and does not constitute Insurance authorization or approval of services.  To determine eligibility, please contact the members Insurance carrier to verify and review coverage.     If you have medical questions regarding this request for services. Please contact [address] at [phone] during normal business hours.       Authorizing Provider:Jacklyn Fernandez APRN  Authorizing Provider's NPI: [id number]  Order Entered By: Jacklyn Fernandez APRN 7/12/2020  3:45 PM     Electronically signed by: Jacklyn Fernandez APRN 7/12/2020  3:45  PM

## 2020-07-12 NOTE — PROGRESS NOTES
Continued Stay Note   Pal     Patient Name: Kellie Arzate  MRN: [id number]  Today's Date: 7/12/2020    Admit Date: 7/9/2020    Discharge Plan     Row Name 07/12/20 1625       Plan    Final Discharge Disposition Code  06 - home with home health care    Final Note  Pt is discharging with  orders. [facility]  [facility] to office.         Discharge Codes    No documentation.       Expected Discharge Date and Time     Expected Discharge Date Expected Discharge Time    Jul 12, 2020             Masha Ramirez

## 2020-07-12 NOTE — PLAN OF CARE
Problem: Patient Care Overview  Goal: Plan of Care Review  Flowsheets  Taken 7/12/2020 1200  Plan of Care Reviewed With: patient  Taken 7/12/2020 1122  Outcome Summary: OT eval completed. Pt is A&Ox4. Demo's decreased activity tolerance and increased SOB with activity. Pt is supervision for sit <> stand t/f from chair and all functional mobility in room on 3L O2/NC. Pt O2 sats dropped to 82% on 3L O2/NC with mobility, required recovery period for sats to return to 90% on 3L/NC. Pt would benefit from skilled OT services to address these deficits. Recommend d/c home with assist and HH.

## 2020-07-12 NOTE — PLAN OF CARE
Problem: Patient Care Overview  Goal: Plan of Care Review  Outcome: Ongoing (interventions implemented as appropriate)  Note:   VSS, no c/o of pain; oxygen saturations in low-mid 90s on 3 L oxygen; CPAP worn while asleep @ 36% oxygen; mepilex changed on coccyx, red and not blanchable; pt changes position independently but reminded to turn; safety maintained, will continue to monitor     Problem: Patient Care Overview  Goal: Individualization and Mutuality  Outcome: Ongoing (interventions implemented as appropriate)     Problem: Patient Care Overview  Goal: Discharge Needs Assessment  Outcome: Ongoing (interventions implemented as appropriate)     Problem: Patient Care Overview  Goal: Interprofessional Rounds/Family Conf  Outcome: Ongoing (interventions implemented as appropriate)     Problem: Chronic Obstructive Pulmonary Disease (Adult)  Goal: Signs and Symptoms of Listed Potential Problems Will be Absent, Minimized or Managed (Chronic Obstructive Pulmonary Disease)  Outcome: Ongoing (interventions implemented as appropriate)     Problem: Pneumonia (Adult)  Goal: Signs and Symptoms of Listed Potential Problems Will be Absent, Minimized or Managed (Pneumonia)  Outcome: Ongoing (interventions implemented as appropriate)     Problem: Skin Injury Risk (Adult)  Goal: Identify Related Risk Factors and Signs and Symptoms  Outcome: Ongoing (interventions implemented as appropriate)     Problem: Skin Injury Risk (Adult)  Goal: Skin Health and Integrity  Outcome: Ongoing (interventions implemented as appropriate)     Problem: Nutrition, Imbalanced: Inadequate Oral Intake (Adult)  Goal: Improved Oral Intake  Outcome: Ongoing (interventions implemented as appropriate)     Problem: Nutrition, Imbalanced: Inadequate Oral Intake (Adult)  Goal: Prevent Further Weight Loss  Outcome: Ongoing (interventions implemented as appropriate)     Problem: Fall Risk (Adult)  Goal: Identify Related Risk Factors and Signs and  Symptoms  Outcome: Ongoing (interventions implemented as appropriate)     Problem: Fall Risk (Adult)  Goal: Absence of Fall  Outcome: Ongoing (interventions implemented as appropriate)

## 2020-07-12 NOTE — THERAPY EVALUATION
Acute Care - Occupational Therapy Initial Evaluation  [facility]     Patient Name: Kellie Arzate  : 1955  MRN: [id number]  Today's Date: 2020  Onset of Illness/Injury or Date of Surgery: 20  Date of Referral to OT: 07/10/20  Referring Physician: Dr. Servin    Admit Date: 2020       ICD-10-CM ICD-9-CM   1. Pneumonia of left upper lobe due to infectious organism J18.9 486   2. Decreased activities of daily living (ADL) Z78.9 V49.89     Patient Active Problem List   Diagnosis   • Aspiration pneumonia of left lung (CMS/HCC)   • Abnormal chest x-ray, findings apex right lung suspect TB   • Acute on chronic respiratory failure with hypercapnia (CMS/HCC)   • Tobacco dependence   • Anemia   • Severe malnutrition (CMS/HCC)   • Hypercarbia   • COPD with acute exacerbation (CMS/HCC)   • Acute on chronic respiratory failure with hypoxia and hypercapnia (CMS/HCC)   • Pneumonia of left upper lobe due to infectious organism   • Acute on chronic respiratory failure with hypoxia and hypercapnia (CMS/HCC)   • Underweight   • COPD with acute exacerbation (CMS/HCC)     Past Medical History:   Diagnosis Date   • Anxiety    • Arthritis    • Chronic respiratory failure with hypoxia, on home O2 therapy (CMS/HCC), 3 L    • COPD (chronic obstructive pulmonary disease) (CMS/HCC)    • Depression    • Hypertension    • Mixed hyperlipidemia    • Nephrolithiasis    • Pneumonia, pseudomonal 2016 with negative AFB, aspiration 2018      Past Surgical History:   Procedure Laterality Date   • COLONOSCOPY  2007    Within Normal Limits.   • ENDOSCOPY  2007    urea neg, sbbx benign, mild gastritis, small hiatal hernia   • ENDOSCOPY N/A 10/5/2016    Procedure: ESOPHAGOGASTRODUODENOSCOPY WITH ANESTHESIA;  Surgeon: Kevyn Damon MD;  Location: [facility] ENDOSCOPY;  Service:    • HEMORRHOIDECTOMY     • PEG TUBE INSERTION  2016    Dr Mccarty   • TRACHEOSTOMY            OT ASSESSMENT FLOWSHEET (last 12 hours)       Occupational Therapy Evaluation     Row Name 07/12/20 1122                   OT Evaluation Time/Intention    Subjective Information  complains of;dyspnea  -JJ        Document Type  evaluation  -JJ        Mode of Treatment  occupational therapy  -JJ        Patient Effort  good  -JJ           General Information    Patient Profile Reviewed?  yes  -JJ        Onset of Illness/Injury or Date of Surgery  07/09/20  -JJ        Referring Physician  Dr. Servin  -J        Patient Observations  alert;cooperative;agree to therapy  -JJ        Patient/Family Observations  no family present in room   -JJ        General Observations of Patient  sitting up in chair, 3L O2/NC, alert and in no apparent distress  -JJ        Prior Level of Function  independent:;all household mobility;transfer;bed mobility;ADL's  -JJ        Equipment Currently Used at Home  oxygen;shower chair;commode, bedside  -JJ        Pertinent History of Current Functional Problem  Pt presented with decreased O2 level. Pt has a hx of COPD, TB, and emphysema on continuous 3L O2/NC. Dx: pneumonia of XI, COPD with acute exacerbation.   -JJ        Existing Precautions/Restrictions  fall;oxygen therapy device and L/min  -JJ        Risks Reviewed  patient:;nausea/vomiting;LOB;dizziness;increased discomfort;change in vital signs;increased drainage;lines disloged  -JJ        Benefits Reviewed  patient:;improve function;increase strength;increase independence;increase balance;decrease pain;decrease risk of DVT;improve skin integrity;increase knowledge  -JJ           Relationship/Environment    Lives With  other (see comments)  -JJ        Family Caregiver Names  niece  -JJ           Resource/Environmental Concerns    Current Living Arrangements  home/apartment/condo  -JJ           Home Main Entrance    Number of Stairs, Main Entrance  three  -JJ        Stair Railings, Main Entrance  railing on right side (ascending)  -JJ           Cognitive Assessment/Interventions     Additional Documentation  Cognitive Assessment/Intervention (Group)  -           Cognitive Assessment/Intervention- PT/OT    Affect/Mental Status (Cognitive)  WNL  -J        Orientation Status (Cognition)  oriented x 4  -        Follows Commands (Cognition)  WNL  -        Cognitive Function (Cognitive)  WNL  -        Personal Safety Interventions  fall prevention program maintained;gait belt;muscle strengthening facilitated;nonskid shoes/slippers when out of bed;supervised activity  -           Safety Issues, Functional Mobility    Impairments Affecting Function (Mobility)  endurance/activity tolerance;shortness of breath;strength  -           Bed Mobility Assessment/Treatment    Comment (Bed Mobility)  sitting up in chair upon entry to room, left as found   -           Functional Mobility    Functional Mobility- Ind. Level  supervision required  -        Functional Mobility- Safety Issues  supplemental O2  -        Functional Mobility- Comment  bed to doorway x2. O2 dropped to 82% on 3L with mobility, nsg notified.   -           Transfer Assessment/Treatment    Transfer Assessment/Treatment  sit-stand transfer;stand-sit transfer  -           Sit-Stand Transfer    Sit-Stand [place] (Transfers)  supervision  -           Stand-Sit Transfer    Stand-Sit [place] (Transfers)  supervision  -           ADL Assessment/Intervention    BADL Assessment/Intervention  lower body dressing  -           Lower Body Dressing Assessment/Training    Lower Body Dressing [place] Level  don;socks;independent  -        Lower Body Dressing Position  unsupported sitting in chair  -           BADL Safety/Performance    Impairments, BADL Safety/Performance  endurance/activity tolerance;shortness of breath  -        Skilled BADL Treatment/Intervention  BADL process/adaptation training  -           General ROM    GENERAL ROM COMMENTS  BUE AROM WFL   -           MMT (Manual Muscle Testing)     General MMT Comments  BUE strength grossly 4/5  -JJ           Motor Assessment/Interventions    Additional Documentation  Balance (Group)  -JJ           Balance    Balance  static sitting balance;static standing balance  -JJ           Static Sitting Balance    Level of [place] (Unsupported Sitting, Static Balance)  independent  -JJ        Sitting Position (Unsupported Sitting, Static Balance)  sitting in chair  -JJ           Static Standing Balance    Level of [place] (Unsupported Standing, Static Balance)  supervision  -JJ        Time Able to Maintain Position (Unsupported Standing, Static Balance)  45 to 60 seconds  -JJ           Sensory Assessment/Intervention    Sensory General Assessment  no sensation deficits identified in BUEs  -JJ           Positioning and Restraints    Pre-Treatment Position  in bed  -JJ        Post Treatment Position  chair  -JJ        In Chair  sitting;notified nsg;call light within reach;encouraged to call for assist  -JJ           Pain Assessment    Additional Documentation  Pain Scale: Numbers Pre/Post-Treatment (Group)  -JJ           Pain Scale: Numbers Pre/Post-Treatment    Pain Scale: Numbers, Pretreatment  0/10 - no pain  -JJ        Pain Scale: Numbers, Post-Treatment  0/10 - no pain  -JJ           Wound 04/04/20 1637 Bilateral medial coccyx Pressure Injury    Wound - Properties Group Date first assessed: 04/04/20  - Time first assessed: 1637  - Present on Hospital Admission: Y  -MC Side: Bilateral  -MC Orientation: medial  -MC Location: coccyx  -MC Primary Wound Type: Pressure inj  -MC Stage, Pressure Injury: Stage 1  -MC       Plan of Care Review    Plan of Care Reviewed With  patient  -JJ        Outcome Summary  OT eval completed. Pt is A&Ox4. Demo's decreased activity tolerance and increased SOB with activity. Pt is supervision for sit <> stand t/f from chair and all functional mobility in room on 3L O2/NC. Pt O2 sats dropped to 82% on 3L O2/NC with mobility,  required recovery period for sats to return to 90% on 3L/NC. Pt would benefit from skilled OT services to address these deficits. Recommend d/c home with assist and HH.   -JJ           Clinical Impression (OT)    Date of Referral to OT  07/10/20  -JJ        OT Diagnosis  decreased adls.   -JJ        Prognosis (OT Eval)  good  -JJ        Patient/Family Goals Statement (OT Eval)  return home  -JJ        Criteria for Skilled Therapeutic Interventions Met (OT Eval)  yes;treatment indicated  -JJ        Rehab Potential (OT Eval)  good, to achieve stated therapy goals  -JJ        Therapy Frequency (OT Eval)  3 times/wk  -JJ        Predicted Duration of Therapy Intervention (Therapy Eval)  10 days  -J        Care Plan Review (OT)  evaluation/treatment results reviewed;care plan/treatment goals reviewed;risks/benefits reviewed;current/potential barriers reviewed;patient/other agree to care plan  -JJ        Anticipated Discharge Disposition (OT)  home with assist;home with home health  -JJ           Vital Signs    Intra SpO2 (%)  (S) 82  -JJ        O2 Delivery Intra Treatment  supplemental O2 3L  -JJ        Post SpO2 (%)  91  -JJ        O2 Delivery Post Treatment  supplemental O2 3L  -JJ        Pre Patient Position  Sitting  -JJ        Intra Patient Position  Standing  -JJ        Post Patient Position  Sitting  -JJ           Planned OT Interventions    Planned Therapy Interventions (OT Eval)  activity tolerance training;BADL retraining;occupation/activity based interventions;patient/caregiver education/training;strengthening exercise;transfer/mobility retraining;functional balance retraining  -JJ           OT Goals    Bathing Goal Selection (OT)  bathing, OT goal 1  -JJ        Activity Tolerance Goal Selection (OT)  activity tolerance, OT goal 1  -JJ        Additional Documentation  Activity Tolerance Goal Selection (OT) (Row)  -JJ           Bathing Goal 1 (OT)    Activity/Assistive Device (Bathing Goal 1, OT)  bathing  skills, all  -        [place] Level/Cues Needed (Bathing Goal 1, OT)  supervision required  -JJ        Time Frame (Bathing Goal 1, OT)  long term goal (LTG);by discharge  -J        Progress/Outcomes (Bathing Goal 1, OT)  goal ongoing  -            Activity Tolerance Goal 1 (OT)    Activity Tolerance Goal 1 (OT)  Pt will complete morning adl routine in standing with one or less recovery periods to address decreased activity tolerance and adl independence.  -        Activity Level (Endurance Goal 1, OT)  10 min activity;O2 sat >/ equal to 88%  -J        Time Frame (Activity Tolerance Goal 1, OT)  long term goal (LTG);by discharge  -        Progress/Outcome (Activity Tolerance Goal 1, OT)  goal ongoing  -           Living Environment    Home Accessibility  stairs to enter home;tub/shower is not walk in  -          User Key  (r) = Recorded By, (t) = Taken By, (c) = Cosigned By    Initials Name Effective Dates    TERESE Linda Montana, NISREENR/ALEIDA 07/05/20 -     Madisyn Vidal RN 02/03/20 -          Occupational Therapy Education                 Title: PT OT SLP Therapies (Done)     Topic: Occupational Therapy (Done)     Point: ADL training (Done)     Description:   Instruct learner(s) on proper safety adaptation and remediation techniques during self care or transfers.   Instruct in proper use of assistive devices.              Learning Progress Summary           Patient Acceptance, E, VU by RICO at 7/12/2020 1200                   Point: Home exercise program (Done)     Description:   Instruct learner(s) on appropriate technique for monitoring, assisting and/or progressing therapeutic exercises/activities.              Learning Progress Summary           Patient Acceptance, E, VU by RICO at 7/12/2020 1200                   Point: Precautions (Done)     Description:   Instruct learner(s) on prescribed precautions during self-care and functional transfers.              Learning Progress Summary            Patient Acceptance, E, VU by  at 7/12/2020 1200                   Point: Body mechanics (Done)     Description:   Instruct learner(s) on proper positioning and spine alignment during self-care, functional mobility activities and/or exercises.              Learning Progress Summary           Patient Acceptance, E, VU by  at 7/12/2020 1200                               User Key     Initials Effective Dates Name Provider Type Discipline     07/05/20 -  Linda Montana OTR/L Occupational Therapist OT                  OT Recommendation and Plan  Outcome Summary/Treatment Plan (OT)  Anticipated Discharge Disposition (OT): home with assist, home with home health  Planned Therapy Interventions (OT Eval): activity tolerance training, BADL retraining, occupation/activity based interventions, patient/caregiver education/training, strengthening exercise, transfer/mobility retraining, functional balance retraining  Therapy Frequency (OT Eval): 3 times/wk  Plan of Care Review  Plan of Care Reviewed With: patient  Plan of Care Reviewed With: patient  Outcome Summary: OT eval completed. Pt is A&Ox4. Demo's decreased activity tolerance and increased SOB with activity. Pt is supervision for sit <> stand t/f from chair and all functional mobility in room on 3L O2/NC. Pt O2 sats dropped to 82% on 3L O2/NC with mobility, required recovery period for sats to return to 90% on 3L/NC. Pt would benefit from skilled OT services to address these deficits. Recommend d/c home with assist and HH.     Outcome Measures     Row Name 07/12/20 1100             How much help from another is currently needed...    Putting on and taking off regular lower body clothing?  4  -JJ      Bathing (including washing, rinsing, and drying)  3  -JJ      Toileting (which includes using toilet bed pan or urinal)  4  -JJ      Putting on and taking off regular upper body clothing  3  -JJ      Taking care of personal grooming (such as brushing teeth)  3  -JJ       Eating meals  3  -      AM-PAC 6 Clicks Score (OT)  20  -         Functional Assessment    Outcome Measure Options  AM-PAC 6 Clicks Daily Activity (OT)  -        User Key  (r) = Recorded By, (t) = Taken By, (c) = Cosigned By    Initials Name Provider Type    Linda Elizabeth OTR/L Occupational Therapist          Time Calculation:   Time Calculation- OT     Row Name 07/12/20 1159             Time Calculation- OT    OT Start Time  1122  -      OT Stop Time  1200  -      OT Time Calculation (min)  38 min  -      OT Received On  07/12/20  -      OT Goal Re-Cert Due Date  07/22/20  -        User Key  (r) = Recorded By, (t) = Taken By, (c) = Cosigned By    Initials Name Provider Type    Linda Elizabeth OTR/L Occupational Therapist        Therapy Charges for Today     Code Description Service Date Service Provider Modifiers Qty    42222503008 HC OT EVAL LOW COMPLEXITY 3 7/12/2020 Linda Montana OTR/L GO 1               LANCE Bourne/ALEIDA  7/12/2020

## 2020-07-13 ENCOUNTER — READMISSION MANAGEMENT (OUTPATIENT)
Dept: CALL CENTER | Facility: HOSPITAL | Age: 65
End: 2020-07-13

## 2020-07-13 LAB
BACTERIA SPEC AEROBE CULT: ABNORMAL
BACTERIA SPEC RESP CULT: NORMAL
GRAM STN SPEC: ABNORMAL
GRAM STN SPEC: NORMAL
ISOLATED FROM: ABNORMAL

## 2020-07-13 NOTE — OUTREACH NOTE
Prep Survey      Responses   [facility] facility patient discharged from?  [place]   Is LACE score < 7 ?  No   Eligibility  Readm Mgmt   Discharge diagnosis  COPD with acute exacerbation    Does the patient have one of the following disease processes/diagnoses(primary or secondary)?  COPD/Pneumonia   Does the patient have Home health ordered?  Yes   What is the Home health agency?   [facility].     Is there a DME ordered?  No   Prep survey completed?  Yes          Laina Pina RN

## 2020-07-13 NOTE — THERAPY DISCHARGE NOTE
Acute Care - Occupational Therapy Discharge Summary  [facility]     Patient Name: Kellie Arzate  : 1955  MRN: [id number]    Today's Date: 2020  Onset of Illness/Injury or Date of Surgery: 20    Date of Referral to OT: 07/10/20  Referring Physician: Dr. Servin      Admit Date: 2020        OT Recommendation and Plan    Visit Dx:    ICD-10-CM ICD-9-CM   1. Pneumonia of left upper lobe due to infectious organism J18.9 486   2. Decreased activities of daily living (ADL) Z78.9 V49.89   3. COPD with acute exacerbation (CMS/[facility]) J44.1 491.21   4. Acute on chronic respiratory failure with hypoxia and hypercapnia (CMS/[facility]) J96.21 518.84    J96.22 786.09     799.02               Rehab Goal Summary     Row Name 20 0800             Bathing Goal 1 (OT)    Activity/Assistive Device (Bathing Goal 1, OT)  bathing skills, all  -TS      [place] Level/Cues Needed (Bathing Goal 1, OT)  supervision required  -TS      Time Frame (Bathing Goal 1, OT)  long term goal (LTG);by discharge  -TS      Progress/Outcomes (Bathing Goal 1, OT)  goal not met  -TS          Activity Tolerance Goal 1 (OT)    Activity Tolerance Goal 1 (OT)  Pt will complete morning adl routine in standing with one or less recovery periods to address deccreased activity tolerace and adl independence   -TS      Activity Level (Endurance Goal 1, OT)  10 min activity;O2 sat >/ equal to 88%  -TS      Time Frame (Activity Tolerance Goal 1, OT)  long term goal (LTG);by discharge  -TS      Progress/Outcome (Activity Tolerance Goal 1, OT)  goal not met  -TS        User Key  (r) = Recorded By, (t) = Taken By, (c) = Cosigned By    Initials Name Provider Type Discipline    TS Hodan Odell COTA/L Occupational Therapy Assistant OT          Outcome Measures     Row Name 20 1100             How much help from another is currently needed...    Putting on and taking off regular lower body clothing?  4  -JJ      Bathing (including  washing, rinsing, and drying)  3  -JJ      Toileting (which includes using toilet bed pan or urinal)  4  -JJ      Putting on and taking off regular upper body clothing  3  -JJ      Taking care of personal grooming (such as brushing teeth)  3  -JJ      Eating meals  3  -JJ      AM-PAC 6 Clicks Score (OT)  20  -JJ         Functional Assessment    Outcome Measure Options  AM-PAC 6 Clicks Daily Activity (OT)  -JJ        User Key  (r) = Recorded By, (t) = Taken By, (c) = Cosigned By    Initials Name Provider Type    Linda Elizabeth OTR/L Occupational Therapist          Therapy Suggested Charges     Code   Minutes Charges    None                 OT Discharge Summary  Reason for Discharge: Discharge from facility  Outcomes Achieved: Refer to plan of care for updates on goals achieved  Discharge Destination: Home with assist, Home with home health      GLORIA Armstrong/ALEIDA  7/13/2020

## 2020-07-14 LAB — BACTERIA SPEC AEROBE CULT: NORMAL

## 2020-07-17 ENCOUNTER — READMISSION MANAGEMENT (OUTPATIENT)
Dept: CALL CENTER | Facility: HOSPITAL | Age: 65
End: 2020-07-17

## 2020-07-17 NOTE — OUTREACH NOTE
COPD/PN Week 1 Survey      Responses   [facility] patient discharged from?  [place]   COVID-19 Test Status  Negative   Does the patient have one of the following disease processes/diagnoses(primary or secondary)?  COPD/Pneumonia   Is there a successful TCM telephone encounter documented?  No   Was the primary reason for admission:  COPD exacerbation   Week 1 attempt successful?  Yes   Call start time  0832   Call end time  0909   Is patient permission given to speak with other caregiver?  Yes   List who call center can speak with  Eva   Person spoke with today (if not patient) and relationship  Eva   Meds reviewed with patient/caregiver?  Yes   Is the patient having any side effects they believe may be caused by any medication additions or changes?  No   Does the patient have all medications ordered at discharge?  Yes   Is the patient taking all medications as directed (includes completed medication regime)?  Yes   Comments regarding appointments  PCP appt today 07/17/20 video visit.   Does the patient have a primary care provider?   Yes   Does the patient have an appointment with their PCP or pulmonologist within 7 days of discharge?  Yes   Has the patient kept scheduled appointments due by today?  N/A   Comments  pulmonologist appt 07/23/20   Has home health visited the patient within 72 hours of discharge?  Yes   Pulse Ox monitoring  Intermittent   Pulse Ox device source  Patient   O2 Sat comments  94% this morning on continuous O2 at 3L/min.   O2 Sat: education provided  Sat levels, Monitoring frequency, When to seek care   O2 Sat education comments  Notify physician if oxygen level falls below 92%.   Psychosocial issues?  No   Psychosocial comments  Has caregiver with him 24/7.   Did the patient receive a copy of their discharge instructions?  Yes   Nursing interventions  Reviewed instructions with patient   What is the patient's perception of their health status since discharge?  Improving  "  Nursing Interventions  Nurse provided patient education   Are the patient's immunizations up to date?   Yes   Nursing interventions  Educated on importance of maintaining up to date immunizations as advised by provider, Advised patient to discuss with provider at next visit   Is the patient/caregiver able to teach back the hierarchy of who to call/visit for symptoms/problems? PCP, Specialist, Home health nurse, Urgent Care, ED, 911  Yes   Is the patient able to teach back COPD zones?  No   Nursing interventions  Education provided on various zones   Patient reports what zone on this call?  Green Zone   Green Zone  Reports doing well, Usual activity and exercise level, Sleeping well, Breathing without shortness of breath, Usual amount of phlegm/mucus without difficulty coughing up, Appetite is good   Green Zone interventions:  Take daily medications, Use oxygen as prescribed, Continue regular exercise/diet plan, Avoid indoor/outdoor triggers   Is the patient/caregiver able to teach back signs and symptoms of worsening condition:  Fever/chills, Shortness of breath, Chest pain   Is the patient/caregiver able to teach back importance of completing antibiotic course of treatment?  Yes   Week 1 call completed?  Yes   Wrap up additional comments  Caregiver states is improving-states appetite has improved-weight increased to 92.2#. States O2 sat 94% with no c/o of SOA or chest pain. Reports BP \"good\", HR 78. Denies any problems today.          Jessica Cameron RN  "

## 2020-07-22 NOTE — PROGRESS NOTES
Yes. She demonstrated hypoxia on higher levels of O2 as you note and also on 40% with BiPAP. A single value such as you reference may be erroneous and/or incorrectly documented and we must look at the entire scope of information available.     Electronically signed by Jamal Servin DO, 07/22/20, 8:26 AM.

## 2020-07-24 ENCOUNTER — READMISSION MANAGEMENT (OUTPATIENT)
Dept: CALL CENTER | Facility: HOSPITAL | Age: 65
End: 2020-07-24

## 2020-07-24 NOTE — OUTREACH NOTE
COPD/PN Week 2 Survey      Responses   [facility] patient discharged from?  [place]   COVID-19 Test Status  Negative   Does the patient have one of the following disease processes/diagnoses(primary or secondary)?  COPD/Pneumonia   Was the primary reason for admission:  COPD exacerbation   Week 2 attempt successful?  No   Unsuccessful attempts  Attempt 1          Chris Bond RN

## 2020-07-25 PROBLEM — I46.9 CARDIAC ARREST (HCC): Status: ACTIVE | Noted: 2020-07-25

## 2020-07-25 NOTE — PROCEDURES
After implied consent was obtained, the right femoral region was prepped and draped in sterile fashion. The femoral vein was cannulated on 1 attempt. The guidewire was inserted without difficulty. A skin nick was made. The tract was then dilated. The catheter was then inserted via Seldinger technique. All 3 ports were flushed and had good flow. The catheter was sutured in place there were no immediate complications.

## 2020-07-27 ENCOUNTER — READMISSION MANAGEMENT (OUTPATIENT)
Dept: CALL CENTER | Facility: HOSPITAL | Age: 65
End: 2020-07-27

## 2020-07-27 NOTE — OUTREACH NOTE
COPD/PN Week 2 Survey      Responses   [facility] patient discharged from?  [place]   COVID-19 Test Status  Negative   Does the patient have one of the following disease processes/diagnoses(primary or secondary)?  COPD/Pneumonia   Was the primary reason for admission:  COPD exacerbation   Week 2 attempt successful?  No   Unsuccessful attempts  Attempt 2          Valarie Shannon LPN

## 2020-08-04 ENCOUNTER — READMISSION MANAGEMENT (OUTPATIENT)
Dept: CALL CENTER | Facility: HOSPITAL | Age: 65
End: 2020-08-04

## 2020-08-04 NOTE — OUTREACH NOTE
COPD/PN Week 3 Survey      Responses   [facility] patient discharged from?  [place]   COVID-19 Test Status  Negative   Does the patient have one of the following disease processes/diagnoses(primary or secondary)?  COPD/Pneumonia   Was the primary reason for admission:  COPD exacerbation   Week 3 attempt successful?  No   Unsuccessful attempts  Attempt 1          Daisy Rodriguez RN

## 2020-08-05 ENCOUNTER — READMISSION MANAGEMENT (OUTPATIENT)
Dept: CALL CENTER | Facility: HOSPITAL | Age: 65
End: 2020-08-05

## 2020-08-05 NOTE — OUTREACH NOTE
COPD/PN Week 3 Survey      Responses   [facility] patient discharged from?  [place]   COVID-19 Test Status  Negative   Does the patient have one of the following disease processes/diagnoses(primary or secondary)?  COPD/Pneumonia   Was the primary reason for admission:  COPD exacerbation   Week 3 attempt successful?  No   Unsuccessful attempts  Attempt 2          Bre Woodson RN

## 2021-05-19 NOTE — PLAN OF CARE
Problem: Patient Care Overview  Goal: Plan of Care Review  Outcome: Ongoing (interventions implemented as appropriate)  Flowsheets (Taken 12/14/2019 0312)  Progress: improving  Plan of Care Reviewed With: patient  Outcome Summary: pt had no c/o pain this shift; pt has rested well; pt is on home dose of O2 at 3L; VSS; pt is wanting to go home but understands why he is here; safety discussed and maintained; will continue to monitor      Scheduled procedure with Patient at      Telephone Information:   Mobile [phone]      Scheduled Via: Case Request Order for  [facility]  Procedure date: 8/16/21  Procedure time: 7:30 AM  Insurance confirmed as [employer], will be the same at time of procedure?: Yes  Insurance Accepted at Facility? YES    The following have been confirmed:  Latex Allergy No  Diabetic No  Sleep Apnea No  Diuretic/Water pill No  Defibrillator/Pacemaker No  MRSA hx No  Blood thinners: Coumadin (Warfarin) or Plavix No      Aspirin No      Phentermine (diet pill) No  Pre-Op testing required No, Patient informed NA  Prep required? YES [employer] ; Briefly reviewed? YES Prep cost range discussed? NO  If procedure is scheduled 7 days or less, patient was told to  prep letter?: NO
